# Patient Record
Sex: FEMALE | Race: WHITE | NOT HISPANIC OR LATINO | Employment: OTHER | URBAN - NONMETROPOLITAN AREA
[De-identification: names, ages, dates, MRNs, and addresses within clinical notes are randomized per-mention and may not be internally consistent; named-entity substitution may affect disease eponyms.]

---

## 2017-02-24 ENCOUNTER — TELEPHONE (OUTPATIENT)
Dept: UROLOGY | Facility: CLINIC | Age: 77
End: 2017-02-24

## 2017-02-27 ENCOUNTER — TELEPHONE (OUTPATIENT)
Dept: UROLOGY | Facility: CLINIC | Age: 77
End: 2017-02-27

## 2017-02-27 DIAGNOSIS — N39.0 URINARY TRACT INFECTION, SITE UNSPECIFIED: Primary | ICD-10-CM

## 2017-02-27 RX ORDER — PHENAZOPYRIDINE HYDROCHLORIDE 200 MG/1
200 TABLET, FILM COATED ORAL 3 TIMES DAILY PRN
Qty: 30 TABLET | Refills: 0 | Status: SHIPPED | OUTPATIENT
Start: 2017-02-27 | End: 2017-03-09

## 2017-02-28 ENCOUNTER — TELEPHONE (OUTPATIENT)
Dept: UROLOGY | Facility: CLINIC | Age: 77
End: 2017-02-28

## 2017-03-07 ENCOUNTER — OFFICE VISIT (OUTPATIENT)
Dept: UROLOGY | Facility: CLINIC | Age: 77
End: 2017-03-07

## 2017-03-07 VITALS
BODY MASS INDEX: 36.7 KG/M2 | SYSTOLIC BLOOD PRESSURE: 142 MMHG | HEIGHT: 64 IN | WEIGHT: 215 LBS | DIASTOLIC BLOOD PRESSURE: 70 MMHG | TEMPERATURE: 97.1 F

## 2017-03-07 DIAGNOSIS — N30.20 CHRONIC CYSTITIS: ICD-10-CM

## 2017-03-07 DIAGNOSIS — N39.0 URINARY TRACT INFECTION, SITE UNSPECIFIED: Primary | ICD-10-CM

## 2017-03-07 DIAGNOSIS — N20.0 RENAL STONE: ICD-10-CM

## 2017-03-07 LAB
BILIRUB BLD-MCNC: NEGATIVE MG/DL
CLARITY, POC: CLEAR
COLOR UR: YELLOW
GLUCOSE UR STRIP-MCNC: NEGATIVE MG/DL
KETONES UR QL: NEGATIVE
LEUKOCYTE EST, POC: ABNORMAL
NITRITE UR-MCNC: POSITIVE MG/ML
PH UR: 6 [PH] (ref 5–8)
PROT UR STRIP-MCNC: ABNORMAL MG/DL
RBC # UR STRIP: ABNORMAL /UL
SP GR UR: 1.02 (ref 1–1.03)
UROBILINOGEN UR QL: NORMAL

## 2017-03-07 PROCEDURE — 99214 OFFICE O/P EST MOD 30 MIN: CPT | Performed by: UROLOGY

## 2017-03-07 PROCEDURE — 81003 URINALYSIS AUTO W/O SCOPE: CPT | Performed by: UROLOGY

## 2017-04-18 ENCOUNTER — TELEPHONE (OUTPATIENT)
Dept: UROLOGY | Facility: CLINIC | Age: 77
End: 2017-04-18

## 2017-04-25 ENCOUNTER — OFFICE VISIT (OUTPATIENT)
Dept: UROLOGY | Facility: CLINIC | Age: 77
End: 2017-04-25

## 2017-04-25 ENCOUNTER — HOSPITAL ENCOUNTER (OUTPATIENT)
Dept: GENERAL RADIOLOGY | Facility: HOSPITAL | Age: 77
Discharge: HOME | End: 2017-04-25
Attending: UROLOGY | Admitting: UROLOGY

## 2017-04-25 VITALS — WEIGHT: 215 LBS | HEIGHT: 65 IN | BODY MASS INDEX: 35.82 KG/M2 | TEMPERATURE: 97.7 F

## 2017-04-25 DIAGNOSIS — N20.0 RENAL STONE: ICD-10-CM

## 2017-04-25 DIAGNOSIS — N30.20 CHRONIC CYSTITIS: Primary | ICD-10-CM

## 2017-04-25 LAB
BILIRUB BLD-MCNC: NEGATIVE MG/DL
CLARITY, POC: CLEAR
COLOR UR: YELLOW
GLUCOSE UR STRIP-MCNC: NEGATIVE MG/DL
KETONES UR QL: NEGATIVE
LEUKOCYTE EST, POC: ABNORMAL
NITRITE UR-MCNC: NEGATIVE MG/ML
PH UR: 5.5 [PH] (ref 5–8)
PROT UR STRIP-MCNC: NEGATIVE MG/DL
RBC # UR STRIP: ABNORMAL /UL
SP GR UR: 1.02 (ref 1–1.03)
UROBILINOGEN UR QL: NORMAL

## 2017-04-25 PROCEDURE — 74000 HC ABDOMEN KUB: CPT

## 2017-04-25 PROCEDURE — 81003 URINALYSIS AUTO W/O SCOPE: CPT | Performed by: UROLOGY

## 2017-04-25 PROCEDURE — 87088 URINE BACTERIA CULTURE: CPT | Performed by: UROLOGY

## 2017-04-25 PROCEDURE — 87186 SC STD MICRODIL/AGAR DIL: CPT | Performed by: UROLOGY

## 2017-04-25 PROCEDURE — 87086 URINE CULTURE/COLONY COUNT: CPT | Performed by: UROLOGY

## 2017-04-25 PROCEDURE — 99213 OFFICE O/P EST LOW 20 MIN: CPT | Performed by: UROLOGY

## 2017-04-28 LAB — BACTERIA SPEC AEROBE CULT: ABNORMAL

## 2017-05-01 ENCOUNTER — TELEPHONE (OUTPATIENT)
Dept: UROLOGY | Facility: CLINIC | Age: 77
End: 2017-05-01

## 2017-05-05 ENCOUNTER — TELEPHONE (OUTPATIENT)
Dept: UROLOGY | Facility: CLINIC | Age: 77
End: 2017-05-05

## 2017-06-22 ENCOUNTER — HOSPITAL ENCOUNTER (OUTPATIENT)
Dept: ULTRASOUND IMAGING | Age: 77
Discharge: HOME | End: 2017-06-22
Payer: MEDICARE

## 2017-06-22 DIAGNOSIS — R94.5 ABNORMAL RESULTS OF LIVER FUNCTION STUDIES: ICD-10-CM

## 2017-06-22 PROCEDURE — 76705 ECHO EXAM OF ABDOMEN: CPT

## 2017-10-31 ENCOUNTER — HOSPITAL ENCOUNTER (OUTPATIENT)
Dept: GENERAL RADIOLOGY | Facility: HOSPITAL | Age: 77
Discharge: HOME | End: 2017-10-31
Attending: UROLOGY | Admitting: UROLOGY

## 2017-10-31 ENCOUNTER — OFFICE VISIT (OUTPATIENT)
Dept: UROLOGY | Facility: CLINIC | Age: 77
End: 2017-10-31

## 2017-10-31 VITALS — BODY MASS INDEX: 35.82 KG/M2 | TEMPERATURE: 96 F | WEIGHT: 215 LBS | HEIGHT: 65 IN

## 2017-10-31 DIAGNOSIS — N30.20 CHRONIC CYSTITIS: Primary | ICD-10-CM

## 2017-10-31 DIAGNOSIS — N20.0 RENAL STONE: ICD-10-CM

## 2017-10-31 DIAGNOSIS — N30.20 CHRONIC CYSTITIS WITHOUT HEMATURIA: ICD-10-CM

## 2017-10-31 LAB
BILIRUB BLD-MCNC: NEGATIVE MG/DL
CLARITY, POC: CLEAR
COLOR UR: YELLOW
GLUCOSE UR STRIP-MCNC: NEGATIVE MG/DL
KETONES UR QL: NEGATIVE
LEUKOCYTE EST, POC: ABNORMAL
NITRITE UR-MCNC: POSITIVE MG/ML
PH UR: 6 [PH] (ref 5–8)
PROT UR STRIP-MCNC: NEGATIVE MG/DL
RBC # UR STRIP: ABNORMAL /UL
SP GR UR: 1.02 (ref 1–1.03)
UROBILINOGEN UR QL: NORMAL

## 2017-10-31 PROCEDURE — 81003 URINALYSIS AUTO W/O SCOPE: CPT | Performed by: UROLOGY

## 2017-10-31 PROCEDURE — 99213 OFFICE O/P EST LOW 20 MIN: CPT | Performed by: UROLOGY

## 2017-10-31 PROCEDURE — 74000 HC ABDOMEN KUB: CPT

## 2017-10-31 RX ORDER — AMPICILLIN 500 MG/1
CAPSULE ORAL
Refills: 0 | COMMUNITY
Start: 2017-10-14 | End: 2018-05-18

## 2017-10-31 RX ORDER — CLINDAMYCIN HYDROCHLORIDE 300 MG/1
CAPSULE ORAL
Refills: 0 | COMMUNITY
Start: 2017-10-23 | End: 2018-05-18

## 2018-03-26 ENCOUNTER — TELEPHONE (OUTPATIENT)
Dept: NEUROLOGY | Age: 78
End: 2018-03-26

## 2018-05-18 ENCOUNTER — TELEPHONE (OUTPATIENT)
Dept: UROLOGY | Facility: CLINIC | Age: 78
End: 2018-05-18

## 2018-05-18 PROCEDURE — 87086 URINE CULTURE/COLONY COUNT: CPT | Performed by: NURSE PRACTITIONER

## 2018-05-18 PROCEDURE — 87186 SC STD MICRODIL/AGAR DIL: CPT | Performed by: NURSE PRACTITIONER

## 2018-05-18 PROCEDURE — 87088 URINE BACTERIA CULTURE: CPT | Performed by: NURSE PRACTITIONER

## 2018-05-31 ENCOUNTER — LAB (OUTPATIENT)
Dept: LAB | Facility: HOSPITAL | Age: 78
End: 2018-05-31

## 2018-05-31 ENCOUNTER — TRANSCRIBE ORDERS (OUTPATIENT)
Dept: ADMINISTRATIVE | Facility: HOSPITAL | Age: 78
End: 2018-05-31

## 2018-05-31 DIAGNOSIS — R30.0 DYSURIA: ICD-10-CM

## 2018-05-31 DIAGNOSIS — R30.0 DYSURIA: Primary | ICD-10-CM

## 2018-05-31 LAB
BACTERIA UR QL AUTO: ABNORMAL /HPF
BILIRUB UR QL STRIP: ABNORMAL
CLARITY UR: CLEAR
COLOR UR: YELLOW
GLUCOSE UR STRIP-MCNC: ABNORMAL MG/DL
HGB UR QL STRIP.AUTO: ABNORMAL
HYALINE CASTS UR QL AUTO: ABNORMAL /LPF
KETONES UR QL STRIP: NEGATIVE
LEUKOCYTE ESTERASE UR QL STRIP.AUTO: ABNORMAL
MUCOUS THREADS URNS QL MICRO: ABNORMAL /HPF
NITRITE UR QL STRIP: POSITIVE
PH UR STRIP.AUTO: 6.5 [PH] (ref 5–8)
PROT UR QL STRIP: ABNORMAL
RBC # UR: ABNORMAL /HPF
REF LAB TEST METHOD: ABNORMAL
SP GR UR STRIP: 1.02 (ref 1–1.03)
SQUAMOUS #/AREA URNS HPF: ABNORMAL /HPF
UROBILINOGEN UR QL STRIP: ABNORMAL
WBC UR QL AUTO: ABNORMAL /HPF

## 2018-05-31 PROCEDURE — 87186 SC STD MICRODIL/AGAR DIL: CPT | Performed by: NURSE PRACTITIONER

## 2018-05-31 PROCEDURE — 87088 URINE BACTERIA CULTURE: CPT | Performed by: NURSE PRACTITIONER

## 2018-05-31 PROCEDURE — 81001 URINALYSIS AUTO W/SCOPE: CPT

## 2018-05-31 PROCEDURE — 87086 URINE CULTURE/COLONY COUNT: CPT | Performed by: NURSE PRACTITIONER

## 2018-05-31 PROCEDURE — 87077 CULTURE AEROBIC IDENTIFY: CPT | Performed by: NURSE PRACTITIONER

## 2018-06-05 ENCOUNTER — OFFICE VISIT (OUTPATIENT)
Dept: UROLOGY | Facility: CLINIC | Age: 78
End: 2018-06-05

## 2018-06-05 VITALS
DIASTOLIC BLOOD PRESSURE: 70 MMHG | BODY MASS INDEX: 34.75 KG/M2 | SYSTOLIC BLOOD PRESSURE: 132 MMHG | TEMPERATURE: 97.7 F | HEIGHT: 65 IN | WEIGHT: 208.6 LBS

## 2018-06-05 DIAGNOSIS — R10.9 FLANK PAIN: ICD-10-CM

## 2018-06-05 DIAGNOSIS — N20.0 RENAL STONE: ICD-10-CM

## 2018-06-05 DIAGNOSIS — N30.20 CHRONIC CYSTITIS: Primary | ICD-10-CM

## 2018-06-05 LAB
BACTERIA SPEC AEROBE CULT: ABNORMAL
BILIRUB BLD-MCNC: NEGATIVE MG/DL
CLARITY, POC: CLEAR
COLOR UR: YELLOW
GLUCOSE UR STRIP-MCNC: NEGATIVE MG/DL
KETONES UR QL: NEGATIVE
LEUKOCYTE EST, POC: ABNORMAL
NITRITE UR-MCNC: POSITIVE MG/ML
PH UR: 6 [PH] (ref 5–8)
PROT UR STRIP-MCNC: NEGATIVE MG/DL
RBC # UR STRIP: ABNORMAL /UL
SP GR UR: 1.02 (ref 1–1.03)
UROBILINOGEN UR QL: NORMAL

## 2018-06-05 PROCEDURE — 99214 OFFICE O/P EST MOD 30 MIN: CPT | Performed by: UROLOGY

## 2018-06-05 PROCEDURE — 81001 URINALYSIS AUTO W/SCOPE: CPT | Performed by: UROLOGY

## 2018-06-05 RX ORDER — LEVOTHYROXINE SODIUM 0.1 MG/1
100 TABLET ORAL DAILY
COMMUNITY

## 2018-06-05 RX ORDER — NITROFURANTOIN 25; 75 MG/1; MG/1
100 CAPSULE ORAL 2 TIMES DAILY
COMMUNITY
End: 2018-07-30

## 2018-06-05 RX ORDER — CETIRIZINE HYDROCHLORIDE 10 MG/1
10 TABLET ORAL DAILY
COMMUNITY

## 2018-06-06 ENCOUNTER — TELEPHONE (OUTPATIENT)
Dept: UROLOGY | Facility: CLINIC | Age: 78
End: 2018-06-06

## 2018-06-07 ENCOUNTER — TELEPHONE (OUTPATIENT)
Dept: UROLOGY | Facility: CLINIC | Age: 78
End: 2018-06-07

## 2018-06-08 ENCOUNTER — APPOINTMENT (OUTPATIENT)
Dept: ONCOLOGY | Facility: HOSPITAL | Age: 78
End: 2018-06-08

## 2018-06-09 ENCOUNTER — APPOINTMENT (OUTPATIENT)
Dept: ONCOLOGY | Facility: HOSPITAL | Age: 78
End: 2018-06-09

## 2018-06-10 ENCOUNTER — APPOINTMENT (OUTPATIENT)
Dept: ONCOLOGY | Facility: HOSPITAL | Age: 78
End: 2018-06-10

## 2018-06-11 ENCOUNTER — APPOINTMENT (OUTPATIENT)
Dept: ONCOLOGY | Facility: HOSPITAL | Age: 78
End: 2018-06-11

## 2018-06-12 ENCOUNTER — APPOINTMENT (OUTPATIENT)
Dept: ONCOLOGY | Facility: HOSPITAL | Age: 78
End: 2018-06-12

## 2018-06-18 ENCOUNTER — TELEPHONE (OUTPATIENT)
Dept: UROLOGY | Facility: CLINIC | Age: 78
End: 2018-06-18

## 2018-07-13 ENCOUNTER — PROCEDURE VISIT (OUTPATIENT)
Dept: UROLOGY | Facility: CLINIC | Age: 78
End: 2018-07-13

## 2018-07-13 DIAGNOSIS — N30.20 CHRONIC CYSTITIS: Primary | ICD-10-CM

## 2018-07-13 PROCEDURE — 87077 CULTURE AEROBIC IDENTIFY: CPT | Performed by: UROLOGY

## 2018-07-13 PROCEDURE — 87186 SC STD MICRODIL/AGAR DIL: CPT | Performed by: UROLOGY

## 2018-07-13 PROCEDURE — 87088 URINE BACTERIA CULTURE: CPT | Performed by: UROLOGY

## 2018-07-13 PROCEDURE — 87086 URINE CULTURE/COLONY COUNT: CPT | Performed by: UROLOGY

## 2018-07-13 PROCEDURE — 99024 POSTOP FOLLOW-UP VISIT: CPT | Performed by: UROLOGY

## 2018-07-16 ENCOUNTER — TELEPHONE (OUTPATIENT)
Dept: UROLOGY | Facility: CLINIC | Age: 78
End: 2018-07-16

## 2018-07-17 LAB
BACTERIA SPEC AEROBE CULT: ABNORMAL

## 2018-07-20 ENCOUNTER — PROCEDURE VISIT (OUTPATIENT)
Dept: UROLOGY | Facility: CLINIC | Age: 78
End: 2018-07-20

## 2018-07-20 DIAGNOSIS — N30.20 CHRONIC CYSTITIS: Primary | ICD-10-CM

## 2018-07-20 PROCEDURE — 87088 URINE BACTERIA CULTURE: CPT | Performed by: UROLOGY

## 2018-07-20 PROCEDURE — 87186 SC STD MICRODIL/AGAR DIL: CPT | Performed by: UROLOGY

## 2018-07-20 PROCEDURE — 87077 CULTURE AEROBIC IDENTIFY: CPT | Performed by: UROLOGY

## 2018-07-20 PROCEDURE — 87086 URINE CULTURE/COLONY COUNT: CPT | Performed by: UROLOGY

## 2018-07-23 LAB
BACTERIA SPEC AEROBE CULT: ABNORMAL

## 2018-07-30 PROCEDURE — 87086 URINE CULTURE/COLONY COUNT: CPT | Performed by: NURSE PRACTITIONER

## 2018-07-30 PROCEDURE — 87077 CULTURE AEROBIC IDENTIFY: CPT | Performed by: NURSE PRACTITIONER

## 2018-07-30 PROCEDURE — 87088 URINE BACTERIA CULTURE: CPT | Performed by: NURSE PRACTITIONER

## 2018-07-30 PROCEDURE — 87181 SC STD AGAR DILUTION PER AGT: CPT | Performed by: NURSE PRACTITIONER

## 2018-07-30 PROCEDURE — 87186 SC STD MICRODIL/AGAR DIL: CPT | Performed by: NURSE PRACTITIONER

## 2018-08-02 ENCOUNTER — TELEPHONE (OUTPATIENT)
Dept: URGENT CARE | Facility: CLINIC | Age: 78
End: 2018-08-02

## 2018-08-02 DIAGNOSIS — N39.0 UTI (URINARY TRACT INFECTION), UNCOMPLICATED: Primary | ICD-10-CM

## 2018-08-02 RX ORDER — LEVOFLOXACIN 500 MG/1
500 TABLET, FILM COATED ORAL DAILY
Qty: 7 TABLET | Refills: 0 | Status: SHIPPED | OUTPATIENT
Start: 2018-08-02 | End: 2018-08-09

## 2018-08-14 PROCEDURE — 87088 URINE BACTERIA CULTURE: CPT | Performed by: FAMILY MEDICINE

## 2018-08-14 PROCEDURE — 87186 SC STD MICRODIL/AGAR DIL: CPT | Performed by: FAMILY MEDICINE

## 2018-08-14 PROCEDURE — 87086 URINE CULTURE/COLONY COUNT: CPT | Performed by: FAMILY MEDICINE

## 2018-08-21 ENCOUNTER — TELEPHONE (OUTPATIENT)
Dept: UROLOGY | Facility: CLINIC | Age: 78
End: 2018-08-21

## 2018-08-27 ENCOUNTER — OFFICE VISIT (OUTPATIENT)
Dept: NEUROLOGY | Age: 78
End: 2018-08-27
Payer: MEDICARE

## 2018-08-27 VITALS
SYSTOLIC BLOOD PRESSURE: 117 MMHG | WEIGHT: 208 LBS | BODY MASS INDEX: 34.66 KG/M2 | DIASTOLIC BLOOD PRESSURE: 70 MMHG | HEIGHT: 65 IN | HEART RATE: 78 BPM

## 2018-08-27 DIAGNOSIS — G89.29 CHRONIC BILATERAL LOW BACK PAIN WITH BILATERAL SCIATICA: ICD-10-CM

## 2018-08-27 DIAGNOSIS — M54.41 CHRONIC BILATERAL LOW BACK PAIN WITH BILATERAL SCIATICA: ICD-10-CM

## 2018-08-27 DIAGNOSIS — M54.42 CHRONIC BILATERAL LOW BACK PAIN WITH BILATERAL SCIATICA: ICD-10-CM

## 2018-08-27 DIAGNOSIS — M54.2 CERVICALGIA: ICD-10-CM

## 2018-08-27 DIAGNOSIS — G60.9 IDIOPATHIC PERIPHERAL NEUROPATHY: Primary | ICD-10-CM

## 2018-08-27 PROCEDURE — 4004F PT TOBACCO SCREEN RCVD TLK: CPT | Performed by: PSYCHIATRY & NEUROLOGY

## 2018-08-27 PROCEDURE — 1090F PRES/ABSN URINE INCON ASSESS: CPT | Performed by: PSYCHIATRY & NEUROLOGY

## 2018-08-27 PROCEDURE — G8427 DOCREV CUR MEDS BY ELIG CLIN: HCPCS | Performed by: PSYCHIATRY & NEUROLOGY

## 2018-08-27 PROCEDURE — 99203 OFFICE O/P NEW LOW 30 MIN: CPT | Performed by: PSYCHIATRY & NEUROLOGY

## 2018-08-27 PROCEDURE — 1123F ACP DISCUSS/DSCN MKR DOCD: CPT | Performed by: PSYCHIATRY & NEUROLOGY

## 2018-08-27 PROCEDURE — G8400 PT W/DXA NO RESULTS DOC: HCPCS | Performed by: PSYCHIATRY & NEUROLOGY

## 2018-08-27 PROCEDURE — 1101F PT FALLS ASSESS-DOCD LE1/YR: CPT | Performed by: PSYCHIATRY & NEUROLOGY

## 2018-08-27 PROCEDURE — 4040F PNEUMOC VAC/ADMIN/RCVD: CPT | Performed by: PSYCHIATRY & NEUROLOGY

## 2018-08-27 PROCEDURE — G8417 CALC BMI ABV UP PARAM F/U: HCPCS | Performed by: PSYCHIATRY & NEUROLOGY

## 2018-08-27 RX ORDER — OXYCODONE HYDROCHLORIDE 10 MG/1
10 TABLET ORAL EVERY 6 HOURS
COMMUNITY
End: 2020-01-01

## 2018-08-27 RX ORDER — GABAPENTIN 100 MG/1
300 CAPSULE ORAL 3 TIMES DAILY
Status: ON HOLD | COMMUNITY
End: 2020-01-01 | Stop reason: SDUPTHER

## 2018-08-27 RX ORDER — NAPROXEN 250 MG/1
250 TABLET ORAL 2 TIMES DAILY
COMMUNITY
End: 2020-01-01

## 2018-08-27 RX ORDER — LEVOTHYROXINE SODIUM 137 UG/1
137 TABLET ORAL DAILY
COMMUNITY
End: 2020-01-01

## 2018-08-27 RX ORDER — LISINOPRIL AND HYDROCHLOROTHIAZIDE 12.5; 1 MG/1; MG/1
1 TABLET ORAL DAILY
COMMUNITY
End: 2020-01-01

## 2018-08-27 RX ORDER — LISINOPRIL AND HYDROCHLOROTHIAZIDE 20; 12.5 MG/1; MG/1
20 TABLET ORAL DAILY
COMMUNITY
End: 2020-01-01

## 2018-08-27 RX ORDER — CETIRIZINE HYDROCHLORIDE 10 MG/1
10 TABLET ORAL DAILY
Status: ON HOLD | COMMUNITY
End: 2020-01-01 | Stop reason: HOSPADM

## 2018-08-30 ENCOUNTER — OFFICE VISIT (OUTPATIENT)
Dept: UROLOGY | Facility: CLINIC | Age: 78
End: 2018-08-30

## 2018-08-30 ENCOUNTER — HOSPITAL ENCOUNTER (OUTPATIENT)
Dept: GENERAL RADIOLOGY | Facility: HOSPITAL | Age: 78
Discharge: HOME | End: 2018-08-30
Attending: UROLOGY | Admitting: UROLOGY

## 2018-08-30 VITALS
SYSTOLIC BLOOD PRESSURE: 142 MMHG | DIASTOLIC BLOOD PRESSURE: 78 MMHG | HEIGHT: 65 IN | BODY MASS INDEX: 33.82 KG/M2 | WEIGHT: 203 LBS | TEMPERATURE: 97.7 F

## 2018-08-30 DIAGNOSIS — N20.0 RENAL STONE: ICD-10-CM

## 2018-08-30 DIAGNOSIS — N30.00 ACUTE CYSTITIS WITHOUT HEMATURIA: ICD-10-CM

## 2018-08-30 DIAGNOSIS — N30.20 CHRONIC CYSTITIS WITHOUT HEMATURIA: ICD-10-CM

## 2018-08-30 DIAGNOSIS — R10.9 FLANK PAIN: ICD-10-CM

## 2018-08-30 DIAGNOSIS — N30.20 CHRONIC CYSTITIS: Primary | ICD-10-CM

## 2018-08-30 LAB
BILIRUB BLD-MCNC: NEGATIVE MG/DL
CLARITY, POC: CLEAR
COLOR UR: YELLOW
GLUCOSE UR STRIP-MCNC: NEGATIVE MG/DL
KETONES UR QL: NEGATIVE
LEUKOCYTE EST, POC: ABNORMAL
NITRITE UR-MCNC: NEGATIVE MG/ML
PH UR: 6.5 [PH] (ref 5–8)
PROT UR STRIP-MCNC: NEGATIVE MG/DL
RBC # UR STRIP: ABNORMAL /UL
SP GR UR: 1.02 (ref 1–1.03)
UROBILINOGEN UR QL: NORMAL

## 2018-08-30 PROCEDURE — 99213 OFFICE O/P EST LOW 20 MIN: CPT | Performed by: UROLOGY

## 2018-08-30 PROCEDURE — 81001 URINALYSIS AUTO W/SCOPE: CPT | Performed by: UROLOGY

## 2018-08-30 PROCEDURE — 74018 RADEX ABDOMEN 1 VIEW: CPT

## 2018-08-31 ENCOUNTER — TELEPHONE (OUTPATIENT)
Dept: NEUROLOGY | Age: 78
End: 2018-08-31

## 2018-09-07 ENCOUNTER — HOSPITAL ENCOUNTER (OUTPATIENT)
Dept: NEUROLOGY | Age: 78
Discharge: HOME | End: 2018-09-07
Payer: MEDICARE

## 2018-09-07 DIAGNOSIS — G60.9 IDIOPATHIC PERIPHERAL NEUROPATHY: ICD-10-CM

## 2018-09-13 ENCOUNTER — NURSE TRIAGE (OUTPATIENT)
Dept: CALL CENTER | Facility: HOSPITAL | Age: 78
End: 2018-09-13

## 2018-09-21 ENCOUNTER — TELEPHONE (OUTPATIENT)
Dept: URGENT CARE | Facility: CLINIC | Age: 78
End: 2018-09-21

## 2018-09-21 ENCOUNTER — HOSPITAL ENCOUNTER (OUTPATIENT)
Dept: NEUROLOGY | Age: 78
Discharge: HOME | End: 2018-09-21
Payer: MEDICARE

## 2018-09-21 PROCEDURE — 95886 MUSC TEST DONE W/N TEST COMP: CPT

## 2018-09-21 PROCEDURE — 95886 MUSC TEST DONE W/N TEST COMP: CPT | Performed by: PSYCHIATRY & NEUROLOGY

## 2018-09-21 PROCEDURE — 95911 NRV CNDJ TEST 9-10 STUDIES: CPT

## 2018-09-21 PROCEDURE — 95911 NRV CNDJ TEST 9-10 STUDIES: CPT | Performed by: PSYCHIATRY & NEUROLOGY

## 2018-09-25 ENCOUNTER — TELEPHONE (OUTPATIENT)
Dept: UROLOGY | Facility: CLINIC | Age: 78
End: 2018-09-25

## 2018-09-26 ENCOUNTER — TELEPHONE (OUTPATIENT)
Dept: NEUROLOGY | Age: 78
End: 2018-09-26

## 2018-09-26 DIAGNOSIS — G60.9 IDIOPATHIC PERIPHERAL NEUROPATHY: Primary | ICD-10-CM

## 2018-09-26 ASSESSMENT — ENCOUNTER SYMPTOMS
BLURRED VISION: 1
BACK PAIN: 1
DOUBLE VISION: 0
NAUSEA: 1
CONSTIPATION: 1
SHORTNESS OF BREATH: 0
ABDOMINAL PAIN: 0
VOMITING: 0
DIARRHEA: 0
SPUTUM PRODUCTION: 0
HEMOPTYSIS: 0
COUGH: 0
BLOOD IN STOOL: 0

## 2018-10-02 DIAGNOSIS — N20.0 RENAL STONE: Primary | ICD-10-CM

## 2018-10-05 DIAGNOSIS — G60.9 IDIOPATHIC PERIPHERAL NEUROPATHY: ICD-10-CM

## 2018-10-05 LAB
FOLATE: >20 NG/ML (ref 4.8–37.3)
SEDIMENTATION RATE, ERYTHROCYTE: 28 MM/HR (ref 0–25)
VITAMIN B-12: >2000 PG/ML (ref 211–946)

## 2018-10-08 LAB — ANA IGG, ELISA: NORMAL

## 2018-10-09 LAB
+IMM: NORMAL
ALBUMIN SERPL-MCNC: 4.54 G/DL (ref 3.75–5.01)
ALPHA-1-GLOBULIN: 0.35 G/DL (ref 0.19–0.46)
ALPHA-2-GLOBULIN: 0.82 G/DL (ref 0.48–1.05)
BETA GLOBULIN: 0.94 G/DL (ref 0.48–1.1)
GAMMA GLOBULIN: 0.94 G/DL (ref 0.62–1.51)
IGA: 198 MG/DL (ref 68–408)
IGG: 820 MG/DL (ref 768–1632)
IGM: 40 MG/DL (ref 35–263)
SPE/IFE INTERPRETATION: NORMAL
TOTAL PROTEIN: 7.6 G/DL (ref 6–8.3)

## 2018-10-10 ENCOUNTER — TELEPHONE (OUTPATIENT)
Dept: NEUROLOGY | Age: 78
End: 2018-10-10

## 2018-10-23 ENCOUNTER — OFFICE VISIT (OUTPATIENT)
Dept: OBSTETRICS AND GYNECOLOGY | Facility: CLINIC | Age: 78
End: 2018-10-23

## 2018-10-23 VITALS
DIASTOLIC BLOOD PRESSURE: 60 MMHG | BODY MASS INDEX: 33.82 KG/M2 | HEIGHT: 65 IN | SYSTOLIC BLOOD PRESSURE: 130 MMHG | WEIGHT: 203 LBS

## 2018-10-23 DIAGNOSIS — N90.89 VULVAR IRRITATION: ICD-10-CM

## 2018-10-23 DIAGNOSIS — N81.10 FEMALE CYSTOCELE: Primary | ICD-10-CM

## 2018-10-23 DIAGNOSIS — R35.0 FREQUENCY OF URINATION: ICD-10-CM

## 2018-10-23 DIAGNOSIS — Z87.891 FORMER SMOKER: ICD-10-CM

## 2018-10-23 DIAGNOSIS — Z78.0 MENOPAUSE: ICD-10-CM

## 2018-10-23 DIAGNOSIS — N39.0 RECURRENT UTI: ICD-10-CM

## 2018-10-23 DIAGNOSIS — Z01.411 ENCOUNTER FOR GYNECOLOGICAL EXAMINATION WITH ABNORMAL FINDING: ICD-10-CM

## 2018-10-23 PROCEDURE — G0101 CA SCREEN;PELVIC/BREAST EXAM: HCPCS | Performed by: OBSTETRICS & GYNECOLOGY

## 2018-10-23 PROCEDURE — 99213 OFFICE O/P EST LOW 20 MIN: CPT | Performed by: OBSTETRICS & GYNECOLOGY

## 2018-10-23 RX ORDER — HYDROXYZINE PAMOATE 25 MG/1
CAPSULE ORAL
Refills: 1 | Status: ON HOLD | COMMUNITY
Start: 2018-10-08 | End: 2018-11-26

## 2018-10-26 LAB
BACTERIA UR CULT: ABNORMAL
BACTERIA UR CULT: ABNORMAL
OTHER ANTIBIOTIC SUSC ISLT: ABNORMAL

## 2018-10-30 RX ORDER — CIPROFLOXACIN 250 MG/1
250 TABLET, FILM COATED ORAL 2 TIMES DAILY
Qty: 10 TABLET | Refills: 0 | Status: ON HOLD | OUTPATIENT
Start: 2018-10-30 | End: 2018-11-26

## 2018-11-02 ENCOUNTER — TELEPHONE (OUTPATIENT)
Dept: UROLOGY | Facility: CLINIC | Age: 78
End: 2018-11-02

## 2018-11-12 DIAGNOSIS — R30.0 DYSURIA: Primary | ICD-10-CM

## 2018-11-17 LAB
BACTERIA UR CULT: ABNORMAL
BACTERIA UR CULT: ABNORMAL
OTHER ANTIBIOTIC SUSC ISLT: ABNORMAL

## 2018-11-19 ENCOUNTER — RESULTS ENCOUNTER (OUTPATIENT)
Dept: OBSTETRICS AND GYNECOLOGY | Facility: CLINIC | Age: 78
End: 2018-11-19

## 2018-11-19 DIAGNOSIS — N30.00 ACUTE CYSTITIS WITHOUT HEMATURIA: Primary | ICD-10-CM

## 2018-11-19 DIAGNOSIS — N30.00 ACUTE CYSTITIS WITHOUT HEMATURIA: ICD-10-CM

## 2018-11-19 RX ORDER — NITROFURANTOIN 25; 75 MG/1; MG/1
100 CAPSULE ORAL 2 TIMES DAILY
Qty: 10 CAPSULE | Refills: 0 | Status: ON HOLD | OUTPATIENT
Start: 2018-11-19 | End: 2018-11-26

## 2018-11-25 ENCOUNTER — ANESTHESIA (OUTPATIENT)
Dept: PERIOP | Facility: HOSPITAL | Age: 78
End: 2018-11-25

## 2018-11-25 ENCOUNTER — APPOINTMENT (OUTPATIENT)
Dept: GENERAL RADIOLOGY | Facility: HOSPITAL | Age: 78
End: 2018-11-25

## 2018-11-25 ENCOUNTER — APPOINTMENT (OUTPATIENT)
Dept: CT IMAGING | Facility: HOSPITAL | Age: 78
End: 2018-11-25

## 2018-11-25 ENCOUNTER — ANESTHESIA EVENT (OUTPATIENT)
Dept: PERIOP | Facility: HOSPITAL | Age: 78
End: 2018-11-25

## 2018-11-25 ENCOUNTER — HOSPITAL ENCOUNTER (INPATIENT)
Facility: HOSPITAL | Age: 78
LOS: 6 days | Discharge: NURSING, REHAB OR LONG-TERM CARE | End: 2018-12-01
Attending: EMERGENCY MEDICINE | Admitting: UROLOGY

## 2018-11-25 DIAGNOSIS — R26.9 GAIT ABNORMALITY: ICD-10-CM

## 2018-11-25 DIAGNOSIS — Z78.9 IMPAIRED MOBILITY AND ADLS: ICD-10-CM

## 2018-11-25 DIAGNOSIS — Z74.09 IMPAIRED MOBILITY AND ADLS: ICD-10-CM

## 2018-11-25 DIAGNOSIS — N15.9 KIDNEY INFECTION: ICD-10-CM

## 2018-11-25 DIAGNOSIS — R10.9 FLANK PAIN: ICD-10-CM

## 2018-11-25 DIAGNOSIS — N20.0 RENAL STONE: Primary | ICD-10-CM

## 2018-11-25 PROBLEM — N30.01 ACUTE CYSTITIS WITH HEMATURIA: Status: ACTIVE | Noted: 2018-11-25

## 2018-11-25 LAB
ALBUMIN SERPL-MCNC: 3.7 G/DL (ref 3.5–5)
ALBUMIN/GLOB SERPL: 1.1 G/DL (ref 1.1–2.5)
ALP SERPL-CCNC: 146 U/L (ref 24–120)
ALT SERPL W P-5'-P-CCNC: 37 U/L (ref 0–54)
ANION GAP SERPL CALCULATED.3IONS-SCNC: 11 MMOL/L (ref 4–13)
AST SERPL-CCNC: 66 U/L (ref 7–45)
BACTERIA UR QL AUTO: ABNORMAL /HPF
BASOPHILS # BLD AUTO: 0.07 10*3/MM3 (ref 0–0.2)
BASOPHILS NFR BLD AUTO: 0.4 % (ref 0–2)
BILIRUB SERPL-MCNC: 0.6 MG/DL (ref 0.1–1)
BILIRUB UR QL STRIP: NEGATIVE
BUN BLD-MCNC: 56 MG/DL (ref 5–21)
BUN/CREAT SERPL: 31.8 (ref 7–25)
CALCIUM SPEC-SCNC: 9.8 MG/DL (ref 8.4–10.4)
CHLORIDE SERPL-SCNC: 97 MMOL/L (ref 98–110)
CLARITY UR: ABNORMAL
CO2 SERPL-SCNC: 29 MMOL/L (ref 24–31)
COARSE GRAN CASTS URNS QL MICRO: ABNORMAL /LPF
COLOR UR: YELLOW
CREAT BLD-MCNC: 1.76 MG/DL (ref 0.5–1.4)
D-LACTATE SERPL-SCNC: 1 MMOL/L (ref 0.5–2)
D-LACTATE SERPL-SCNC: 1.2 MMOL/L (ref 0.5–2)
DEPRECATED RDW RBC AUTO: 51.3 FL (ref 40–54)
EOSINOPHIL # BLD AUTO: 0 10*3/MM3 (ref 0–0.7)
EOSINOPHIL NFR BLD AUTO: 0 % (ref 0–4)
ERYTHROCYTE [DISTWIDTH] IN BLOOD BY AUTOMATED COUNT: 15 % (ref 12–15)
GFR SERPL CREATININE-BSD FRML MDRD: 28 ML/MIN/1.73
GLOBULIN UR ELPH-MCNC: 3.4 GM/DL
GLUCOSE BLD-MCNC: 127 MG/DL (ref 70–100)
GLUCOSE UR STRIP-MCNC: NEGATIVE MG/DL
HCT VFR BLD AUTO: 36.6 % (ref 37–47)
HGB BLD-MCNC: 12.1 G/DL (ref 12–16)
HGB UR QL STRIP.AUTO: ABNORMAL
HYALINE CASTS UR QL AUTO: ABNORMAL /LPF
IMM GRANULOCYTES # BLD: 0.14 10*3/MM3 (ref 0–0.03)
IMM GRANULOCYTES NFR BLD: 0.8 % (ref 0–5)
KETONES UR QL STRIP: ABNORMAL
LEUKOCYTE ESTERASE UR QL STRIP.AUTO: ABNORMAL
LYMPHOCYTES # BLD AUTO: 0.86 10*3/MM3 (ref 0.72–4.86)
LYMPHOCYTES NFR BLD AUTO: 5 % (ref 15–45)
MCH RBC QN AUTO: 30.6 PG (ref 28–32)
MCHC RBC AUTO-ENTMCNC: 33.1 G/DL (ref 33–36)
MCV RBC AUTO: 92.7 FL (ref 82–98)
MONOCYTES # BLD AUTO: 1.04 10*3/MM3 (ref 0.19–1.3)
MONOCYTES NFR BLD AUTO: 6 % (ref 4–12)
NEUTROPHILS # BLD AUTO: 15.12 10*3/MM3 (ref 1.87–8.4)
NEUTROPHILS NFR BLD AUTO: 87.8 % (ref 39–78)
NITRITE UR QL STRIP: POSITIVE
NRBC BLD MANUAL-RTO: 0.1 /100 WBC (ref 0–0)
PH UR STRIP.AUTO: 6 [PH] (ref 5–8)
PLATELET # BLD AUTO: 282 10*3/MM3 (ref 130–400)
PMV BLD AUTO: 10.4 FL (ref 6–12)
POTASSIUM BLD-SCNC: 3.6 MMOL/L (ref 3.5–5.3)
PROT SERPL-MCNC: 7.1 G/DL (ref 6.3–8.7)
PROT UR QL STRIP: ABNORMAL
RBC # BLD AUTO: 3.95 10*6/MM3 (ref 4.2–5.4)
RBC # UR: ABNORMAL /HPF
REF LAB TEST METHOD: ABNORMAL
SODIUM BLD-SCNC: 137 MMOL/L (ref 135–145)
SP GR UR STRIP: 1.02 (ref 1–1.03)
SQUAMOUS #/AREA URNS HPF: ABNORMAL /HPF
UROBILINOGEN UR QL STRIP: ABNORMAL
WBC NRBC COR # BLD: 17.23 10*3/MM3 (ref 4.8–10.8)
WBC UR QL AUTO: ABNORMAL /HPF

## 2018-11-25 PROCEDURE — 99223 1ST HOSP IP/OBS HIGH 75: CPT | Performed by: UROLOGY

## 2018-11-25 PROCEDURE — 70450 CT HEAD/BRAIN W/O DYE: CPT

## 2018-11-25 PROCEDURE — 81001 URINALYSIS AUTO W/SCOPE: CPT | Performed by: EMERGENCY MEDICINE

## 2018-11-25 PROCEDURE — 74176 CT ABD & PELVIS W/O CONTRAST: CPT

## 2018-11-25 PROCEDURE — 85025 COMPLETE CBC W/AUTO DIFF WBC: CPT | Performed by: EMERGENCY MEDICINE

## 2018-11-25 PROCEDURE — C1758 CATHETER, URETERAL: HCPCS | Performed by: UROLOGY

## 2018-11-25 PROCEDURE — 25010000002 IOPAMIDOL 61 % SOLUTION: Performed by: UROLOGY

## 2018-11-25 PROCEDURE — 87086 URINE CULTURE/COLONY COUNT: CPT | Performed by: UROLOGY

## 2018-11-25 PROCEDURE — 87086 URINE CULTURE/COLONY COUNT: CPT | Performed by: EMERGENCY MEDICINE

## 2018-11-25 PROCEDURE — 99284 EMERGENCY DEPT VISIT MOD MDM: CPT

## 2018-11-25 PROCEDURE — BT1D1ZZ FLUOROSCOPY OF RIGHT KIDNEY, URETER AND BLADDER USING LOW OSMOLAR CONTRAST: ICD-10-PCS | Performed by: UROLOGY

## 2018-11-25 PROCEDURE — 87088 URINE BACTERIA CULTURE: CPT | Performed by: EMERGENCY MEDICINE

## 2018-11-25 PROCEDURE — 25010000002 FENTANYL CITRATE (PF) 100 MCG/2ML SOLUTION: Performed by: NURSE ANESTHETIST, CERTIFIED REGISTERED

## 2018-11-25 PROCEDURE — 87040 BLOOD CULTURE FOR BACTERIA: CPT | Performed by: INTERNAL MEDICINE

## 2018-11-25 PROCEDURE — 87088 URINE BACTERIA CULTURE: CPT | Performed by: UROLOGY

## 2018-11-25 PROCEDURE — 25010000002 PROPOFOL 10 MG/ML EMULSION: Performed by: NURSE ANESTHETIST, CERTIFIED REGISTERED

## 2018-11-25 PROCEDURE — 87186 SC STD MICRODIL/AGAR DIL: CPT | Performed by: EMERGENCY MEDICINE

## 2018-11-25 PROCEDURE — C1769 GUIDE WIRE: HCPCS | Performed by: UROLOGY

## 2018-11-25 PROCEDURE — 93005 ELECTROCARDIOGRAM TRACING: CPT | Performed by: NURSE ANESTHETIST, CERTIFIED REGISTERED

## 2018-11-25 PROCEDURE — 25010000002 ONDANSETRON PER 1 MG: Performed by: NURSE ANESTHETIST, CERTIFIED REGISTERED

## 2018-11-25 PROCEDURE — 93010 ELECTROCARDIOGRAM REPORT: CPT | Performed by: INTERNAL MEDICINE

## 2018-11-25 PROCEDURE — 74420 UROGRAPHY RTRGR +-KUB: CPT

## 2018-11-25 PROCEDURE — 25010000002 DEXAMETHASONE PER 1 MG: Performed by: NURSE ANESTHETIST, CERTIFIED REGISTERED

## 2018-11-25 PROCEDURE — 80053 COMPREHEN METABOLIC PANEL: CPT | Performed by: EMERGENCY MEDICINE

## 2018-11-25 PROCEDURE — 0T738DZ DILATION OF RIGHT KIDNEY PELVIS WITH INTRALUMINAL DEVICE, VIA NATURAL OR ARTIFICIAL OPENING ENDOSCOPIC: ICD-10-PCS | Performed by: UROLOGY

## 2018-11-25 PROCEDURE — 25010000003 MORPHINE 5 MG/ML SOLUTION: Performed by: EMERGENCY MEDICINE

## 2018-11-25 PROCEDURE — 52332 CYSTOSCOPY AND TREATMENT: CPT | Performed by: UROLOGY

## 2018-11-25 PROCEDURE — 83605 ASSAY OF LACTIC ACID: CPT | Performed by: INTERNAL MEDICINE

## 2018-11-25 PROCEDURE — C2617 STENT, NON-COR, TEM W/O DEL: HCPCS | Performed by: UROLOGY

## 2018-11-25 PROCEDURE — 25010000002 CEFTRIAXONE PER 250 MG: Performed by: EMERGENCY MEDICINE

## 2018-11-25 PROCEDURE — 83605 ASSAY OF LACTIC ACID: CPT | Performed by: EMERGENCY MEDICINE

## 2018-11-25 DEVICE — URETERAL STENT
Type: IMPLANTABLE DEVICE | Status: FUNCTIONAL
Brand: PERCUFLEX™ PLUS

## 2018-11-25 RX ORDER — SODIUM CHLORIDE, SODIUM LACTATE, POTASSIUM CHLORIDE, CALCIUM CHLORIDE 600; 310; 30; 20 MG/100ML; MG/100ML; MG/100ML; MG/100ML
100 INJECTION, SOLUTION INTRAVENOUS CONTINUOUS
Status: DISCONTINUED | OUTPATIENT
Start: 2018-11-25 | End: 2018-11-29

## 2018-11-25 RX ORDER — OXYCODONE HYDROCHLORIDE 15 MG/1
15 TABLET, FILM COATED, EXTENDED RELEASE ORAL EVERY 12 HOURS SCHEDULED
Status: DISCONTINUED | OUTPATIENT
Start: 2018-11-26 | End: 2018-11-26 | Stop reason: SDUPTHER

## 2018-11-25 RX ORDER — ACETAMINOPHEN 500 MG
1000 TABLET ORAL ONCE
Status: CANCELLED | OUTPATIENT
Start: 2018-11-25 | End: 2018-11-25

## 2018-11-25 RX ORDER — LABETALOL HYDROCHLORIDE 5 MG/ML
5 INJECTION, SOLUTION INTRAVENOUS
Status: DISCONTINUED | OUTPATIENT
Start: 2018-11-25 | End: 2018-11-25 | Stop reason: HOSPADM

## 2018-11-25 RX ORDER — MORPHINE SULFATE 5 MG/ML
4 INJECTION, SOLUTION INTRAMUSCULAR; INTRAVENOUS ONCE
Status: COMPLETED | OUTPATIENT
Start: 2018-11-25 | End: 2018-11-25

## 2018-11-25 RX ORDER — OXYCODONE AND ACETAMINOPHEN 10; 325 MG/1; MG/1
1 TABLET ORAL ONCE AS NEEDED
Status: DISCONTINUED | OUTPATIENT
Start: 2018-11-25 | End: 2018-11-25 | Stop reason: HOSPADM

## 2018-11-25 RX ORDER — MEPERIDINE HYDROCHLORIDE 25 MG/ML
12.5 INJECTION INTRAMUSCULAR; INTRAVENOUS; SUBCUTANEOUS
Status: DISCONTINUED | OUTPATIENT
Start: 2018-11-25 | End: 2018-11-25 | Stop reason: HOSPADM

## 2018-11-25 RX ORDER — GABAPENTIN 100 MG/1
100 CAPSULE ORAL 3 TIMES DAILY PRN
Status: DISCONTINUED | OUTPATIENT
Start: 2018-11-25 | End: 2018-12-01 | Stop reason: HOSPADM

## 2018-11-25 RX ORDER — PROPOFOL 10 MG/ML
VIAL (ML) INTRAVENOUS AS NEEDED
Status: DISCONTINUED | OUTPATIENT
Start: 2018-11-25 | End: 2018-11-25 | Stop reason: SURG

## 2018-11-25 RX ORDER — OXYCODONE AND ACETAMINOPHEN 10; 325 MG/1; MG/1
1 TABLET ORAL EVERY 6 HOURS PRN
Status: ON HOLD | COMMUNITY
End: 2018-12-01 | Stop reason: SDUPTHER

## 2018-11-25 RX ORDER — ONDANSETRON 2 MG/ML
INJECTION INTRAMUSCULAR; INTRAVENOUS AS NEEDED
Status: DISCONTINUED | OUTPATIENT
Start: 2018-11-25 | End: 2018-11-25 | Stop reason: SURG

## 2018-11-25 RX ORDER — ONDANSETRON 2 MG/ML
4 INJECTION INTRAMUSCULAR; INTRAVENOUS EVERY 6 HOURS PRN
Status: DISCONTINUED | OUTPATIENT
Start: 2018-11-25 | End: 2018-12-01 | Stop reason: HOSPADM

## 2018-11-25 RX ORDER — CETIRIZINE HYDROCHLORIDE 10 MG/1
10 TABLET ORAL DAILY
Status: DISCONTINUED | OUTPATIENT
Start: 2018-11-26 | End: 2018-12-01 | Stop reason: HOSPADM

## 2018-11-25 RX ORDER — SODIUM CHLORIDE 9 MG/ML
75 INJECTION, SOLUTION INTRAVENOUS CONTINUOUS
Status: DISCONTINUED | OUTPATIENT
Start: 2018-11-25 | End: 2018-11-29

## 2018-11-25 RX ORDER — MAGNESIUM HYDROXIDE 1200 MG/15ML
LIQUID ORAL AS NEEDED
Status: DISCONTINUED | OUTPATIENT
Start: 2018-11-25 | End: 2018-11-25 | Stop reason: HOSPADM

## 2018-11-25 RX ORDER — DIPHENHYDRAMINE HCL 25 MG
25 CAPSULE ORAL EVERY 6 HOURS PRN
COMMUNITY
End: 2018-12-22 | Stop reason: HOSPADM

## 2018-11-25 RX ORDER — DIPHENHYDRAMINE HCL 25 MG
25 CAPSULE ORAL EVERY 6 HOURS PRN
Status: DISCONTINUED | OUTPATIENT
Start: 2018-11-25 | End: 2018-12-01 | Stop reason: HOSPADM

## 2018-11-25 RX ORDER — SODIUM CHLORIDE 0.9 % (FLUSH) 0.9 %
1-10 SYRINGE (ML) INJECTION AS NEEDED
Status: CANCELLED | OUTPATIENT
Start: 2018-11-25

## 2018-11-25 RX ORDER — SODIUM CHLORIDE 0.9 % (FLUSH) 0.9 %
3 SYRINGE (ML) INJECTION EVERY 12 HOURS SCHEDULED
Status: CANCELLED | OUTPATIENT
Start: 2018-11-25

## 2018-11-25 RX ORDER — MORPHINE SULFATE 2 MG/ML
1 INJECTION, SOLUTION INTRAMUSCULAR; INTRAVENOUS EVERY 4 HOURS PRN
Status: DISCONTINUED | OUTPATIENT
Start: 2018-11-25 | End: 2018-12-01 | Stop reason: ALTCHOICE

## 2018-11-25 RX ORDER — METOCLOPRAMIDE HYDROCHLORIDE 5 MG/ML
5 INJECTION INTRAMUSCULAR; INTRAVENOUS
Status: DISCONTINUED | OUTPATIENT
Start: 2018-11-25 | End: 2018-11-25 | Stop reason: HOSPADM

## 2018-11-25 RX ORDER — IPRATROPIUM BROMIDE AND ALBUTEROL SULFATE 2.5; .5 MG/3ML; MG/3ML
3 SOLUTION RESPIRATORY (INHALATION) ONCE AS NEEDED
Status: DISCONTINUED | OUTPATIENT
Start: 2018-11-25 | End: 2018-11-25 | Stop reason: HOSPADM

## 2018-11-25 RX ORDER — NALOXONE HCL 0.4 MG/ML
0.4 VIAL (ML) INJECTION AS NEEDED
Status: DISCONTINUED | OUTPATIENT
Start: 2018-11-25 | End: 2018-11-25 | Stop reason: HOSPADM

## 2018-11-25 RX ORDER — ONDANSETRON 2 MG/ML
4 INJECTION INTRAMUSCULAR; INTRAVENOUS ONCE AS NEEDED
Status: DISCONTINUED | OUTPATIENT
Start: 2018-11-25 | End: 2018-11-25 | Stop reason: HOSPADM

## 2018-11-25 RX ORDER — OXYCODONE AND ACETAMINOPHEN 10; 325 MG/1; MG/1
1 TABLET ORAL EVERY 6 HOURS PRN
Status: DISCONTINUED | OUTPATIENT
Start: 2018-11-25 | End: 2018-12-01 | Stop reason: HOSPADM

## 2018-11-25 RX ORDER — MIDAZOLAM HYDROCHLORIDE 1 MG/ML
1 INJECTION INTRAMUSCULAR; INTRAVENOUS
Status: CANCELLED | OUTPATIENT
Start: 2018-11-25

## 2018-11-25 RX ORDER — IBUPROFEN 600 MG/1
600 TABLET ORAL ONCE AS NEEDED
Status: DISCONTINUED | OUTPATIENT
Start: 2018-11-25 | End: 2018-11-25 | Stop reason: HOSPADM

## 2018-11-25 RX ORDER — FENTANYL CITRATE 50 UG/ML
INJECTION, SOLUTION INTRAMUSCULAR; INTRAVENOUS AS NEEDED
Status: DISCONTINUED | OUTPATIENT
Start: 2018-11-25 | End: 2018-11-25 | Stop reason: SURG

## 2018-11-25 RX ORDER — LEVOTHYROXINE SODIUM 0.1 MG/1
100 TABLET ORAL
Status: DISCONTINUED | OUTPATIENT
Start: 2018-11-26 | End: 2018-12-01 | Stop reason: HOSPADM

## 2018-11-25 RX ORDER — LIDOCAINE HYDROCHLORIDE 20 MG/ML
INJECTION, SOLUTION INFILTRATION; PERINEURAL AS NEEDED
Status: DISCONTINUED | OUTPATIENT
Start: 2018-11-25 | End: 2018-11-25 | Stop reason: SURG

## 2018-11-25 RX ORDER — MIDAZOLAM HYDROCHLORIDE 1 MG/ML
2 INJECTION INTRAMUSCULAR; INTRAVENOUS
Status: CANCELLED | OUTPATIENT
Start: 2018-11-25

## 2018-11-25 RX ORDER — SODIUM CHLORIDE, SODIUM LACTATE, POTASSIUM CHLORIDE, CALCIUM CHLORIDE 600; 310; 30; 20 MG/100ML; MG/100ML; MG/100ML; MG/100ML
INJECTION, SOLUTION INTRAVENOUS CONTINUOUS PRN
Status: DISCONTINUED | OUTPATIENT
Start: 2018-11-25 | End: 2018-11-25 | Stop reason: SURG

## 2018-11-25 RX ORDER — FENTANYL CITRATE 50 UG/ML
25 INJECTION, SOLUTION INTRAMUSCULAR; INTRAVENOUS AS NEEDED
Status: DISCONTINUED | OUTPATIENT
Start: 2018-11-25 | End: 2018-11-25 | Stop reason: HOSPADM

## 2018-11-25 RX ORDER — DEXAMETHASONE SODIUM PHOSPHATE 4 MG/ML
INJECTION, SOLUTION INTRA-ARTICULAR; INTRALESIONAL; INTRAMUSCULAR; INTRAVENOUS; SOFT TISSUE AS NEEDED
Status: DISCONTINUED | OUTPATIENT
Start: 2018-11-25 | End: 2018-11-25 | Stop reason: SURG

## 2018-11-25 RX ORDER — GABAPENTIN 100 MG/1
100 CAPSULE ORAL 3 TIMES DAILY PRN
Status: ON HOLD | COMMUNITY
End: 2018-12-01 | Stop reason: SDUPTHER

## 2018-11-25 RX ORDER — OXYCODONE AND ACETAMINOPHEN 7.5; 325 MG/1; MG/1
2 TABLET ORAL ONCE AS NEEDED
Status: DISCONTINUED | OUTPATIENT
Start: 2018-11-25 | End: 2018-11-25 | Stop reason: HOSPADM

## 2018-11-25 RX ADMIN — ONDANSETRON HYDROCHLORIDE 4 MG: 2 SOLUTION INTRAMUSCULAR; INTRAVENOUS at 20:14

## 2018-11-25 RX ADMIN — MORPHINE SULFATE 4 MG: 5 INJECTION, SOLUTION INTRAMUSCULAR; INTRAVENOUS at 17:31

## 2018-11-25 RX ADMIN — SODIUM CHLORIDE, POTASSIUM CHLORIDE, SODIUM LACTATE AND CALCIUM CHLORIDE 1000 ML: 600; 310; 30; 20 INJECTION, SOLUTION INTRAVENOUS at 21:57

## 2018-11-25 RX ADMIN — MORPHINE SULFATE 4 MG: 5 INJECTION, SOLUTION INTRAMUSCULAR; INTRAVENOUS at 15:49

## 2018-11-25 RX ADMIN — SODIUM CHLORIDE, POTASSIUM CHLORIDE, SODIUM LACTATE AND CALCIUM CHLORIDE 100 ML/HR: 600; 310; 30; 20 INJECTION, SOLUTION INTRAVENOUS at 21:57

## 2018-11-25 RX ADMIN — DEXAMETHASONE SODIUM PHOSPHATE 4 MG: 4 INJECTION, SOLUTION INTRAMUSCULAR; INTRAVENOUS at 20:14

## 2018-11-25 RX ADMIN — CEFTRIAXONE SODIUM 1 G: 1 INJECTION, POWDER, FOR SOLUTION INTRAMUSCULAR; INTRAVENOUS at 17:31

## 2018-11-25 RX ADMIN — PROPOFOL 150 MG: 10 INJECTION, EMULSION INTRAVENOUS at 20:14

## 2018-11-25 RX ADMIN — LIDOCAINE HYDROCHLORIDE 80 MG: 20 INJECTION, SOLUTION INFILTRATION; PERINEURAL at 20:14

## 2018-11-25 RX ADMIN — FENTANYL CITRATE 100 MCG: 50 INJECTION, SOLUTION INTRAMUSCULAR; INTRAVENOUS at 20:14

## 2018-11-25 RX ADMIN — SODIUM CHLORIDE, POTASSIUM CHLORIDE, SODIUM LACTATE AND CALCIUM CHLORIDE 100 ML/HR: 600; 310; 30; 20 INJECTION, SOLUTION INTRAVENOUS at 21:00

## 2018-11-25 RX ADMIN — SODIUM CHLORIDE, POTASSIUM CHLORIDE, SODIUM LACTATE AND CALCIUM CHLORIDE: 600; 310; 30; 20 INJECTION, SOLUTION INTRAVENOUS at 20:07

## 2018-11-25 RX ADMIN — SODIUM CHLORIDE 1000 ML: 9 INJECTION, SOLUTION INTRAVENOUS at 15:23

## 2018-11-26 LAB
ANION GAP SERPL CALCULATED.3IONS-SCNC: 9 MMOL/L (ref 4–13)
BUN BLD-MCNC: 45 MG/DL (ref 5–21)
BUN/CREAT SERPL: 38.8 (ref 7–25)
CALCIUM SPEC-SCNC: 9 MG/DL (ref 8.4–10.4)
CHLORIDE SERPL-SCNC: 104 MMOL/L (ref 98–110)
CO2 SERPL-SCNC: 27 MMOL/L (ref 24–31)
CREAT BLD-MCNC: 1.16 MG/DL (ref 0.5–1.4)
DEPRECATED RDW RBC AUTO: 52.7 FL (ref 40–54)
ERYTHROCYTE [DISTWIDTH] IN BLOOD BY AUTOMATED COUNT: 15.4 % (ref 12–15)
GFR SERPL CREATININE-BSD FRML MDRD: 45 ML/MIN/1.73
GLUCOSE BLD-MCNC: 159 MG/DL (ref 70–100)
HCT VFR BLD AUTO: 32.9 % (ref 37–47)
HGB BLD-MCNC: 10.9 G/DL (ref 12–16)
LYMPHOCYTES # BLD MANUAL: 0.83 10*3/MM3 (ref 0.72–4.86)
LYMPHOCYTES NFR BLD MANUAL: 1 % (ref 4–12)
LYMPHOCYTES NFR BLD MANUAL: 6 % (ref 15–45)
MCH RBC QN AUTO: 31 PG (ref 28–32)
MCHC RBC AUTO-ENTMCNC: 33.1 G/DL (ref 33–36)
MCV RBC AUTO: 93.5 FL (ref 82–98)
MONOCYTES # BLD AUTO: 0.14 10*3/MM3 (ref 0.19–1.3)
NEUTROPHILS # BLD AUTO: 12.63 10*3/MM3 (ref 1.87–8.4)
NEUTROPHILS NFR BLD MANUAL: 73 % (ref 39–78)
NEUTS BAND NFR BLD MANUAL: 18 % (ref 0–10)
NRBC BLD MANUAL-RTO: 0 /100 WBC (ref 0–0)
PLAT MORPH BLD: NORMAL
PLATELET # BLD AUTO: 244 10*3/MM3 (ref 130–400)
PMV BLD AUTO: 10.5 FL (ref 6–12)
POTASSIUM BLD-SCNC: 3.7 MMOL/L (ref 3.5–5.3)
RBC # BLD AUTO: 3.52 10*6/MM3 (ref 4.2–5.4)
RBC MORPH BLD: NORMAL
SODIUM BLD-SCNC: 140 MMOL/L (ref 135–145)
VARIANT LYMPHS NFR BLD MANUAL: 2 % (ref 0–5)
WBC MORPH BLD: NORMAL
WBC NRBC COR # BLD: 13.88 10*3/MM3 (ref 4.8–10.8)

## 2018-11-26 PROCEDURE — 99232 SBSQ HOSP IP/OBS MODERATE 35: CPT | Performed by: UROLOGY

## 2018-11-26 PROCEDURE — 85007 BL SMEAR W/DIFF WBC COUNT: CPT | Performed by: INTERNAL MEDICINE

## 2018-11-26 PROCEDURE — 85025 COMPLETE CBC W/AUTO DIFF WBC: CPT | Performed by: INTERNAL MEDICINE

## 2018-11-26 PROCEDURE — 80048 BASIC METABOLIC PNL TOTAL CA: CPT | Performed by: INTERNAL MEDICINE

## 2018-11-26 PROCEDURE — 36415 COLL VENOUS BLD VENIPUNCTURE: CPT | Performed by: INTERNAL MEDICINE

## 2018-11-26 PROCEDURE — 25010000002 CEFTRIAXONE PER 250 MG: Performed by: INTERNAL MEDICINE

## 2018-11-26 RX ORDER — OXYCODONE HYDROCHLORIDE 15 MG/1
15 TABLET, FILM COATED, EXTENDED RELEASE ORAL EVERY 12 HOURS SCHEDULED
Status: DISCONTINUED | OUTPATIENT
Start: 2018-11-26 | End: 2018-12-01 | Stop reason: HOSPADM

## 2018-11-26 RX ORDER — HYDROXYZINE HYDROCHLORIDE 25 MG/1
25 TABLET, FILM COATED ORAL 3 TIMES DAILY PRN
COMMUNITY
End: 2018-12-01 | Stop reason: HOSPADM

## 2018-11-26 RX ADMIN — SODIUM CHLORIDE, POTASSIUM CHLORIDE, SODIUM LACTATE AND CALCIUM CHLORIDE 100 ML/HR: 600; 310; 30; 20 INJECTION, SOLUTION INTRAVENOUS at 10:23

## 2018-11-26 RX ADMIN — GABAPENTIN 100 MG: 100 CAPSULE ORAL at 13:07

## 2018-11-26 RX ADMIN — OXYCODONE HYDROCHLORIDE AND ACETAMINOPHEN 1 TABLET: 10; 325 TABLET ORAL at 10:02

## 2018-11-26 RX ADMIN — CEFTRIAXONE SODIUM 2 G: 2 INJECTION, POWDER, FOR SOLUTION INTRAMUSCULAR; INTRAVENOUS at 18:12

## 2018-11-26 RX ADMIN — LISINOPRIL: 10 TABLET ORAL at 09:38

## 2018-11-26 RX ADMIN — SODIUM CHLORIDE 75 ML/HR: 9 INJECTION, SOLUTION INTRAVENOUS at 20:21

## 2018-11-26 RX ADMIN — OXYCODONE HYDROCHLORIDE 15 MG: 15 TABLET, FILM COATED, EXTENDED RELEASE ORAL at 20:24

## 2018-11-26 RX ADMIN — OXYCODONE HYDROCHLORIDE AND ACETAMINOPHEN 1 TABLET: 10; 325 TABLET ORAL at 18:12

## 2018-11-26 RX ADMIN — SODIUM CHLORIDE, POTASSIUM CHLORIDE, SODIUM LACTATE AND CALCIUM CHLORIDE 100 ML/HR: 600; 310; 30; 20 INJECTION, SOLUTION INTRAVENOUS at 00:11

## 2018-11-26 RX ADMIN — LEVOTHYROXINE SODIUM 100 MCG: 100 TABLET ORAL at 06:37

## 2018-11-26 RX ADMIN — OXYCODONE HYDROCHLORIDE 15 MG: 15 TABLET, FILM COATED, EXTENDED RELEASE ORAL at 01:49

## 2018-11-26 RX ADMIN — CETIRIZINE HYDROCHLORIDE 10 MG: 10 TABLET ORAL at 09:38

## 2018-11-26 RX ADMIN — OXYCODONE HYDROCHLORIDE 15 MG: 15 TABLET, FILM COATED, EXTENDED RELEASE ORAL at 11:40

## 2018-11-27 ENCOUNTER — APPOINTMENT (OUTPATIENT)
Dept: GENERAL RADIOLOGY | Facility: HOSPITAL | Age: 78
End: 2018-11-27

## 2018-11-27 ENCOUNTER — TELEPHONE (OUTPATIENT)
Dept: UROLOGY | Facility: CLINIC | Age: 78
End: 2018-11-27

## 2018-11-27 LAB
ANION GAP SERPL CALCULATED.3IONS-SCNC: 7 MMOL/L (ref 4–13)
BASOPHILS # BLD AUTO: 0.06 10*3/MM3 (ref 0–0.2)
BASOPHILS NFR BLD AUTO: 0.4 % (ref 0–2)
BUN BLD-MCNC: 30 MG/DL (ref 5–21)
BUN/CREAT SERPL: 38 (ref 7–25)
CALCIUM SPEC-SCNC: 8.8 MG/DL (ref 8.4–10.4)
CHLORIDE SERPL-SCNC: 103 MMOL/L (ref 98–110)
CO2 SERPL-SCNC: 30 MMOL/L (ref 24–31)
CREAT BLD-MCNC: 0.79 MG/DL (ref 0.5–1.4)
DEPRECATED RDW RBC AUTO: 56.1 FL (ref 40–54)
EOSINOPHIL # BLD AUTO: 0.01 10*3/MM3 (ref 0–0.7)
EOSINOPHIL NFR BLD AUTO: 0.1 % (ref 0–4)
ERYTHROCYTE [DISTWIDTH] IN BLOOD BY AUTOMATED COUNT: 15.6 % (ref 12–15)
GFR SERPL CREATININE-BSD FRML MDRD: 71 ML/MIN/1.73
GLUCOSE BLD-MCNC: 117 MG/DL (ref 70–100)
HCT VFR BLD AUTO: 35.8 % (ref 37–47)
HGB BLD-MCNC: 11.6 G/DL (ref 12–16)
IMM GRANULOCYTES # BLD: 0.37 10*3/MM3 (ref 0–0.03)
IMM GRANULOCYTES NFR BLD: 2.3 % (ref 0–5)
LYMPHOCYTES # BLD AUTO: 1.65 10*3/MM3 (ref 0.72–4.86)
LYMPHOCYTES NFR BLD AUTO: 10.1 % (ref 15–45)
MCH RBC QN AUTO: 31.5 PG (ref 28–32)
MCHC RBC AUTO-ENTMCNC: 32.4 G/DL (ref 33–36)
MCV RBC AUTO: 97.3 FL (ref 82–98)
MONOCYTES # BLD AUTO: 1.23 10*3/MM3 (ref 0.19–1.3)
MONOCYTES NFR BLD AUTO: 7.5 % (ref 4–12)
NEUTROPHILS # BLD AUTO: 13 10*3/MM3 (ref 1.87–8.4)
NEUTROPHILS NFR BLD AUTO: 79.6 % (ref 39–78)
NRBC BLD MANUAL-RTO: 0 /100 WBC (ref 0–0)
PLATELET # BLD AUTO: 272 10*3/MM3 (ref 130–400)
PMV BLD AUTO: 10.5 FL (ref 6–12)
POTASSIUM BLD-SCNC: 3.8 MMOL/L (ref 3.5–5.3)
RBC # BLD AUTO: 3.68 10*6/MM3 (ref 4.2–5.4)
SODIUM BLD-SCNC: 140 MMOL/L (ref 135–145)
WBC NRBC COR # BLD: 16.32 10*3/MM3 (ref 4.8–10.8)

## 2018-11-27 PROCEDURE — 97162 PT EVAL MOD COMPLEX 30 MIN: CPT

## 2018-11-27 PROCEDURE — G8979 MOBILITY GOAL STATUS: HCPCS

## 2018-11-27 PROCEDURE — 25010000002 CEFEPIME PER 500 MG: Performed by: FAMILY MEDICINE

## 2018-11-27 PROCEDURE — 99232 SBSQ HOSP IP/OBS MODERATE 35: CPT | Performed by: UROLOGY

## 2018-11-27 PROCEDURE — 80048 BASIC METABOLIC PNL TOTAL CA: CPT | Performed by: FAMILY MEDICINE

## 2018-11-27 PROCEDURE — G8988 SELF CARE GOAL STATUS: HCPCS

## 2018-11-27 PROCEDURE — 85025 COMPLETE CBC W/AUTO DIFF WBC: CPT | Performed by: FAMILY MEDICINE

## 2018-11-27 PROCEDURE — G8978 MOBILITY CURRENT STATUS: HCPCS

## 2018-11-27 PROCEDURE — 25010000002 ENOXAPARIN PER 10 MG: Performed by: FAMILY MEDICINE

## 2018-11-27 PROCEDURE — 97165 OT EVAL LOW COMPLEX 30 MIN: CPT

## 2018-11-27 PROCEDURE — G8987 SELF CARE CURRENT STATUS: HCPCS

## 2018-11-27 PROCEDURE — 73502 X-RAY EXAM HIP UNI 2-3 VIEWS: CPT

## 2018-11-27 RX ADMIN — OXYCODONE HYDROCHLORIDE AND ACETAMINOPHEN 1 TABLET: 10; 325 TABLET ORAL at 10:37

## 2018-11-27 RX ADMIN — CEFEPIME HYDROCHLORIDE 2 G: 2 INJECTION, POWDER, FOR SOLUTION INTRAVENOUS at 20:19

## 2018-11-27 RX ADMIN — OXYCODONE HYDROCHLORIDE AND ACETAMINOPHEN 1 TABLET: 10; 325 TABLET ORAL at 22:47

## 2018-11-27 RX ADMIN — GABAPENTIN 100 MG: 100 CAPSULE ORAL at 20:14

## 2018-11-27 RX ADMIN — LISINOPRIL: 10 TABLET ORAL at 10:38

## 2018-11-27 RX ADMIN — CEFEPIME HYDROCHLORIDE 2 G: 2 INJECTION, POWDER, FOR SOLUTION INTRAVENOUS at 14:45

## 2018-11-27 RX ADMIN — OXYCODONE HYDROCHLORIDE AND ACETAMINOPHEN 1 TABLET: 10; 325 TABLET ORAL at 04:39

## 2018-11-27 RX ADMIN — ENOXAPARIN SODIUM 40 MG: 40 INJECTION SUBCUTANEOUS at 17:37

## 2018-11-27 RX ADMIN — OXYCODONE HYDROCHLORIDE AND ACETAMINOPHEN 1 TABLET: 10; 325 TABLET ORAL at 16:39

## 2018-11-27 RX ADMIN — OXYCODONE HYDROCHLORIDE 15 MG: 15 TABLET, FILM COATED, EXTENDED RELEASE ORAL at 20:19

## 2018-11-27 RX ADMIN — LEVOTHYROXINE SODIUM 100 MCG: 100 TABLET ORAL at 06:11

## 2018-11-27 RX ADMIN — OXYCODONE HYDROCHLORIDE 15 MG: 15 TABLET, FILM COATED, EXTENDED RELEASE ORAL at 08:39

## 2018-11-27 RX ADMIN — CETIRIZINE HYDROCHLORIDE 10 MG: 10 TABLET ORAL at 10:38

## 2018-11-28 ENCOUNTER — TELEPHONE (OUTPATIENT)
Dept: UROLOGY | Facility: CLINIC | Age: 78
End: 2018-11-28

## 2018-11-28 ENCOUNTER — APPOINTMENT (OUTPATIENT)
Dept: CT IMAGING | Facility: HOSPITAL | Age: 78
End: 2018-11-28

## 2018-11-28 LAB
ANION GAP SERPL CALCULATED.3IONS-SCNC: 10 MMOL/L (ref 4–13)
ANISOCYTOSIS BLD QL: ABNORMAL
BACTERIA SPEC AEROBE CULT: ABNORMAL
BUN BLD-MCNC: 20 MG/DL (ref 5–21)
BUN/CREAT SERPL: 27.4 (ref 7–25)
CALCIUM SPEC-SCNC: 8.7 MG/DL (ref 8.4–10.4)
CHLORIDE SERPL-SCNC: 101 MMOL/L (ref 98–110)
CO2 SERPL-SCNC: 30 MMOL/L (ref 24–31)
CREAT BLD-MCNC: 0.73 MG/DL (ref 0.5–1.4)
DEPRECATED RDW RBC AUTO: 53.4 FL (ref 40–54)
ERYTHROCYTE [DISTWIDTH] IN BLOOD BY AUTOMATED COUNT: 15.5 % (ref 12–15)
GFR SERPL CREATININE-BSD FRML MDRD: 77 ML/MIN/1.73
GIANT PLATELETS: ABNORMAL
GLUCOSE BLD-MCNC: 116 MG/DL (ref 70–100)
HCT VFR BLD AUTO: 36 % (ref 37–47)
HGB BLD-MCNC: 11.7 G/DL (ref 12–16)
LYMPHOCYTES # BLD MANUAL: 1.4 10*3/MM3 (ref 0.72–4.86)
LYMPHOCYTES NFR BLD MANUAL: 8.1 % (ref 15–45)
LYMPHOCYTES NFR BLD MANUAL: 9.1 % (ref 4–12)
MCH RBC QN AUTO: 30.5 PG (ref 28–32)
MCHC RBC AUTO-ENTMCNC: 32.5 G/DL (ref 33–36)
MCV RBC AUTO: 93.8 FL (ref 82–98)
MONOCYTES # BLD AUTO: 1.57 10*3/MM3 (ref 0.19–1.3)
NEUTROPHILS # BLD AUTO: 13.08 10*3/MM3 (ref 1.87–8.4)
NEUTROPHILS NFR BLD MANUAL: 74.7 % (ref 39–78)
NEUTS BAND NFR BLD MANUAL: 1 % (ref 0–10)
PLASMA CELL PREC NFR BLD MANUAL: 1 % (ref 0–0)
PLATELET # BLD AUTO: 289 10*3/MM3 (ref 130–400)
PMV BLD AUTO: 10.1 FL (ref 6–12)
POIKILOCYTOSIS BLD QL SMEAR: ABNORMAL
POTASSIUM BLD-SCNC: 3.3 MMOL/L (ref 3.5–5.3)
RBC # BLD AUTO: 3.84 10*6/MM3 (ref 4.2–5.4)
SODIUM BLD-SCNC: 141 MMOL/L (ref 135–145)
VARIANT LYMPHS NFR BLD MANUAL: 6.1 % (ref 0–5)
WBC MORPH BLD: NORMAL
WBC NRBC COR # BLD: 17.26 10*3/MM3 (ref 4.8–10.8)

## 2018-11-28 PROCEDURE — 25010000002 CEFEPIME PER 500 MG: Performed by: FAMILY MEDICINE

## 2018-11-28 PROCEDURE — 94760 N-INVAS EAR/PLS OXIMETRY 1: CPT

## 2018-11-28 PROCEDURE — 94799 UNLISTED PULMONARY SVC/PX: CPT

## 2018-11-28 PROCEDURE — 97530 THERAPEUTIC ACTIVITIES: CPT

## 2018-11-28 PROCEDURE — 85025 COMPLETE CBC W/AUTO DIFF WBC: CPT | Performed by: FAMILY MEDICINE

## 2018-11-28 PROCEDURE — 85007 BL SMEAR W/DIFF WBC COUNT: CPT | Performed by: FAMILY MEDICINE

## 2018-11-28 PROCEDURE — 80048 BASIC METABOLIC PNL TOTAL CA: CPT | Performed by: FAMILY MEDICINE

## 2018-11-28 PROCEDURE — 73700 CT LOWER EXTREMITY W/O DYE: CPT

## 2018-11-28 PROCEDURE — 99231 SBSQ HOSP IP/OBS SF/LOW 25: CPT | Performed by: UROLOGY

## 2018-11-28 PROCEDURE — 25010000002 ENOXAPARIN PER 10 MG: Performed by: FAMILY MEDICINE

## 2018-11-28 PROCEDURE — 97110 THERAPEUTIC EXERCISES: CPT

## 2018-11-28 RX ORDER — POTASSIUM CHLORIDE 750 MG/1
40 CAPSULE, EXTENDED RELEASE ORAL ONCE
Status: COMPLETED | OUTPATIENT
Start: 2018-11-28 | End: 2018-11-28

## 2018-11-28 RX ORDER — ECHINACEA PURPUREA EXTRACT 125 MG
1 TABLET ORAL AS NEEDED
Status: DISCONTINUED | OUTPATIENT
Start: 2018-11-28 | End: 2018-12-01 | Stop reason: HOSPADM

## 2018-11-28 RX ADMIN — OXYCODONE HYDROCHLORIDE AND ACETAMINOPHEN 1 TABLET: 10; 325 TABLET ORAL at 11:16

## 2018-11-28 RX ADMIN — CEFEPIME HYDROCHLORIDE 2 G: 2 INJECTION, POWDER, FOR SOLUTION INTRAVENOUS at 11:19

## 2018-11-28 RX ADMIN — OXYCODONE HYDROCHLORIDE AND ACETAMINOPHEN 1 TABLET: 10; 325 TABLET ORAL at 05:15

## 2018-11-28 RX ADMIN — LEVOTHYROXINE SODIUM 100 MCG: 100 TABLET ORAL at 05:15

## 2018-11-28 RX ADMIN — OXYCODONE HYDROCHLORIDE AND ACETAMINOPHEN 1 TABLET: 10; 325 TABLET ORAL at 23:40

## 2018-11-28 RX ADMIN — GABAPENTIN 100 MG: 100 CAPSULE ORAL at 11:16

## 2018-11-28 RX ADMIN — CEFEPIME HYDROCHLORIDE 2 G: 2 INJECTION, POWDER, FOR SOLUTION INTRAVENOUS at 20:57

## 2018-11-28 RX ADMIN — LISINOPRIL: 10 TABLET ORAL at 09:27

## 2018-11-28 RX ADMIN — POTASSIUM CHLORIDE 40 MEQ: 750 CAPSULE, EXTENDED RELEASE ORAL at 10:18

## 2018-11-28 RX ADMIN — ENOXAPARIN SODIUM 40 MG: 40 INJECTION SUBCUTANEOUS at 17:25

## 2018-11-28 RX ADMIN — SODIUM CHLORIDE 75 ML/HR: 9 INJECTION, SOLUTION INTRAVENOUS at 15:52

## 2018-11-28 RX ADMIN — OXYCODONE HYDROCHLORIDE 15 MG: 15 TABLET, FILM COATED, EXTENDED RELEASE ORAL at 09:27

## 2018-11-28 RX ADMIN — OXYCODONE HYDROCHLORIDE AND ACETAMINOPHEN 1 TABLET: 10; 325 TABLET ORAL at 17:25

## 2018-11-28 RX ADMIN — OXYCODONE HYDROCHLORIDE 15 MG: 15 TABLET, FILM COATED, EXTENDED RELEASE ORAL at 20:57

## 2018-11-28 RX ADMIN — CETIRIZINE HYDROCHLORIDE 10 MG: 10 TABLET ORAL at 09:27

## 2018-11-28 RX ADMIN — SODIUM CHLORIDE 75 ML/HR: 9 INJECTION, SOLUTION INTRAVENOUS at 00:32

## 2018-11-29 LAB
ANION GAP SERPL CALCULATED.3IONS-SCNC: 9 MMOL/L (ref 4–13)
BUN BLD-MCNC: 16 MG/DL (ref 5–21)
BUN/CREAT SERPL: 24.6 (ref 7–25)
CALCIUM SPEC-SCNC: 8.6 MG/DL (ref 8.4–10.4)
CHLORIDE SERPL-SCNC: 96 MMOL/L (ref 98–110)
CO2 SERPL-SCNC: 34 MMOL/L (ref 24–31)
CREAT BLD-MCNC: 0.65 MG/DL (ref 0.5–1.4)
GFR SERPL CREATININE-BSD FRML MDRD: 88 ML/MIN/1.73
GLUCOSE BLD-MCNC: 121 MG/DL (ref 70–100)
POTASSIUM BLD-SCNC: 2.9 MMOL/L (ref 3.5–5.3)
SODIUM BLD-SCNC: 139 MMOL/L (ref 135–145)

## 2018-11-29 PROCEDURE — 99231 SBSQ HOSP IP/OBS SF/LOW 25: CPT | Performed by: UROLOGY

## 2018-11-29 PROCEDURE — 25010000002 ENOXAPARIN PER 10 MG: Performed by: FAMILY MEDICINE

## 2018-11-29 PROCEDURE — 80048 BASIC METABOLIC PNL TOTAL CA: CPT | Performed by: FAMILY MEDICINE

## 2018-11-29 PROCEDURE — 97530 THERAPEUTIC ACTIVITIES: CPT

## 2018-11-29 PROCEDURE — 25010000002 CEFEPIME PER 500 MG: Performed by: FAMILY MEDICINE

## 2018-11-29 PROCEDURE — 97110 THERAPEUTIC EXERCISES: CPT

## 2018-11-29 PROCEDURE — 25010000002 ONDANSETRON PER 1 MG: Performed by: INTERNAL MEDICINE

## 2018-11-29 RX ORDER — HYDROCHLOROTHIAZIDE 25 MG/1
12.5 TABLET ORAL DAILY
Status: DISCONTINUED | OUTPATIENT
Start: 2018-11-30 | End: 2018-12-01 | Stop reason: HOSPADM

## 2018-11-29 RX ORDER — GUAIFENESIN 600 MG/1
1200 TABLET, EXTENDED RELEASE ORAL EVERY 12 HOURS SCHEDULED
Status: DISCONTINUED | OUTPATIENT
Start: 2018-11-29 | End: 2018-12-01 | Stop reason: HOSPADM

## 2018-11-29 RX ORDER — LISINOPRIL 20 MG/1
20 TABLET ORAL ONCE
Status: COMPLETED | OUTPATIENT
Start: 2018-11-29 | End: 2018-11-29

## 2018-11-29 RX ORDER — LISINOPRIL 20 MG/1
40 TABLET ORAL
Status: DISCONTINUED | OUTPATIENT
Start: 2018-11-30 | End: 2018-12-01 | Stop reason: HOSPADM

## 2018-11-29 RX ADMIN — OXYCODONE HYDROCHLORIDE AND ACETAMINOPHEN 1 TABLET: 10; 325 TABLET ORAL at 08:30

## 2018-11-29 RX ADMIN — ONDANSETRON 4 MG: 2 INJECTION INTRAMUSCULAR; INTRAVENOUS at 09:16

## 2018-11-29 RX ADMIN — CEFEPIME HYDROCHLORIDE 2 G: 2 INJECTION, POWDER, FOR SOLUTION INTRAVENOUS at 12:27

## 2018-11-29 RX ADMIN — CEFEPIME HYDROCHLORIDE 2 G: 2 INJECTION, POWDER, FOR SOLUTION INTRAVENOUS at 19:06

## 2018-11-29 RX ADMIN — GUAIFENESIN 1200 MG: 600 TABLET, EXTENDED RELEASE ORAL at 09:30

## 2018-11-29 RX ADMIN — LEVOTHYROXINE SODIUM 100 MCG: 100 TABLET ORAL at 06:18

## 2018-11-29 RX ADMIN — SODIUM CHLORIDE 75 ML/HR: 9 INJECTION, SOLUTION INTRAVENOUS at 04:07

## 2018-11-29 RX ADMIN — GUAIFENESIN 1200 MG: 600 TABLET, EXTENDED RELEASE ORAL at 23:09

## 2018-11-29 RX ADMIN — OXYCODONE HYDROCHLORIDE AND ACETAMINOPHEN 1 TABLET: 10; 325 TABLET ORAL at 15:56

## 2018-11-29 RX ADMIN — LISINOPRIL 20 MG: 20 TABLET ORAL at 09:30

## 2018-11-29 RX ADMIN — CEFEPIME HYDROCHLORIDE 2 G: 2 INJECTION, POWDER, FOR SOLUTION INTRAVENOUS at 04:07

## 2018-11-29 RX ADMIN — ENOXAPARIN SODIUM 40 MG: 40 INJECTION SUBCUTANEOUS at 18:13

## 2018-11-29 RX ADMIN — LISINOPRIL: 10 TABLET ORAL at 08:32

## 2018-11-29 RX ADMIN — GABAPENTIN 100 MG: 100 CAPSULE ORAL at 08:30

## 2018-11-29 RX ADMIN — CETIRIZINE HYDROCHLORIDE 10 MG: 10 TABLET ORAL at 08:32

## 2018-11-29 RX ADMIN — OXYCODONE HYDROCHLORIDE 15 MG: 15 TABLET, FILM COATED, EXTENDED RELEASE ORAL at 23:09

## 2018-11-29 RX ADMIN — OXYCODONE HYDROCHLORIDE 15 MG: 15 TABLET, FILM COATED, EXTENDED RELEASE ORAL at 09:16

## 2018-11-30 LAB
ANION GAP SERPL CALCULATED.3IONS-SCNC: 8 MMOL/L (ref 4–13)
ANION GAP SERPL CALCULATED.3IONS-SCNC: 9 MMOL/L (ref 4–13)
BACTERIA SPEC AEROBE CULT: NORMAL
BUN BLD-MCNC: 25 MG/DL (ref 5–21)
BUN BLD-MCNC: 27 MG/DL (ref 5–21)
BUN/CREAT SERPL: 26.6 (ref 7–25)
BUN/CREAT SERPL: 31.4 (ref 7–25)
CALCIUM SPEC-SCNC: 8.3 MG/DL (ref 8.4–10.4)
CALCIUM SPEC-SCNC: 8.5 MG/DL (ref 8.4–10.4)
CHLORIDE SERPL-SCNC: 97 MMOL/L (ref 98–110)
CHLORIDE SERPL-SCNC: 99 MMOL/L (ref 98–110)
CO2 SERPL-SCNC: 32 MMOL/L (ref 24–31)
CO2 SERPL-SCNC: 33 MMOL/L (ref 24–31)
CREAT BLD-MCNC: 0.86 MG/DL (ref 0.5–1.4)
CREAT BLD-MCNC: 0.94 MG/DL (ref 0.5–1.4)
GFR SERPL CREATININE-BSD FRML MDRD: 58 ML/MIN/1.73
GFR SERPL CREATININE-BSD FRML MDRD: 64 ML/MIN/1.73
GLUCOSE BLD-MCNC: 115 MG/DL (ref 70–100)
GLUCOSE BLD-MCNC: 126 MG/DL (ref 70–100)
POTASSIUM BLD-SCNC: 2.9 MMOL/L (ref 3.5–5.3)
POTASSIUM BLD-SCNC: 3.6 MMOL/L (ref 3.5–5.3)
SODIUM BLD-SCNC: 139 MMOL/L (ref 135–145)
SODIUM BLD-SCNC: 139 MMOL/L (ref 135–145)

## 2018-11-30 PROCEDURE — 97110 THERAPEUTIC EXERCISES: CPT

## 2018-11-30 PROCEDURE — 25010000002 ENOXAPARIN PER 10 MG: Performed by: FAMILY MEDICINE

## 2018-11-30 PROCEDURE — 63710000001 DIPHENHYDRAMINE PER 50 MG: Performed by: INTERNAL MEDICINE

## 2018-11-30 PROCEDURE — 80048 BASIC METABOLIC PNL TOTAL CA: CPT | Performed by: FAMILY MEDICINE

## 2018-11-30 PROCEDURE — 97530 THERAPEUTIC ACTIVITIES: CPT

## 2018-11-30 PROCEDURE — 97535 SELF CARE MNGMENT TRAINING: CPT | Performed by: OCCUPATIONAL THERAPIST

## 2018-11-30 PROCEDURE — 97110 THERAPEUTIC EXERCISES: CPT | Performed by: OCCUPATIONAL THERAPIST

## 2018-11-30 PROCEDURE — 25010000002 CEFEPIME PER 500 MG: Performed by: FAMILY MEDICINE

## 2018-11-30 RX ORDER — POTASSIUM CHLORIDE 750 MG/1
40 CAPSULE, EXTENDED RELEASE ORAL DAILY
Status: DISCONTINUED | OUTPATIENT
Start: 2018-11-30 | End: 2018-12-01 | Stop reason: HOSPADM

## 2018-11-30 RX ORDER — POTASSIUM CHLORIDE 750 MG/1
40 CAPSULE, EXTENDED RELEASE ORAL ONCE
Status: COMPLETED | OUTPATIENT
Start: 2018-11-30 | End: 2018-11-30

## 2018-11-30 RX ADMIN — CETIRIZINE HYDROCHLORIDE 10 MG: 10 TABLET ORAL at 08:17

## 2018-11-30 RX ADMIN — HYDROCHLOROTHIAZIDE 12.5 MG: 25 TABLET ORAL at 08:17

## 2018-11-30 RX ADMIN — POTASSIUM CHLORIDE 40 MEQ: 750 CAPSULE, EXTENDED RELEASE ORAL at 08:17

## 2018-11-30 RX ADMIN — DIPHENHYDRAMINE HYDROCHLORIDE 25 MG: 25 CAPSULE ORAL at 20:57

## 2018-11-30 RX ADMIN — OXYCODONE HYDROCHLORIDE 15 MG: 15 TABLET, FILM COATED, EXTENDED RELEASE ORAL at 20:56

## 2018-11-30 RX ADMIN — OXYCODONE HYDROCHLORIDE AND ACETAMINOPHEN 1 TABLET: 10; 325 TABLET ORAL at 03:17

## 2018-11-30 RX ADMIN — LISINOPRIL 40 MG: 20 TABLET ORAL at 08:17

## 2018-11-30 RX ADMIN — CEFEPIME HYDROCHLORIDE 2 G: 2 INJECTION, POWDER, FOR SOLUTION INTRAVENOUS at 20:57

## 2018-11-30 RX ADMIN — POTASSIUM CHLORIDE 40 MEQ: 750 CAPSULE, EXTENDED RELEASE ORAL at 11:19

## 2018-11-30 RX ADMIN — OXYCODONE HYDROCHLORIDE AND ACETAMINOPHEN 1 TABLET: 10; 325 TABLET ORAL at 12:43

## 2018-11-30 RX ADMIN — GUAIFENESIN 1200 MG: 600 TABLET, EXTENDED RELEASE ORAL at 08:17

## 2018-11-30 RX ADMIN — ENOXAPARIN SODIUM 40 MG: 40 INJECTION SUBCUTANEOUS at 18:47

## 2018-11-30 RX ADMIN — LEVOTHYROXINE SODIUM 100 MCG: 100 TABLET ORAL at 05:42

## 2018-11-30 RX ADMIN — CEFEPIME HYDROCHLORIDE 2 G: 2 INJECTION, POWDER, FOR SOLUTION INTRAVENOUS at 10:56

## 2018-11-30 RX ADMIN — GABAPENTIN 100 MG: 100 CAPSULE ORAL at 18:50

## 2018-11-30 RX ADMIN — CEFEPIME HYDROCHLORIDE 2 G: 2 INJECTION, POWDER, FOR SOLUTION INTRAVENOUS at 03:26

## 2018-11-30 RX ADMIN — OXYCODONE HYDROCHLORIDE AND ACETAMINOPHEN 1 TABLET: 10; 325 TABLET ORAL at 18:50

## 2018-11-30 RX ADMIN — OXYCODONE HYDROCHLORIDE 15 MG: 15 TABLET, FILM COATED, EXTENDED RELEASE ORAL at 08:17

## 2018-12-01 VITALS
DIASTOLIC BLOOD PRESSURE: 51 MMHG | OXYGEN SATURATION: 90 % | TEMPERATURE: 97.8 F | BODY MASS INDEX: 36.15 KG/M2 | RESPIRATION RATE: 16 BRPM | HEART RATE: 88 BPM | HEIGHT: 65 IN | SYSTOLIC BLOOD PRESSURE: 134 MMHG | WEIGHT: 217 LBS

## 2018-12-01 LAB
ANION GAP SERPL CALCULATED.3IONS-SCNC: 9 MMOL/L (ref 4–13)
BUN BLD-MCNC: 19 MG/DL (ref 5–21)
BUN/CREAT SERPL: 23.5 (ref 7–25)
CALCIUM SPEC-SCNC: 8.6 MG/DL (ref 8.4–10.4)
CHLORIDE SERPL-SCNC: 98 MMOL/L (ref 98–110)
CO2 SERPL-SCNC: 34 MMOL/L (ref 24–31)
CREAT BLD-MCNC: 0.81 MG/DL (ref 0.5–1.4)
GFR SERPL CREATININE-BSD FRML MDRD: 69 ML/MIN/1.73
GLUCOSE BLD-MCNC: 150 MG/DL (ref 70–100)
POTASSIUM BLD-SCNC: 3.6 MMOL/L (ref 3.5–5.3)
SODIUM BLD-SCNC: 141 MMOL/L (ref 135–145)

## 2018-12-01 PROCEDURE — 80048 BASIC METABOLIC PNL TOTAL CA: CPT | Performed by: FAMILY MEDICINE

## 2018-12-01 PROCEDURE — 25010000002 CEFEPIME PER 500 MG: Performed by: FAMILY MEDICINE

## 2018-12-01 PROCEDURE — 25010000002 MORPHINE PER 10 MG: Performed by: INTERNAL MEDICINE

## 2018-12-01 RX ORDER — OXYCODONE AND ACETAMINOPHEN 10; 325 MG/1; MG/1
1 TABLET ORAL EVERY 6 HOURS PRN
Qty: 12 TABLET | Refills: 0 | Status: SHIPPED | OUTPATIENT
Start: 2018-12-01 | End: 2018-12-22 | Stop reason: HOSPADM

## 2018-12-01 RX ORDER — GABAPENTIN 100 MG/1
100 CAPSULE ORAL 3 TIMES DAILY PRN
Qty: 30 CAPSULE | Refills: 0 | Status: ON HOLD | OUTPATIENT
Start: 2018-12-01 | End: 2019-01-27

## 2018-12-01 RX ORDER — LEVOFLOXACIN 500 MG/1
500 TABLET, FILM COATED ORAL DAILY
Qty: 14 TABLET | Refills: 0
Start: 2018-12-01 | End: 2018-12-15

## 2018-12-01 RX ORDER — POTASSIUM CHLORIDE 750 MG/1
20 CAPSULE, EXTENDED RELEASE ORAL DAILY
Qty: 30 CAPSULE | Refills: 0 | Status: ON HOLD
Start: 2018-12-02 | End: 2019-01-27

## 2018-12-01 RX ADMIN — OXYCODONE HYDROCHLORIDE AND ACETAMINOPHEN 1 TABLET: 10; 325 TABLET ORAL at 06:52

## 2018-12-01 RX ADMIN — CEFEPIME HYDROCHLORIDE 2 G: 2 INJECTION, POWDER, FOR SOLUTION INTRAVENOUS at 04:30

## 2018-12-01 RX ADMIN — GUAIFENESIN 1200 MG: 600 TABLET, EXTENDED RELEASE ORAL at 08:36

## 2018-12-01 RX ADMIN — OXYCODONE HYDROCHLORIDE 15 MG: 15 TABLET, FILM COATED, EXTENDED RELEASE ORAL at 08:36

## 2018-12-01 RX ADMIN — LISINOPRIL 40 MG: 20 TABLET ORAL at 08:36

## 2018-12-01 RX ADMIN — HYDROCHLOROTHIAZIDE 12.5 MG: 25 TABLET ORAL at 08:36

## 2018-12-01 RX ADMIN — MORPHINE SULFATE 1 MG: 2 INJECTION, SOLUTION INTRAMUSCULAR; INTRAVENOUS at 04:29

## 2018-12-01 RX ADMIN — CETIRIZINE HYDROCHLORIDE 10 MG: 10 TABLET ORAL at 08:36

## 2018-12-01 RX ADMIN — LEVOTHYROXINE SODIUM 100 MCG: 100 TABLET ORAL at 05:30

## 2018-12-01 RX ADMIN — POTASSIUM CHLORIDE 40 MEQ: 750 CAPSULE, EXTENDED RELEASE ORAL at 08:36

## 2018-12-04 ENCOUNTER — LAB REQUISITION (OUTPATIENT)
Dept: LAB | Facility: HOSPITAL | Age: 78
End: 2018-12-04

## 2018-12-04 DIAGNOSIS — Z00.00 ENCOUNTER FOR GENERAL ADULT MEDICAL EXAMINATION WITHOUT ABNORMAL FINDINGS: ICD-10-CM

## 2018-12-04 LAB
25(OH)D3 SERPL-MCNC: 37 NG/ML (ref 30–100)
ALBUMIN SERPL-MCNC: 2.7 G/DL (ref 3.5–5)
ALBUMIN/GLOB SERPL: 0.9 G/DL (ref 1.1–2.5)
ALP SERPL-CCNC: 81 U/L (ref 24–120)
ALT SERPL W P-5'-P-CCNC: 31 U/L (ref 0–54)
ANION GAP SERPL CALCULATED.3IONS-SCNC: 9 MMOL/L (ref 4–13)
ARTICHOKE IGE QN: 75 MG/DL (ref 0–99)
AST SERPL-CCNC: 31 U/L (ref 7–45)
BASOPHILS # BLD AUTO: 0.07 10*3/MM3 (ref 0–0.2)
BASOPHILS NFR BLD AUTO: 0.5 % (ref 0–2)
BILIRUB SERPL-MCNC: 0.4 MG/DL (ref 0.1–1)
BUN BLD-MCNC: 17 MG/DL (ref 5–21)
BUN/CREAT SERPL: 23.9 (ref 7–25)
CALCIUM SPEC-SCNC: 8.4 MG/DL (ref 8.4–10.4)
CHLORIDE SERPL-SCNC: 98 MMOL/L (ref 98–110)
CHOLEST SERPL-MCNC: 134 MG/DL (ref 130–200)
CO2 SERPL-SCNC: 33 MMOL/L (ref 24–31)
CREAT BLD-MCNC: 0.71 MG/DL (ref 0.5–1.4)
DEPRECATED RDW RBC AUTO: 54.6 FL (ref 40–54)
EOSINOPHIL # BLD AUTO: 0.2 10*3/MM3 (ref 0–0.7)
EOSINOPHIL NFR BLD AUTO: 1.5 % (ref 0–4)
ERYTHROCYTE [DISTWIDTH] IN BLOOD BY AUTOMATED COUNT: 15.4 % (ref 12–15)
GFR SERPL CREATININE-BSD FRML MDRD: 80 ML/MIN/1.73
GLOBULIN UR ELPH-MCNC: 2.9 GM/DL
GLUCOSE BLD-MCNC: 97 MG/DL (ref 70–100)
HBA1C MFR BLD: 6.3 %
HCT VFR BLD AUTO: 32 % (ref 37–47)
HDLC SERPL-MCNC: 40 MG/DL
HGB BLD-MCNC: 10 G/DL (ref 12–16)
IMM GRANULOCYTES # BLD: 0.09 10*3/MM3 (ref 0–0.03)
IMM GRANULOCYTES NFR BLD: 0.7 % (ref 0–5)
LDLC/HDLC SERPL: 1.68 {RATIO}
LYMPHOCYTES # BLD AUTO: 1.9 10*3/MM3 (ref 0.72–4.86)
LYMPHOCYTES NFR BLD AUTO: 14.6 % (ref 15–45)
MAGNESIUM SERPL-MCNC: 2.1 MG/DL (ref 1.4–2.2)
MCH RBC QN AUTO: 30.1 PG (ref 28–32)
MCHC RBC AUTO-ENTMCNC: 31.3 G/DL (ref 33–36)
MCV RBC AUTO: 96.4 FL (ref 82–98)
MONOCYTES # BLD AUTO: 1.05 10*3/MM3 (ref 0.19–1.3)
MONOCYTES NFR BLD AUTO: 8.1 % (ref 4–12)
NEUTROPHILS # BLD AUTO: 9.66 10*3/MM3 (ref 1.87–8.4)
NEUTROPHILS NFR BLD AUTO: 74.6 % (ref 39–78)
NRBC BLD MANUAL-RTO: 0 /100 WBC (ref 0–0)
PLATELET # BLD AUTO: 477 10*3/MM3 (ref 130–400)
PMV BLD AUTO: 10.1 FL (ref 6–12)
POTASSIUM BLD-SCNC: 4 MMOL/L (ref 3.5–5.3)
PREALB SERPL-MCNC: 11.2 MG/DL (ref 18–36)
PROT SERPL-MCNC: 5.6 G/DL (ref 6.3–8.7)
RBC # BLD AUTO: 3.32 10*6/MM3 (ref 4.2–5.4)
SODIUM BLD-SCNC: 140 MMOL/L (ref 135–145)
T4 FREE SERPL-MCNC: 0.97 NG/DL (ref 0.78–2.19)
TRIGL SERPL-MCNC: 134 MG/DL (ref 0–149)
TSH SERPL DL<=0.05 MIU/L-ACNC: 6.26 MIU/ML (ref 0.47–4.68)
VIT B12 BLD-MCNC: >1000 PG/ML (ref 239–931)
WBC NRBC COR # BLD: 12.97 10*3/MM3 (ref 4.8–10.8)

## 2018-12-04 PROCEDURE — 36415 COLL VENOUS BLD VENIPUNCTURE: CPT | Performed by: INTERNAL MEDICINE

## 2018-12-04 PROCEDURE — 84443 ASSAY THYROID STIM HORMONE: CPT | Performed by: INTERNAL MEDICINE

## 2018-12-04 PROCEDURE — 84134 ASSAY OF PREALBUMIN: CPT | Performed by: INTERNAL MEDICINE

## 2018-12-04 PROCEDURE — 80061 LIPID PANEL: CPT | Performed by: INTERNAL MEDICINE

## 2018-12-04 PROCEDURE — 84439 ASSAY OF FREE THYROXINE: CPT | Performed by: INTERNAL MEDICINE

## 2018-12-04 PROCEDURE — 85025 COMPLETE CBC W/AUTO DIFF WBC: CPT | Performed by: INTERNAL MEDICINE

## 2018-12-04 PROCEDURE — 83036 HEMOGLOBIN GLYCOSYLATED A1C: CPT | Performed by: INTERNAL MEDICINE

## 2018-12-04 PROCEDURE — 82306 VITAMIN D 25 HYDROXY: CPT | Performed by: INTERNAL MEDICINE

## 2018-12-04 PROCEDURE — 80053 COMPREHEN METABOLIC PANEL: CPT | Performed by: INTERNAL MEDICINE

## 2018-12-04 PROCEDURE — 83735 ASSAY OF MAGNESIUM: CPT | Performed by: INTERNAL MEDICINE

## 2018-12-04 PROCEDURE — 82607 VITAMIN B-12: CPT | Performed by: INTERNAL MEDICINE

## 2018-12-17 ENCOUNTER — APPOINTMENT (OUTPATIENT)
Dept: CARDIOLOGY | Facility: HOSPITAL | Age: 78
End: 2018-12-17
Attending: INTERNAL MEDICINE

## 2018-12-17 ENCOUNTER — APPOINTMENT (OUTPATIENT)
Dept: CT IMAGING | Facility: HOSPITAL | Age: 78
End: 2018-12-17

## 2018-12-17 ENCOUNTER — HOSPITAL ENCOUNTER (INPATIENT)
Facility: HOSPITAL | Age: 78
LOS: 5 days | Discharge: NURSING, REHAB OR LONG-TERM CARE | End: 2018-12-22
Attending: EMERGENCY MEDICINE | Admitting: FAMILY MEDICINE

## 2018-12-17 ENCOUNTER — APPOINTMENT (OUTPATIENT)
Dept: GENERAL RADIOLOGY | Facility: HOSPITAL | Age: 78
End: 2018-12-17

## 2018-12-17 DIAGNOSIS — A41.9 SEPSIS, DUE TO UNSPECIFIED ORGANISM: Primary | ICD-10-CM

## 2018-12-17 DIAGNOSIS — Z74.09 IMPAIRED FUNCTIONAL MOBILITY, BALANCE, GAIT, AND ENDURANCE: ICD-10-CM

## 2018-12-17 DIAGNOSIS — E86.0 DEHYDRATION: ICD-10-CM

## 2018-12-17 DIAGNOSIS — E87.5 HYPERKALEMIA: ICD-10-CM

## 2018-12-17 DIAGNOSIS — N30.90 CYSTITIS: ICD-10-CM

## 2018-12-17 DIAGNOSIS — R13.12 OROPHARYNGEAL DYSPHAGIA: ICD-10-CM

## 2018-12-17 DIAGNOSIS — J69.0 ASPIRATION PNEUMONIA OF RIGHT UPPER LOBE, UNSPECIFIED ASPIRATION PNEUMONIA TYPE (HCC): ICD-10-CM

## 2018-12-17 DIAGNOSIS — N17.9 AKI (ACUTE KIDNEY INJURY) (HCC): ICD-10-CM

## 2018-12-17 LAB
ABO GROUP BLD: NORMAL
ALBUMIN SERPL-MCNC: 3 G/DL (ref 3.5–5)
ALBUMIN/GLOB SERPL: 0.8 G/DL (ref 1.1–2.5)
ALP SERPL-CCNC: 80 U/L (ref 24–120)
ALT SERPL W P-5'-P-CCNC: <15 U/L (ref 0–54)
AMPHET+METHAMPHET UR QL: NEGATIVE
ANION GAP SERPL CALCULATED.3IONS-SCNC: 10 MMOL/L (ref 4–13)
ANION GAP SERPL CALCULATED.3IONS-SCNC: 11 MMOL/L (ref 4–13)
ANION GAP SERPL CALCULATED.3IONS-SCNC: 9 MMOL/L (ref 4–13)
ARTERIAL PATENCY WRIST A: POSITIVE
AST SERPL-CCNC: 47 U/L (ref 7–45)
ATMOSPHERIC PRESS: 756 MMHG
BACTERIA UR QL AUTO: ABNORMAL /HPF
BARBITURATES UR QL SCN: NEGATIVE
BASE EXCESS BLDA CALC-SCNC: -0.6 MMOL/L (ref 0–2)
BASOPHILS # BLD AUTO: 0.05 10*3/MM3 (ref 0–0.2)
BASOPHILS NFR BLD AUTO: 0.3 % (ref 0–2)
BDY SITE: ABNORMAL
BENZODIAZ UR QL SCN: NEGATIVE
BILIRUB SERPL-MCNC: 0.4 MG/DL (ref 0.1–1)
BILIRUB UR QL STRIP: NEGATIVE
BLD GP AB SCN SERPL QL: NEGATIVE
BODY TEMPERATURE: 37 C
BUN BLD-MCNC: 74 MG/DL (ref 5–21)
BUN BLD-MCNC: 75 MG/DL (ref 5–21)
BUN BLD-MCNC: 80 MG/DL (ref 5–21)
BUN/CREAT SERPL: 19.8 (ref 7–25)
BUN/CREAT SERPL: 20.2 (ref 7–25)
BUN/CREAT SERPL: 22.6 (ref 7–25)
CALCIUM SPEC-SCNC: 8.3 MG/DL (ref 8.4–10.4)
CALCIUM SPEC-SCNC: 8.5 MG/DL (ref 8.4–10.4)
CALCIUM SPEC-SCNC: 8.5 MG/DL (ref 8.4–10.4)
CANNABINOIDS SERPL QL: NEGATIVE
CHLORIDE SERPL-SCNC: 102 MMOL/L (ref 98–110)
CHLORIDE SERPL-SCNC: 105 MMOL/L (ref 98–110)
CHLORIDE SERPL-SCNC: 107 MMOL/L (ref 98–110)
CK SERPL-CCNC: <20 U/L (ref 0–203)
CLARITY UR: ABNORMAL
CO2 SERPL-SCNC: 25 MMOL/L (ref 24–31)
CO2 SERPL-SCNC: 26 MMOL/L (ref 24–31)
CO2 SERPL-SCNC: 27 MMOL/L (ref 24–31)
COCAINE UR QL: NEGATIVE
COLOR UR: YELLOW
CREAT BLD-MCNC: 3.32 MG/DL (ref 0.5–1.4)
CREAT BLD-MCNC: 3.67 MG/DL (ref 0.5–1.4)
CREAT BLD-MCNC: 4.04 MG/DL (ref 0.5–1.4)
CREAT UR-MCNC: 104.8 MG/DL
D-LACTATE SERPL-SCNC: 1.4 MMOL/L (ref 0.5–2)
DEPRECATED RDW RBC AUTO: 56.6 FL (ref 40–54)
EOSINOPHIL # BLD AUTO: 0.02 10*3/MM3 (ref 0–0.7)
EOSINOPHIL NFR BLD AUTO: 0.1 % (ref 0–4)
ERYTHROCYTE [DISTWIDTH] IN BLOOD BY AUTOMATED COUNT: 15.7 % (ref 12–15)
FLUAV AG NPH QL: NEGATIVE
FLUBV AG NPH QL IA: NEGATIVE
GAS FLOW AIRWAY: 40 LPM
GFR SERPL CREATININE-BSD FRML MDRD: 11 ML/MIN/1.73
GFR SERPL CREATININE-BSD FRML MDRD: 12 ML/MIN/1.73
GFR SERPL CREATININE-BSD FRML MDRD: 13 ML/MIN/1.73
GFR SERPL CREATININE-BSD FRML MDRD: ABNORMAL ML/MIN/1.73
GLOBULIN UR ELPH-MCNC: 3.8 GM/DL
GLUCOSE BLD-MCNC: 146 MG/DL (ref 70–100)
GLUCOSE BLD-MCNC: 153 MG/DL (ref 70–100)
GLUCOSE BLD-MCNC: 175 MG/DL (ref 70–100)
GLUCOSE BLDC GLUCOMTR-MCNC: 122 MG/DL (ref 70–130)
GLUCOSE UR STRIP-MCNC: NEGATIVE MG/DL
HCO3 BLDA-SCNC: 26 MMOL/L (ref 20–26)
HCT VFR BLD AUTO: 30.5 % (ref 37–47)
HGB BLD-MCNC: 9.3 G/DL (ref 12–16)
HGB UR QL STRIP.AUTO: ABNORMAL
HOROWITZ INDEX BLD+IHG-RTO: 100 %
HYALINE CASTS UR QL AUTO: ABNORMAL /LPF
IMM GRANULOCYTES # BLD: 0.28 10*3/MM3 (ref 0–0.03)
IMM GRANULOCYTES NFR BLD: 1.5 % (ref 0–5)
KETONES UR QL STRIP: NEGATIVE
LEUKOCYTE ESTERASE UR QL STRIP.AUTO: ABNORMAL
LYMPHOCYTES # BLD AUTO: 0.66 10*3/MM3 (ref 0.72–4.86)
LYMPHOCYTES NFR BLD AUTO: 3.6 % (ref 15–45)
Lab: ABNORMAL
MCH RBC QN AUTO: 29.8 PG (ref 28–32)
MCHC RBC AUTO-ENTMCNC: 30.5 G/DL (ref 33–36)
MCV RBC AUTO: 97.8 FL (ref 82–98)
METHADONE UR QL SCN: NEGATIVE
MODALITY: ABNORMAL
MONOCYTES # BLD AUTO: 0.68 10*3/MM3 (ref 0.19–1.3)
MONOCYTES NFR BLD AUTO: 3.7 % (ref 4–12)
NEUTROPHILS # BLD AUTO: 16.72 10*3/MM3 (ref 1.87–8.4)
NEUTROPHILS NFR BLD AUTO: 90.8 % (ref 39–78)
NITRITE UR QL STRIP: NEGATIVE
NRBC BLD MANUAL-RTO: 0 /100 WBC (ref 0–0)
NT-PROBNP SERPL-MCNC: 2310 PG/ML (ref 0–1800)
OPIATES UR QL: POSITIVE
PCO2 BLDA: 51.2 MM HG (ref 35–45)
PCP UR QL SCN: NEGATIVE
PH BLDA: 7.31 PH UNITS (ref 7.35–7.45)
PH UR STRIP.AUTO: <=5 [PH] (ref 5–8)
PLATELET # BLD AUTO: 492 10*3/MM3 (ref 130–400)
PMV BLD AUTO: 9.4 FL (ref 6–12)
PO2 BLDA: 271 MM HG (ref 83–108)
POTASSIUM BLD-SCNC: 5.7 MMOL/L (ref 3.5–5.3)
POTASSIUM BLD-SCNC: 5.9 MMOL/L (ref 3.5–5.3)
POTASSIUM BLD-SCNC: 6.6 MMOL/L (ref 3.5–5.3)
PROCALCITONIN SERPL-MCNC: 0.63 NG/ML
PROT SERPL-MCNC: 6.8 G/DL (ref 6.3–8.7)
PROT UR QL STRIP: ABNORMAL
RBC # BLD AUTO: 3.12 10*6/MM3 (ref 4.2–5.4)
RBC # UR: ABNORMAL /HPF
REF LAB TEST METHOD: ABNORMAL
RH BLD: POSITIVE
SAO2 % BLDCOA: 98.1 % (ref 94–99)
SODIUM BLD-SCNC: 139 MMOL/L (ref 135–145)
SODIUM BLD-SCNC: 141 MMOL/L (ref 135–145)
SODIUM BLD-SCNC: 142 MMOL/L (ref 135–145)
SODIUM UR-SCNC: 47 MMOL/L (ref 30–90)
SP GR UR STRIP: 1.02 (ref 1–1.03)
SQUAMOUS #/AREA URNS HPF: ABNORMAL /HPF
T&S EXPIRATION DATE: NORMAL
TROPONIN I SERPL-MCNC: 0.06 NG/ML (ref 0–0.03)
TROPONIN I SERPL-MCNC: 0.18 NG/ML (ref 0–0.03)
TROPONIN I SERPL-MCNC: 0.24 NG/ML (ref 0–0.03)
TROPONIN I SERPL-MCNC: 0.29 NG/ML (ref 0–0.03)
UROBILINOGEN UR QL STRIP: ABNORMAL
VENTILATOR MODE: ABNORMAL
WBC NRBC COR # BLD: 18.41 10*3/MM3 (ref 4.8–10.8)
WBC UR QL AUTO: ABNORMAL /HPF

## 2018-12-17 PROCEDURE — 84484 ASSAY OF TROPONIN QUANT: CPT | Performed by: FAMILY MEDICINE

## 2018-12-17 PROCEDURE — 36600 WITHDRAWAL OF ARTERIAL BLOOD: CPT

## 2018-12-17 PROCEDURE — 80307 DRUG TEST PRSMV CHEM ANLYZR: CPT | Performed by: EMERGENCY MEDICINE

## 2018-12-17 PROCEDURE — 25010000002 MORPHINE PER 10 MG: Performed by: FAMILY MEDICINE

## 2018-12-17 PROCEDURE — 99222 1ST HOSP IP/OBS MODERATE 55: CPT | Performed by: UROLOGY

## 2018-12-17 PROCEDURE — 84145 PROCALCITONIN (PCT): CPT | Performed by: EMERGENCY MEDICINE

## 2018-12-17 PROCEDURE — 83605 ASSAY OF LACTIC ACID: CPT | Performed by: EMERGENCY MEDICINE

## 2018-12-17 PROCEDURE — 86901 BLOOD TYPING SEROLOGIC RH(D): CPT | Performed by: EMERGENCY MEDICINE

## 2018-12-17 PROCEDURE — 93010 ELECTROCARDIOGRAM REPORT: CPT | Performed by: INTERNAL MEDICINE

## 2018-12-17 PROCEDURE — 36415 COLL VENOUS BLD VENIPUNCTURE: CPT | Performed by: EMERGENCY MEDICINE

## 2018-12-17 PROCEDURE — 25010000002 ONDANSETRON PER 1 MG: Performed by: EMERGENCY MEDICINE

## 2018-12-17 PROCEDURE — 63710000001 INSULIN REGULAR HUMAN PER 5 UNITS: Performed by: EMERGENCY MEDICINE

## 2018-12-17 PROCEDURE — 87804 INFLUENZA ASSAY W/OPTIC: CPT | Performed by: EMERGENCY MEDICINE

## 2018-12-17 PROCEDURE — 71250 CT THORAX DX C-: CPT

## 2018-12-17 PROCEDURE — 93005 ELECTROCARDIOGRAM TRACING: CPT | Performed by: EMERGENCY MEDICINE

## 2018-12-17 PROCEDURE — 82803 BLOOD GASES ANY COMBINATION: CPT

## 2018-12-17 PROCEDURE — 86900 BLOOD TYPING SEROLOGIC ABO: CPT | Performed by: EMERGENCY MEDICINE

## 2018-12-17 PROCEDURE — 93306 TTE W/DOPPLER COMPLETE: CPT | Performed by: INTERNAL MEDICINE

## 2018-12-17 PROCEDURE — 25010000002 PIPERACILLIN SOD-TAZOBACTAM PER 1 G: Performed by: FAMILY MEDICINE

## 2018-12-17 PROCEDURE — 82962 GLUCOSE BLOOD TEST: CPT

## 2018-12-17 PROCEDURE — 84300 ASSAY OF URINE SODIUM: CPT | Performed by: EMERGENCY MEDICINE

## 2018-12-17 PROCEDURE — 25010000002 ENOXAPARIN PER 10 MG: Performed by: FAMILY MEDICINE

## 2018-12-17 PROCEDURE — 70450 CT HEAD/BRAIN W/O DYE: CPT

## 2018-12-17 PROCEDURE — 85025 COMPLETE CBC W/AUTO DIFF WBC: CPT | Performed by: EMERGENCY MEDICINE

## 2018-12-17 PROCEDURE — P9612 CATHETERIZE FOR URINE SPEC: HCPCS

## 2018-12-17 PROCEDURE — 82550 ASSAY OF CK (CPK): CPT | Performed by: EMERGENCY MEDICINE

## 2018-12-17 PROCEDURE — 25010000002 PIPERACILLIN SOD-TAZOBACTAM PER 1 G: Performed by: EMERGENCY MEDICINE

## 2018-12-17 PROCEDURE — 87040 BLOOD CULTURE FOR BACTERIA: CPT | Performed by: EMERGENCY MEDICINE

## 2018-12-17 PROCEDURE — 99285 EMERGENCY DEPT VISIT HI MDM: CPT

## 2018-12-17 PROCEDURE — 99222 1ST HOSP IP/OBS MODERATE 55: CPT | Performed by: INTERNAL MEDICINE

## 2018-12-17 PROCEDURE — 82570 ASSAY OF URINE CREATININE: CPT | Performed by: EMERGENCY MEDICINE

## 2018-12-17 PROCEDURE — 25010000002 VANCOMYCIN 1 G RECONSTITUTED SOLUTION 1 EACH VIAL: Performed by: EMERGENCY MEDICINE

## 2018-12-17 PROCEDURE — 71045 X-RAY EXAM CHEST 1 VIEW: CPT

## 2018-12-17 PROCEDURE — 25010000002 CALCIUM GLUCONATE PER 10 ML: Performed by: EMERGENCY MEDICINE

## 2018-12-17 PROCEDURE — 94799 UNLISTED PULMONARY SVC/PX: CPT

## 2018-12-17 PROCEDURE — 93306 TTE W/DOPPLER COMPLETE: CPT

## 2018-12-17 PROCEDURE — 83880 ASSAY OF NATRIURETIC PEPTIDE: CPT | Performed by: EMERGENCY MEDICINE

## 2018-12-17 PROCEDURE — 84484 ASSAY OF TROPONIN QUANT: CPT | Performed by: EMERGENCY MEDICINE

## 2018-12-17 PROCEDURE — 80053 COMPREHEN METABOLIC PANEL: CPT | Performed by: EMERGENCY MEDICINE

## 2018-12-17 PROCEDURE — 86850 RBC ANTIBODY SCREEN: CPT | Performed by: EMERGENCY MEDICINE

## 2018-12-17 PROCEDURE — 99223 1ST HOSP IP/OBS HIGH 75: CPT | Performed by: INTERNAL MEDICINE

## 2018-12-17 PROCEDURE — 81001 URINALYSIS AUTO W/SCOPE: CPT | Performed by: EMERGENCY MEDICINE

## 2018-12-17 PROCEDURE — 74176 CT ABD & PELVIS W/O CONTRAST: CPT

## 2018-12-17 PROCEDURE — 87086 URINE CULTURE/COLONY COUNT: CPT | Performed by: FAMILY MEDICINE

## 2018-12-17 RX ORDER — SODIUM CHLORIDE 9 MG/ML
30 INJECTION, SOLUTION INTRAVENOUS CONTINUOUS
Status: DISCONTINUED | OUTPATIENT
Start: 2018-12-17 | End: 2018-12-19

## 2018-12-17 RX ORDER — IPRATROPIUM BROMIDE AND ALBUTEROL SULFATE 2.5; .5 MG/3ML; MG/3ML
3 SOLUTION RESPIRATORY (INHALATION)
Status: DISCONTINUED | OUTPATIENT
Start: 2018-12-17 | End: 2018-12-22 | Stop reason: HOSPADM

## 2018-12-17 RX ORDER — CLINDAMYCIN PHOSPHATE 900 MG/50ML
900 INJECTION INTRAVENOUS ONCE
Status: DISCONTINUED | OUTPATIENT
Start: 2018-12-17 | End: 2018-12-17

## 2018-12-17 RX ORDER — MORPHINE SULFATE 2 MG/ML
0.5 INJECTION, SOLUTION INTRAMUSCULAR; INTRAVENOUS EVERY 4 HOURS PRN
Status: DISCONTINUED | OUTPATIENT
Start: 2018-12-17 | End: 2018-12-20

## 2018-12-17 RX ORDER — BUMETANIDE 0.25 MG/ML
1 INJECTION INTRAMUSCULAR; INTRAVENOUS EVERY 12 HOURS SCHEDULED
Status: DISCONTINUED | OUTPATIENT
Start: 2018-12-17 | End: 2018-12-22 | Stop reason: HOSPADM

## 2018-12-17 RX ORDER — ONDANSETRON 2 MG/ML
4 INJECTION INTRAMUSCULAR; INTRAVENOUS ONCE
Status: COMPLETED | OUTPATIENT
Start: 2018-12-17 | End: 2018-12-17

## 2018-12-17 RX ORDER — DEXTROSE MONOHYDRATE 25 G/50ML
50 INJECTION, SOLUTION INTRAVENOUS ONCE
Status: COMPLETED | OUTPATIENT
Start: 2018-12-17 | End: 2018-12-17

## 2018-12-17 RX ORDER — ASPIRIN 81 MG/1
81 TABLET, CHEWABLE ORAL DAILY
Status: DISCONTINUED | OUTPATIENT
Start: 2018-12-17 | End: 2018-12-22 | Stop reason: HOSPADM

## 2018-12-17 RX ORDER — IPRATROPIUM BROMIDE AND ALBUTEROL SULFATE 2.5; .5 MG/3ML; MG/3ML
3 SOLUTION RESPIRATORY (INHALATION)
Status: DISCONTINUED | OUTPATIENT
Start: 2018-12-17 | End: 2018-12-17

## 2018-12-17 RX ORDER — ACETAMINOPHEN 650 MG/1
650 SUPPOSITORY RECTAL EVERY 6 HOURS PRN
Status: DISCONTINUED | OUTPATIENT
Start: 2018-12-17 | End: 2018-12-20

## 2018-12-17 RX ORDER — CLINDAMYCIN PHOSPHATE 900 MG/50ML
900 INJECTION INTRAVENOUS EVERY 8 HOURS
Status: DISCONTINUED | OUTPATIENT
Start: 2018-12-17 | End: 2018-12-20

## 2018-12-17 RX ORDER — FAMOTIDINE 10 MG/ML
20 INJECTION, SOLUTION INTRAVENOUS EVERY 12 HOURS SCHEDULED
Status: DISCONTINUED | OUTPATIENT
Start: 2018-12-17 | End: 2018-12-17

## 2018-12-17 RX ORDER — NALOXONE HCL 0.4 MG/ML
0.4 VIAL (ML) INJECTION ONCE
Status: DISCONTINUED | OUTPATIENT
Start: 2018-12-17 | End: 2018-12-17

## 2018-12-17 RX ORDER — FAMOTIDINE 10 MG/ML
20 INJECTION, SOLUTION INTRAVENOUS DAILY
Status: DISCONTINUED | OUTPATIENT
Start: 2018-12-17 | End: 2018-12-21 | Stop reason: ALTCHOICE

## 2018-12-17 RX ADMIN — IPRATROPIUM BROMIDE AND ALBUTEROL SULFATE 3 ML: 2.5; .5 SOLUTION RESPIRATORY (INHALATION) at 23:50

## 2018-12-17 RX ADMIN — IPRATROPIUM BROMIDE AND ALBUTEROL SULFATE 3 ML: 2.5; .5 SOLUTION RESPIRATORY (INHALATION) at 19:25

## 2018-12-17 RX ADMIN — INSULIN HUMAN 10 UNITS: 100 INJECTION, SOLUTION PARENTERAL at 07:30

## 2018-12-17 RX ADMIN — SODIUM CHLORIDE 100 ML/HR: 9 INJECTION, SOLUTION INTRAVENOUS at 23:18

## 2018-12-17 RX ADMIN — SODIUM CHLORIDE 2898 ML: 9 INJECTION, SOLUTION INTRAVENOUS at 05:12

## 2018-12-17 RX ADMIN — MORPHINE SULFATE 0.5 MG: 2 INJECTION, SOLUTION INTRAMUSCULAR; INTRAVENOUS at 22:19

## 2018-12-17 RX ADMIN — ENOXAPARIN SODIUM 30 MG: 30 INJECTION SUBCUTANEOUS at 10:29

## 2018-12-17 RX ADMIN — IPRATROPIUM BROMIDE AND ALBUTEROL SULFATE 3 ML: 2.5; .5 SOLUTION RESPIRATORY (INHALATION) at 15:33

## 2018-12-17 RX ADMIN — FAMOTIDINE 20 MG: 10 INJECTION, SOLUTION INTRAVENOUS at 10:29

## 2018-12-17 RX ADMIN — PIPERACILLIN SODIUM AND TAZOBACTAM SODIUM 3.38 G: 3; .375 INJECTION, POWDER, LYOPHILIZED, FOR SOLUTION INTRAVENOUS at 05:26

## 2018-12-17 RX ADMIN — SODIUM CHLORIDE 100 ML/HR: 9 INJECTION, SOLUTION INTRAVENOUS at 10:29

## 2018-12-17 RX ADMIN — CLINDAMYCIN IN 5 PERCENT DEXTROSE 900 MG: 18 INJECTION, SOLUTION INTRAVENOUS at 17:59

## 2018-12-17 RX ADMIN — NOREPINEPHRINE BITARTRATE 0.02 MCG/KG/MIN: 1 INJECTION, SOLUTION, CONCENTRATE INTRAVENOUS at 07:35

## 2018-12-17 RX ADMIN — ONDANSETRON 4 MG: 2 INJECTION INTRAMUSCULAR; INTRAVENOUS at 05:26

## 2018-12-17 RX ADMIN — CALCIUM GLUCONATE 1 G: 98 INJECTION, SOLUTION INTRAVENOUS at 07:01

## 2018-12-17 RX ADMIN — TAZOBACTAM SODIUM AND PIPERACILLIN SODIUM 3.38 G: 375; 3 INJECTION, SOLUTION INTRAVENOUS at 17:59

## 2018-12-17 RX ADMIN — CLINDAMYCIN IN 5 PERCENT DEXTROSE 900 MG: 18 INJECTION, SOLUTION INTRAVENOUS at 10:29

## 2018-12-17 RX ADMIN — DEXTROSE MONOHYDRATE 50 ML: 25 INJECTION, SOLUTION INTRAVENOUS at 07:25

## 2018-12-17 RX ADMIN — IPRATROPIUM BROMIDE AND ALBUTEROL SULFATE 3 ML: 2.5; .5 SOLUTION RESPIRATORY (INHALATION) at 11:03

## 2018-12-17 RX ADMIN — BUMETANIDE 1 MG: 0.25 INJECTION INTRAMUSCULAR; INTRAVENOUS at 13:49

## 2018-12-17 RX ADMIN — VANCOMYCIN HYDROCHLORIDE 2000 MG: 1 INJECTION, POWDER, LYOPHILIZED, FOR SOLUTION INTRAVENOUS at 06:10

## 2018-12-18 LAB
ALBUMIN SERPL-MCNC: 2.6 G/DL (ref 3.5–5)
ALBUMIN/GLOB SERPL: 0.8 G/DL (ref 1.1–2.5)
ALP SERPL-CCNC: 155 U/L (ref 24–120)
ALT SERPL W P-5'-P-CCNC: 40 U/L (ref 0–54)
ANION GAP SERPL CALCULATED.3IONS-SCNC: 14 MMOL/L (ref 4–13)
AST SERPL-CCNC: 63 U/L (ref 7–45)
BASOPHILS # BLD AUTO: 0.03 10*3/MM3 (ref 0–0.2)
BASOPHILS NFR BLD AUTO: 0.2 % (ref 0–2)
BH CV ECHO MEAS - AO MAX PG (FULL): 2.7 MMHG
BH CV ECHO MEAS - AO MAX PG: 9.6 MMHG
BH CV ECHO MEAS - AO MEAN PG (FULL): 2 MMHG
BH CV ECHO MEAS - AO MEAN PG: 6 MMHG
BH CV ECHO MEAS - AO ROOT AREA (BSA CORRECTED): 1.4
BH CV ECHO MEAS - AO ROOT AREA: 6.2 CM^2
BH CV ECHO MEAS - AO ROOT DIAM: 2.8 CM
BH CV ECHO MEAS - AO V2 MAX: 155 CM/SEC
BH CV ECHO MEAS - AO V2 MEAN: 108 CM/SEC
BH CV ECHO MEAS - AO V2 VTI: 30.1 CM
BH CV ECHO MEAS - AVA(I,A): 3 CM^2
BH CV ECHO MEAS - AVA(I,D): 3 CM^2
BH CV ECHO MEAS - AVA(V,A): 2.9 CM^2
BH CV ECHO MEAS - AVA(V,D): 2.9 CM^2
BH CV ECHO MEAS - BSA(HAYCOCK): 2.2 M^2
BH CV ECHO MEAS - BSA: 2 M^2
BH CV ECHO MEAS - BZI_BMI: 41.5 KILOGRAMS/M^2
BH CV ECHO MEAS - BZI_METRIC_HEIGHT: 157.5 CM
BH CV ECHO MEAS - BZI_METRIC_WEIGHT: 103 KG
BH CV ECHO MEAS - EDV(CUBED): 137.4 ML
BH CV ECHO MEAS - EDV(MOD-SP4): 88.6 ML
BH CV ECHO MEAS - EDV(TEICH): 127.2 ML
BH CV ECHO MEAS - EF(CUBED): 69.6 %
BH CV ECHO MEAS - EF(MOD-SP4): 53.5 %
BH CV ECHO MEAS - EF(TEICH): 60.8 %
BH CV ECHO MEAS - ESV(CUBED): 41.8 ML
BH CV ECHO MEAS - ESV(MOD-SP4): 41.2 ML
BH CV ECHO MEAS - ESV(TEICH): 49.8 ML
BH CV ECHO MEAS - FS: 32.8 %
BH CV ECHO MEAS - IVS/LVPW: 0.97
BH CV ECHO MEAS - IVSD: 0.93 CM
BH CV ECHO MEAS - LA DIMENSION: 3.5 CM
BH CV ECHO MEAS - LA/AO: 1.3
BH CV ECHO MEAS - LAT PEAK E' VEL: 8.2 CM/SEC
BH CV ECHO MEAS - LV DIASTOLIC VOL/BSA (35-75): 43.9 ML/M^2
BH CV ECHO MEAS - LV MASS(C)D: 178.2 GRAMS
BH CV ECHO MEAS - LV MASS(C)DI: 88.3 GRAMS/M^2
BH CV ECHO MEAS - LV MAX PG: 6.9 MMHG
BH CV ECHO MEAS - LV MEAN PG: 4 MMHG
BH CV ECHO MEAS - LV SYSTOLIC VOL/BSA (12-30): 20.4 ML/M^2
BH CV ECHO MEAS - LV V1 MAX: 131 CM/SEC
BH CV ECHO MEAS - LV V1 MEAN: 87.9 CM/SEC
BH CV ECHO MEAS - LV V1 VTI: 25.9 CM
BH CV ECHO MEAS - LVIDD: 5.2 CM
BH CV ECHO MEAS - LVIDS: 3.5 CM
BH CV ECHO MEAS - LVLD AP4: 8.1 CM
BH CV ECHO MEAS - LVLS AP4: 7 CM
BH CV ECHO MEAS - LVOT AREA (M): 3.5 CM^2
BH CV ECHO MEAS - LVOT AREA: 3.5 CM^2
BH CV ECHO MEAS - LVOT DIAM: 2.1 CM
BH CV ECHO MEAS - LVPWD: 0.96 CM
BH CV ECHO MEAS - MED PEAK E' VEL: 7.62 CM/SEC
BH CV ECHO MEAS - MR MAX PG: 131.3 MMHG
BH CV ECHO MEAS - MR MAX VEL: 573 CM/SEC
BH CV ECHO MEAS - MR MEAN PG: 85 MMHG
BH CV ECHO MEAS - MR MEAN VEL: 431 CM/SEC
BH CV ECHO MEAS - MR VTI: 190 CM
BH CV ECHO MEAS - MV A MAX VEL: 120 CM/SEC
BH CV ECHO MEAS - MV DEC TIME: 0.15 SEC
BH CV ECHO MEAS - MV E MAX VEL: 101 CM/SEC
BH CV ECHO MEAS - MV E/A: 0.84
BH CV ECHO MEAS - RAP SYSTOLE: 5 MMHG
BH CV ECHO MEAS - RVSP: 30.8 MMHG
BH CV ECHO MEAS - SI(AO): 91.9 ML/M^2
BH CV ECHO MEAS - SI(CUBED): 47.4 ML/M^2
BH CV ECHO MEAS - SI(LVOT): 44.5 ML/M^2
BH CV ECHO MEAS - SI(MOD-SP4): 23.5 ML/M^2
BH CV ECHO MEAS - SI(TEICH): 38.4 ML/M^2
BH CV ECHO MEAS - SV(AO): 185.3 ML
BH CV ECHO MEAS - SV(CUBED): 95.6 ML
BH CV ECHO MEAS - SV(LVOT): 89.7 ML
BH CV ECHO MEAS - SV(MOD-SP4): 47.4 ML
BH CV ECHO MEAS - SV(TEICH): 77.4 ML
BH CV ECHO MEAS - TR MAX VEL: 254 CM/SEC
BH CV ECHO MEASUREMENTS AVERAGE E/E' RATIO: 12.77
BILIRUB SERPL-MCNC: 0.3 MG/DL (ref 0.1–1)
BUN BLD-MCNC: 72 MG/DL (ref 5–21)
BUN/CREAT SERPL: 25.5 (ref 7–25)
CALCIUM SPEC-SCNC: 8.3 MG/DL (ref 8.4–10.4)
CHLORIDE SERPL-SCNC: 106 MMOL/L (ref 98–110)
CO2 SERPL-SCNC: 25 MMOL/L (ref 24–31)
CREAT BLD-MCNC: 2.82 MG/DL (ref 0.5–1.4)
DEPRECATED RDW RBC AUTO: 59.9 FL (ref 40–54)
EOSINOPHIL # BLD AUTO: 0.05 10*3/MM3 (ref 0–0.7)
EOSINOPHIL NFR BLD AUTO: 0.3 % (ref 0–4)
ERYTHROCYTE [DISTWIDTH] IN BLOOD BY AUTOMATED COUNT: 15.9 % (ref 12–15)
GFR SERPL CREATININE-BSD FRML MDRD: 16 ML/MIN/1.73
GLOBULIN UR ELPH-MCNC: 3.3 GM/DL
GLUCOSE BLD-MCNC: 80 MG/DL (ref 70–100)
HCT VFR BLD AUTO: 31.1 % (ref 37–47)
HGB BLD-MCNC: 9.2 G/DL (ref 12–16)
IMM GRANULOCYTES # BLD: 0.17 10*3/MM3 (ref 0–0.03)
IMM GRANULOCYTES NFR BLD: 1 % (ref 0–5)
LEFT ATRIUM VOLUME INDEX: 31.2 ML/M2
LEFT ATRIUM VOLUME: 63.1 CM3
LYMPHOCYTES # BLD AUTO: 1.24 10*3/MM3 (ref 0.72–4.86)
LYMPHOCYTES NFR BLD AUTO: 7.1 % (ref 15–45)
MCH RBC QN AUTO: 30.4 PG (ref 28–32)
MCHC RBC AUTO-ENTMCNC: 29.6 G/DL (ref 33–36)
MCV RBC AUTO: 102.6 FL (ref 82–98)
MONOCYTES # BLD AUTO: 0.86 10*3/MM3 (ref 0.19–1.3)
MONOCYTES NFR BLD AUTO: 4.9 % (ref 4–12)
NEUTROPHILS # BLD AUTO: 15.05 10*3/MM3 (ref 1.87–8.4)
NEUTROPHILS NFR BLD AUTO: 86.5 % (ref 39–78)
NRBC BLD MANUAL-RTO: 0 /100 WBC (ref 0–0)
PLATELET # BLD AUTO: 489 10*3/MM3 (ref 130–400)
PMV BLD AUTO: 9.9 FL (ref 6–12)
POTASSIUM BLD-SCNC: 5.9 MMOL/L (ref 3.5–5.3)
PROT SERPL-MCNC: 5.9 G/DL (ref 6.3–8.7)
RBC # BLD AUTO: 3.03 10*6/MM3 (ref 4.2–5.4)
SODIUM BLD-SCNC: 145 MMOL/L (ref 135–145)
WBC NRBC COR # BLD: 17.4 10*3/MM3 (ref 4.8–10.8)

## 2018-12-18 PROCEDURE — 25010000002 PIPERACILLIN SOD-TAZOBACTAM PER 1 G: Performed by: FAMILY MEDICINE

## 2018-12-18 PROCEDURE — 80053 COMPREHEN METABOLIC PANEL: CPT | Performed by: NURSE PRACTITIONER

## 2018-12-18 PROCEDURE — 94760 N-INVAS EAR/PLS OXIMETRY 1: CPT

## 2018-12-18 PROCEDURE — 99232 SBSQ HOSP IP/OBS MODERATE 35: CPT | Performed by: INTERNAL MEDICINE

## 2018-12-18 PROCEDURE — 25010000002 MORPHINE PER 10 MG: Performed by: FAMILY MEDICINE

## 2018-12-18 PROCEDURE — 94799 UNLISTED PULMONARY SVC/PX: CPT

## 2018-12-18 PROCEDURE — G8996 SWALLOW CURRENT STATUS: HCPCS

## 2018-12-18 PROCEDURE — 85025 COMPLETE CBC W/AUTO DIFF WBC: CPT | Performed by: NURSE PRACTITIONER

## 2018-12-18 PROCEDURE — 25010000002 ENOXAPARIN PER 10 MG: Performed by: FAMILY MEDICINE

## 2018-12-18 PROCEDURE — 99232 SBSQ HOSP IP/OBS MODERATE 35: CPT | Performed by: UROLOGY

## 2018-12-18 PROCEDURE — G8997 SWALLOW GOAL STATUS: HCPCS

## 2018-12-18 PROCEDURE — 92610 EVALUATE SWALLOWING FUNCTION: CPT

## 2018-12-18 RX ORDER — LEVOTHYROXINE SODIUM 0.1 MG/1
100 TABLET ORAL
Status: DISCONTINUED | OUTPATIENT
Start: 2018-12-19 | End: 2018-12-22 | Stop reason: HOSPADM

## 2018-12-18 RX ORDER — SODIUM POLYSTYRENE SULFONATE 4.1 MEQ/G
15 POWDER, FOR SUSPENSION ORAL; RECTAL ONCE
Status: COMPLETED | OUTPATIENT
Start: 2018-12-18 | End: 2018-12-18

## 2018-12-18 RX ORDER — METOPROLOL TARTRATE 5 MG/5ML
5 INJECTION INTRAVENOUS ONCE
Status: COMPLETED | OUTPATIENT
Start: 2018-12-18 | End: 2018-12-18

## 2018-12-18 RX ORDER — GABAPENTIN 100 MG/1
100 CAPSULE ORAL NIGHTLY
Status: DISCONTINUED | OUTPATIENT
Start: 2018-12-18 | End: 2018-12-22 | Stop reason: HOSPADM

## 2018-12-18 RX ORDER — SODIUM POLYSTYRENE SULFONATE 15 G/60ML
15 SUSPENSION ORAL; RECTAL ONCE
Status: DISCONTINUED | OUTPATIENT
Start: 2018-12-18 | End: 2018-12-18 | Stop reason: SDUPTHER

## 2018-12-18 RX ADMIN — TAZOBACTAM SODIUM AND PIPERACILLIN SODIUM 3.38 G: 375; 3 INJECTION, SOLUTION INTRAVENOUS at 06:09

## 2018-12-18 RX ADMIN — BUMETANIDE 1 MG: 0.25 INJECTION INTRAMUSCULAR; INTRAVENOUS at 06:09

## 2018-12-18 RX ADMIN — IPRATROPIUM BROMIDE AND ALBUTEROL SULFATE 3 ML: 2.5; .5 SOLUTION RESPIRATORY (INHALATION) at 07:08

## 2018-12-18 RX ADMIN — MORPHINE SULFATE 0.5 MG: 2 INJECTION, SOLUTION INTRAMUSCULAR; INTRAVENOUS at 06:23

## 2018-12-18 RX ADMIN — BUMETANIDE 1 MG: 0.25 INJECTION INTRAMUSCULAR; INTRAVENOUS at 16:47

## 2018-12-18 RX ADMIN — METOPROLOL TARTRATE 5 MG: 5 INJECTION, SOLUTION INTRAVENOUS at 10:33

## 2018-12-18 RX ADMIN — IPRATROPIUM BROMIDE AND ALBUTEROL SULFATE 3 ML: 2.5; .5 SOLUTION RESPIRATORY (INHALATION) at 20:29

## 2018-12-18 RX ADMIN — CLINDAMYCIN IN 5 PERCENT DEXTROSE 900 MG: 18 INJECTION, SOLUTION INTRAVENOUS at 02:24

## 2018-12-18 RX ADMIN — IPRATROPIUM BROMIDE AND ALBUTEROL SULFATE 3 ML: 2.5; .5 SOLUTION RESPIRATORY (INHALATION) at 15:33

## 2018-12-18 RX ADMIN — SODIUM CHLORIDE 30 ML/HR: 9 INJECTION, SOLUTION INTRAVENOUS at 16:46

## 2018-12-18 RX ADMIN — CLINDAMYCIN IN 5 PERCENT DEXTROSE 900 MG: 18 INJECTION, SOLUTION INTRAVENOUS at 09:40

## 2018-12-18 RX ADMIN — ENOXAPARIN SODIUM 30 MG: 30 INJECTION SUBCUTANEOUS at 09:40

## 2018-12-18 RX ADMIN — CLINDAMYCIN IN 5 PERCENT DEXTROSE 900 MG: 18 INJECTION, SOLUTION INTRAVENOUS at 20:03

## 2018-12-18 RX ADMIN — IPRATROPIUM BROMIDE AND ALBUTEROL SULFATE 3 ML: 2.5; .5 SOLUTION RESPIRATORY (INHALATION) at 03:48

## 2018-12-18 RX ADMIN — Medication 81 MG: at 09:39

## 2018-12-18 RX ADMIN — IPRATROPIUM BROMIDE AND ALBUTEROL SULFATE 3 ML: 2.5; .5 SOLUTION RESPIRATORY (INHALATION) at 10:54

## 2018-12-18 RX ADMIN — SODIUM POLYSTYRENE SULFONATE 15 G: 1 POWDER ORAL; RECTAL at 10:33

## 2018-12-18 RX ADMIN — TAZOBACTAM SODIUM AND PIPERACILLIN SODIUM 3.38 G: 375; 3 INJECTION, SOLUTION INTRAVENOUS at 16:42

## 2018-12-18 RX ADMIN — GABAPENTIN 100 MG: 100 CAPSULE ORAL at 22:00

## 2018-12-18 RX ADMIN — MORPHINE SULFATE 0.5 MG: 2 INJECTION, SOLUTION INTRAMUSCULAR; INTRAVENOUS at 02:23

## 2018-12-18 RX ADMIN — FAMOTIDINE 20 MG: 10 INJECTION, SOLUTION INTRAVENOUS at 09:40

## 2018-12-19 LAB
ALBUMIN SERPL-MCNC: 2.6 G/DL (ref 3.5–5)
ALBUMIN/GLOB SERPL: 0.8 G/DL (ref 1.1–2.5)
ALP SERPL-CCNC: 128 U/L (ref 24–120)
ALT SERPL W P-5'-P-CCNC: 27 U/L (ref 0–54)
ANION GAP SERPL CALCULATED.3IONS-SCNC: 13 MMOL/L (ref 4–13)
AST SERPL-CCNC: 21 U/L (ref 7–45)
BASOPHILS # BLD AUTO: 0.04 10*3/MM3 (ref 0–0.2)
BASOPHILS NFR BLD AUTO: 0.2 % (ref 0–2)
BILIRUB SERPL-MCNC: 0.3 MG/DL (ref 0.1–1)
BUN BLD-MCNC: 61 MG/DL (ref 5–21)
BUN/CREAT SERPL: 34.1 (ref 7–25)
CALCIUM SPEC-SCNC: 8.5 MG/DL (ref 8.4–10.4)
CHLORIDE SERPL-SCNC: 110 MMOL/L (ref 98–110)
CO2 SERPL-SCNC: 28 MMOL/L (ref 24–31)
CREAT BLD-MCNC: 1.79 MG/DL (ref 0.5–1.4)
DEPRECATED RDW RBC AUTO: 52 FL (ref 40–54)
EOSINOPHIL # BLD AUTO: 0.03 10*3/MM3 (ref 0–0.7)
EOSINOPHIL NFR BLD AUTO: 0.2 % (ref 0–4)
ERYTHROCYTE [DISTWIDTH] IN BLOOD BY AUTOMATED COUNT: 15.4 % (ref 12–15)
GFR SERPL CREATININE-BSD FRML MDRD: 27 ML/MIN/1.73
GLOBULIN UR ELPH-MCNC: 3.2 GM/DL
GLUCOSE BLD-MCNC: 142 MG/DL (ref 70–100)
HCT VFR BLD AUTO: 28.9 % (ref 37–47)
HGB BLD-MCNC: 9 G/DL (ref 12–16)
IMM GRANULOCYTES # BLD: 0.18 10*3/MM3 (ref 0–0.03)
IMM GRANULOCYTES NFR BLD: 1.1 % (ref 0–5)
LYMPHOCYTES # BLD AUTO: 1.12 10*3/MM3 (ref 0.72–4.86)
LYMPHOCYTES NFR BLD AUTO: 6.7 % (ref 15–45)
MCH RBC QN AUTO: 29 PG (ref 28–32)
MCHC RBC AUTO-ENTMCNC: 31.1 G/DL (ref 33–36)
MCV RBC AUTO: 93.2 FL (ref 82–98)
MONOCYTES # BLD AUTO: 0.85 10*3/MM3 (ref 0.19–1.3)
MONOCYTES NFR BLD AUTO: 5.1 % (ref 4–12)
NEUTROPHILS # BLD AUTO: 14.52 10*3/MM3 (ref 1.87–8.4)
NEUTROPHILS NFR BLD AUTO: 86.7 % (ref 39–78)
NRBC BLD MANUAL-RTO: 0 /100 WBC (ref 0–0)
PLATELET # BLD AUTO: 485 10*3/MM3 (ref 130–400)
PMV BLD AUTO: 9.2 FL (ref 6–12)
POTASSIUM BLD-SCNC: 3.8 MMOL/L (ref 3.5–5.3)
PROT SERPL-MCNC: 5.8 G/DL (ref 6.3–8.7)
RBC # BLD AUTO: 3.1 10*6/MM3 (ref 4.2–5.4)
SODIUM BLD-SCNC: 151 MMOL/L (ref 135–145)
WBC NRBC COR # BLD: 16.74 10*3/MM3 (ref 4.8–10.8)

## 2018-12-19 PROCEDURE — 99232 SBSQ HOSP IP/OBS MODERATE 35: CPT | Performed by: UROLOGY

## 2018-12-19 PROCEDURE — 25010000002 ENOXAPARIN PER 10 MG: Performed by: FAMILY MEDICINE

## 2018-12-19 PROCEDURE — 85025 COMPLETE CBC W/AUTO DIFF WBC: CPT | Performed by: NURSE PRACTITIONER

## 2018-12-19 PROCEDURE — 80053 COMPREHEN METABOLIC PANEL: CPT | Performed by: NURSE PRACTITIONER

## 2018-12-19 PROCEDURE — 94799 UNLISTED PULMONARY SVC/PX: CPT

## 2018-12-19 PROCEDURE — 99231 SBSQ HOSP IP/OBS SF/LOW 25: CPT | Performed by: INTERNAL MEDICINE

## 2018-12-19 PROCEDURE — 25010000002 PIPERACILLIN SOD-TAZOBACTAM PER 1 G: Performed by: FAMILY MEDICINE

## 2018-12-19 PROCEDURE — 25010000002 MORPHINE PER 10 MG: Performed by: FAMILY MEDICINE

## 2018-12-19 PROCEDURE — 94760 N-INVAS EAR/PLS OXIMETRY 1: CPT

## 2018-12-19 PROCEDURE — 25010000002 VANCOMYCIN 10 G RECONSTITUTED SOLUTION: Performed by: FAMILY MEDICINE

## 2018-12-19 PROCEDURE — 92526 ORAL FUNCTION THERAPY: CPT

## 2018-12-19 RX ORDER — DEXTROSE MONOHYDRATE 50 MG/ML
100 INJECTION, SOLUTION INTRAVENOUS CONTINUOUS
Status: DISCONTINUED | OUTPATIENT
Start: 2018-12-19 | End: 2018-12-20 | Stop reason: ALTCHOICE

## 2018-12-19 RX ADMIN — DEXTROSE MONOHYDRATE 75 ML/HR: 50 INJECTION, SOLUTION INTRAVENOUS at 10:00

## 2018-12-19 RX ADMIN — BUMETANIDE 1 MG: 0.25 INJECTION INTRAMUSCULAR; INTRAVENOUS at 05:53

## 2018-12-19 RX ADMIN — IPRATROPIUM BROMIDE AND ALBUTEROL SULFATE 3 ML: 2.5; .5 SOLUTION RESPIRATORY (INHALATION) at 18:24

## 2018-12-19 RX ADMIN — MORPHINE SULFATE 0.5 MG: 2 INJECTION, SOLUTION INTRAMUSCULAR; INTRAVENOUS at 17:23

## 2018-12-19 RX ADMIN — ENOXAPARIN SODIUM 30 MG: 30 INJECTION SUBCUTANEOUS at 09:39

## 2018-12-19 RX ADMIN — MORPHINE SULFATE 0.5 MG: 2 INJECTION, SOLUTION INTRAMUSCULAR; INTRAVENOUS at 11:19

## 2018-12-19 RX ADMIN — BUMETANIDE 1 MG: 0.25 INJECTION INTRAMUSCULAR; INTRAVENOUS at 17:31

## 2018-12-19 RX ADMIN — CLINDAMYCIN IN 5 PERCENT DEXTROSE 900 MG: 18 INJECTION, SOLUTION INTRAVENOUS at 04:16

## 2018-12-19 RX ADMIN — CLINDAMYCIN IN 5 PERCENT DEXTROSE 900 MG: 18 INJECTION, SOLUTION INTRAVENOUS at 09:39

## 2018-12-19 RX ADMIN — TAZOBACTAM SODIUM AND PIPERACILLIN SODIUM 3.38 G: 375; 3 INJECTION, SOLUTION INTRAVENOUS at 17:31

## 2018-12-19 RX ADMIN — IPRATROPIUM BROMIDE AND ALBUTEROL SULFATE 3 ML: 2.5; .5 SOLUTION RESPIRATORY (INHALATION) at 06:50

## 2018-12-19 RX ADMIN — MORPHINE SULFATE 0.5 MG: 2 INJECTION, SOLUTION INTRAMUSCULAR; INTRAVENOUS at 21:28

## 2018-12-19 RX ADMIN — CLINDAMYCIN IN 5 PERCENT DEXTROSE 900 MG: 18 INJECTION, SOLUTION INTRAVENOUS at 21:21

## 2018-12-19 RX ADMIN — Medication 81 MG: at 09:39

## 2018-12-19 RX ADMIN — IPRATROPIUM BROMIDE AND ALBUTEROL SULFATE 3 ML: 2.5; .5 SOLUTION RESPIRATORY (INHALATION) at 00:14

## 2018-12-19 RX ADMIN — IPRATROPIUM BROMIDE AND ALBUTEROL SULFATE 3 ML: 2.5; .5 SOLUTION RESPIRATORY (INHALATION) at 15:16

## 2018-12-19 RX ADMIN — IPRATROPIUM BROMIDE AND ALBUTEROL SULFATE 3 ML: 2.5; .5 SOLUTION RESPIRATORY (INHALATION) at 11:16

## 2018-12-19 RX ADMIN — VANCOMYCIN HYDROCHLORIDE 500 MG: 10 INJECTION, POWDER, LYOPHILIZED, FOR SOLUTION INTRAVENOUS at 23:25

## 2018-12-19 RX ADMIN — TAZOBACTAM SODIUM AND PIPERACILLIN SODIUM 3.38 G: 375; 3 INJECTION, SOLUTION INTRAVENOUS at 05:52

## 2018-12-19 RX ADMIN — GABAPENTIN 100 MG: 100 CAPSULE ORAL at 21:28

## 2018-12-19 RX ADMIN — FAMOTIDINE 20 MG: 10 INJECTION, SOLUTION INTRAVENOUS at 09:39

## 2018-12-19 RX ADMIN — LEVOTHYROXINE SODIUM 100 MCG: 100 TABLET ORAL at 06:54

## 2018-12-19 RX ADMIN — IPRATROPIUM BROMIDE AND ALBUTEROL SULFATE 3 ML: 2.5; .5 SOLUTION RESPIRATORY (INHALATION) at 03:51

## 2018-12-19 RX ADMIN — IPRATROPIUM BROMIDE AND ALBUTEROL SULFATE 3 ML: 2.5; .5 SOLUTION RESPIRATORY (INHALATION) at 22:08

## 2018-12-20 ENCOUNTER — APPOINTMENT (OUTPATIENT)
Dept: GENERAL RADIOLOGY | Facility: HOSPITAL | Age: 78
End: 2018-12-20

## 2018-12-20 LAB
ALBUMIN SERPL-MCNC: 2.7 G/DL (ref 3.5–5)
ALBUMIN/GLOB SERPL: 0.8 G/DL (ref 1.1–2.5)
ALP SERPL-CCNC: 109 U/L (ref 24–120)
ALT SERPL W P-5'-P-CCNC: 20 U/L (ref 0–54)
ANION GAP SERPL CALCULATED.3IONS-SCNC: 12 MMOL/L (ref 4–13)
AST SERPL-CCNC: 17 U/L (ref 7–45)
BACTERIA SPEC AEROBE CULT: ABNORMAL
BASOPHILS # BLD AUTO: 0.04 10*3/MM3 (ref 0–0.2)
BASOPHILS NFR BLD AUTO: 0.2 % (ref 0–2)
BILIRUB SERPL-MCNC: 0.5 MG/DL (ref 0.1–1)
BUN BLD-MCNC: 50 MG/DL (ref 5–21)
BUN/CREAT SERPL: 33.6 (ref 7–25)
CALCIUM SPEC-SCNC: 8.4 MG/DL (ref 8.4–10.4)
CHLORIDE SERPL-SCNC: 103 MMOL/L (ref 98–110)
CO2 SERPL-SCNC: 32 MMOL/L (ref 24–31)
CREAT BLD-MCNC: 1.49 MG/DL (ref 0.5–1.4)
DEPRECATED RDW RBC AUTO: 50.4 FL (ref 40–54)
EOSINOPHIL # BLD AUTO: 0.03 10*3/MM3 (ref 0–0.7)
EOSINOPHIL NFR BLD AUTO: 0.2 % (ref 0–4)
ERYTHROCYTE [DISTWIDTH] IN BLOOD BY AUTOMATED COUNT: 15.1 % (ref 12–15)
GFR SERPL CREATININE-BSD FRML MDRD: 34 ML/MIN/1.73
GLOBULIN UR ELPH-MCNC: 3.3 GM/DL
GLUCOSE BLD-MCNC: 127 MG/DL (ref 70–100)
GLUCOSE BLDC GLUCOMTR-MCNC: 150 MG/DL (ref 70–130)
GLUCOSE BLDC GLUCOMTR-MCNC: 162 MG/DL (ref 70–130)
HCT VFR BLD AUTO: 28.8 % (ref 37–47)
HGB BLD-MCNC: 9.2 G/DL (ref 12–16)
IMM GRANULOCYTES # BLD: 0.32 10*3/MM3 (ref 0–0.03)
IMM GRANULOCYTES NFR BLD: 1.8 % (ref 0–5)
IRON 24H UR-MRATE: 45 MCG/DL (ref 42–180)
IRON SATN MFR SERPL: 23 % (ref 20–45)
LYMPHOCYTES # BLD AUTO: 1.58 10*3/MM3 (ref 0.72–4.86)
LYMPHOCYTES NFR BLD AUTO: 9.1 % (ref 15–45)
MCH RBC QN AUTO: 29.3 PG (ref 28–32)
MCHC RBC AUTO-ENTMCNC: 31.9 G/DL (ref 33–36)
MCV RBC AUTO: 91.7 FL (ref 82–98)
MONOCYTES # BLD AUTO: 0.93 10*3/MM3 (ref 0.19–1.3)
MONOCYTES NFR BLD AUTO: 5.3 % (ref 4–12)
NEUTROPHILS # BLD AUTO: 14.54 10*3/MM3 (ref 1.87–8.4)
NEUTROPHILS NFR BLD AUTO: 83.4 % (ref 39–78)
NRBC BLD MANUAL-RTO: 0 /100 WBC (ref 0–0)
PLATELET # BLD AUTO: 496 10*3/MM3 (ref 130–400)
PMV BLD AUTO: 9.3 FL (ref 6–12)
POTASSIUM BLD-SCNC: 2.7 MMOL/L (ref 3.5–5.3)
PROT SERPL-MCNC: 6 G/DL (ref 6.3–8.7)
RBC # BLD AUTO: 3.14 10*6/MM3 (ref 4.2–5.4)
SODIUM BLD-SCNC: 147 MMOL/L (ref 135–145)
TIBC SERPL-MCNC: 194 MCG/DL (ref 225–420)
WBC NRBC COR # BLD: 17.44 10*3/MM3 (ref 4.8–10.8)

## 2018-12-20 PROCEDURE — 82962 GLUCOSE BLOOD TEST: CPT

## 2018-12-20 PROCEDURE — 25010000002 MORPHINE PER 10 MG: Performed by: FAMILY MEDICINE

## 2018-12-20 PROCEDURE — 71045 X-RAY EXAM CHEST 1 VIEW: CPT

## 2018-12-20 PROCEDURE — 99232 SBSQ HOSP IP/OBS MODERATE 35: CPT | Performed by: UROLOGY

## 2018-12-20 PROCEDURE — 25010000002 ENOXAPARIN PER 10 MG: Performed by: FAMILY MEDICINE

## 2018-12-20 PROCEDURE — 80053 COMPREHEN METABOLIC PANEL: CPT | Performed by: NURSE PRACTITIONER

## 2018-12-20 PROCEDURE — 25010000002 POTASSIUM CHLORIDE PER 2 MEQ OF POTASSIUM: Performed by: INTERNAL MEDICINE

## 2018-12-20 PROCEDURE — 25010000002 PIPERACILLIN SOD-TAZOBACTAM PER 1 G: Performed by: INTERNAL MEDICINE

## 2018-12-20 PROCEDURE — 94799 UNLISTED PULMONARY SVC/PX: CPT

## 2018-12-20 PROCEDURE — 94760 N-INVAS EAR/PLS OXIMETRY 1: CPT

## 2018-12-20 PROCEDURE — 25010000002 PIPERACILLIN SOD-TAZOBACTAM PER 1 G: Performed by: FAMILY MEDICINE

## 2018-12-20 PROCEDURE — 85025 COMPLETE CBC W/AUTO DIFF WBC: CPT | Performed by: NURSE PRACTITIONER

## 2018-12-20 PROCEDURE — 83540 ASSAY OF IRON: CPT | Performed by: INTERNAL MEDICINE

## 2018-12-20 PROCEDURE — 83550 IRON BINDING TEST: CPT | Performed by: INTERNAL MEDICINE

## 2018-12-20 RX ORDER — VANCOMYCIN HYDROCHLORIDE 1 G/200ML
1000 INJECTION, SOLUTION INTRAVENOUS EVERY 24 HOURS
Status: DISCONTINUED | OUTPATIENT
Start: 2018-12-20 | End: 2018-12-20

## 2018-12-20 RX ORDER — POTASSIUM CHLORIDE 750 MG/1
40 CAPSULE, EXTENDED RELEASE ORAL ONCE
Status: COMPLETED | OUTPATIENT
Start: 2018-12-20 | End: 2018-12-20

## 2018-12-20 RX ORDER — FLUCONAZOLE 200 MG/1
200 TABLET ORAL EVERY 24 HOURS
Status: DISCONTINUED | OUTPATIENT
Start: 2018-12-20 | End: 2018-12-22 | Stop reason: HOSPADM

## 2018-12-20 RX ORDER — ECHINACEA PURPUREA EXTRACT 125 MG
1 TABLET ORAL AS NEEDED
Status: DISCONTINUED | OUTPATIENT
Start: 2018-12-20 | End: 2018-12-22 | Stop reason: HOSPADM

## 2018-12-20 RX ORDER — ACETAMINOPHEN 325 MG/1
650 TABLET ORAL EVERY 6 HOURS PRN
Status: DISCONTINUED | OUTPATIENT
Start: 2018-12-20 | End: 2018-12-22 | Stop reason: HOSPADM

## 2018-12-20 RX ORDER — OXYCODONE HYDROCHLORIDE AND ACETAMINOPHEN 5; 325 MG/1; MG/1
1 TABLET ORAL EVERY 4 HOURS PRN
Status: DISCONTINUED | OUTPATIENT
Start: 2018-12-20 | End: 2018-12-22 | Stop reason: HOSPADM

## 2018-12-20 RX ADMIN — Medication 1 SPRAY: at 10:47

## 2018-12-20 RX ADMIN — IPRATROPIUM BROMIDE AND ALBUTEROL SULFATE 3 ML: 2.5; .5 SOLUTION RESPIRATORY (INHALATION) at 06:32

## 2018-12-20 RX ADMIN — IPRATROPIUM BROMIDE AND ALBUTEROL SULFATE 3 ML: 2.5; .5 SOLUTION RESPIRATORY (INHALATION) at 15:19

## 2018-12-20 RX ADMIN — IPRATROPIUM BROMIDE AND ALBUTEROL SULFATE 3 ML: 2.5; .5 SOLUTION RESPIRATORY (INHALATION) at 21:20

## 2018-12-20 RX ADMIN — ENOXAPARIN SODIUM 30 MG: 30 INJECTION SUBCUTANEOUS at 09:34

## 2018-12-20 RX ADMIN — TAZOBACTAM SODIUM AND PIPERACILLIN SODIUM 4.5 G: 500; 4 INJECTION, SOLUTION INTRAVENOUS at 21:31

## 2018-12-20 RX ADMIN — TAZOBACTAM SODIUM AND PIPERACILLIN SODIUM 4.5 G: 500; 4 INJECTION, SOLUTION INTRAVENOUS at 12:48

## 2018-12-20 RX ADMIN — OXYCODONE HYDROCHLORIDE AND ACETAMINOPHEN 1 TABLET: 5; 325 TABLET ORAL at 15:05

## 2018-12-20 RX ADMIN — Medication 81 MG: at 08:00

## 2018-12-20 RX ADMIN — OXYCODONE HYDROCHLORIDE AND ACETAMINOPHEN 1 TABLET: 5; 325 TABLET ORAL at 19:40

## 2018-12-20 RX ADMIN — POTASSIUM CHLORIDE: 149 INJECTION, SOLUTION, CONCENTRATE INTRAVENOUS at 15:05

## 2018-12-20 RX ADMIN — CLINDAMYCIN IN 5 PERCENT DEXTROSE 900 MG: 18 INJECTION, SOLUTION INTRAVENOUS at 09:35

## 2018-12-20 RX ADMIN — FAMOTIDINE 20 MG: 10 INJECTION, SOLUTION INTRAVENOUS at 08:00

## 2018-12-20 RX ADMIN — GABAPENTIN 100 MG: 100 CAPSULE ORAL at 21:31

## 2018-12-20 RX ADMIN — LEVOTHYROXINE SODIUM 100 MCG: 100 TABLET ORAL at 05:24

## 2018-12-20 RX ADMIN — TAZOBACTAM SODIUM AND PIPERACILLIN SODIUM 3.38 G: 375; 3 INJECTION, SOLUTION INTRAVENOUS at 05:25

## 2018-12-20 RX ADMIN — BUMETANIDE 1 MG: 0.25 INJECTION INTRAMUSCULAR; INTRAVENOUS at 17:24

## 2018-12-20 RX ADMIN — OXYCODONE HYDROCHLORIDE AND ACETAMINOPHEN 1 TABLET: 5; 325 TABLET ORAL at 10:47

## 2018-12-20 RX ADMIN — POTASSIUM CHLORIDE 40 MEQ: 750 CAPSULE, EXTENDED RELEASE ORAL at 09:34

## 2018-12-20 RX ADMIN — CLINDAMYCIN IN 5 PERCENT DEXTROSE 900 MG: 18 INJECTION, SOLUTION INTRAVENOUS at 04:15

## 2018-12-20 RX ADMIN — MORPHINE SULFATE 0.5 MG: 2 INJECTION, SOLUTION INTRAMUSCULAR; INTRAVENOUS at 05:07

## 2018-12-20 RX ADMIN — IPRATROPIUM BROMIDE AND ALBUTEROL SULFATE 3 ML: 2.5; .5 SOLUTION RESPIRATORY (INHALATION) at 02:32

## 2018-12-20 RX ADMIN — BUMETANIDE 1 MG: 0.25 INJECTION INTRAMUSCULAR; INTRAVENOUS at 05:25

## 2018-12-20 RX ADMIN — IPRATROPIUM BROMIDE AND ALBUTEROL SULFATE 3 ML: 2.5; .5 SOLUTION RESPIRATORY (INHALATION) at 10:52

## 2018-12-20 RX ADMIN — DEXTROSE MONOHYDRATE 100 ML/HR: 50 INJECTION, SOLUTION INTRAVENOUS at 07:57

## 2018-12-20 RX ADMIN — FLUCONAZOLE 200 MG: 200 TABLET ORAL at 12:46

## 2018-12-21 LAB
ALBUMIN SERPL-MCNC: 2.7 G/DL (ref 3.5–5)
ALBUMIN/GLOB SERPL: 0.8 G/DL (ref 1.1–2.5)
ALP SERPL-CCNC: 89 U/L (ref 24–120)
ALT SERPL W P-5'-P-CCNC: 18 U/L (ref 0–54)
ANION GAP SERPL CALCULATED.3IONS-SCNC: 9 MMOL/L (ref 4–13)
AST SERPL-CCNC: 25 U/L (ref 7–45)
BASOPHILS # BLD AUTO: 0.04 10*3/MM3 (ref 0–0.2)
BASOPHILS NFR BLD AUTO: 0.3 % (ref 0–2)
BILIRUB SERPL-MCNC: 0.3 MG/DL (ref 0.1–1)
BUN BLD-MCNC: 35 MG/DL (ref 5–21)
BUN/CREAT SERPL: 24.6 (ref 7–25)
CALCIUM SPEC-SCNC: 7.7 MG/DL (ref 8.4–10.4)
CHLORIDE SERPL-SCNC: 101 MMOL/L (ref 98–110)
CO2 SERPL-SCNC: 31 MMOL/L (ref 24–31)
CREAT BLD-MCNC: 1.42 MG/DL (ref 0.5–1.4)
DEPRECATED RDW RBC AUTO: 49.6 FL (ref 40–54)
EOSINOPHIL # BLD AUTO: 0.17 10*3/MM3 (ref 0–0.7)
EOSINOPHIL NFR BLD AUTO: 1.4 % (ref 0–4)
ERYTHROCYTE [DISTWIDTH] IN BLOOD BY AUTOMATED COUNT: 14.9 % (ref 12–15)
GFR SERPL CREATININE-BSD FRML MDRD: 36 ML/MIN/1.73
GLOBULIN UR ELPH-MCNC: 3.4 GM/DL
GLUCOSE BLD-MCNC: 108 MG/DL (ref 70–100)
HCT VFR BLD AUTO: 26.8 % (ref 37–47)
HGB BLD-MCNC: 8.7 G/DL (ref 12–16)
IMM GRANULOCYTES # BLD AUTO: 0.25 10*3/MM3 (ref 0–0.03)
IMM GRANULOCYTES NFR BLD AUTO: 2 % (ref 0–5)
LYMPHOCYTES # BLD AUTO: 1.84 10*3/MM3 (ref 0.72–4.86)
LYMPHOCYTES NFR BLD AUTO: 14.7 % (ref 15–45)
MCH RBC QN AUTO: 29.5 PG (ref 28–32)
MCHC RBC AUTO-ENTMCNC: 32.5 G/DL (ref 33–36)
MCV RBC AUTO: 90.8 FL (ref 82–98)
MONOCYTES # BLD AUTO: 0.7 10*3/MM3 (ref 0.19–1.3)
MONOCYTES NFR BLD AUTO: 5.6 % (ref 4–12)
NEUTROPHILS # BLD AUTO: 9.55 10*3/MM3 (ref 1.87–8.4)
NEUTROPHILS NFR BLD AUTO: 76 % (ref 39–78)
NRBC BLD AUTO-RTO: 0 /100 WBC (ref 0–0)
PLATELET # BLD AUTO: 431 10*3/MM3 (ref 130–400)
PMV BLD AUTO: 9.3 FL (ref 6–12)
POTASSIUM BLD-SCNC: 2.9 MMOL/L (ref 3.5–5.3)
PROT SERPL-MCNC: 6.1 G/DL (ref 6.3–8.7)
RBC # BLD AUTO: 2.95 10*6/MM3 (ref 4.2–5.4)
SODIUM BLD-SCNC: 141 MMOL/L (ref 135–145)
WBC NRBC COR # BLD: 12.55 10*3/MM3 (ref 4.8–10.8)

## 2018-12-21 PROCEDURE — 94760 N-INVAS EAR/PLS OXIMETRY 1: CPT

## 2018-12-21 PROCEDURE — 97161 PT EVAL LOW COMPLEX 20 MIN: CPT | Performed by: PHYSICAL THERAPIST

## 2018-12-21 PROCEDURE — 25010000002 POTASSIUM CHLORIDE PER 2 MEQ OF POTASSIUM: Performed by: INTERNAL MEDICINE

## 2018-12-21 PROCEDURE — G8979 MOBILITY GOAL STATUS: HCPCS | Performed by: PHYSICAL THERAPIST

## 2018-12-21 PROCEDURE — 25010000002 ENOXAPARIN PER 10 MG: Performed by: FAMILY MEDICINE

## 2018-12-21 PROCEDURE — 94799 UNLISTED PULMONARY SVC/PX: CPT

## 2018-12-21 PROCEDURE — 99233 SBSQ HOSP IP/OBS HIGH 50: CPT | Performed by: UROLOGY

## 2018-12-21 PROCEDURE — 25010000002 PIPERACILLIN SOD-TAZOBACTAM PER 1 G: Performed by: INTERNAL MEDICINE

## 2018-12-21 PROCEDURE — 85025 COMPLETE CBC W/AUTO DIFF WBC: CPT | Performed by: NURSE PRACTITIONER

## 2018-12-21 PROCEDURE — 80053 COMPREHEN METABOLIC PANEL: CPT | Performed by: NURSE PRACTITIONER

## 2018-12-21 PROCEDURE — G8978 MOBILITY CURRENT STATUS: HCPCS | Performed by: PHYSICAL THERAPIST

## 2018-12-21 PROCEDURE — 92526 ORAL FUNCTION THERAPY: CPT

## 2018-12-21 RX ORDER — POTASSIUM CHLORIDE 750 MG/1
40 CAPSULE, EXTENDED RELEASE ORAL DAILY
Status: DISCONTINUED | OUTPATIENT
Start: 2018-12-21 | End: 2018-12-22 | Stop reason: HOSPADM

## 2018-12-21 RX ORDER — FAMOTIDINE 20 MG/1
20 TABLET, FILM COATED ORAL DAILY
Status: DISCONTINUED | OUTPATIENT
Start: 2018-12-22 | End: 2018-12-22 | Stop reason: HOSPADM

## 2018-12-21 RX ADMIN — OXYCODONE HYDROCHLORIDE AND ACETAMINOPHEN 1 TABLET: 5; 325 TABLET ORAL at 20:38

## 2018-12-21 RX ADMIN — Medication 81 MG: at 11:49

## 2018-12-21 RX ADMIN — BUMETANIDE 1 MG: 0.25 INJECTION INTRAMUSCULAR; INTRAVENOUS at 06:01

## 2018-12-21 RX ADMIN — IPRATROPIUM BROMIDE AND ALBUTEROL SULFATE 3 ML: 2.5; .5 SOLUTION RESPIRATORY (INHALATION) at 06:47

## 2018-12-21 RX ADMIN — FAMOTIDINE 20 MG: 10 INJECTION, SOLUTION INTRAVENOUS at 11:49

## 2018-12-21 RX ADMIN — POTASSIUM CHLORIDE: 149 INJECTION, SOLUTION, CONCENTRATE INTRAVENOUS at 22:53

## 2018-12-21 RX ADMIN — IPRATROPIUM BROMIDE AND ALBUTEROL SULFATE 3 ML: 2.5; .5 SOLUTION RESPIRATORY (INHALATION) at 04:05

## 2018-12-21 RX ADMIN — FLUCONAZOLE 200 MG: 200 TABLET ORAL at 11:49

## 2018-12-21 RX ADMIN — LEVOTHYROXINE SODIUM 100 MCG: 100 TABLET ORAL at 06:03

## 2018-12-21 RX ADMIN — TAZOBACTAM SODIUM AND PIPERACILLIN SODIUM 4.5 G: 500; 4 INJECTION, SOLUTION INTRAVENOUS at 20:38

## 2018-12-21 RX ADMIN — TAZOBACTAM SODIUM AND PIPERACILLIN SODIUM 4.5 G: 500; 4 INJECTION, SOLUTION INTRAVENOUS at 12:42

## 2018-12-21 RX ADMIN — OXYCODONE HYDROCHLORIDE AND ACETAMINOPHEN 1 TABLET: 5; 325 TABLET ORAL at 06:03

## 2018-12-21 RX ADMIN — POTASSIUM CHLORIDE: 149 INJECTION, SOLUTION, CONCENTRATE INTRAVENOUS at 12:41

## 2018-12-21 RX ADMIN — POTASSIUM CHLORIDE 40 MEQ: 750 CAPSULE, EXTENDED RELEASE ORAL at 11:50

## 2018-12-21 RX ADMIN — OXYCODONE HYDROCHLORIDE AND ACETAMINOPHEN 1 TABLET: 5; 325 TABLET ORAL at 10:33

## 2018-12-21 RX ADMIN — IPRATROPIUM BROMIDE AND ALBUTEROL SULFATE 3 ML: 2.5; .5 SOLUTION RESPIRATORY (INHALATION) at 11:12

## 2018-12-21 RX ADMIN — TAZOBACTAM SODIUM AND PIPERACILLIN SODIUM 4.5 G: 500; 4 INJECTION, SOLUTION INTRAVENOUS at 05:05

## 2018-12-21 RX ADMIN — ENOXAPARIN SODIUM 30 MG: 30 INJECTION SUBCUTANEOUS at 11:50

## 2018-12-21 RX ADMIN — OXYCODONE HYDROCHLORIDE AND ACETAMINOPHEN 1 TABLET: 5; 325 TABLET ORAL at 01:19

## 2018-12-21 RX ADMIN — POTASSIUM CHLORIDE: 149 INJECTION, SOLUTION, CONCENTRATE INTRAVENOUS at 01:33

## 2018-12-21 RX ADMIN — GABAPENTIN 100 MG: 100 CAPSULE ORAL at 20:38

## 2018-12-21 RX ADMIN — OXYCODONE HYDROCHLORIDE AND ACETAMINOPHEN 1 TABLET: 5; 325 TABLET ORAL at 15:00

## 2018-12-21 RX ADMIN — BUMETANIDE 1 MG: 0.25 INJECTION INTRAMUSCULAR; INTRAVENOUS at 19:27

## 2018-12-22 VITALS
RESPIRATION RATE: 18 BRPM | BODY MASS INDEX: 33.85 KG/M2 | SYSTOLIC BLOOD PRESSURE: 160 MMHG | HEART RATE: 74 BPM | WEIGHT: 203.2 LBS | DIASTOLIC BLOOD PRESSURE: 78 MMHG | TEMPERATURE: 98.5 F | OXYGEN SATURATION: 92 % | HEIGHT: 65 IN

## 2018-12-22 LAB
ALBUMIN SERPL-MCNC: 2.6 G/DL (ref 3.5–5)
ALBUMIN/GLOB SERPL: 0.8 G/DL (ref 1.1–2.5)
ALP SERPL-CCNC: 84 U/L (ref 24–120)
ALT SERPL W P-5'-P-CCNC: 23 U/L (ref 0–54)
ANION GAP SERPL CALCULATED.3IONS-SCNC: 8 MMOL/L (ref 4–13)
AST SERPL-CCNC: 16 U/L (ref 7–45)
BACTERIA SPEC AEROBE CULT: NORMAL
BACTERIA SPEC AEROBE CULT: NORMAL
BASOPHILS # BLD AUTO: 0.05 10*3/MM3 (ref 0–0.2)
BASOPHILS NFR BLD AUTO: 0.4 % (ref 0–2)
BILIRUB SERPL-MCNC: 0.3 MG/DL (ref 0.1–1)
BUN BLD-MCNC: 23 MG/DL (ref 5–21)
BUN/CREAT SERPL: 19.8 (ref 7–25)
CALCIUM SPEC-SCNC: 7.9 MG/DL (ref 8.4–10.4)
CHLORIDE SERPL-SCNC: 104 MMOL/L (ref 98–110)
CO2 SERPL-SCNC: 29 MMOL/L (ref 24–31)
CREAT BLD-MCNC: 1.16 MG/DL (ref 0.5–1.4)
DEPRECATED RDW RBC AUTO: 51 FL (ref 40–54)
EOSINOPHIL # BLD AUTO: 0.25 10*3/MM3 (ref 0–0.7)
EOSINOPHIL NFR BLD AUTO: 2 % (ref 0–4)
ERYTHROCYTE [DISTWIDTH] IN BLOOD BY AUTOMATED COUNT: 15 % (ref 12–15)
GFR SERPL CREATININE-BSD FRML MDRD: 45 ML/MIN/1.73
GLOBULIN UR ELPH-MCNC: 3.2 GM/DL
GLUCOSE BLD-MCNC: 87 MG/DL (ref 70–100)
HCT VFR BLD AUTO: 31.6 % (ref 37–47)
HGB BLD-MCNC: 10.1 G/DL (ref 12–16)
IMM GRANULOCYTES # BLD AUTO: 0.26 10*3/MM3 (ref 0–0.03)
IMM GRANULOCYTES NFR BLD AUTO: 2 % (ref 0–5)
LYMPHOCYTES # BLD AUTO: 2.36 10*3/MM3 (ref 0.72–4.86)
LYMPHOCYTES NFR BLD AUTO: 18.5 % (ref 15–45)
MAGNESIUM SERPL-MCNC: 1.4 MG/DL (ref 1.4–2.2)
MCH RBC QN AUTO: 29.9 PG (ref 28–32)
MCHC RBC AUTO-ENTMCNC: 32 G/DL (ref 33–36)
MCV RBC AUTO: 93.5 FL (ref 82–98)
MONOCYTES # BLD AUTO: 0.57 10*3/MM3 (ref 0.19–1.3)
MONOCYTES NFR BLD AUTO: 4.5 % (ref 4–12)
NEUTROPHILS # BLD AUTO: 9.28 10*3/MM3 (ref 1.87–8.4)
NEUTROPHILS NFR BLD AUTO: 72.6 % (ref 39–78)
NRBC BLD AUTO-RTO: 0 /100 WBC (ref 0–0)
PLATELET # BLD AUTO: 403 10*3/MM3 (ref 130–400)
PMV BLD AUTO: 9.2 FL (ref 6–12)
POTASSIUM BLD-SCNC: 3.8 MMOL/L (ref 3.5–5.3)
PROT SERPL-MCNC: 5.8 G/DL (ref 6.3–8.7)
RBC # BLD AUTO: 3.38 10*6/MM3 (ref 4.2–5.4)
SODIUM BLD-SCNC: 141 MMOL/L (ref 135–145)
WBC NRBC COR # BLD: 12.77 10*3/MM3 (ref 4.8–10.8)

## 2018-12-22 PROCEDURE — 94760 N-INVAS EAR/PLS OXIMETRY 1: CPT

## 2018-12-22 PROCEDURE — 94799 UNLISTED PULMONARY SVC/PX: CPT

## 2018-12-22 PROCEDURE — 25010000002 PIPERACILLIN SOD-TAZOBACTAM PER 1 G: Performed by: INTERNAL MEDICINE

## 2018-12-22 PROCEDURE — 85025 COMPLETE CBC W/AUTO DIFF WBC: CPT | Performed by: NURSE PRACTITIONER

## 2018-12-22 PROCEDURE — 83735 ASSAY OF MAGNESIUM: CPT | Performed by: FAMILY MEDICINE

## 2018-12-22 PROCEDURE — 99233 SBSQ HOSP IP/OBS HIGH 50: CPT | Performed by: UROLOGY

## 2018-12-22 PROCEDURE — 80053 COMPREHEN METABOLIC PANEL: CPT | Performed by: NURSE PRACTITIONER

## 2018-12-22 RX ORDER — ACETAMINOPHEN 325 MG/1
650 TABLET ORAL EVERY 6 HOURS PRN
Qty: 100 TABLET | Refills: 3 | Status: SHIPPED | OUTPATIENT
Start: 2018-12-22

## 2018-12-22 RX ORDER — IPRATROPIUM BROMIDE AND ALBUTEROL SULFATE 2.5; .5 MG/3ML; MG/3ML
3 SOLUTION RESPIRATORY (INHALATION)
Qty: 360 ML | Refills: 3 | Status: ON HOLD | OUTPATIENT
Start: 2018-12-22 | End: 2019-01-27

## 2018-12-22 RX ORDER — FLUCONAZOLE 200 MG/1
200 TABLET ORAL EVERY 24 HOURS
Qty: 2 TABLET | Refills: 0 | Status: SHIPPED | OUTPATIENT
Start: 2018-12-22 | End: 2018-12-24

## 2018-12-22 RX ORDER — OXYCODONE HYDROCHLORIDE AND ACETAMINOPHEN 5; 325 MG/1; MG/1
1 TABLET ORAL EVERY 4 HOURS PRN
Qty: 30 TABLET | Refills: 0 | Status: SHIPPED | OUTPATIENT
Start: 2018-12-22 | End: 2018-12-30

## 2018-12-22 RX ORDER — ASPIRIN 81 MG/1
81 TABLET, CHEWABLE ORAL DAILY
Qty: 100 TABLET | Refills: 3 | Status: SHIPPED | OUTPATIENT
Start: 2018-12-22

## 2018-12-22 RX ADMIN — POTASSIUM CHLORIDE 40 MEQ: 750 CAPSULE, EXTENDED RELEASE ORAL at 09:37

## 2018-12-22 RX ADMIN — OXYCODONE HYDROCHLORIDE AND ACETAMINOPHEN 1 TABLET: 5; 325 TABLET ORAL at 01:19

## 2018-12-22 RX ADMIN — FAMOTIDINE 20 MG: 20 TABLET, FILM COATED ORAL at 09:37

## 2018-12-22 RX ADMIN — OXYCODONE HYDROCHLORIDE AND ACETAMINOPHEN 1 TABLET: 5; 325 TABLET ORAL at 10:44

## 2018-12-22 RX ADMIN — Medication 81 MG: at 09:37

## 2018-12-22 RX ADMIN — TAZOBACTAM SODIUM AND PIPERACILLIN SODIUM 4.5 G: 500; 4 INJECTION, SOLUTION INTRAVENOUS at 05:28

## 2018-12-22 RX ADMIN — LEVOTHYROXINE SODIUM 100 MCG: 100 TABLET ORAL at 05:28

## 2018-12-22 RX ADMIN — OXYCODONE HYDROCHLORIDE AND ACETAMINOPHEN 1 TABLET: 5; 325 TABLET ORAL at 05:28

## 2018-12-24 ENCOUNTER — LAB REQUISITION (OUTPATIENT)
Dept: LAB | Facility: HOSPITAL | Age: 78
End: 2018-12-24

## 2018-12-24 DIAGNOSIS — Z00.00 ENCOUNTER FOR GENERAL ADULT MEDICAL EXAMINATION WITHOUT ABNORMAL FINDINGS: ICD-10-CM

## 2018-12-24 LAB
25(OH)D3 SERPL-MCNC: 35.6 NG/ML (ref 30–100)
ALBUMIN SERPL-MCNC: 2.7 G/DL (ref 3.5–5)
ALBUMIN/GLOB SERPL: 0.9 G/DL (ref 1.1–2.5)
ALP SERPL-CCNC: 80 U/L (ref 24–120)
ALT SERPL W P-5'-P-CCNC: 21 U/L (ref 0–54)
ANION GAP SERPL CALCULATED.3IONS-SCNC: 9 MMOL/L (ref 4–13)
ARTICHOKE IGE QN: 62 MG/DL (ref 0–99)
AST SERPL-CCNC: 25 U/L (ref 7–45)
BASOPHILS # BLD AUTO: 0.05 10*3/MM3 (ref 0–0.2)
BASOPHILS NFR BLD AUTO: 0.4 % (ref 0–2)
BILIRUB SERPL-MCNC: 0.2 MG/DL (ref 0.1–1)
BUN BLD-MCNC: 12 MG/DL (ref 5–21)
BUN/CREAT SERPL: 14.1 (ref 7–25)
CALCIUM SPEC-SCNC: 8.1 MG/DL (ref 8.4–10.4)
CHLORIDE SERPL-SCNC: 102 MMOL/L (ref 98–110)
CHOLEST SERPL-MCNC: 138 MG/DL (ref 130–200)
CO2 SERPL-SCNC: 28 MMOL/L (ref 24–31)
CREAT BLD-MCNC: 0.85 MG/DL (ref 0.5–1.4)
DEPRECATED RDW RBC AUTO: 51.8 FL (ref 40–54)
EOSINOPHIL # BLD AUTO: 0.23 10*3/MM3 (ref 0–0.7)
EOSINOPHIL NFR BLD AUTO: 1.9 % (ref 0–4)
ERYTHROCYTE [DISTWIDTH] IN BLOOD BY AUTOMATED COUNT: 15.3 % (ref 12–15)
GFR SERPL CREATININE-BSD FRML MDRD: 65 ML/MIN/1.73
GLOBULIN UR ELPH-MCNC: 3.1 GM/DL
GLUCOSE BLD-MCNC: 89 MG/DL (ref 70–100)
HBA1C MFR BLD: 6.7 %
HCT VFR BLD AUTO: 29.9 % (ref 37–47)
HDLC SERPL-MCNC: 43 MG/DL
HGB BLD-MCNC: 9.5 G/DL (ref 12–16)
IMM GRANULOCYTES # BLD AUTO: 0.18 10*3/MM3 (ref 0–0.03)
IMM GRANULOCYTES NFR BLD AUTO: 1.5 % (ref 0–5)
LDLC/HDLC SERPL: 1.02 {RATIO}
LYMPHOCYTES # BLD AUTO: 2.5 10*3/MM3 (ref 0.72–4.86)
LYMPHOCYTES NFR BLD AUTO: 21.1 % (ref 15–45)
MAGNESIUM SERPL-MCNC: 1.6 MG/DL (ref 1.4–2.2)
MCH RBC QN AUTO: 29.7 PG (ref 28–32)
MCHC RBC AUTO-ENTMCNC: 31.8 G/DL (ref 33–36)
MCV RBC AUTO: 93.4 FL (ref 82–98)
MONOCYTES # BLD AUTO: 0.57 10*3/MM3 (ref 0.19–1.3)
MONOCYTES NFR BLD AUTO: 4.8 % (ref 4–12)
NEUTROPHILS # BLD AUTO: 8.32 10*3/MM3 (ref 1.87–8.4)
NEUTROPHILS NFR BLD AUTO: 70.3 % (ref 39–78)
NRBC BLD AUTO-RTO: 0 /100 WBC (ref 0–0)
PLATELET # BLD AUTO: 379 10*3/MM3 (ref 130–400)
PMV BLD AUTO: 9.5 FL (ref 6–12)
POTASSIUM BLD-SCNC: 4 MMOL/L (ref 3.5–5.3)
PREALB SERPL-MCNC: 18.7 MG/DL (ref 18–36)
PROT SERPL-MCNC: 5.8 G/DL (ref 6.3–8.7)
RBC # BLD AUTO: 3.2 10*6/MM3 (ref 4.2–5.4)
SODIUM BLD-SCNC: 139 MMOL/L (ref 135–145)
T4 FREE SERPL-MCNC: 0.99 NG/DL (ref 0.78–2.19)
TRIGL SERPL-MCNC: 256 MG/DL (ref 0–149)
TSH SERPL DL<=0.05 MIU/L-ACNC: 17.2 MIU/ML (ref 0.47–4.68)
VIT B12 BLD-MCNC: >1000 PG/ML (ref 239–931)
WBC NRBC COR # BLD: 11.85 10*3/MM3 (ref 4.8–10.8)

## 2018-12-24 PROCEDURE — 82306 VITAMIN D 25 HYDROXY: CPT | Performed by: NURSE PRACTITIONER

## 2018-12-24 PROCEDURE — 82607 VITAMIN B-12: CPT | Performed by: NURSE PRACTITIONER

## 2018-12-24 PROCEDURE — 84443 ASSAY THYROID STIM HORMONE: CPT | Performed by: NURSE PRACTITIONER

## 2018-12-24 PROCEDURE — 83735 ASSAY OF MAGNESIUM: CPT | Performed by: NURSE PRACTITIONER

## 2018-12-24 PROCEDURE — 36415 COLL VENOUS BLD VENIPUNCTURE: CPT | Performed by: NURSE PRACTITIONER

## 2018-12-24 PROCEDURE — 83036 HEMOGLOBIN GLYCOSYLATED A1C: CPT | Performed by: NURSE PRACTITIONER

## 2018-12-24 PROCEDURE — 84439 ASSAY OF FREE THYROXINE: CPT | Performed by: NURSE PRACTITIONER

## 2018-12-24 PROCEDURE — 80053 COMPREHEN METABOLIC PANEL: CPT | Performed by: NURSE PRACTITIONER

## 2018-12-24 PROCEDURE — 84134 ASSAY OF PREALBUMIN: CPT | Performed by: NURSE PRACTITIONER

## 2018-12-24 PROCEDURE — 80061 LIPID PANEL: CPT | Performed by: NURSE PRACTITIONER

## 2018-12-24 PROCEDURE — 85025 COMPLETE CBC W/AUTO DIFF WBC: CPT | Performed by: NURSE PRACTITIONER

## 2019-01-01 ENCOUNTER — TELEPHONE (OUTPATIENT)
Dept: NEUROLOGY | Age: 79
End: 2019-01-01

## 2019-01-01 ENCOUNTER — HOSPITAL ENCOUNTER (OUTPATIENT)
Dept: MRI IMAGING | Age: 79
Discharge: HOME | End: 2019-12-27
Payer: MEDICARE

## 2019-01-01 ENCOUNTER — OFFICE VISIT (OUTPATIENT)
Dept: NEUROLOGY | Age: 79
End: 2019-01-01
Payer: MEDICARE

## 2019-01-01 VITALS
HEART RATE: 82 BPM | SYSTOLIC BLOOD PRESSURE: 137 MMHG | DIASTOLIC BLOOD PRESSURE: 76 MMHG | BODY MASS INDEX: 41.65 KG/M2 | RESPIRATION RATE: 14 BRPM | HEIGHT: 65 IN | WEIGHT: 250 LBS

## 2019-01-01 DIAGNOSIS — M54.41 CHRONIC BILATERAL LOW BACK PAIN WITH BILATERAL SCIATICA: ICD-10-CM

## 2019-01-01 DIAGNOSIS — M54.41 CHRONIC BILATERAL LOW BACK PAIN WITH BILATERAL SCIATICA: Primary | ICD-10-CM

## 2019-01-01 DIAGNOSIS — G60.9 IDIOPATHIC PERIPHERAL NEUROPATHY: Primary | ICD-10-CM

## 2019-01-01 DIAGNOSIS — M54.42 CHRONIC BILATERAL LOW BACK PAIN WITH BILATERAL SCIATICA: ICD-10-CM

## 2019-01-01 DIAGNOSIS — G89.29 CHRONIC BILATERAL LOW BACK PAIN WITH BILATERAL SCIATICA: Primary | ICD-10-CM

## 2019-01-01 DIAGNOSIS — M54.42 CHRONIC BILATERAL LOW BACK PAIN WITH BILATERAL SCIATICA: Primary | ICD-10-CM

## 2019-01-01 DIAGNOSIS — R27.0 ATAXIA: ICD-10-CM

## 2019-01-01 DIAGNOSIS — G89.29 CHRONIC BILATERAL LOW BACK PAIN WITH BILATERAL SCIATICA: ICD-10-CM

## 2019-01-01 PROCEDURE — G8484 FLU IMMUNIZE NO ADMIN: HCPCS | Performed by: PSYCHIATRY & NEUROLOGY

## 2019-01-01 PROCEDURE — 4040F PNEUMOC VAC/ADMIN/RCVD: CPT | Performed by: PSYCHIATRY & NEUROLOGY

## 2019-01-01 PROCEDURE — 99213 OFFICE O/P EST LOW 20 MIN: CPT | Performed by: PSYCHIATRY & NEUROLOGY

## 2019-01-01 PROCEDURE — 1123F ACP DISCUSS/DSCN MKR DOCD: CPT | Performed by: PSYCHIATRY & NEUROLOGY

## 2019-01-01 PROCEDURE — G8400 PT W/DXA NO RESULTS DOC: HCPCS | Performed by: PSYCHIATRY & NEUROLOGY

## 2019-01-01 PROCEDURE — 1036F TOBACCO NON-USER: CPT | Performed by: PSYCHIATRY & NEUROLOGY

## 2019-01-01 PROCEDURE — G8417 CALC BMI ABV UP PARAM F/U: HCPCS | Performed by: PSYCHIATRY & NEUROLOGY

## 2019-01-01 PROCEDURE — G8427 DOCREV CUR MEDS BY ELIG CLIN: HCPCS | Performed by: PSYCHIATRY & NEUROLOGY

## 2019-01-01 PROCEDURE — 1090F PRES/ABSN URINE INCON ASSESS: CPT | Performed by: PSYCHIATRY & NEUROLOGY

## 2019-01-01 PROCEDURE — 70551 MRI BRAIN STEM W/O DYE: CPT

## 2019-01-01 RX ORDER — FUROSEMIDE 20 MG/1
20 TABLET ORAL 2 TIMES DAILY
COMMUNITY
End: 2020-01-01

## 2019-01-03 ENCOUNTER — LAB REQUISITION (OUTPATIENT)
Dept: LAB | Facility: HOSPITAL | Age: 79
End: 2019-01-03

## 2019-01-03 DIAGNOSIS — Z00.00 ENCOUNTER FOR GENERAL ADULT MEDICAL EXAMINATION WITHOUT ABNORMAL FINDINGS: ICD-10-CM

## 2019-01-03 LAB
ALBUMIN SERPL-MCNC: 3.2 G/DL (ref 3.5–5)
ALBUMIN/GLOB SERPL: 0.9 G/DL (ref 1.1–2.5)
ALP SERPL-CCNC: 82 U/L (ref 24–120)
ALT SERPL W P-5'-P-CCNC: 16 U/L (ref 0–54)
ANION GAP SERPL CALCULATED.3IONS-SCNC: 9 MMOL/L (ref 4–13)
AST SERPL-CCNC: 19 U/L (ref 7–45)
BASOPHILS # BLD AUTO: 0.05 10*3/MM3 (ref 0–0.2)
BASOPHILS NFR BLD AUTO: 0.7 % (ref 0–2)
BILIRUB SERPL-MCNC: 0.3 MG/DL (ref 0.1–1)
BUN BLD-MCNC: 15 MG/DL (ref 5–21)
BUN/CREAT SERPL: 22.4 (ref 7–25)
CALCIUM SPEC-SCNC: 9.1 MG/DL (ref 8.4–10.4)
CHLORIDE SERPL-SCNC: 100 MMOL/L (ref 98–110)
CO2 SERPL-SCNC: 28 MMOL/L (ref 24–31)
CREAT BLD-MCNC: 0.67 MG/DL (ref 0.5–1.4)
DEPRECATED RDW RBC AUTO: 55.5 FL (ref 40–54)
EOSINOPHIL # BLD AUTO: 0.23 10*3/MM3 (ref 0–0.7)
EOSINOPHIL NFR BLD AUTO: 3.2 % (ref 0–4)
ERYTHROCYTE [DISTWIDTH] IN BLOOD BY AUTOMATED COUNT: 16.1 % (ref 12–15)
GFR SERPL CREATININE-BSD FRML MDRD: 85 ML/MIN/1.73
GLOBULIN UR ELPH-MCNC: 3.4 GM/DL
GLUCOSE BLD-MCNC: 139 MG/DL (ref 70–100)
HCT VFR BLD AUTO: 30.3 % (ref 37–47)
HGB BLD-MCNC: 9.2 G/DL (ref 12–16)
IMM GRANULOCYTES # BLD AUTO: 0.03 10*3/MM3 (ref 0–0.03)
IMM GRANULOCYTES NFR BLD AUTO: 0.4 % (ref 0–5)
LYMPHOCYTES # BLD AUTO: 3.08 10*3/MM3 (ref 0.72–4.86)
LYMPHOCYTES NFR BLD AUTO: 42.4 % (ref 15–45)
MAGNESIUM SERPL-MCNC: 2 MG/DL (ref 1.4–2.2)
MCH RBC QN AUTO: 29.3 PG (ref 28–32)
MCHC RBC AUTO-ENTMCNC: 30.4 G/DL (ref 33–36)
MCV RBC AUTO: 96.5 FL (ref 82–98)
MONOCYTES # BLD AUTO: 0.55 10*3/MM3 (ref 0.19–1.3)
MONOCYTES NFR BLD AUTO: 7.6 % (ref 4–12)
NEUTROPHILS # BLD AUTO: 3.32 10*3/MM3 (ref 1.87–8.4)
NEUTROPHILS NFR BLD AUTO: 45.7 % (ref 39–78)
NRBC BLD AUTO-RTO: 0 /100 WBC (ref 0–0)
PLATELET # BLD AUTO: 475 10*3/MM3 (ref 130–400)
PMV BLD AUTO: 9.6 FL (ref 6–12)
POTASSIUM BLD-SCNC: 4.3 MMOL/L (ref 3.5–5.3)
PROT SERPL-MCNC: 6.6 G/DL (ref 6.3–8.7)
RBC # BLD AUTO: 3.14 10*6/MM3 (ref 4.2–5.4)
SODIUM BLD-SCNC: 137 MMOL/L (ref 135–145)
WBC NRBC COR # BLD: 7.26 10*3/MM3 (ref 4.8–10.8)

## 2019-01-03 PROCEDURE — 85025 COMPLETE CBC W/AUTO DIFF WBC: CPT | Performed by: INTERNAL MEDICINE

## 2019-01-03 PROCEDURE — 36415 COLL VENOUS BLD VENIPUNCTURE: CPT | Performed by: INTERNAL MEDICINE

## 2019-01-03 PROCEDURE — 80053 COMPREHEN METABOLIC PANEL: CPT | Performed by: INTERNAL MEDICINE

## 2019-01-03 PROCEDURE — 83735 ASSAY OF MAGNESIUM: CPT | Performed by: INTERNAL MEDICINE

## 2019-01-08 ENCOUNTER — TELEPHONE (OUTPATIENT)
Dept: UROLOGY | Facility: CLINIC | Age: 79
End: 2019-01-08

## 2019-01-09 ENCOUNTER — LAB REQUISITION (OUTPATIENT)
Dept: LAB | Facility: HOSPITAL | Age: 79
End: 2019-01-09

## 2019-01-09 DIAGNOSIS — Z00.00 ENCOUNTER FOR GENERAL ADULT MEDICAL EXAMINATION WITHOUT ABNORMAL FINDINGS: ICD-10-CM

## 2019-01-09 LAB
ALBUMIN SERPL-MCNC: 3.1 G/DL (ref 3.5–5)
ALBUMIN/GLOB SERPL: 0.9 G/DL (ref 1.1–2.5)
ALP SERPL-CCNC: 74 U/L (ref 24–120)
ALT SERPL W P-5'-P-CCNC: 23 U/L (ref 0–54)
ANION GAP SERPL CALCULATED.3IONS-SCNC: 10 MMOL/L (ref 4–13)
AST SERPL-CCNC: 19 U/L (ref 7–45)
BASOPHILS # BLD AUTO: 0.06 10*3/MM3 (ref 0–0.2)
BASOPHILS NFR BLD AUTO: 0.8 % (ref 0–2)
BILIRUB SERPL-MCNC: 0.3 MG/DL (ref 0.1–1)
BUN BLD-MCNC: 19 MG/DL (ref 5–21)
BUN/CREAT SERPL: 27.5 (ref 7–25)
CALCIUM SPEC-SCNC: 9.2 MG/DL (ref 8.4–10.4)
CHLORIDE SERPL-SCNC: 99 MMOL/L (ref 98–110)
CO2 SERPL-SCNC: 30 MMOL/L (ref 24–31)
CREAT BLD-MCNC: 0.69 MG/DL (ref 0.5–1.4)
DEPRECATED RDW RBC AUTO: 59.7 FL (ref 40–54)
EOSINOPHIL # BLD AUTO: 0.3 10*3/MM3 (ref 0–0.7)
EOSINOPHIL NFR BLD AUTO: 3.8 % (ref 0–4)
ERYTHROCYTE [DISTWIDTH] IN BLOOD BY AUTOMATED COUNT: 17.2 % (ref 12–15)
GFR SERPL CREATININE-BSD FRML MDRD: 82 ML/MIN/1.73
GLOBULIN UR ELPH-MCNC: 3.4 GM/DL
GLUCOSE BLD-MCNC: 99 MG/DL (ref 70–100)
HCT VFR BLD AUTO: 29.2 % (ref 37–47)
HGB BLD-MCNC: 9 G/DL (ref 12–16)
IMM GRANULOCYTES # BLD AUTO: 0.06 10*3/MM3 (ref 0–0.03)
IMM GRANULOCYTES NFR BLD AUTO: 0.8 % (ref 0–5)
LYMPHOCYTES # BLD AUTO: 3.91 10*3/MM3 (ref 0.72–4.86)
LYMPHOCYTES NFR BLD AUTO: 49.2 % (ref 15–45)
MAGNESIUM SERPL-MCNC: 2 MG/DL (ref 1.4–2.2)
MCH RBC QN AUTO: 29.8 PG (ref 28–32)
MCHC RBC AUTO-ENTMCNC: 30.8 G/DL (ref 33–36)
MCV RBC AUTO: 96.7 FL (ref 82–98)
MONOCYTES # BLD AUTO: 0.72 10*3/MM3 (ref 0.19–1.3)
MONOCYTES NFR BLD AUTO: 9.1 % (ref 4–12)
NEUTROPHILS # BLD AUTO: 2.9 10*3/MM3 (ref 1.87–8.4)
NEUTROPHILS NFR BLD AUTO: 36.3 % (ref 39–78)
NRBC BLD AUTO-RTO: 0 /100 WBC (ref 0–0)
PLATELET # BLD AUTO: 402 10*3/MM3 (ref 130–400)
PMV BLD AUTO: 9.7 FL (ref 6–12)
POTASSIUM BLD-SCNC: 4.7 MMOL/L (ref 3.5–5.3)
PROT SERPL-MCNC: 6.5 G/DL (ref 6.3–8.7)
RBC # BLD AUTO: 3.02 10*6/MM3 (ref 4.2–5.4)
SODIUM BLD-SCNC: 139 MMOL/L (ref 135–145)
WBC NRBC COR # BLD: 7.95 10*3/MM3 (ref 4.8–10.8)

## 2019-01-09 PROCEDURE — 36415 COLL VENOUS BLD VENIPUNCTURE: CPT | Performed by: INTERNAL MEDICINE

## 2019-01-09 PROCEDURE — 83735 ASSAY OF MAGNESIUM: CPT | Performed by: INTERNAL MEDICINE

## 2019-01-09 PROCEDURE — 85025 COMPLETE CBC W/AUTO DIFF WBC: CPT | Performed by: INTERNAL MEDICINE

## 2019-01-09 PROCEDURE — 80053 COMPREHEN METABOLIC PANEL: CPT | Performed by: INTERNAL MEDICINE

## 2019-01-11 ENCOUNTER — HOSPITAL ENCOUNTER (OUTPATIENT)
Dept: GENERAL RADIOLOGY | Age: 79
Discharge: HOME | End: 2019-01-11
Payer: MEDICARE

## 2019-01-11 DIAGNOSIS — R13.10 DYSPHAGIA, UNSPECIFIED TYPE: ICD-10-CM

## 2019-01-11 PROCEDURE — 74230 X-RAY XM SWLNG FUNCJ C+: CPT

## 2019-01-16 ENCOUNTER — LAB REQUISITION (OUTPATIENT)
Dept: LAB | Facility: HOSPITAL | Age: 79
End: 2019-01-16

## 2019-01-16 DIAGNOSIS — Z00.00 ENCOUNTER FOR GENERAL ADULT MEDICAL EXAMINATION WITHOUT ABNORMAL FINDINGS: ICD-10-CM

## 2019-01-16 LAB
ALBUMIN SERPL-MCNC: 3.2 G/DL (ref 3.5–5)
ALBUMIN/GLOB SERPL: 0.9 G/DL (ref 1.1–2.5)
ALP SERPL-CCNC: 88 U/L (ref 24–120)
ALT SERPL W P-5'-P-CCNC: 18 U/L (ref 0–54)
ANION GAP SERPL CALCULATED.3IONS-SCNC: 11 MMOL/L (ref 4–13)
AST SERPL-CCNC: 17 U/L (ref 7–45)
BASOPHILS # BLD AUTO: 0.06 10*3/MM3 (ref 0–0.2)
BASOPHILS NFR BLD AUTO: 0.5 % (ref 0–2)
BILIRUB SERPL-MCNC: 0.3 MG/DL (ref 0.1–1)
BUN BLD-MCNC: 24 MG/DL (ref 5–21)
BUN/CREAT SERPL: 32.9 (ref 7–25)
CALCIUM SPEC-SCNC: 9 MG/DL (ref 8.4–10.4)
CHLORIDE SERPL-SCNC: 101 MMOL/L (ref 98–110)
CO2 SERPL-SCNC: 28 MMOL/L (ref 24–31)
CREAT BLD-MCNC: 0.73 MG/DL (ref 0.5–1.4)
DEPRECATED RDW RBC AUTO: 62.1 FL (ref 40–54)
EOSINOPHIL # BLD AUTO: 0.05 10*3/MM3 (ref 0–0.7)
EOSINOPHIL NFR BLD AUTO: 0.4 % (ref 0–4)
ERYTHROCYTE [DISTWIDTH] IN BLOOD BY AUTOMATED COUNT: 17.4 % (ref 12–15)
GFR SERPL CREATININE-BSD FRML MDRD: 77 ML/MIN/1.73
GLOBULIN UR ELPH-MCNC: 3.4 GM/DL
GLUCOSE BLD-MCNC: 110 MG/DL (ref 70–100)
HCT VFR BLD AUTO: 30.7 % (ref 37–47)
HGB BLD-MCNC: 9.4 G/DL (ref 12–16)
IMM GRANULOCYTES # BLD AUTO: 0.06 10*3/MM3 (ref 0–0.03)
IMM GRANULOCYTES NFR BLD AUTO: 0.5 % (ref 0–5)
LYMPHOCYTES # BLD AUTO: 3.26 10*3/MM3 (ref 0.72–4.86)
LYMPHOCYTES NFR BLD AUTO: 27 % (ref 15–45)
MAGNESIUM SERPL-MCNC: 2.1 MG/DL (ref 1.4–2.2)
MCH RBC QN AUTO: 29.8 PG (ref 28–32)
MCHC RBC AUTO-ENTMCNC: 30.6 G/DL (ref 33–36)
MCV RBC AUTO: 97.5 FL (ref 82–98)
MONOCYTES # BLD AUTO: 0.72 10*3/MM3 (ref 0.19–1.3)
MONOCYTES NFR BLD AUTO: 6 % (ref 4–12)
NEUTROPHILS # BLD AUTO: 7.93 10*3/MM3 (ref 1.87–8.4)
NEUTROPHILS NFR BLD AUTO: 65.6 % (ref 39–78)
NRBC BLD AUTO-RTO: 0 /100 WBC (ref 0–0)
PLATELET # BLD AUTO: 375 10*3/MM3 (ref 130–400)
PMV BLD AUTO: 9.7 FL (ref 6–12)
POTASSIUM BLD-SCNC: 4.2 MMOL/L (ref 3.5–5.3)
PROT SERPL-MCNC: 6.6 G/DL (ref 6.3–8.7)
RBC # BLD AUTO: 3.15 10*6/MM3 (ref 4.2–5.4)
SODIUM BLD-SCNC: 140 MMOL/L (ref 135–145)
WBC NRBC COR # BLD: 12.08 10*3/MM3 (ref 4.8–10.8)

## 2019-01-16 PROCEDURE — 80053 COMPREHEN METABOLIC PANEL: CPT | Performed by: INTERNAL MEDICINE

## 2019-01-16 PROCEDURE — 85025 COMPLETE CBC W/AUTO DIFF WBC: CPT | Performed by: INTERNAL MEDICINE

## 2019-01-16 PROCEDURE — 36415 COLL VENOUS BLD VENIPUNCTURE: CPT | Performed by: INTERNAL MEDICINE

## 2019-01-16 PROCEDURE — 83735 ASSAY OF MAGNESIUM: CPT | Performed by: INTERNAL MEDICINE

## 2019-01-18 ENCOUNTER — LAB REQUISITION (OUTPATIENT)
Dept: LAB | Facility: HOSPITAL | Age: 79
End: 2019-01-18

## 2019-01-18 DIAGNOSIS — Z00.00 ENCOUNTER FOR GENERAL ADULT MEDICAL EXAMINATION WITHOUT ABNORMAL FINDINGS: ICD-10-CM

## 2019-01-18 LAB
BASOPHILS # BLD AUTO: 0.1 10*3/MM3 (ref 0–0.2)
BASOPHILS NFR BLD AUTO: 0.5 % (ref 0–2)
DEPRECATED RDW RBC AUTO: 59.2 FL (ref 40–54)
EOSINOPHIL # BLD AUTO: 0.01 10*3/MM3 (ref 0–0.7)
EOSINOPHIL NFR BLD AUTO: 0 % (ref 0–4)
ERYTHROCYTE [DISTWIDTH] IN BLOOD BY AUTOMATED COUNT: 17 % (ref 12–15)
HCT VFR BLD AUTO: 31.4 % (ref 37–47)
HGB BLD-MCNC: 9.6 G/DL (ref 12–16)
IMM GRANULOCYTES # BLD AUTO: 0.31 10*3/MM3 (ref 0–0.03)
IMM GRANULOCYTES NFR BLD AUTO: 1.4 % (ref 0–5)
LYMPHOCYTES # BLD AUTO: 2.46 10*3/MM3 (ref 0.72–4.86)
LYMPHOCYTES NFR BLD AUTO: 11.3 % (ref 15–45)
MCH RBC QN AUTO: 29.2 PG (ref 28–32)
MCHC RBC AUTO-ENTMCNC: 30.6 G/DL (ref 33–36)
MCV RBC AUTO: 95.4 FL (ref 82–98)
MONOCYTES # BLD AUTO: 1.42 10*3/MM3 (ref 0.19–1.3)
MONOCYTES NFR BLD AUTO: 6.5 % (ref 4–12)
NEUTROPHILS # BLD AUTO: 17.4 10*3/MM3 (ref 1.87–8.4)
NEUTROPHILS NFR BLD AUTO: 80.3 % (ref 39–78)
NRBC BLD AUTO-RTO: 0.1 /100 WBC (ref 0–0)
PLATELET # BLD AUTO: 394 10*3/MM3 (ref 130–400)
PMV BLD AUTO: 9.7 FL (ref 6–12)
RBC # BLD AUTO: 3.29 10*6/MM3 (ref 4.2–5.4)
WBC NRBC COR # BLD: 21.7 10*3/MM3 (ref 4.8–10.8)

## 2019-01-18 PROCEDURE — 85025 COMPLETE CBC W/AUTO DIFF WBC: CPT | Performed by: NURSE PRACTITIONER

## 2019-01-18 PROCEDURE — 36415 COLL VENOUS BLD VENIPUNCTURE: CPT | Performed by: NURSE PRACTITIONER

## 2019-01-23 ENCOUNTER — LAB REQUISITION (OUTPATIENT)
Dept: LAB | Facility: HOSPITAL | Age: 79
End: 2019-01-23

## 2019-01-23 DIAGNOSIS — Z00.00 ENCOUNTER FOR GENERAL ADULT MEDICAL EXAMINATION WITHOUT ABNORMAL FINDINGS: ICD-10-CM

## 2019-01-23 LAB
BACTERIA UR QL AUTO: ABNORMAL /HPF
BILIRUB UR QL STRIP: NEGATIVE
CLARITY UR: ABNORMAL
COLOR UR: YELLOW
GLUCOSE UR STRIP-MCNC: NEGATIVE MG/DL
HGB UR QL STRIP.AUTO: ABNORMAL
HYALINE CASTS UR QL AUTO: ABNORMAL /LPF
KETONES UR QL STRIP: NEGATIVE
LEUKOCYTE ESTERASE UR QL STRIP.AUTO: ABNORMAL
NITRITE UR QL STRIP: POSITIVE
PH UR STRIP.AUTO: 5.5 [PH] (ref 5–8)
PROT UR QL STRIP: ABNORMAL
RBC # UR: ABNORMAL /HPF
REF LAB TEST METHOD: ABNORMAL
SP GR UR STRIP: 1.02 (ref 1–1.03)
SQUAMOUS #/AREA URNS HPF: ABNORMAL /HPF
UROBILINOGEN UR QL STRIP: ABNORMAL
WBC UR QL AUTO: ABNORMAL /HPF

## 2019-01-23 PROCEDURE — 87077 CULTURE AEROBIC IDENTIFY: CPT | Performed by: INTERNAL MEDICINE

## 2019-01-23 PROCEDURE — 87186 SC STD MICRODIL/AGAR DIL: CPT | Performed by: INTERNAL MEDICINE

## 2019-01-23 PROCEDURE — 87086 URINE CULTURE/COLONY COUNT: CPT | Performed by: INTERNAL MEDICINE

## 2019-01-23 PROCEDURE — 81001 URINALYSIS AUTO W/SCOPE: CPT | Performed by: INTERNAL MEDICINE

## 2019-01-24 ENCOUNTER — LAB REQUISITION (OUTPATIENT)
Dept: LAB | Facility: HOSPITAL | Age: 79
End: 2019-01-24

## 2019-01-24 DIAGNOSIS — Z00.00 ENCOUNTER FOR GENERAL ADULT MEDICAL EXAMINATION WITHOUT ABNORMAL FINDINGS: ICD-10-CM

## 2019-01-24 LAB
BACTERIA UR QL AUTO: ABNORMAL /HPF
BILIRUB UR QL STRIP: NEGATIVE
CLARITY UR: ABNORMAL
COLOR UR: YELLOW
GLUCOSE UR STRIP-MCNC: NEGATIVE MG/DL
HGB UR QL STRIP.AUTO: ABNORMAL
KETONES UR QL STRIP: NEGATIVE
LEUKOCYTE ESTERASE UR QL STRIP.AUTO: ABNORMAL
NITRITE UR QL STRIP: POSITIVE
PH UR STRIP.AUTO: <=5 [PH] (ref 5–8)
PROT UR QL STRIP: ABNORMAL
RBC # UR: ABNORMAL /HPF
REF LAB TEST METHOD: ABNORMAL
SP GR UR STRIP: 1.01 (ref 1–1.03)
SQUAMOUS #/AREA URNS HPF: ABNORMAL /HPF
UROBILINOGEN UR QL STRIP: ABNORMAL
WBC UR QL AUTO: ABNORMAL /HPF
YEAST URNS QL MICRO: ABNORMAL /HPF

## 2019-01-24 PROCEDURE — 87186 SC STD MICRODIL/AGAR DIL: CPT | Performed by: NURSE PRACTITIONER

## 2019-01-24 PROCEDURE — 81001 URINALYSIS AUTO W/SCOPE: CPT | Performed by: NURSE PRACTITIONER

## 2019-01-24 PROCEDURE — 87086 URINE CULTURE/COLONY COUNT: CPT | Performed by: NURSE PRACTITIONER

## 2019-01-24 PROCEDURE — 87077 CULTURE AEROBIC IDENTIFY: CPT | Performed by: NURSE PRACTITIONER

## 2019-01-25 LAB — BACTERIA SPEC AEROBE CULT: ABNORMAL

## 2019-01-26 LAB — BACTERIA SPEC AEROBE CULT: ABNORMAL

## 2019-01-27 ENCOUNTER — HOSPITAL ENCOUNTER (OUTPATIENT)
Facility: HOSPITAL | Age: 79
Setting detail: OBSERVATION
Discharge: HOME HEALTH | End: 2019-01-30
Attending: EMERGENCY MEDICINE | Admitting: FAMILY MEDICINE

## 2019-01-27 DIAGNOSIS — R79.89 ELEVATED TSH: ICD-10-CM

## 2019-01-27 DIAGNOSIS — R29.6 FREQUENT FALLS: Primary | ICD-10-CM

## 2019-01-27 DIAGNOSIS — N39.0 CHRONIC UTI: ICD-10-CM

## 2019-01-27 DIAGNOSIS — M54.31 SCIATICA WITHOUT BACK PAIN, RIGHT: ICD-10-CM

## 2019-01-27 LAB
ANION GAP SERPL CALCULATED.3IONS-SCNC: 10 MMOL/L (ref 4–13)
BACTERIA UR QL AUTO: ABNORMAL /HPF
BASOPHILS # BLD AUTO: 0.06 10*3/MM3 (ref 0–0.2)
BASOPHILS NFR BLD AUTO: 0.7 % (ref 0–2)
BILIRUB UR QL STRIP: NEGATIVE
BUN BLD-MCNC: 19 MG/DL (ref 5–21)
BUN/CREAT SERPL: 28.4 (ref 7–25)
CALCIUM SPEC-SCNC: 9.2 MG/DL (ref 8.4–10.4)
CHLORIDE SERPL-SCNC: 106 MMOL/L (ref 98–110)
CLARITY UR: ABNORMAL
CO2 SERPL-SCNC: 25 MMOL/L (ref 24–31)
COLOR UR: YELLOW
CREAT BLD-MCNC: 0.67 MG/DL (ref 0.5–1.4)
DEPRECATED RDW RBC AUTO: 58 FL (ref 40–54)
EOSINOPHIL # BLD AUTO: 0.24 10*3/MM3 (ref 0–0.7)
EOSINOPHIL NFR BLD AUTO: 2.6 % (ref 0–4)
ERYTHROCYTE [DISTWIDTH] IN BLOOD BY AUTOMATED COUNT: 16.9 % (ref 12–15)
GFR SERPL CREATININE-BSD FRML MDRD: 85 ML/MIN/1.73
GLUCOSE BLD-MCNC: 114 MG/DL (ref 70–100)
GLUCOSE UR STRIP-MCNC: NEGATIVE MG/DL
HCT VFR BLD AUTO: 30.3 % (ref 37–47)
HGB BLD-MCNC: 9.4 G/DL (ref 12–16)
HGB UR QL STRIP.AUTO: ABNORMAL
HYALINE CASTS UR QL AUTO: ABNORMAL /LPF
IMM GRANULOCYTES # BLD AUTO: 0.04 10*3/MM3 (ref 0–0.03)
IMM GRANULOCYTES NFR BLD AUTO: 0.4 % (ref 0–5)
KETONES UR QL STRIP: NEGATIVE
LEUKOCYTE ESTERASE UR QL STRIP.AUTO: ABNORMAL
LYMPHOCYTES # BLD AUTO: 2.29 10*3/MM3 (ref 0.72–4.86)
LYMPHOCYTES NFR BLD AUTO: 25 % (ref 15–45)
MCH RBC QN AUTO: 29.4 PG (ref 28–32)
MCHC RBC AUTO-ENTMCNC: 31 G/DL (ref 33–36)
MCV RBC AUTO: 94.7 FL (ref 82–98)
MONOCYTES # BLD AUTO: 0.53 10*3/MM3 (ref 0.19–1.3)
MONOCYTES NFR BLD AUTO: 5.8 % (ref 4–12)
NEUTROPHILS # BLD AUTO: 6 10*3/MM3 (ref 1.87–8.4)
NEUTROPHILS NFR BLD AUTO: 65.5 % (ref 39–78)
NITRITE UR QL STRIP: POSITIVE
NRBC BLD AUTO-RTO: 0 /100 WBC (ref 0–0)
PH UR STRIP.AUTO: 5.5 [PH] (ref 5–8)
PLATELET # BLD AUTO: 511 10*3/MM3 (ref 130–400)
PMV BLD AUTO: 9 FL (ref 6–12)
POTASSIUM BLD-SCNC: 3.8 MMOL/L (ref 3.5–5.3)
PROT UR QL STRIP: ABNORMAL
RBC # BLD AUTO: 3.2 10*6/MM3 (ref 4.2–5.4)
RBC # UR: ABNORMAL /HPF
REF LAB TEST METHOD: ABNORMAL
SODIUM BLD-SCNC: 141 MMOL/L (ref 135–145)
SP GR UR STRIP: 1.02 (ref 1–1.03)
SQUAMOUS #/AREA URNS HPF: ABNORMAL /HPF
T4 FREE SERPL-MCNC: 1.19 NG/DL (ref 0.78–2.19)
TSH SERPL DL<=0.05 MIU/L-ACNC: 24.2 MIU/ML (ref 0.47–4.68)
UROBILINOGEN UR QL STRIP: ABNORMAL
WBC NRBC COR # BLD: 9.16 10*3/MM3 (ref 4.8–10.8)
WBC UR QL AUTO: ABNORMAL /HPF
YEAST URNS QL MICRO: ABNORMAL /HPF

## 2019-01-27 PROCEDURE — 25010000002 CEFTRIAXONE PER 250 MG: Performed by: EMERGENCY MEDICINE

## 2019-01-27 PROCEDURE — 94760 N-INVAS EAR/PLS OXIMETRY 1: CPT

## 2019-01-27 PROCEDURE — 81001 URINALYSIS AUTO W/SCOPE: CPT | Performed by: EMERGENCY MEDICINE

## 2019-01-27 PROCEDURE — 80048 BASIC METABOLIC PNL TOTAL CA: CPT | Performed by: EMERGENCY MEDICINE

## 2019-01-27 PROCEDURE — 94799 UNLISTED PULMONARY SVC/PX: CPT

## 2019-01-27 PROCEDURE — 85025 COMPLETE CBC W/AUTO DIFF WBC: CPT | Performed by: EMERGENCY MEDICINE

## 2019-01-27 PROCEDURE — 87186 SC STD MICRODIL/AGAR DIL: CPT | Performed by: EMERGENCY MEDICINE

## 2019-01-27 PROCEDURE — 84439 ASSAY OF FREE THYROXINE: CPT | Performed by: EMERGENCY MEDICINE

## 2019-01-27 PROCEDURE — 25010000002 KETOROLAC TROMETHAMINE PER 15 MG: Performed by: EMERGENCY MEDICINE

## 2019-01-27 PROCEDURE — 84443 ASSAY THYROID STIM HORMONE: CPT | Performed by: EMERGENCY MEDICINE

## 2019-01-27 PROCEDURE — 96372 THER/PROPH/DIAG INJ SC/IM: CPT

## 2019-01-27 PROCEDURE — 99284 EMERGENCY DEPT VISIT MOD MDM: CPT

## 2019-01-27 PROCEDURE — 87086 URINE CULTURE/COLONY COUNT: CPT | Performed by: EMERGENCY MEDICINE

## 2019-01-27 PROCEDURE — G0378 HOSPITAL OBSERVATION PER HR: HCPCS

## 2019-01-27 PROCEDURE — 96374 THER/PROPH/DIAG INJ IV PUSH: CPT

## 2019-01-27 RX ORDER — ACETAMINOPHEN 325 MG/1
650 TABLET ORAL EVERY 6 HOURS PRN
Status: DISCONTINUED | OUTPATIENT
Start: 2019-01-27 | End: 2019-01-30 | Stop reason: HOSPADM

## 2019-01-27 RX ORDER — LEVOTHYROXINE SODIUM 0.1 MG/1
100 TABLET ORAL
Status: DISCONTINUED | OUTPATIENT
Start: 2019-01-27 | End: 2019-01-27 | Stop reason: SDUPTHER

## 2019-01-27 RX ORDER — IPRATROPIUM BROMIDE AND ALBUTEROL SULFATE 2.5; .5 MG/3ML; MG/3ML
3 SOLUTION RESPIRATORY (INHALATION) EVERY 6 HOURS PRN
Status: ON HOLD | COMMUNITY
End: 2019-03-31

## 2019-01-27 RX ORDER — POTASSIUM CHLORIDE 750 MG/1
10 CAPSULE, EXTENDED RELEASE ORAL DAILY
Status: DISCONTINUED | OUTPATIENT
Start: 2019-01-27 | End: 2019-01-30 | Stop reason: HOSPADM

## 2019-01-27 RX ORDER — IPRATROPIUM BROMIDE AND ALBUTEROL SULFATE 2.5; .5 MG/3ML; MG/3ML
3 SOLUTION RESPIRATORY (INHALATION) EVERY 6 HOURS PRN
Status: DISCONTINUED | OUTPATIENT
Start: 2019-01-27 | End: 2019-01-27 | Stop reason: SDUPTHER

## 2019-01-27 RX ORDER — POTASSIUM CHLORIDE 750 MG/1
10 CAPSULE, EXTENDED RELEASE ORAL DAILY
COMMUNITY

## 2019-01-27 RX ORDER — POTASSIUM CHLORIDE 750 MG/1
10 CAPSULE, EXTENDED RELEASE ORAL DAILY
Status: DISCONTINUED | OUTPATIENT
Start: 2019-01-27 | End: 2019-01-27 | Stop reason: SDUPTHER

## 2019-01-27 RX ORDER — IPRATROPIUM BROMIDE AND ALBUTEROL SULFATE 2.5; .5 MG/3ML; MG/3ML
3 SOLUTION RESPIRATORY (INHALATION) EVERY 6 HOURS PRN
Status: DISCONTINUED | OUTPATIENT
Start: 2019-01-27 | End: 2019-01-30 | Stop reason: HOSPADM

## 2019-01-27 RX ORDER — CEFDINIR 300 MG/1
300 CAPSULE ORAL 2 TIMES DAILY
Qty: 20 CAPSULE | Refills: 0 | Status: SHIPPED | OUTPATIENT
Start: 2019-01-27 | End: 2019-01-27

## 2019-01-27 RX ORDER — CETIRIZINE HYDROCHLORIDE 10 MG/1
10 TABLET ORAL DAILY
Status: DISCONTINUED | OUTPATIENT
Start: 2019-01-27 | End: 2019-01-30 | Stop reason: HOSPADM

## 2019-01-27 RX ORDER — FLUCONAZOLE 200 MG/1
200 TABLET ORAL EVERY 24 HOURS
Status: DISCONTINUED | OUTPATIENT
Start: 2019-01-27 | End: 2019-01-30 | Stop reason: HOSPADM

## 2019-01-27 RX ORDER — ASPIRIN 81 MG/1
81 TABLET, CHEWABLE ORAL DAILY
Status: DISCONTINUED | OUTPATIENT
Start: 2019-01-27 | End: 2019-01-30 | Stop reason: HOSPADM

## 2019-01-27 RX ORDER — LEVOTHYROXINE SODIUM 0.1 MG/1
100 TABLET ORAL
Status: DISCONTINUED | OUTPATIENT
Start: 2019-01-28 | End: 2019-01-30 | Stop reason: HOSPADM

## 2019-01-27 RX ORDER — LEVOTHYROXINE SODIUM 0.1 MG/1
100 TABLET ORAL
Status: DISCONTINUED | OUTPATIENT
Start: 2019-01-27 | End: 2019-01-27

## 2019-01-27 RX ORDER — KETOROLAC TROMETHAMINE 30 MG/ML
30 INJECTION, SOLUTION INTRAMUSCULAR; INTRAVENOUS ONCE
Status: COMPLETED | OUTPATIENT
Start: 2019-01-27 | End: 2019-01-27

## 2019-01-27 RX ORDER — GABAPENTIN 100 MG/1
100 CAPSULE ORAL 3 TIMES DAILY
Status: DISCONTINUED | OUTPATIENT
Start: 2019-01-27 | End: 2019-01-30 | Stop reason: HOSPADM

## 2019-01-27 RX ORDER — LEVOTHYROXINE SODIUM 112 UG/1
112 TABLET ORAL
Status: DISCONTINUED | OUTPATIENT
Start: 2019-01-27 | End: 2019-01-27 | Stop reason: SDUPTHER

## 2019-01-27 RX ORDER — OXYCODONE HYDROCHLORIDE AND ACETAMINOPHEN 5; 325 MG/1; MG/1
1 TABLET ORAL EVERY 4 HOURS PRN
Status: ON HOLD | COMMUNITY
End: 2019-03-31

## 2019-01-27 RX ORDER — ASPIRIN 81 MG/1
81 TABLET ORAL DAILY
Status: DISCONTINUED | OUTPATIENT
Start: 2019-01-27 | End: 2019-01-27

## 2019-01-27 RX ORDER — ACETAMINOPHEN 325 MG/1
650 TABLET ORAL EVERY 6 HOURS PRN
Status: DISCONTINUED | OUTPATIENT
Start: 2019-01-27 | End: 2019-01-27 | Stop reason: SDUPTHER

## 2019-01-27 RX ORDER — OXYCODONE HYDROCHLORIDE AND ACETAMINOPHEN 5; 325 MG/1; MG/1
1 TABLET ORAL EVERY 4 HOURS PRN
Status: DISCONTINUED | OUTPATIENT
Start: 2019-01-27 | End: 2019-01-27 | Stop reason: SDUPTHER

## 2019-01-27 RX ORDER — OXYCODONE HYDROCHLORIDE AND ACETAMINOPHEN 5; 325 MG/1; MG/1
1 TABLET ORAL EVERY 4 HOURS PRN
Status: DISCONTINUED | OUTPATIENT
Start: 2019-01-27 | End: 2019-01-30 | Stop reason: HOSPADM

## 2019-01-27 RX ORDER — GABAPENTIN 300 MG/1
300 CAPSULE ORAL EVERY 8 HOURS PRN
COMMUNITY

## 2019-01-27 RX ADMIN — KETOROLAC TROMETHAMINE 30 MG: 30 INJECTION, SOLUTION INTRAMUSCULAR at 06:49

## 2019-01-27 RX ADMIN — OXYCODONE HYDROCHLORIDE AND ACETAMINOPHEN 1 TABLET: 5; 325 TABLET ORAL at 11:32

## 2019-01-27 RX ADMIN — OXYCODONE HYDROCHLORIDE AND ACETAMINOPHEN 1 TABLET: 5; 325 TABLET ORAL at 23:09

## 2019-01-27 RX ADMIN — GABAPENTIN 100 MG: 100 CAPSULE ORAL at 20:05

## 2019-01-27 RX ADMIN — FLUCONAZOLE 200 MG: 200 TABLET ORAL at 14:30

## 2019-01-27 RX ADMIN — CEFTRIAXONE SODIUM 1 G: 1 INJECTION, POWDER, FOR SOLUTION INTRAMUSCULAR; INTRAVENOUS at 08:18

## 2019-01-27 RX ADMIN — Medication 81 MG: at 13:29

## 2019-01-27 RX ADMIN — GABAPENTIN 100 MG: 100 CAPSULE ORAL at 11:33

## 2019-01-27 RX ADMIN — CETIRIZINE HYDROCHLORIDE 10 MG: 10 TABLET, FILM COATED ORAL at 13:29

## 2019-01-27 RX ADMIN — GABAPENTIN 100 MG: 100 CAPSULE ORAL at 15:37

## 2019-01-27 RX ADMIN — POTASSIUM CHLORIDE 10 MEQ: 750 CAPSULE, EXTENDED RELEASE ORAL at 11:33

## 2019-01-27 RX ADMIN — LEVOTHYROXINE SODIUM 112 MCG: 112 TABLET ORAL at 08:39

## 2019-01-27 RX ADMIN — OXYCODONE HYDROCHLORIDE AND ACETAMINOPHEN 1 TABLET: 5; 325 TABLET ORAL at 18:39

## 2019-01-28 LAB — C DIFF TOX GENS STL QL NAA+PROBE: POSITIVE

## 2019-01-28 PROCEDURE — 25010000002 ENOXAPARIN PER 10 MG: Performed by: FAMILY MEDICINE

## 2019-01-28 PROCEDURE — G0378 HOSPITAL OBSERVATION PER HR: HCPCS

## 2019-01-28 PROCEDURE — 96372 THER/PROPH/DIAG INJ SC/IM: CPT

## 2019-01-28 PROCEDURE — 94799 UNLISTED PULMONARY SVC/PX: CPT

## 2019-01-28 PROCEDURE — 94760 N-INVAS EAR/PLS OXIMETRY 1: CPT

## 2019-01-28 PROCEDURE — 87493 C DIFF AMPLIFIED PROBE: CPT | Performed by: FAMILY MEDICINE

## 2019-01-28 RX ADMIN — OXYCODONE HYDROCHLORIDE AND ACETAMINOPHEN 1 TABLET: 5; 325 TABLET ORAL at 03:30

## 2019-01-28 RX ADMIN — FLUCONAZOLE 200 MG: 200 TABLET ORAL at 13:52

## 2019-01-28 RX ADMIN — OXYCODONE HYDROCHLORIDE AND ACETAMINOPHEN 1 TABLET: 5; 325 TABLET ORAL at 09:12

## 2019-01-28 RX ADMIN — POTASSIUM CHLORIDE 10 MEQ: 750 CAPSULE, EXTENDED RELEASE ORAL at 09:09

## 2019-01-28 RX ADMIN — CETIRIZINE HYDROCHLORIDE 10 MG: 10 TABLET, FILM COATED ORAL at 09:09

## 2019-01-28 RX ADMIN — OXYCODONE HYDROCHLORIDE AND ACETAMINOPHEN 1 TABLET: 5; 325 TABLET ORAL at 19:36

## 2019-01-28 RX ADMIN — ENOXAPARIN SODIUM 40 MG: 40 INJECTION SUBCUTANEOUS at 15:19

## 2019-01-28 RX ADMIN — OXYCODONE HYDROCHLORIDE AND ACETAMINOPHEN 1 TABLET: 5; 325 TABLET ORAL at 15:12

## 2019-01-28 RX ADMIN — GABAPENTIN 100 MG: 100 CAPSULE ORAL at 20:53

## 2019-01-28 RX ADMIN — GABAPENTIN 100 MG: 100 CAPSULE ORAL at 09:08

## 2019-01-28 RX ADMIN — GABAPENTIN 100 MG: 100 CAPSULE ORAL at 15:19

## 2019-01-28 RX ADMIN — LEVOTHYROXINE SODIUM 100 MCG: 100 TABLET ORAL at 04:29

## 2019-01-28 RX ADMIN — Medication 81 MG: at 09:09

## 2019-01-29 VITALS
SYSTOLIC BLOOD PRESSURE: 144 MMHG | RESPIRATION RATE: 16 BRPM | TEMPERATURE: 98.3 F | HEIGHT: 65 IN | OXYGEN SATURATION: 95 % | BODY MASS INDEX: 34.49 KG/M2 | WEIGHT: 207 LBS | DIASTOLIC BLOOD PRESSURE: 74 MMHG | HEART RATE: 79 BPM

## 2019-01-29 LAB
ANION GAP SERPL CALCULATED.3IONS-SCNC: 6 MMOL/L (ref 4–13)
BACTERIA SPEC AEROBE CULT: ABNORMAL
BACTERIA SPEC AEROBE CULT: ABNORMAL
BASOPHILS # BLD AUTO: 0.08 10*3/MM3 (ref 0–0.2)
BASOPHILS NFR BLD AUTO: 0.7 % (ref 0–2)
BUN BLD-MCNC: 18 MG/DL (ref 5–21)
BUN/CREAT SERPL: 28.1 (ref 7–25)
CALCIUM SPEC-SCNC: 9.1 MG/DL (ref 8.4–10.4)
CHLORIDE SERPL-SCNC: 104 MMOL/L (ref 98–110)
CO2 SERPL-SCNC: 29 MMOL/L (ref 24–31)
CREAT BLD-MCNC: 0.64 MG/DL (ref 0.5–1.4)
DEPRECATED RDW RBC AUTO: 58.3 FL (ref 40–54)
EOSINOPHIL # BLD AUTO: 0.38 10*3/MM3 (ref 0–0.7)
EOSINOPHIL NFR BLD AUTO: 3.3 % (ref 0–4)
ERYTHROCYTE [DISTWIDTH] IN BLOOD BY AUTOMATED COUNT: 16.8 % (ref 12–15)
GFR SERPL CREATININE-BSD FRML MDRD: 90 ML/MIN/1.73
GLUCOSE BLD-MCNC: 99 MG/DL (ref 70–100)
HCT VFR BLD AUTO: 29.9 % (ref 37–47)
HGB BLD-MCNC: 9.1 G/DL (ref 12–16)
IMM GRANULOCYTES # BLD AUTO: 0.05 10*3/MM3 (ref 0–0.03)
IMM GRANULOCYTES NFR BLD AUTO: 0.4 % (ref 0–5)
LYMPHOCYTES # BLD AUTO: 3.11 10*3/MM3 (ref 0.72–4.86)
LYMPHOCYTES NFR BLD AUTO: 26.9 % (ref 15–45)
MCH RBC QN AUTO: 29 PG (ref 28–32)
MCHC RBC AUTO-ENTMCNC: 30.4 G/DL (ref 33–36)
MCV RBC AUTO: 95.2 FL (ref 82–98)
MONOCYTES # BLD AUTO: 0.76 10*3/MM3 (ref 0.19–1.3)
MONOCYTES NFR BLD AUTO: 6.6 % (ref 4–12)
NEUTROPHILS # BLD AUTO: 7.18 10*3/MM3 (ref 1.87–8.4)
NEUTROPHILS NFR BLD AUTO: 62.1 % (ref 39–78)
NRBC BLD AUTO-RTO: 0 /100 WBC (ref 0–0)
PLATELET # BLD AUTO: 507 10*3/MM3 (ref 130–400)
PMV BLD AUTO: 9.2 FL (ref 6–12)
POTASSIUM BLD-SCNC: 4.3 MMOL/L (ref 3.5–5.3)
RBC # BLD AUTO: 3.14 10*6/MM3 (ref 4.2–5.4)
SODIUM BLD-SCNC: 139 MMOL/L (ref 135–145)
WBC NRBC COR # BLD: 11.56 10*3/MM3 (ref 4.8–10.8)

## 2019-01-29 PROCEDURE — 80048 BASIC METABOLIC PNL TOTAL CA: CPT | Performed by: FAMILY MEDICINE

## 2019-01-29 PROCEDURE — 85025 COMPLETE CBC W/AUTO DIFF WBC: CPT | Performed by: FAMILY MEDICINE

## 2019-01-29 PROCEDURE — G0378 HOSPITAL OBSERVATION PER HR: HCPCS

## 2019-01-29 RX ORDER — LISINOPRIL 10 MG/1
10 TABLET ORAL
Status: DISCONTINUED | OUTPATIENT
Start: 2019-01-29 | End: 2019-01-30 | Stop reason: HOSPADM

## 2019-01-29 RX ORDER — NITROFURANTOIN MACROCRYSTALS 50 MG/1
50 CAPSULE ORAL EVERY 6 HOURS SCHEDULED
Status: DISCONTINUED | OUTPATIENT
Start: 2019-01-29 | End: 2019-01-30 | Stop reason: HOSPADM

## 2019-01-29 RX ORDER — NAPROXEN 250 MG/1
250 TABLET ORAL 2 TIMES DAILY PRN
Status: DISCONTINUED | OUTPATIENT
Start: 2019-01-29 | End: 2019-01-30 | Stop reason: HOSPADM

## 2019-01-29 RX ADMIN — OXYCODONE HYDROCHLORIDE AND ACETAMINOPHEN 1 TABLET: 5; 325 TABLET ORAL at 07:11

## 2019-01-29 RX ADMIN — GABAPENTIN 100 MG: 100 CAPSULE ORAL at 16:44

## 2019-01-29 RX ADMIN — NAPROXEN 250 MG: 250 TABLET ORAL at 13:46

## 2019-01-29 RX ADMIN — NITROFURANTOIN MACROCRYSTALS 50 MG: 50 CAPSULE ORAL at 13:04

## 2019-01-29 RX ADMIN — LEVOTHYROXINE SODIUM 100 MCG: 100 TABLET ORAL at 05:28

## 2019-01-29 RX ADMIN — OXYCODONE HYDROCHLORIDE AND ACETAMINOPHEN 1 TABLET: 5; 325 TABLET ORAL at 21:39

## 2019-01-29 RX ADMIN — CETIRIZINE HYDROCHLORIDE 10 MG: 10 TABLET, FILM COATED ORAL at 08:34

## 2019-01-29 RX ADMIN — GABAPENTIN 100 MG: 100 CAPSULE ORAL at 08:34

## 2019-01-29 RX ADMIN — VANCOMYCIN HYDROCHLORIDE 125 MG: KIT at 13:47

## 2019-01-29 RX ADMIN — GABAPENTIN 100 MG: 100 CAPSULE ORAL at 20:28

## 2019-01-29 RX ADMIN — VANCOMYCIN HYDROCHLORIDE 125 MG: KIT at 17:19

## 2019-01-29 RX ADMIN — OXYCODONE HYDROCHLORIDE AND ACETAMINOPHEN 1 TABLET: 5; 325 TABLET ORAL at 17:19

## 2019-01-29 RX ADMIN — NITROFURANTOIN MACROCRYSTALS 50 MG: 50 CAPSULE ORAL at 17:19

## 2019-01-29 RX ADMIN — HYDROCORTISONE 2.5%: 25 CREAM TOPICAL at 13:46

## 2019-01-29 RX ADMIN — OXYCODONE HYDROCHLORIDE AND ACETAMINOPHEN 1 TABLET: 5; 325 TABLET ORAL at 13:03

## 2019-01-29 RX ADMIN — HYDROCORTISONE 2.5%: 25 CREAM TOPICAL at 20:28

## 2019-01-29 RX ADMIN — Medication 81 MG: at 08:34

## 2019-01-29 RX ADMIN — LISINOPRIL 10 MG: 10 TABLET ORAL at 13:46

## 2019-01-29 RX ADMIN — OXYCODONE HYDROCHLORIDE AND ACETAMINOPHEN 1 TABLET: 5; 325 TABLET ORAL at 02:35

## 2019-01-29 RX ADMIN — POTASSIUM CHLORIDE 10 MEQ: 750 CAPSULE, EXTENDED RELEASE ORAL at 08:34

## 2019-01-29 RX ADMIN — FLUCONAZOLE 200 MG: 200 TABLET ORAL at 13:05

## 2019-01-30 LAB
ANION GAP SERPL CALCULATED.3IONS-SCNC: 8 MMOL/L (ref 4–13)
BASOPHILS # BLD AUTO: 0.08 10*3/MM3 (ref 0–0.2)
BASOPHILS NFR BLD AUTO: 0.8 % (ref 0–2)
BUN BLD-MCNC: 15 MG/DL (ref 5–21)
BUN/CREAT SERPL: 24.2 (ref 7–25)
CALCIUM SPEC-SCNC: 9.6 MG/DL (ref 8.4–10.4)
CHLORIDE SERPL-SCNC: 103 MMOL/L (ref 98–110)
CO2 SERPL-SCNC: 27 MMOL/L (ref 24–31)
CREAT BLD-MCNC: 0.62 MG/DL (ref 0.5–1.4)
DEPRECATED RDW RBC AUTO: 62.3 FL (ref 40–54)
EOSINOPHIL # BLD AUTO: 0.41 10*3/MM3 (ref 0–0.7)
EOSINOPHIL NFR BLD AUTO: 4.3 % (ref 0–4)
ERYTHROCYTE [DISTWIDTH] IN BLOOD BY AUTOMATED COUNT: 16.7 % (ref 12–15)
GFR SERPL CREATININE-BSD FRML MDRD: 93 ML/MIN/1.73
GLUCOSE BLD-MCNC: 98 MG/DL (ref 70–100)
HCT VFR BLD AUTO: 35.7 % (ref 37–47)
HGB BLD-MCNC: 10.6 G/DL (ref 12–16)
IMM GRANULOCYTES # BLD AUTO: 0.05 10*3/MM3 (ref 0–0.03)
IMM GRANULOCYTES NFR BLD AUTO: 0.5 % (ref 0–5)
LYMPHOCYTES # BLD AUTO: 3.87 10*3/MM3 (ref 0.72–4.86)
LYMPHOCYTES NFR BLD AUTO: 40.7 % (ref 15–45)
MCH RBC QN AUTO: 29.9 PG (ref 28–32)
MCHC RBC AUTO-ENTMCNC: 29.7 G/DL (ref 33–36)
MCV RBC AUTO: 100.8 FL (ref 82–98)
MONOCYTES # BLD AUTO: 0.6 10*3/MM3 (ref 0.19–1.3)
MONOCYTES NFR BLD AUTO: 6.3 % (ref 4–12)
NEUTROPHILS # BLD AUTO: 4.49 10*3/MM3 (ref 1.87–8.4)
NEUTROPHILS NFR BLD AUTO: 47.4 % (ref 39–78)
NRBC BLD AUTO-RTO: 0 /100 WBC (ref 0–0)
PLATELET # BLD AUTO: 485 10*3/MM3 (ref 130–400)
PMV BLD AUTO: 9.5 FL (ref 6–12)
POTASSIUM BLD-SCNC: 4.6 MMOL/L (ref 3.5–5.3)
RBC # BLD AUTO: 3.54 10*6/MM3 (ref 4.2–5.4)
SODIUM BLD-SCNC: 138 MMOL/L (ref 135–145)
WBC NRBC COR # BLD: 9.5 10*3/MM3 (ref 4.8–10.8)

## 2019-01-30 PROCEDURE — 80048 BASIC METABOLIC PNL TOTAL CA: CPT | Performed by: FAMILY MEDICINE

## 2019-01-30 PROCEDURE — G0378 HOSPITAL OBSERVATION PER HR: HCPCS

## 2019-01-30 PROCEDURE — 96372 THER/PROPH/DIAG INJ SC/IM: CPT

## 2019-01-30 PROCEDURE — 85025 COMPLETE CBC W/AUTO DIFF WBC: CPT | Performed by: FAMILY MEDICINE

## 2019-01-30 PROCEDURE — 25010000002 ENOXAPARIN PER 10 MG: Performed by: FAMILY MEDICINE

## 2019-01-30 RX ORDER — FLUCONAZOLE 200 MG/1
200 TABLET ORAL EVERY 24 HOURS
Qty: 4 TABLET | Refills: 0 | Status: SHIPPED | OUTPATIENT
Start: 2019-01-30 | End: 2019-02-03

## 2019-01-30 RX ORDER — NITROFURANTOIN MACROCRYSTALS 50 MG/1
50 CAPSULE ORAL EVERY 6 HOURS SCHEDULED
Qty: 16 CAPSULE | Refills: 0 | Status: SHIPPED | OUTPATIENT
Start: 2019-01-30 | End: 2019-02-03

## 2019-01-30 RX ORDER — LISINOPRIL 10 MG/1
10 TABLET ORAL
Qty: 30 TABLET | Refills: 0 | Status: SHIPPED | OUTPATIENT
Start: 2019-01-31

## 2019-01-30 RX ADMIN — GABAPENTIN 100 MG: 100 CAPSULE ORAL at 09:10

## 2019-01-30 RX ADMIN — OXYCODONE HYDROCHLORIDE AND ACETAMINOPHEN 1 TABLET: 5; 325 TABLET ORAL at 15:24

## 2019-01-30 RX ADMIN — LISINOPRIL 10 MG: 10 TABLET ORAL at 09:10

## 2019-01-30 RX ADMIN — VANCOMYCIN HYDROCHLORIDE 125 MG: KIT at 12:08

## 2019-01-30 RX ADMIN — CETIRIZINE HYDROCHLORIDE 10 MG: 10 TABLET, FILM COATED ORAL at 09:10

## 2019-01-30 RX ADMIN — VANCOMYCIN HYDROCHLORIDE 125 MG: KIT at 01:13

## 2019-01-30 RX ADMIN — POTASSIUM CHLORIDE 10 MEQ: 750 CAPSULE, EXTENDED RELEASE ORAL at 09:10

## 2019-01-30 RX ADMIN — NITROFURANTOIN MACROCRYSTALS 50 MG: 50 CAPSULE ORAL at 12:08

## 2019-01-30 RX ADMIN — HYDROCORTISONE 2.5%: 25 CREAM TOPICAL at 09:09

## 2019-01-30 RX ADMIN — VANCOMYCIN HYDROCHLORIDE 125 MG: KIT at 06:28

## 2019-01-30 RX ADMIN — NITROFURANTOIN MACROCRYSTALS 50 MG: 50 CAPSULE ORAL at 01:13

## 2019-01-30 RX ADMIN — NITROFURANTOIN MACROCRYSTALS 50 MG: 50 CAPSULE ORAL at 07:22

## 2019-01-30 RX ADMIN — OXYCODONE HYDROCHLORIDE AND ACETAMINOPHEN 1 TABLET: 5; 325 TABLET ORAL at 10:38

## 2019-01-30 RX ADMIN — ENOXAPARIN SODIUM 40 MG: 40 INJECTION SUBCUTANEOUS at 12:09

## 2019-01-30 RX ADMIN — Medication 81 MG: at 09:10

## 2019-01-30 RX ADMIN — LEVOTHYROXINE SODIUM 100 MCG: 100 TABLET ORAL at 06:28

## 2019-01-30 RX ADMIN — FLUCONAZOLE 200 MG: 200 TABLET ORAL at 14:08

## 2019-01-31 ENCOUNTER — READMISSION MANAGEMENT (OUTPATIENT)
Dept: CALL CENTER | Facility: HOSPITAL | Age: 79
End: 2019-01-31

## 2019-02-01 ENCOUNTER — READMISSION MANAGEMENT (OUTPATIENT)
Dept: CALL CENTER | Facility: HOSPITAL | Age: 79
End: 2019-02-01

## 2019-02-05 ENCOUNTER — READMISSION MANAGEMENT (OUTPATIENT)
Dept: CALL CENTER | Facility: HOSPITAL | Age: 79
End: 2019-02-05

## 2019-02-09 ENCOUNTER — LAB REQUISITION (OUTPATIENT)
Dept: LAB | Facility: HOSPITAL | Age: 79
End: 2019-02-09

## 2019-02-09 DIAGNOSIS — Z00.00 ENCOUNTER FOR GENERAL ADULT MEDICAL EXAMINATION WITHOUT ABNORMAL FINDINGS: ICD-10-CM

## 2019-02-09 LAB
ANION GAP SERPL CALCULATED.3IONS-SCNC: 9 MMOL/L (ref 4–13)
BACTERIA UR QL AUTO: ABNORMAL /HPF
BASOPHILS # BLD AUTO: 0.1 10*3/MM3 (ref 0–0.2)
BASOPHILS NFR BLD AUTO: 1.4 % (ref 0–2)
BILIRUB UR QL STRIP: NEGATIVE
BUN BLD-MCNC: 21 MG/DL (ref 5–21)
BUN/CREAT SERPL: 25.3 (ref 7–25)
CALCIUM SPEC-SCNC: 10.2 MG/DL (ref 8.4–10.4)
CHLORIDE SERPL-SCNC: 102 MMOL/L (ref 98–110)
CLARITY UR: ABNORMAL
CO2 SERPL-SCNC: 29 MMOL/L (ref 24–31)
COLOR UR: YELLOW
CREAT BLD-MCNC: 0.83 MG/DL (ref 0.5–1.4)
DEPRECATED RDW RBC AUTO: 62 FL (ref 40–54)
EOSINOPHIL # BLD AUTO: 0.31 10*3/MM3 (ref 0–0.7)
EOSINOPHIL NFR BLD AUTO: 4.4 % (ref 0–4)
ERYTHROCYTE [DISTWIDTH] IN BLOOD BY AUTOMATED COUNT: 17.2 % (ref 12–15)
GFR SERPL CREATININE-BSD FRML MDRD: 67 ML/MIN/1.73
GLUCOSE BLD-MCNC: 126 MG/DL (ref 70–100)
GLUCOSE UR STRIP-MCNC: NEGATIVE MG/DL
HCT VFR BLD AUTO: 36.3 % (ref 37–47)
HGB BLD-MCNC: 11.1 G/DL (ref 12–16)
HGB UR QL STRIP.AUTO: ABNORMAL
KETONES UR QL STRIP: NEGATIVE
LEUKOCYTE ESTERASE UR QL STRIP.AUTO: ABNORMAL
LYMPHOCYTES # BLD AUTO: 2.78 10*3/MM3 (ref 0.72–4.86)
LYMPHOCYTES NFR BLD AUTO: 39.3 % (ref 15–45)
MCH RBC QN AUTO: 29.9 PG (ref 28–32)
MCHC RBC AUTO-ENTMCNC: 30.6 G/DL (ref 33–36)
MCV RBC AUTO: 97.8 FL (ref 82–98)
MONOCYTES # BLD AUTO: 0.42 10*3/MM3 (ref 0.19–1.3)
MONOCYTES NFR BLD AUTO: 5.9 % (ref 4–12)
NEUTROPHILS # BLD AUTO: 3.45 10*3/MM3 (ref 1.87–8.4)
NEUTROPHILS NFR BLD AUTO: 48.7 % (ref 39–78)
NITRITE UR QL STRIP: NEGATIVE
PH UR STRIP.AUTO: 6.5 [PH] (ref 5–8)
PLATELET # BLD AUTO: 385 10*3/MM3 (ref 130–400)
PMV BLD AUTO: 10.4 FL (ref 6–12)
POTASSIUM BLD-SCNC: 4.5 MMOL/L (ref 3.5–5.3)
PROT UR QL STRIP: ABNORMAL
RBC # BLD AUTO: 3.71 10*6/MM3 (ref 4.2–5.4)
RBC # UR: ABNORMAL /HPF
REF LAB TEST METHOD: ABNORMAL
SODIUM BLD-SCNC: 140 MMOL/L (ref 135–145)
SP GR UR STRIP: 1.01 (ref 1–1.03)
SQUAMOUS #/AREA URNS HPF: ABNORMAL /HPF
UROBILINOGEN UR QL STRIP: ABNORMAL
WBC NRBC COR # BLD: 7.08 10*3/MM3 (ref 4.8–10.8)
WBC UR QL AUTO: ABNORMAL /HPF

## 2019-02-09 PROCEDURE — 87086 URINE CULTURE/COLONY COUNT: CPT | Performed by: FAMILY MEDICINE

## 2019-02-09 PROCEDURE — 85025 COMPLETE CBC W/AUTO DIFF WBC: CPT | Performed by: FAMILY MEDICINE

## 2019-02-09 PROCEDURE — 87088 URINE BACTERIA CULTURE: CPT | Performed by: FAMILY MEDICINE

## 2019-02-09 PROCEDURE — 80048 BASIC METABOLIC PNL TOTAL CA: CPT | Performed by: FAMILY MEDICINE

## 2019-02-09 PROCEDURE — 81001 URINALYSIS AUTO W/SCOPE: CPT | Performed by: FAMILY MEDICINE

## 2019-02-09 PROCEDURE — 87186 SC STD MICRODIL/AGAR DIL: CPT | Performed by: FAMILY MEDICINE

## 2019-02-11 ENCOUNTER — READMISSION MANAGEMENT (OUTPATIENT)
Dept: CALL CENTER | Facility: HOSPITAL | Age: 79
End: 2019-02-11

## 2019-02-11 LAB — BACTERIA SPEC AEROBE CULT: ABNORMAL

## 2019-02-14 ENCOUNTER — READMISSION MANAGEMENT (OUTPATIENT)
Dept: CALL CENTER | Facility: HOSPITAL | Age: 79
End: 2019-02-14

## 2019-02-15 ENCOUNTER — READMISSION MANAGEMENT (OUTPATIENT)
Dept: CALL CENTER | Facility: HOSPITAL | Age: 79
End: 2019-02-15

## 2019-02-19 ENCOUNTER — TELEPHONE (OUTPATIENT)
Dept: INTERNAL MEDICINE | Age: 79
End: 2019-02-19

## 2019-02-27 ENCOUNTER — LAB REQUISITION (OUTPATIENT)
Dept: LAB | Facility: HOSPITAL | Age: 79
End: 2019-02-27

## 2019-02-27 DIAGNOSIS — Z00.00 ENCOUNTER FOR GENERAL ADULT MEDICAL EXAMINATION WITHOUT ABNORMAL FINDINGS: ICD-10-CM

## 2019-02-27 LAB
BACTERIA UR QL AUTO: ABNORMAL /HPF
BASOPHILS # BLD AUTO: 0.05 10*3/MM3 (ref 0–0.2)
BASOPHILS NFR BLD AUTO: 0.8 % (ref 0–2)
BILIRUB UR QL STRIP: NEGATIVE
CLARITY UR: ABNORMAL
COLOR UR: YELLOW
DEPRECATED RDW RBC AUTO: 58.3 FL (ref 40–54)
EOSINOPHIL # BLD AUTO: 0.22 10*3/MM3 (ref 0–0.7)
EOSINOPHIL NFR BLD AUTO: 3.4 % (ref 0–4)
ERYTHROCYTE [DISTWIDTH] IN BLOOD BY AUTOMATED COUNT: 16.1 % (ref 12–15)
FERRITIN SERPL-MCNC: 46.1 NG/ML (ref 11.1–264)
GLUCOSE UR STRIP-MCNC: NEGATIVE MG/DL
HCT VFR BLD AUTO: 34.2 % (ref 37–47)
HGB BLD-MCNC: 10.2 G/DL (ref 12–16)
HGB UR QL STRIP.AUTO: ABNORMAL
HYALINE CASTS UR QL AUTO: ABNORMAL /LPF
IMM GRANULOCYTES # BLD AUTO: 0.01 10*3/MM3 (ref 0–0.05)
IMM GRANULOCYTES NFR BLD AUTO: 0.2 % (ref 0–5)
IRON 24H UR-MRATE: <10 MCG/DL (ref 42–180)
IRON SATN MFR SERPL: ABNORMAL % (ref 20–45)
KETONES UR QL STRIP: NEGATIVE
LEUKOCYTE ESTERASE UR QL STRIP.AUTO: ABNORMAL
LYMPHOCYTES # BLD AUTO: 2.98 10*3/MM3 (ref 0.72–4.86)
LYMPHOCYTES NFR BLD AUTO: 45.9 % (ref 15–45)
MCH RBC QN AUTO: 29.4 PG (ref 28–32)
MCHC RBC AUTO-ENTMCNC: 29.8 G/DL (ref 33–36)
MCV RBC AUTO: 98.6 FL (ref 82–98)
MONOCYTES # BLD AUTO: 0.57 10*3/MM3 (ref 0.19–1.3)
MONOCYTES NFR BLD AUTO: 8.8 % (ref 4–12)
NEUTROPHILS # BLD AUTO: 2.66 10*3/MM3 (ref 1.87–8.4)
NEUTROPHILS NFR BLD AUTO: 40.9 % (ref 39–78)
NITRITE UR QL STRIP: NEGATIVE
NRBC BLD AUTO-RTO: 0 /100 WBC (ref 0–0)
PH UR STRIP.AUTO: 6 [PH] (ref 5–8)
PLATELET # BLD AUTO: 450 10*3/MM3 (ref 130–400)
PMV BLD AUTO: 9.9 FL (ref 6–12)
PROT UR QL STRIP: ABNORMAL
RBC # BLD AUTO: 3.47 10*6/MM3 (ref 4.2–5.4)
RBC # UR: ABNORMAL /HPF
REF LAB TEST METHOD: ABNORMAL
RETICS # AUTO: 0.05 10*6/MM3 (ref 0.02–0.13)
RETICS/RBC NFR AUTO: 1.57 % (ref 0.6–1.8)
SP GR UR STRIP: 1.02 (ref 1–1.03)
SQUAMOUS #/AREA URNS HPF: ABNORMAL /HPF
TIBC SERPL-MCNC: 253 MCG/DL (ref 225–420)
UROBILINOGEN UR QL STRIP: ABNORMAL
VIT B12 BLD-MCNC: 333 PG/ML (ref 239–931)
WBC NRBC COR # BLD: 6.49 10*3/MM3 (ref 4.8–10.8)
WBC UR QL AUTO: ABNORMAL /HPF

## 2019-02-27 PROCEDURE — 82607 VITAMIN B-12: CPT | Performed by: FAMILY MEDICINE

## 2019-02-27 PROCEDURE — 85045 AUTOMATED RETICULOCYTE COUNT: CPT | Performed by: FAMILY MEDICINE

## 2019-02-27 PROCEDURE — 85025 COMPLETE CBC W/AUTO DIFF WBC: CPT | Performed by: FAMILY MEDICINE

## 2019-02-27 PROCEDURE — 87086 URINE CULTURE/COLONY COUNT: CPT | Performed by: FAMILY MEDICINE

## 2019-02-27 PROCEDURE — 83550 IRON BINDING TEST: CPT | Performed by: FAMILY MEDICINE

## 2019-02-27 PROCEDURE — 87088 URINE BACTERIA CULTURE: CPT | Performed by: FAMILY MEDICINE

## 2019-02-27 PROCEDURE — 87186 SC STD MICRODIL/AGAR DIL: CPT | Performed by: FAMILY MEDICINE

## 2019-02-27 PROCEDURE — 82728 ASSAY OF FERRITIN: CPT | Performed by: FAMILY MEDICINE

## 2019-02-27 PROCEDURE — 81001 URINALYSIS AUTO W/SCOPE: CPT | Performed by: FAMILY MEDICINE

## 2019-02-27 PROCEDURE — 83540 ASSAY OF IRON: CPT | Performed by: FAMILY MEDICINE

## 2019-03-01 LAB — BACTERIA SPEC AEROBE CULT: ABNORMAL

## 2019-03-14 ENCOUNTER — HOSPITAL ENCOUNTER (EMERGENCY)
Facility: HOSPITAL | Age: 79
Discharge: HOME | End: 2019-03-15
Admitting: EMERGENCY MEDICINE

## 2019-03-14 ENCOUNTER — APPOINTMENT (OUTPATIENT)
Dept: CT IMAGING | Facility: HOSPITAL | Age: 79
End: 2019-03-14

## 2019-03-14 DIAGNOSIS — N30.90 CYSTITIS: ICD-10-CM

## 2019-03-14 DIAGNOSIS — R10.9 ABDOMINAL PAIN, UNSPECIFIED ABDOMINAL LOCATION: Primary | ICD-10-CM

## 2019-03-14 LAB
ALBUMIN SERPL-MCNC: 3.6 G/DL (ref 3.5–5)
ALBUMIN/GLOB SERPL: 1 G/DL (ref 1.1–2.5)
ALP SERPL-CCNC: 74 U/L (ref 24–120)
ALT SERPL W P-5'-P-CCNC: <15 U/L (ref 0–54)
ANION GAP SERPL CALCULATED.3IONS-SCNC: 5 MMOL/L (ref 4–13)
APTT PPP: 33.7 SECONDS (ref 24.1–34.8)
AST SERPL-CCNC: 25 U/L (ref 7–45)
BACTERIA UR QL AUTO: ABNORMAL /HPF
BASOPHILS # BLD AUTO: 0.04 10*3/MM3 (ref 0–0.2)
BASOPHILS NFR BLD AUTO: 0.5 % (ref 0–2)
BILIRUB SERPL-MCNC: 0.2 MG/DL (ref 0.1–1)
BILIRUB UR QL STRIP: NEGATIVE
BUN BLD-MCNC: 27 MG/DL (ref 5–21)
BUN/CREAT SERPL: 30.3 (ref 7–25)
CALCIUM SPEC-SCNC: 8.9 MG/DL (ref 8.4–10.4)
CHLORIDE SERPL-SCNC: 100 MMOL/L (ref 98–110)
CLARITY UR: ABNORMAL
CO2 SERPL-SCNC: 33 MMOL/L (ref 24–31)
COLOR UR: YELLOW
CREAT BLD-MCNC: 0.89 MG/DL (ref 0.5–1.4)
D-LACTATE SERPL-SCNC: 0.8 MMOL/L (ref 0.5–2)
DEPRECATED RDW RBC AUTO: 56.8 FL (ref 40–54)
EOSINOPHIL # BLD AUTO: 0.23 10*3/MM3 (ref 0–0.7)
EOSINOPHIL NFR BLD AUTO: 2.6 % (ref 0–4)
ERYTHROCYTE [DISTWIDTH] IN BLOOD BY AUTOMATED COUNT: 15.3 % (ref 12–15)
GFR SERPL CREATININE-BSD FRML MDRD: 62 ML/MIN/1.73
GLOBULIN UR ELPH-MCNC: 3.5 GM/DL
GLUCOSE BLD-MCNC: 110 MG/DL (ref 70–100)
GLUCOSE UR STRIP-MCNC: NEGATIVE MG/DL
HCT VFR BLD AUTO: 33.1 % (ref 37–47)
HGB BLD-MCNC: 10 G/DL (ref 12–16)
HGB UR QL STRIP.AUTO: ABNORMAL
HOLD SPECIMEN: NORMAL
HOLD SPECIMEN: NORMAL
HYALINE CASTS UR QL AUTO: ABNORMAL /LPF
IMM GRANULOCYTES # BLD AUTO: 0.02 10*3/MM3 (ref 0–0.05)
IMM GRANULOCYTES NFR BLD AUTO: 0.2 % (ref 0–5)
INR PPP: 0.94 (ref 0.91–1.09)
KETONES UR QL STRIP: NEGATIVE
LEUKOCYTE ESTERASE UR QL STRIP.AUTO: ABNORMAL
LIPASE SERPL-CCNC: 56 U/L (ref 23–203)
LYMPHOCYTES # BLD AUTO: 3.22 10*3/MM3 (ref 0.72–4.86)
LYMPHOCYTES NFR BLD AUTO: 36.9 % (ref 15–45)
MCH RBC QN AUTO: 30.4 PG (ref 28–32)
MCHC RBC AUTO-ENTMCNC: 30.2 G/DL (ref 33–36)
MCV RBC AUTO: 100.6 FL (ref 82–98)
MONOCYTES # BLD AUTO: 0.69 10*3/MM3 (ref 0.19–1.3)
MONOCYTES NFR BLD AUTO: 7.9 % (ref 4–12)
NEUTROPHILS # BLD AUTO: 4.53 10*3/MM3 (ref 1.87–8.4)
NEUTROPHILS NFR BLD AUTO: 51.9 % (ref 39–78)
NITRITE UR QL STRIP: POSITIVE
NRBC BLD AUTO-RTO: 0 /100 WBC (ref 0–0)
PH UR STRIP.AUTO: 6.5 [PH] (ref 5–8)
PLATELET # BLD AUTO: 364 10*3/MM3 (ref 130–400)
PMV BLD AUTO: 9.8 FL (ref 6–12)
POTASSIUM BLD-SCNC: 4 MMOL/L (ref 3.5–5.3)
PROT SERPL-MCNC: 7.1 G/DL (ref 6.3–8.7)
PROT UR QL STRIP: ABNORMAL
PROTHROMBIN TIME: 12.9 SECONDS (ref 11.9–14.6)
RBC # BLD AUTO: 3.29 10*6/MM3 (ref 4.2–5.4)
RBC # UR: ABNORMAL /HPF
REF LAB TEST METHOD: ABNORMAL
SODIUM BLD-SCNC: 138 MMOL/L (ref 135–145)
SP GR UR STRIP: 1.02 (ref 1–1.03)
SQUAMOUS #/AREA URNS HPF: ABNORMAL /HPF
UROBILINOGEN UR QL STRIP: ABNORMAL
WBC NRBC COR # BLD: 8.73 10*3/MM3 (ref 4.8–10.8)
WBC UR QL AUTO: ABNORMAL /HPF
WHOLE BLOOD HOLD SPECIMEN: NORMAL
WHOLE BLOOD HOLD SPECIMEN: NORMAL

## 2019-03-14 PROCEDURE — 83605 ASSAY OF LACTIC ACID: CPT | Performed by: EMERGENCY MEDICINE

## 2019-03-14 PROCEDURE — 85025 COMPLETE CBC W/AUTO DIFF WBC: CPT | Performed by: EMERGENCY MEDICINE

## 2019-03-14 PROCEDURE — 25010000002 MORPHINE PER 10 MG: Performed by: EMERGENCY MEDICINE

## 2019-03-14 PROCEDURE — 0 IOPAMIDOL PER 1 ML: Performed by: EMERGENCY MEDICINE

## 2019-03-14 PROCEDURE — 96365 THER/PROPH/DIAG IV INF INIT: CPT

## 2019-03-14 PROCEDURE — 80053 COMPREHEN METABOLIC PANEL: CPT | Performed by: EMERGENCY MEDICINE

## 2019-03-14 PROCEDURE — 87186 SC STD MICRODIL/AGAR DIL: CPT | Performed by: EMERGENCY MEDICINE

## 2019-03-14 PROCEDURE — 25010000002 ONDANSETRON PER 1 MG: Performed by: EMERGENCY MEDICINE

## 2019-03-14 PROCEDURE — 25010000002 CEFTRIAXONE PER 250 MG: Performed by: PHYSICIAN ASSISTANT

## 2019-03-14 PROCEDURE — 99284 EMERGENCY DEPT VISIT MOD MDM: CPT

## 2019-03-14 PROCEDURE — 87088 URINE BACTERIA CULTURE: CPT | Performed by: EMERGENCY MEDICINE

## 2019-03-14 PROCEDURE — 74177 CT ABD & PELVIS W/CONTRAST: CPT

## 2019-03-14 PROCEDURE — 81001 URINALYSIS AUTO W/SCOPE: CPT | Performed by: EMERGENCY MEDICINE

## 2019-03-14 PROCEDURE — 85730 THROMBOPLASTIN TIME PARTIAL: CPT | Performed by: EMERGENCY MEDICINE

## 2019-03-14 PROCEDURE — 83690 ASSAY OF LIPASE: CPT | Performed by: EMERGENCY MEDICINE

## 2019-03-14 PROCEDURE — 87086 URINE CULTURE/COLONY COUNT: CPT | Performed by: EMERGENCY MEDICINE

## 2019-03-14 PROCEDURE — 96375 TX/PRO/DX INJ NEW DRUG ADDON: CPT

## 2019-03-14 PROCEDURE — 85610 PROTHROMBIN TIME: CPT | Performed by: EMERGENCY MEDICINE

## 2019-03-14 RX ORDER — ONDANSETRON 2 MG/ML
4 INJECTION INTRAMUSCULAR; INTRAVENOUS ONCE
Status: COMPLETED | OUTPATIENT
Start: 2019-03-14 | End: 2019-03-14

## 2019-03-14 RX ORDER — LEVOFLOXACIN 750 MG/1
750 TABLET ORAL DAILY
Qty: 7 TABLET | Refills: 0 | Status: ON HOLD | OUTPATIENT
Start: 2019-03-14 | End: 2019-03-31

## 2019-03-14 RX ORDER — ACETAMINOPHEN 500 MG
1000 TABLET ORAL ONCE
Status: COMPLETED | OUTPATIENT
Start: 2019-03-14 | End: 2019-03-14

## 2019-03-14 RX ADMIN — IOPAMIDOL 100 ML: 755 INJECTION, SOLUTION INTRAVENOUS at 20:34

## 2019-03-14 RX ADMIN — ACETAMINOPHEN 1000 MG: 500 TABLET ORAL at 22:46

## 2019-03-14 RX ADMIN — ONDANSETRON 4 MG: 2 INJECTION INTRAMUSCULAR; INTRAVENOUS at 19:18

## 2019-03-14 RX ADMIN — SODIUM CHLORIDE 1000 ML: 9 INJECTION, SOLUTION INTRAVENOUS at 19:17

## 2019-03-14 RX ADMIN — MORPHINE SULFATE 4 MG: 4 INJECTION INTRAVENOUS at 19:18

## 2019-03-14 RX ADMIN — CEFTRIAXONE SODIUM 1 G: 1 INJECTION, POWDER, FOR SOLUTION INTRAMUSCULAR; INTRAVENOUS at 22:11

## 2019-03-15 VITALS
WEIGHT: 206 LBS | HEIGHT: 65 IN | SYSTOLIC BLOOD PRESSURE: 138 MMHG | RESPIRATION RATE: 18 BRPM | OXYGEN SATURATION: 96 % | TEMPERATURE: 97.9 F | BODY MASS INDEX: 34.32 KG/M2 | DIASTOLIC BLOOD PRESSURE: 67 MMHG | HEART RATE: 82 BPM

## 2019-03-16 ENCOUNTER — TELEPHONE (OUTPATIENT)
Dept: EMERGENCY DEPT | Facility: HOSPITAL | Age: 79
End: 2019-03-16

## 2019-03-16 LAB — BACTERIA SPEC AEROBE CULT: ABNORMAL

## 2019-03-19 ENCOUNTER — OFFICE VISIT (OUTPATIENT)
Dept: UROLOGY | Facility: CLINIC | Age: 79
End: 2019-03-19

## 2019-03-19 ENCOUNTER — PREP FOR SURGERY (OUTPATIENT)
Dept: OTHER | Facility: HOSPITAL | Age: 79
End: 2019-03-19

## 2019-03-19 VITALS — WEIGHT: 206 LBS | BODY MASS INDEX: 34.32 KG/M2 | HEIGHT: 65 IN | TEMPERATURE: 99.3 F

## 2019-03-19 DIAGNOSIS — N20.0 RENAL STONE: Primary | ICD-10-CM

## 2019-03-19 DIAGNOSIS — R26.9 GAIT ABNORMALITY: ICD-10-CM

## 2019-03-19 DIAGNOSIS — N20.1 URETERAL STONE: Primary | ICD-10-CM

## 2019-03-19 DIAGNOSIS — N20.1 URETERAL STONE: ICD-10-CM

## 2019-03-19 DIAGNOSIS — N15.9 KIDNEY INFECTION: ICD-10-CM

## 2019-03-19 PROCEDURE — 99214 OFFICE O/P EST MOD 30 MIN: CPT | Performed by: UROLOGY

## 2019-03-19 RX ORDER — BUPIVACAINE HCL/0.9 % NACL/PF 0.1 %
2 PLASTIC BAG, INJECTION (ML) EPIDURAL ONCE
Status: CANCELLED | OUTPATIENT
Start: 2019-03-19 | End: 2019-03-19

## 2019-03-19 RX ORDER — NITROFURANTOIN 25; 75 MG/1; MG/1
100 CAPSULE ORAL 2 TIMES DAILY
Qty: 30 CAPSULE | Refills: 0 | Status: SHIPPED | OUTPATIENT
Start: 2019-03-19 | End: 2019-03-27 | Stop reason: HOSPADM

## 2019-03-27 ENCOUNTER — HOSPITAL ENCOUNTER (OUTPATIENT)
Facility: HOSPITAL | Age: 79
Discharge: HOME | End: 2019-03-27
Attending: UROLOGY | Admitting: UROLOGY

## 2019-03-27 ENCOUNTER — ANESTHESIA EVENT (OUTPATIENT)
Dept: PERIOP | Facility: HOSPITAL | Age: 79
End: 2019-03-27

## 2019-03-27 ENCOUNTER — ANESTHESIA (OUTPATIENT)
Dept: PERIOP | Facility: HOSPITAL | Age: 79
End: 2019-03-27

## 2019-03-27 ENCOUNTER — APPOINTMENT (OUTPATIENT)
Dept: GENERAL RADIOLOGY | Facility: HOSPITAL | Age: 79
End: 2019-03-27

## 2019-03-27 VITALS
BODY MASS INDEX: 35.61 KG/M2 | TEMPERATURE: 99 F | SYSTOLIC BLOOD PRESSURE: 147 MMHG | RESPIRATION RATE: 16 BRPM | WEIGHT: 221.56 LBS | DIASTOLIC BLOOD PRESSURE: 88 MMHG | HEIGHT: 66 IN | HEART RATE: 90 BPM | OXYGEN SATURATION: 94 %

## 2019-03-27 DIAGNOSIS — N20.1 URETERAL STONE: ICD-10-CM

## 2019-03-27 LAB
BILIRUB UR QL STRIP: NEGATIVE
CLARITY UR: ABNORMAL
COLOR UR: YELLOW
GLUCOSE UR STRIP-MCNC: NEGATIVE MG/DL
HGB UR QL STRIP.AUTO: ABNORMAL
KETONES UR QL STRIP: NEGATIVE
LEUKOCYTE ESTERASE UR QL STRIP.AUTO: ABNORMAL
NITRITE UR QL STRIP: POSITIVE
PH UR STRIP.AUTO: 7.5 [PH] (ref 5–8)
PROT UR QL STRIP: ABNORMAL
SP GR UR STRIP: 1.01 (ref 1–1.03)
UROBILINOGEN UR QL STRIP: ABNORMAL

## 2019-03-27 PROCEDURE — 63710000001 ACETAMINOPHEN 500 MG TABLET: Performed by: ANESTHESIOLOGY

## 2019-03-27 PROCEDURE — 25010000002 FENTANYL CITRATE (PF) 100 MCG/2ML SOLUTION: Performed by: NURSE ANESTHETIST, CERTIFIED REGISTERED

## 2019-03-27 PROCEDURE — 52356 CYSTO/URETERO W/LITHOTRIPSY: CPT | Performed by: UROLOGY

## 2019-03-27 PROCEDURE — 25010000002 FENTANYL CITRATE (PF) 100 MCG/2ML SOLUTION: Performed by: ANESTHESIOLOGY

## 2019-03-27 PROCEDURE — A9270 NON-COVERED ITEM OR SERVICE: HCPCS | Performed by: ANESTHESIOLOGY

## 2019-03-27 PROCEDURE — 25010000002 ONDANSETRON PER 1 MG: Performed by: NURSE ANESTHETIST, CERTIFIED REGISTERED

## 2019-03-27 PROCEDURE — 25010000002 PROPOFOL 10 MG/ML EMULSION: Performed by: NURSE ANESTHETIST, CERTIFIED REGISTERED

## 2019-03-27 PROCEDURE — C1894 INTRO/SHEATH, NON-LASER: HCPCS | Performed by: UROLOGY

## 2019-03-27 PROCEDURE — 0T778DZ DILATION OF LEFT URETER WITH INTRALUMINAL DEVICE, VIA NATURAL OR ARTIFICIAL OPENING ENDOSCOPIC: ICD-10-PCS | Performed by: UROLOGY

## 2019-03-27 PROCEDURE — C1758 CATHETER, URETERAL: HCPCS | Performed by: UROLOGY

## 2019-03-27 PROCEDURE — 0TF48ZZ FRAGMENTATION IN LEFT KIDNEY PELVIS, VIA NATURAL OR ARTIFICIAL OPENING ENDOSCOPIC: ICD-10-PCS | Performed by: UROLOGY

## 2019-03-27 PROCEDURE — 82360 CALCULUS ASSAY QUANT: CPT | Performed by: UROLOGY

## 2019-03-27 PROCEDURE — 25010000002 DEXAMETHASONE PER 1 MG: Performed by: NURSE ANESTHETIST, CERTIFIED REGISTERED

## 2019-03-27 PROCEDURE — C2617 STENT, NON-COR, TEM W/O DEL: HCPCS | Performed by: UROLOGY

## 2019-03-27 PROCEDURE — 88300 SURGICAL PATH GROSS: CPT | Performed by: UROLOGY

## 2019-03-27 PROCEDURE — C1769 GUIDE WIRE: HCPCS | Performed by: UROLOGY

## 2019-03-27 PROCEDURE — 25010000002 IOPAMIDOL 61 % SOLUTION: Performed by: UROLOGY

## 2019-03-27 PROCEDURE — 25010000002 MIDAZOLAM PER 1 MG: Performed by: ANESTHESIOLOGY

## 2019-03-27 PROCEDURE — 25010000002 FENTANYL CITRATE (PF) 100 MCG/2ML SOLUTION

## 2019-03-27 PROCEDURE — 81003 URINALYSIS AUTO W/O SCOPE: CPT | Performed by: UROLOGY

## 2019-03-27 PROCEDURE — 0TP98DZ REMOVAL OF INTRALUMINAL DEVICE FROM URETER, VIA NATURAL OR ARTIFICIAL OPENING ENDOSCOPIC: ICD-10-PCS | Performed by: UROLOGY

## 2019-03-27 PROCEDURE — 63710000001 OXYCODONE-ACETAMINOPHEN 10-325 MG TABLET: Performed by: ANESTHESIOLOGY

## 2019-03-27 PROCEDURE — 25010000002 ONDANSETRON PER 1 MG: Performed by: ANESTHESIOLOGY

## 2019-03-27 PROCEDURE — 74018 RADEX ABDOMEN 1 VIEW: CPT

## 2019-03-27 DEVICE — URETERAL STENT
Type: IMPLANTABLE DEVICE | Site: URETER | Status: FUNCTIONAL
Brand: PERCUFLEX™ PLUS

## 2019-03-27 RX ORDER — FENTANYL CITRATE 50 UG/ML
25 INJECTION, SOLUTION INTRAMUSCULAR; INTRAVENOUS
Status: DISCONTINUED | OUTPATIENT
Start: 2019-03-27 | End: 2019-03-27 | Stop reason: HOSPADM

## 2019-03-27 RX ORDER — LIDOCAINE HYDROCHLORIDE 40 MG/ML
SOLUTION TOPICAL AS NEEDED
Status: DISCONTINUED | OUTPATIENT
Start: 2019-03-27 | End: 2019-03-27 | Stop reason: SURG

## 2019-03-27 RX ORDER — MIDAZOLAM HYDROCHLORIDE 1 MG/ML
1 INJECTION INTRAMUSCULAR; INTRAVENOUS
Status: DISCONTINUED | OUTPATIENT
Start: 2019-03-27 | End: 2019-03-27 | Stop reason: HOSPADM

## 2019-03-27 RX ORDER — OXYCODONE AND ACETAMINOPHEN 7.5; 325 MG/1; MG/1
2 TABLET ORAL EVERY 4 HOURS PRN
Status: DISCONTINUED | OUTPATIENT
Start: 2019-03-27 | End: 2019-03-27 | Stop reason: HOSPADM

## 2019-03-27 RX ORDER — ROCURONIUM BROMIDE 10 MG/ML
INJECTION, SOLUTION INTRAVENOUS AS NEEDED
Status: DISCONTINUED | OUTPATIENT
Start: 2019-03-27 | End: 2019-03-27 | Stop reason: SURG

## 2019-03-27 RX ORDER — FENTANYL CITRATE 50 UG/ML
INJECTION, SOLUTION INTRAMUSCULAR; INTRAVENOUS AS NEEDED
Status: DISCONTINUED | OUTPATIENT
Start: 2019-03-27 | End: 2019-03-27 | Stop reason: SURG

## 2019-03-27 RX ORDER — LIDOCAINE HYDROCHLORIDE 20 MG/ML
INJECTION, SOLUTION INFILTRATION; PERINEURAL AS NEEDED
Status: DISCONTINUED | OUTPATIENT
Start: 2019-03-27 | End: 2019-03-27 | Stop reason: SURG

## 2019-03-27 RX ORDER — NALOXONE HCL 0.4 MG/ML
0.4 VIAL (ML) INJECTION AS NEEDED
Status: DISCONTINUED | OUTPATIENT
Start: 2019-03-27 | End: 2019-03-27 | Stop reason: HOSPADM

## 2019-03-27 RX ORDER — FENTANYL CITRATE 50 UG/ML
25 INJECTION, SOLUTION INTRAMUSCULAR; INTRAVENOUS AS NEEDED
Status: DISCONTINUED | OUTPATIENT
Start: 2019-03-27 | End: 2019-03-27 | Stop reason: HOSPADM

## 2019-03-27 RX ORDER — MIDAZOLAM HYDROCHLORIDE 1 MG/ML
2 INJECTION INTRAMUSCULAR; INTRAVENOUS
Status: DISCONTINUED | OUTPATIENT
Start: 2019-03-27 | End: 2019-03-27 | Stop reason: HOSPADM

## 2019-03-27 RX ORDER — ONDANSETRON 2 MG/ML
4 INJECTION INTRAMUSCULAR; INTRAVENOUS ONCE AS NEEDED
Status: COMPLETED | OUTPATIENT
Start: 2019-03-27 | End: 2019-03-27

## 2019-03-27 RX ORDER — ONDANSETRON 2 MG/ML
INJECTION INTRAMUSCULAR; INTRAVENOUS AS NEEDED
Status: DISCONTINUED | OUTPATIENT
Start: 2019-03-27 | End: 2019-03-27 | Stop reason: SURG

## 2019-03-27 RX ORDER — SORBITOL 30 G/1000ML
IRRIGANT IRRIGATION AS NEEDED
Status: DISCONTINUED | OUTPATIENT
Start: 2019-03-27 | End: 2019-03-27 | Stop reason: HOSPADM

## 2019-03-27 RX ORDER — FENTANYL CITRATE 50 UG/ML
INJECTION, SOLUTION INTRAMUSCULAR; INTRAVENOUS
Status: COMPLETED
Start: 2019-03-27 | End: 2019-03-27

## 2019-03-27 RX ORDER — IBUPROFEN 600 MG/1
600 TABLET ORAL ONCE AS NEEDED
Status: DISCONTINUED | OUTPATIENT
Start: 2019-03-27 | End: 2019-03-27 | Stop reason: HOSPADM

## 2019-03-27 RX ORDER — LABETALOL HYDROCHLORIDE 5 MG/ML
5 INJECTION, SOLUTION INTRAVENOUS
Status: DISCONTINUED | OUTPATIENT
Start: 2019-03-27 | End: 2019-03-27 | Stop reason: HOSPADM

## 2019-03-27 RX ORDER — ACETAMINOPHEN 500 MG
1000 TABLET ORAL ONCE
Status: COMPLETED | OUTPATIENT
Start: 2019-03-27 | End: 2019-03-27

## 2019-03-27 RX ORDER — CEPHALEXIN 500 MG/1
500 CAPSULE ORAL 3 TIMES DAILY
Qty: 40 CAPSULE | Refills: 0 | Status: SHIPPED | OUTPATIENT
Start: 2019-03-27 | End: 2019-04-04 | Stop reason: HOSPADM

## 2019-03-27 RX ORDER — DEXAMETHASONE SODIUM PHOSPHATE 4 MG/ML
INJECTION, SOLUTION INTRA-ARTICULAR; INTRALESIONAL; INTRAMUSCULAR; INTRAVENOUS; SOFT TISSUE AS NEEDED
Status: DISCONTINUED | OUTPATIENT
Start: 2019-03-27 | End: 2019-03-27 | Stop reason: SURG

## 2019-03-27 RX ORDER — MEPERIDINE HYDROCHLORIDE 25 MG/ML
12.5 INJECTION INTRAMUSCULAR; INTRAVENOUS; SUBCUTANEOUS
Status: COMPLETED | OUTPATIENT
Start: 2019-03-27 | End: 2019-03-27

## 2019-03-27 RX ORDER — PROPOFOL 10 MG/ML
VIAL (ML) INTRAVENOUS AS NEEDED
Status: DISCONTINUED | OUTPATIENT
Start: 2019-03-27 | End: 2019-03-27 | Stop reason: SURG

## 2019-03-27 RX ORDER — SODIUM CHLORIDE 0.9 % (FLUSH) 0.9 %
3 SYRINGE (ML) INJECTION AS NEEDED
Status: DISCONTINUED | OUTPATIENT
Start: 2019-03-27 | End: 2019-03-27 | Stop reason: HOSPADM

## 2019-03-27 RX ORDER — SODIUM CHLORIDE 0.9 % (FLUSH) 0.9 %
3 SYRINGE (ML) INJECTION EVERY 12 HOURS SCHEDULED
Status: DISCONTINUED | OUTPATIENT
Start: 2019-03-27 | End: 2019-03-27 | Stop reason: HOSPADM

## 2019-03-27 RX ORDER — OXYCODONE AND ACETAMINOPHEN 10; 325 MG/1; MG/1
1 TABLET ORAL ONCE AS NEEDED
Status: COMPLETED | OUTPATIENT
Start: 2019-03-27 | End: 2019-03-27

## 2019-03-27 RX ORDER — METOCLOPRAMIDE HYDROCHLORIDE 5 MG/ML
5 INJECTION INTRAMUSCULAR; INTRAVENOUS
Status: DISCONTINUED | OUTPATIENT
Start: 2019-03-27 | End: 2019-03-27 | Stop reason: HOSPADM

## 2019-03-27 RX ORDER — IPRATROPIUM BROMIDE AND ALBUTEROL SULFATE 2.5; .5 MG/3ML; MG/3ML
3 SOLUTION RESPIRATORY (INHALATION) ONCE AS NEEDED
Status: DISCONTINUED | OUTPATIENT
Start: 2019-03-27 | End: 2019-03-27 | Stop reason: HOSPADM

## 2019-03-27 RX ORDER — SODIUM CHLORIDE, SODIUM LACTATE, POTASSIUM CHLORIDE, CALCIUM CHLORIDE 600; 310; 30; 20 MG/100ML; MG/100ML; MG/100ML; MG/100ML
1000 INJECTION, SOLUTION INTRAVENOUS CONTINUOUS
Status: DISCONTINUED | OUTPATIENT
Start: 2019-03-27 | End: 2019-03-27 | Stop reason: HOSPADM

## 2019-03-27 RX ORDER — SODIUM CHLORIDE 0.9 % (FLUSH) 0.9 %
1-10 SYRINGE (ML) INJECTION AS NEEDED
Status: DISCONTINUED | OUTPATIENT
Start: 2019-03-27 | End: 2019-03-27 | Stop reason: HOSPADM

## 2019-03-27 RX ORDER — MAGNESIUM HYDROXIDE 1200 MG/15ML
LIQUID ORAL AS NEEDED
Status: DISCONTINUED | OUTPATIENT
Start: 2019-03-27 | End: 2019-03-27 | Stop reason: HOSPADM

## 2019-03-27 RX ORDER — SODIUM CHLORIDE, SODIUM LACTATE, POTASSIUM CHLORIDE, CALCIUM CHLORIDE 600; 310; 30; 20 MG/100ML; MG/100ML; MG/100ML; MG/100ML
100 INJECTION, SOLUTION INTRAVENOUS CONTINUOUS
Status: DISCONTINUED | OUTPATIENT
Start: 2019-03-27 | End: 2019-03-27 | Stop reason: HOSPADM

## 2019-03-27 RX ORDER — BUPIVACAINE HCL/0.9 % NACL/PF 0.1 %
2 PLASTIC BAG, INJECTION (ML) EPIDURAL ONCE
Status: COMPLETED | OUTPATIENT
Start: 2019-03-27 | End: 2019-03-27

## 2019-03-27 RX ADMIN — MIDAZOLAM 2 MG: 1 INJECTION INTRAMUSCULAR; INTRAVENOUS at 12:30

## 2019-03-27 RX ADMIN — FENTANYL CITRATE 25 MCG: 50 INJECTION INTRAMUSCULAR; INTRAVENOUS at 12:26

## 2019-03-27 RX ADMIN — Medication 2 G: at 13:28

## 2019-03-27 RX ADMIN — PROPOFOL 150 MG: 10 INJECTION, EMULSION INTRAVENOUS at 13:19

## 2019-03-27 RX ADMIN — ONDANSETRON HYDROCHLORIDE 4 MG: 2 SOLUTION INTRAMUSCULAR; INTRAVENOUS at 13:34

## 2019-03-27 RX ADMIN — FENTANYL CITRATE 25 MCG: 50 INJECTION INTRAMUSCULAR; INTRAVENOUS at 12:53

## 2019-03-27 RX ADMIN — LIDOCAINE HYDROCHLORIDE 1 EACH: 40 SOLUTION TOPICAL at 13:19

## 2019-03-27 RX ADMIN — DEXAMETHASONE SODIUM PHOSPHATE 4 MG: 4 INJECTION, SOLUTION INTRAMUSCULAR; INTRAVENOUS at 13:34

## 2019-03-27 RX ADMIN — MEPERIDINE HYDROCHLORIDE 12.5 MG: 25 INJECTION INTRAMUSCULAR; INTRAVENOUS; SUBCUTANEOUS at 15:02

## 2019-03-27 RX ADMIN — FENTANYL CITRATE 100 MCG: 50 INJECTION, SOLUTION INTRAMUSCULAR; INTRAVENOUS at 13:19

## 2019-03-27 RX ADMIN — SODIUM CHLORIDE, POTASSIUM CHLORIDE, SODIUM LACTATE AND CALCIUM CHLORIDE: 600; 310; 30; 20 INJECTION, SOLUTION INTRAVENOUS at 14:00

## 2019-03-27 RX ADMIN — LABETALOL HYDROCHLORIDE 5 MG: 5 INJECTION INTRAVENOUS at 15:10

## 2019-03-27 RX ADMIN — LIDOCAINE HYDROCHLORIDE 100 MG: 20 INJECTION, SOLUTION INFILTRATION; PERINEURAL at 13:19

## 2019-03-27 RX ADMIN — ACETAMINOPHEN 1000 MG: 500 TABLET, FILM COATED ORAL at 12:30

## 2019-03-27 RX ADMIN — MEPERIDINE HYDROCHLORIDE 12.5 MG: 25 INJECTION INTRAMUSCULAR; INTRAVENOUS; SUBCUTANEOUS at 14:57

## 2019-03-27 RX ADMIN — ONDANSETRON HYDROCHLORIDE 4 MG: 2 SOLUTION INTRAMUSCULAR; INTRAVENOUS at 14:57

## 2019-03-27 RX ADMIN — OXYCODONE HYDROCHLORIDE AND ACETAMINOPHEN 1 TABLET: 10; 325 TABLET ORAL at 14:55

## 2019-03-27 RX ADMIN — FENTANYL CITRATE 100 MCG: 50 INJECTION, SOLUTION INTRAMUSCULAR; INTRAVENOUS at 13:43

## 2019-03-27 RX ADMIN — ROCURONIUM BROMIDE 25 MG: 10 INJECTION INTRAVENOUS at 13:19

## 2019-03-27 RX ADMIN — SODIUM CHLORIDE, POTASSIUM CHLORIDE, SODIUM LACTATE AND CALCIUM CHLORIDE 1000 ML: 600; 310; 30; 20 INJECTION, SOLUTION INTRAVENOUS at 11:43

## 2019-03-28 ENCOUNTER — NURSE TRIAGE (OUTPATIENT)
Dept: CALL CENTER | Facility: HOSPITAL | Age: 79
End: 2019-03-28

## 2019-03-28 ENCOUNTER — TELEPHONE (OUTPATIENT)
Dept: UROLOGY | Facility: CLINIC | Age: 79
End: 2019-03-28

## 2019-03-28 ENCOUNTER — HOSPITAL ENCOUNTER (EMERGENCY)
Facility: HOSPITAL | Age: 79
Discharge: NURSING, REHAB OR LONG-TERM CARE | End: 2019-03-28
Attending: EMERGENCY MEDICINE | Admitting: EMERGENCY MEDICINE

## 2019-03-28 VITALS
SYSTOLIC BLOOD PRESSURE: 163 MMHG | WEIGHT: 221 LBS | BODY MASS INDEX: 35.52 KG/M2 | OXYGEN SATURATION: 93 % | RESPIRATION RATE: 22 BRPM | DIASTOLIC BLOOD PRESSURE: 97 MMHG | HEIGHT: 66 IN | HEART RATE: 93 BPM | TEMPERATURE: 99.9 F

## 2019-03-28 DIAGNOSIS — R11.11 VOMITING WITHOUT NAUSEA, INTRACTABILITY OF VOMITING NOT SPECIFIED, UNSPECIFIED VOMITING TYPE: Primary | ICD-10-CM

## 2019-03-28 LAB
ALBUMIN SERPL-MCNC: 4 G/DL (ref 3.5–5)
ALBUMIN/GLOB SERPL: 1.1 G/DL (ref 1.1–2.5)
ALP SERPL-CCNC: 105 U/L (ref 24–120)
ALT SERPL W P-5'-P-CCNC: 25 U/L (ref 0–54)
ANION GAP SERPL CALCULATED.3IONS-SCNC: 8 MMOL/L (ref 4–13)
AST SERPL-CCNC: 62 U/L (ref 7–45)
BACTERIA UR QL AUTO: ABNORMAL /HPF
BASOPHILS # BLD AUTO: 0.06 10*3/MM3 (ref 0–0.2)
BASOPHILS NFR BLD AUTO: 0.4 % (ref 0–2)
BILIRUB SERPL-MCNC: 0.5 MG/DL (ref 0.1–1)
BILIRUB UR QL STRIP: NEGATIVE
BUN BLD-MCNC: 24 MG/DL (ref 5–21)
BUN/CREAT SERPL: 28.9 (ref 7–25)
CALCIUM SPEC-SCNC: 9.1 MG/DL (ref 8.4–10.4)
CHLORIDE SERPL-SCNC: 100 MMOL/L (ref 98–110)
CLARITY UR: ABNORMAL
CO2 SERPL-SCNC: 29 MMOL/L (ref 24–31)
COLOR UR: YELLOW
CREAT BLD-MCNC: 0.83 MG/DL (ref 0.5–1.4)
DEPRECATED RDW RBC AUTO: 51.9 FL (ref 40–54)
EOSINOPHIL # BLD AUTO: 0 10*3/MM3 (ref 0–0.7)
EOSINOPHIL NFR BLD AUTO: 0 % (ref 0–4)
ERYTHROCYTE [DISTWIDTH] IN BLOOD BY AUTOMATED COUNT: 15 % (ref 12–15)
GFR SERPL CREATININE-BSD FRML MDRD: 67 ML/MIN/1.73
GLOBULIN UR ELPH-MCNC: 3.5 GM/DL
GLUCOSE BLD-MCNC: 127 MG/DL (ref 70–100)
GLUCOSE UR STRIP-MCNC: NEGATIVE MG/DL
HCT VFR BLD AUTO: 32.4 % (ref 37–47)
HGB BLD-MCNC: 10 G/DL (ref 12–16)
HGB UR QL STRIP.AUTO: ABNORMAL
HYALINE CASTS UR QL AUTO: ABNORMAL /LPF
IMM GRANULOCYTES # BLD AUTO: 0.1 10*3/MM3 (ref 0–0.05)
IMM GRANULOCYTES NFR BLD AUTO: 0.7 % (ref 0–5)
KETONES UR QL STRIP: NEGATIVE
LEUKOCYTE ESTERASE UR QL STRIP.AUTO: ABNORMAL
LYMPHOCYTES # BLD AUTO: 1.12 10*3/MM3 (ref 0.72–4.86)
LYMPHOCYTES NFR BLD AUTO: 7.7 % (ref 15–45)
MCH RBC QN AUTO: 29.1 PG (ref 28–32)
MCHC RBC AUTO-ENTMCNC: 30.9 G/DL (ref 33–36)
MCV RBC AUTO: 94.2 FL (ref 82–98)
MONOCYTES # BLD AUTO: 0.75 10*3/MM3 (ref 0.19–1.3)
MONOCYTES NFR BLD AUTO: 5.1 % (ref 4–12)
NEUTROPHILS # BLD AUTO: 12.56 10*3/MM3 (ref 1.87–8.4)
NEUTROPHILS NFR BLD AUTO: 86.1 % (ref 39–78)
NITRITE UR QL STRIP: POSITIVE
NRBC BLD AUTO-RTO: 0 /100 WBC (ref 0–0)
PH UR STRIP.AUTO: 7 [PH] (ref 5–8)
PLATELET # BLD AUTO: 380 10*3/MM3 (ref 130–400)
PMV BLD AUTO: 9.3 FL (ref 6–12)
POTASSIUM BLD-SCNC: 4.5 MMOL/L (ref 3.5–5.3)
PROT SERPL-MCNC: 7.5 G/DL (ref 6.3–8.7)
PROT UR QL STRIP: ABNORMAL
RBC # BLD AUTO: 3.44 10*6/MM3 (ref 4.2–5.4)
RBC # UR: ABNORMAL /HPF
REF LAB TEST METHOD: ABNORMAL
SODIUM BLD-SCNC: 137 MMOL/L (ref 135–145)
SP GR UR STRIP: 1.01 (ref 1–1.03)
SQUAMOUS #/AREA URNS HPF: ABNORMAL /HPF
UROBILINOGEN UR QL STRIP: ABNORMAL
WBC CLUMPS # UR AUTO: ABNORMAL /HPF
WBC NRBC COR # BLD: 14.59 10*3/MM3 (ref 4.8–10.8)
WBC UR QL AUTO: ABNORMAL /HPF

## 2019-03-28 PROCEDURE — 87086 URINE CULTURE/COLONY COUNT: CPT | Performed by: EMERGENCY MEDICINE

## 2019-03-28 PROCEDURE — 99283 EMERGENCY DEPT VISIT LOW MDM: CPT

## 2019-03-28 PROCEDURE — 36415 COLL VENOUS BLD VENIPUNCTURE: CPT | Performed by: EMERGENCY MEDICINE

## 2019-03-28 PROCEDURE — 87077 CULTURE AEROBIC IDENTIFY: CPT | Performed by: EMERGENCY MEDICINE

## 2019-03-28 PROCEDURE — 87186 SC STD MICRODIL/AGAR DIL: CPT | Performed by: EMERGENCY MEDICINE

## 2019-03-28 PROCEDURE — 81001 URINALYSIS AUTO W/SCOPE: CPT | Performed by: EMERGENCY MEDICINE

## 2019-03-28 PROCEDURE — 85025 COMPLETE CBC W/AUTO DIFF WBC: CPT | Performed by: EMERGENCY MEDICINE

## 2019-03-28 PROCEDURE — 87181 SC STD AGAR DILUTION PER AGT: CPT | Performed by: EMERGENCY MEDICINE

## 2019-03-28 PROCEDURE — 96372 THER/PROPH/DIAG INJ SC/IM: CPT

## 2019-03-28 PROCEDURE — 25010000002 CEFTRIAXONE PER 250 MG: Performed by: EMERGENCY MEDICINE

## 2019-03-28 PROCEDURE — 80053 COMPREHEN METABOLIC PANEL: CPT | Performed by: EMERGENCY MEDICINE

## 2019-03-28 RX ORDER — OXYCODONE HYDROCHLORIDE AND ACETAMINOPHEN 5; 325 MG/1; MG/1
1 TABLET ORAL ONCE
Status: COMPLETED | OUTPATIENT
Start: 2019-03-28 | End: 2019-03-28

## 2019-03-28 RX ADMIN — LIDOCAINE HYDROCHLORIDE 250 MG: 10 INJECTION, SOLUTION INFILTRATION; PERINEURAL at 17:50

## 2019-03-28 RX ADMIN — OXYCODONE AND ACETAMINOPHEN 1 TABLET: 5; 325 TABLET ORAL at 17:18

## 2019-03-29 ENCOUNTER — HOSPITAL ENCOUNTER (INPATIENT)
Facility: HOSPITAL | Age: 79
LOS: 6 days | Discharge: HOME HEALTH | End: 2019-04-04
Attending: EMERGENCY MEDICINE | Admitting: UROLOGY

## 2019-03-29 ENCOUNTER — APPOINTMENT (OUTPATIENT)
Dept: GENERAL RADIOLOGY | Facility: HOSPITAL | Age: 79
End: 2019-03-29

## 2019-03-29 DIAGNOSIS — Z74.09 IMPAIRED MOBILITY: ICD-10-CM

## 2019-03-29 DIAGNOSIS — A41.9 SEPSIS, DUE TO UNSPECIFIED ORGANISM: ICD-10-CM

## 2019-03-29 DIAGNOSIS — N12 PYELONEPHRITIS: Primary | ICD-10-CM

## 2019-03-29 LAB
ALBUMIN SERPL-MCNC: 3.5 G/DL (ref 3.5–5)
ALBUMIN/GLOB SERPL: 1.1 G/DL (ref 1.1–2.5)
ALP SERPL-CCNC: 115 U/L (ref 24–120)
ALT SERPL W P-5'-P-CCNC: 19 U/L (ref 0–54)
ANION GAP SERPL CALCULATED.3IONS-SCNC: 7 MMOL/L (ref 4–13)
AST SERPL-CCNC: 44 U/L (ref 7–45)
BACTERIA UR QL AUTO: ABNORMAL /HPF
BASOPHILS # BLD AUTO: 0.03 10*3/MM3 (ref 0–0.2)
BASOPHILS NFR BLD AUTO: 0.2 % (ref 0–2)
BILIRUB SERPL-MCNC: 0.4 MG/DL (ref 0.1–1)
BILIRUB UR QL STRIP: NEGATIVE
BUN BLD-MCNC: 27 MG/DL (ref 5–21)
BUN/CREAT SERPL: 27.3 (ref 7–25)
CALCIUM SPEC-SCNC: 8.8 MG/DL (ref 8.4–10.4)
CHLORIDE SERPL-SCNC: 100 MMOL/L (ref 98–110)
CLARITY UR: ABNORMAL
CO2 SERPL-SCNC: 29 MMOL/L (ref 24–31)
COLOR UR: YELLOW
CREAT BLD-MCNC: 0.99 MG/DL (ref 0.5–1.4)
D-LACTATE SERPL-SCNC: 0.8 MMOL/L (ref 0.5–2)
DEPRECATED RDW RBC AUTO: 50.8 FL (ref 40–54)
EOSINOPHIL # BLD AUTO: 0 10*3/MM3 (ref 0–0.7)
EOSINOPHIL NFR BLD AUTO: 0 % (ref 0–4)
ERYTHROCYTE [DISTWIDTH] IN BLOOD BY AUTOMATED COUNT: 14.9 % (ref 12–15)
GFR SERPL CREATININE-BSD FRML MDRD: 54 ML/MIN/1.73
GLOBULIN UR ELPH-MCNC: 3.3 GM/DL
GLUCOSE BLD-MCNC: 134 MG/DL (ref 70–100)
GLUCOSE UR STRIP-MCNC: NEGATIVE MG/DL
HCT VFR BLD AUTO: 30.2 % (ref 37–47)
HGB BLD-MCNC: 9.5 G/DL (ref 12–16)
HGB UR QL STRIP.AUTO: ABNORMAL
HOLD SPECIMEN: NORMAL
HOLD SPECIMEN: NORMAL
HYALINE CASTS UR QL AUTO: ABNORMAL /LPF
IMM GRANULOCYTES # BLD AUTO: 0.1 10*3/MM3 (ref 0–0.05)
IMM GRANULOCYTES NFR BLD AUTO: 0.6 % (ref 0–5)
KETONES UR QL STRIP: NEGATIVE
LEUKOCYTE ESTERASE UR QL STRIP.AUTO: ABNORMAL
LYMPHOCYTES # BLD AUTO: 1.11 10*3/MM3 (ref 0.72–4.86)
LYMPHOCYTES NFR BLD AUTO: 7.1 % (ref 15–45)
MCH RBC QN AUTO: 29.5 PG (ref 28–32)
MCHC RBC AUTO-ENTMCNC: 31.5 G/DL (ref 33–36)
MCV RBC AUTO: 93.8 FL (ref 82–98)
MONOCYTES # BLD AUTO: 0.98 10*3/MM3 (ref 0.19–1.3)
MONOCYTES NFR BLD AUTO: 6.3 % (ref 4–12)
NEUTROPHILS # BLD AUTO: 13.38 10*3/MM3 (ref 1.87–8.4)
NEUTROPHILS NFR BLD AUTO: 85.8 % (ref 39–78)
NITRITE UR QL STRIP: POSITIVE
NRBC BLD AUTO-RTO: 0 /100 WBC (ref 0–0)
PH UR STRIP.AUTO: 5.5 [PH] (ref 5–8)
PLATELET # BLD AUTO: 331 10*3/MM3 (ref 130–400)
PMV BLD AUTO: 9.4 FL (ref 6–12)
POTASSIUM BLD-SCNC: 4 MMOL/L (ref 3.5–5.3)
PROT SERPL-MCNC: 6.8 G/DL (ref 6.3–8.7)
PROT UR QL STRIP: ABNORMAL
RBC # BLD AUTO: 3.22 10*6/MM3 (ref 4.2–5.4)
RBC # UR: ABNORMAL /HPF
REF LAB TEST METHOD: ABNORMAL
SODIUM BLD-SCNC: 136 MMOL/L (ref 135–145)
SP GR UR STRIP: 1.01 (ref 1–1.03)
SQUAMOUS #/AREA URNS HPF: ABNORMAL /HPF
UROBILINOGEN UR QL STRIP: ABNORMAL
WBC NRBC COR # BLD: 15.6 10*3/MM3 (ref 4.8–10.8)
WBC UR QL AUTO: ABNORMAL /HPF
WHOLE BLOOD HOLD SPECIMEN: NORMAL
WHOLE BLOOD HOLD SPECIMEN: NORMAL
YEAST URNS QL MICRO: ABNORMAL /HPF

## 2019-03-29 PROCEDURE — 87186 SC STD MICRODIL/AGAR DIL: CPT | Performed by: EMERGENCY MEDICINE

## 2019-03-29 PROCEDURE — 83605 ASSAY OF LACTIC ACID: CPT | Performed by: EMERGENCY MEDICINE

## 2019-03-29 PROCEDURE — 87086 URINE CULTURE/COLONY COUNT: CPT | Performed by: EMERGENCY MEDICINE

## 2019-03-29 PROCEDURE — 99285 EMERGENCY DEPT VISIT HI MDM: CPT

## 2019-03-29 PROCEDURE — 87040 BLOOD CULTURE FOR BACTERIA: CPT | Performed by: EMERGENCY MEDICINE

## 2019-03-29 PROCEDURE — 85025 COMPLETE CBC W/AUTO DIFF WBC: CPT | Performed by: EMERGENCY MEDICINE

## 2019-03-29 PROCEDURE — 74018 RADEX ABDOMEN 1 VIEW: CPT

## 2019-03-29 PROCEDURE — 81001 URINALYSIS AUTO W/SCOPE: CPT | Performed by: EMERGENCY MEDICINE

## 2019-03-29 PROCEDURE — 25010000002 CEFTRIAXONE PER 250 MG: Performed by: EMERGENCY MEDICINE

## 2019-03-29 PROCEDURE — 80053 COMPREHEN METABOLIC PANEL: CPT | Performed by: EMERGENCY MEDICINE

## 2019-03-29 PROCEDURE — 87077 CULTURE AEROBIC IDENTIFY: CPT | Performed by: EMERGENCY MEDICINE

## 2019-03-29 RX ORDER — SODIUM CHLORIDE 0.9 % (FLUSH) 0.9 %
10 SYRINGE (ML) INJECTION AS NEEDED
Status: DISCONTINUED | OUTPATIENT
Start: 2019-03-29 | End: 2019-04-04 | Stop reason: HOSPADM

## 2019-03-29 RX ADMIN — CEFTRIAXONE SODIUM 1 G: 1 INJECTION, POWDER, FOR SOLUTION INTRAMUSCULAR; INTRAVENOUS at 22:06

## 2019-03-30 PROBLEM — M48.07 SPINAL STENOSIS OF LUMBOSACRAL REGION: Chronic | Status: ACTIVE | Noted: 2019-03-30

## 2019-03-30 PROCEDURE — 99222 1ST HOSP IP/OBS MODERATE 55: CPT | Performed by: UROLOGY

## 2019-03-30 PROCEDURE — 25010000002 CEFTRIAXONE PER 250 MG: Performed by: FAMILY MEDICINE

## 2019-03-30 PROCEDURE — 94760 N-INVAS EAR/PLS OXIMETRY 1: CPT

## 2019-03-30 PROCEDURE — 94799 UNLISTED PULMONARY SVC/PX: CPT

## 2019-03-30 RX ORDER — LEVOTHYROXINE SODIUM 0.1 MG/1
100 TABLET ORAL
Status: DISCONTINUED | OUTPATIENT
Start: 2019-03-30 | End: 2019-04-04 | Stop reason: HOSPADM

## 2019-03-30 RX ORDER — ACETAMINOPHEN 325 MG/1
650 TABLET ORAL EVERY 6 HOURS PRN
Status: DISCONTINUED | OUTPATIENT
Start: 2019-03-30 | End: 2019-04-04 | Stop reason: HOSPADM

## 2019-03-30 RX ORDER — SODIUM CHLORIDE 0.9 % (FLUSH) 0.9 %
3 SYRINGE (ML) INJECTION EVERY 12 HOURS SCHEDULED
Status: DISCONTINUED | OUTPATIENT
Start: 2019-03-30 | End: 2019-04-04 | Stop reason: HOSPADM

## 2019-03-30 RX ORDER — GABAPENTIN 100 MG/1
100 CAPSULE ORAL 3 TIMES DAILY
Status: DISCONTINUED | OUTPATIENT
Start: 2019-03-30 | End: 2019-04-04 | Stop reason: HOSPADM

## 2019-03-30 RX ORDER — OXYCODONE HYDROCHLORIDE AND ACETAMINOPHEN 5; 325 MG/1; MG/1
1 TABLET ORAL EVERY 4 HOURS PRN
Status: DISCONTINUED | OUTPATIENT
Start: 2019-03-30 | End: 2019-04-04 | Stop reason: HOSPADM

## 2019-03-30 RX ORDER — SODIUM CHLORIDE 0.9 % (FLUSH) 0.9 %
3-10 SYRINGE (ML) INJECTION AS NEEDED
Status: DISCONTINUED | OUTPATIENT
Start: 2019-03-30 | End: 2019-04-04 | Stop reason: HOSPADM

## 2019-03-30 RX ORDER — POTASSIUM CHLORIDE 750 MG/1
10 CAPSULE, EXTENDED RELEASE ORAL DAILY
Status: DISCONTINUED | OUTPATIENT
Start: 2019-03-30 | End: 2019-04-04 | Stop reason: HOSPADM

## 2019-03-30 RX ORDER — SODIUM CHLORIDE 9 MG/ML
100 INJECTION, SOLUTION INTRAVENOUS CONTINUOUS
Status: DISCONTINUED | OUTPATIENT
Start: 2019-03-30 | End: 2019-04-04 | Stop reason: HOSPADM

## 2019-03-30 RX ORDER — CETIRIZINE HYDROCHLORIDE 10 MG/1
10 TABLET ORAL DAILY
Status: DISCONTINUED | OUTPATIENT
Start: 2019-03-30 | End: 2019-04-04 | Stop reason: HOSPADM

## 2019-03-30 RX ORDER — OXYCODONE HCL 20 MG/ML
5 CONCENTRATE, ORAL ORAL EVERY 6 HOURS
Status: DISCONTINUED | OUTPATIENT
Start: 2019-03-30 | End: 2019-03-30 | Stop reason: CLARIF

## 2019-03-30 RX ORDER — OXYCODONE HCL 10 MG/1
10 TABLET, FILM COATED, EXTENDED RELEASE ORAL EVERY 12 HOURS SCHEDULED
Status: DISCONTINUED | OUTPATIENT
Start: 2019-03-30 | End: 2019-04-04 | Stop reason: HOSPADM

## 2019-03-30 RX ORDER — TRAMADOL HYDROCHLORIDE 50 MG/1
50 TABLET ORAL EVERY 6 HOURS PRN
Status: DISCONTINUED | OUTPATIENT
Start: 2019-03-30 | End: 2019-04-01

## 2019-03-30 RX ORDER — FLUCONAZOLE 200 MG/1
200 TABLET ORAL
Status: COMPLETED | OUTPATIENT
Start: 2019-03-30 | End: 2019-04-01

## 2019-03-30 RX ORDER — IPRATROPIUM BROMIDE AND ALBUTEROL SULFATE 2.5; .5 MG/3ML; MG/3ML
3 SOLUTION RESPIRATORY (INHALATION) EVERY 6 HOURS PRN
Status: DISCONTINUED | OUTPATIENT
Start: 2019-03-30 | End: 2019-04-04 | Stop reason: HOSPADM

## 2019-03-30 RX ORDER — OXYCODONE HCL 20 MG/ML
5 CONCENTRATE, ORAL ORAL EVERY 6 HOURS PRN
Status: DISCONTINUED | OUTPATIENT
Start: 2019-03-30 | End: 2019-03-30

## 2019-03-30 RX ORDER — LISINOPRIL 10 MG/1
10 TABLET ORAL
Status: DISCONTINUED | OUTPATIENT
Start: 2019-03-30 | End: 2019-04-02

## 2019-03-30 RX ORDER — ASPIRIN 81 MG/1
81 TABLET, CHEWABLE ORAL DAILY
Status: DISCONTINUED | OUTPATIENT
Start: 2019-03-30 | End: 2019-04-04 | Stop reason: HOSPADM

## 2019-03-30 RX ORDER — HYDROCODONE BITARTRATE AND ACETAMINOPHEN 5; 325 MG/1; MG/1
1 TABLET ORAL EVERY 4 HOURS PRN
Status: DISCONTINUED | OUTPATIENT
Start: 2019-03-30 | End: 2019-04-04 | Stop reason: HOSPADM

## 2019-03-30 RX ORDER — CLONIDINE HYDROCHLORIDE 0.1 MG/1
0.1 TABLET ORAL 4 TIMES DAILY PRN
Status: DISCONTINUED | OUTPATIENT
Start: 2019-03-30 | End: 2019-04-04 | Stop reason: HOSPADM

## 2019-03-30 RX ORDER — SODIUM CHLORIDE 9 MG/ML
125 INJECTION, SOLUTION INTRAVENOUS CONTINUOUS
Status: DISCONTINUED | OUTPATIENT
Start: 2019-03-30 | End: 2019-03-30

## 2019-03-30 RX ADMIN — FLUCONAZOLE 200 MG: 200 TABLET ORAL at 11:18

## 2019-03-30 RX ADMIN — LISINOPRIL 10 MG: 10 TABLET ORAL at 09:17

## 2019-03-30 RX ADMIN — OXYCODONE AND ACETAMINOPHEN 1 TABLET: 5; 325 TABLET ORAL at 15:17

## 2019-03-30 RX ADMIN — CETIRIZINE HYDROCHLORIDE 10 MG: 10 TABLET, FILM COATED ORAL at 09:17

## 2019-03-30 RX ADMIN — GABAPENTIN 100 MG: 100 CAPSULE ORAL at 15:17

## 2019-03-30 RX ADMIN — TRAMADOL HYDROCHLORIDE 50 MG: 50 TABLET, COATED ORAL at 02:21

## 2019-03-30 RX ADMIN — GABAPENTIN 100 MG: 100 CAPSULE ORAL at 09:17

## 2019-03-30 RX ADMIN — SODIUM CHLORIDE, PRESERVATIVE FREE 10 ML: 5 INJECTION INTRAVENOUS at 02:06

## 2019-03-30 RX ADMIN — SODIUM CHLORIDE 100 ML/HR: 9 INJECTION, SOLUTION INTRAVENOUS at 10:28

## 2019-03-30 RX ADMIN — OXYCODONE HYDROCHLORIDE 10 MG: 10 TABLET, FILM COATED, EXTENDED RELEASE ORAL at 09:17

## 2019-03-30 RX ADMIN — POTASSIUM CHLORIDE 10 MEQ: 750 CAPSULE, EXTENDED RELEASE ORAL at 09:17

## 2019-03-30 RX ADMIN — ASPIRIN 81 MG 81 MG: 81 TABLET ORAL at 09:17

## 2019-03-30 RX ADMIN — SODIUM CHLORIDE 125 ML/HR: 9 INJECTION, SOLUTION INTRAVENOUS at 02:05

## 2019-03-30 RX ADMIN — LEVOTHYROXINE SODIUM 100 MCG: 100 TABLET ORAL at 09:17

## 2019-03-30 RX ADMIN — CEFTRIAXONE SODIUM 1 G: 1 INJECTION, POWDER, FOR SOLUTION INTRAMUSCULAR; INTRAVENOUS at 23:04

## 2019-03-30 RX ADMIN — SODIUM CHLORIDE, PRESERVATIVE FREE 3 ML: 5 INJECTION INTRAVENOUS at 09:18

## 2019-03-30 RX ADMIN — OXYCODONE AND ACETAMINOPHEN 1 TABLET: 5; 325 TABLET ORAL at 11:18

## 2019-03-30 RX ADMIN — SODIUM CHLORIDE 100 ML/HR: 9 INJECTION, SOLUTION INTRAVENOUS at 20:09

## 2019-03-30 RX ADMIN — GABAPENTIN 100 MG: 100 CAPSULE ORAL at 20:28

## 2019-03-30 RX ADMIN — OXYCODONE HYDROCHLORIDE 10 MG: 10 TABLET, FILM COATED, EXTENDED RELEASE ORAL at 20:28

## 2019-03-31 LAB
ALBUMIN SERPL-MCNC: 3.1 G/DL (ref 3.5–5)
ALBUMIN/GLOB SERPL: 1 G/DL (ref 1.1–2.5)
ALP SERPL-CCNC: 108 U/L (ref 24–120)
ALT SERPL W P-5'-P-CCNC: <15 U/L (ref 0–54)
ANION GAP SERPL CALCULATED.3IONS-SCNC: 7 MMOL/L (ref 4–13)
AST SERPL-CCNC: 22 U/L (ref 7–45)
BASOPHILS # BLD AUTO: 0.02 10*3/MM3 (ref 0–0.2)
BASOPHILS NFR BLD AUTO: 0.2 % (ref 0–2)
BILIRUB SERPL-MCNC: 0.2 MG/DL (ref 0.1–1)
BUN BLD-MCNC: 17 MG/DL (ref 5–21)
BUN/CREAT SERPL: 22.1 (ref 7–25)
CALCIUM SPEC-SCNC: 8.6 MG/DL (ref 8.4–10.4)
CHLORIDE SERPL-SCNC: 104 MMOL/L (ref 98–110)
CO2 SERPL-SCNC: 27 MMOL/L (ref 24–31)
CREAT BLD-MCNC: 0.77 MG/DL (ref 0.5–1.4)
DEPRECATED RDW RBC AUTO: 52.3 FL (ref 40–54)
EOSINOPHIL # BLD AUTO: 0.06 10*3/MM3 (ref 0–0.7)
EOSINOPHIL NFR BLD AUTO: 0.5 % (ref 0–4)
ERYTHROCYTE [DISTWIDTH] IN BLOOD BY AUTOMATED COUNT: 14.8 % (ref 12–15)
GFR SERPL CREATININE-BSD FRML MDRD: 73 ML/MIN/1.73
GLOBULIN UR ELPH-MCNC: 3.1 GM/DL
GLUCOSE BLD-MCNC: 103 MG/DL (ref 70–100)
HCT VFR BLD AUTO: 29 % (ref 37–47)
HGB BLD-MCNC: 9 G/DL (ref 12–16)
IMM GRANULOCYTES # BLD AUTO: 0.05 10*3/MM3 (ref 0–0.05)
IMM GRANULOCYTES NFR BLD AUTO: 0.4 % (ref 0–5)
IRON 24H UR-MRATE: <10 MCG/DL (ref 42–180)
LYMPHOCYTES # BLD AUTO: 2.17 10*3/MM3 (ref 0.72–4.86)
LYMPHOCYTES NFR BLD AUTO: 19.3 % (ref 15–45)
MCH RBC QN AUTO: 29.5 PG (ref 28–32)
MCHC RBC AUTO-ENTMCNC: 31 G/DL (ref 33–36)
MCV RBC AUTO: 95.1 FL (ref 82–98)
MONOCYTES # BLD AUTO: 0.84 10*3/MM3 (ref 0.19–1.3)
MONOCYTES NFR BLD AUTO: 7.5 % (ref 4–12)
NEUTROPHILS # BLD AUTO: 8.13 10*3/MM3 (ref 1.87–8.4)
NEUTROPHILS NFR BLD AUTO: 72.1 % (ref 39–78)
NRBC BLD AUTO-RTO: 0 /100 WBC (ref 0–0)
PLATELET # BLD AUTO: 321 10*3/MM3 (ref 130–400)
PMV BLD AUTO: 9.7 FL (ref 6–12)
POTASSIUM BLD-SCNC: 3.8 MMOL/L (ref 3.5–5.3)
PROT SERPL-MCNC: 6.2 G/DL (ref 6.3–8.7)
RBC # BLD AUTO: 3.05 10*6/MM3 (ref 4.2–5.4)
SODIUM BLD-SCNC: 138 MMOL/L (ref 135–145)
VIT B12 BLD-MCNC: 389 PG/ML (ref 239–931)
WBC NRBC COR # BLD: 11.27 10*3/MM3 (ref 4.8–10.8)

## 2019-03-31 PROCEDURE — 83540 ASSAY OF IRON: CPT | Performed by: FAMILY MEDICINE

## 2019-03-31 PROCEDURE — 85025 COMPLETE CBC W/AUTO DIFF WBC: CPT | Performed by: FAMILY MEDICINE

## 2019-03-31 PROCEDURE — 99232 SBSQ HOSP IP/OBS MODERATE 35: CPT | Performed by: UROLOGY

## 2019-03-31 PROCEDURE — 80053 COMPREHEN METABOLIC PANEL: CPT | Performed by: FAMILY MEDICINE

## 2019-03-31 PROCEDURE — 82607 VITAMIN B-12: CPT | Performed by: FAMILY MEDICINE

## 2019-03-31 PROCEDURE — 25010000002 CYANOCOBALAMIN PER 1000 MCG: Performed by: FAMILY MEDICINE

## 2019-03-31 PROCEDURE — 25010000002 IRON SUCROSE PER 1 MG: Performed by: FAMILY MEDICINE

## 2019-03-31 PROCEDURE — 25010000002 CEFTRIAXONE PER 250 MG: Performed by: FAMILY MEDICINE

## 2019-03-31 RX ORDER — OXYCODONE AND ACETAMINOPHEN 7.5; 325 MG/1; MG/1
1 TABLET ORAL EVERY 8 HOURS PRN
COMMUNITY

## 2019-03-31 RX ORDER — CYANOCOBALAMIN 1000 UG/ML
1000 INJECTION, SOLUTION INTRAMUSCULAR; SUBCUTANEOUS
Status: DISCONTINUED | OUTPATIENT
Start: 2019-03-31 | End: 2019-04-04 | Stop reason: HOSPADM

## 2019-03-31 RX ADMIN — SODIUM CHLORIDE 100 ML/HR: 9 INJECTION, SOLUTION INTRAVENOUS at 07:42

## 2019-03-31 RX ADMIN — GABAPENTIN 100 MG: 100 CAPSULE ORAL at 08:58

## 2019-03-31 RX ADMIN — OXYCODONE AND ACETAMINOPHEN 1 TABLET: 5; 325 TABLET ORAL at 13:42

## 2019-03-31 RX ADMIN — OXYCODONE HYDROCHLORIDE 10 MG: 10 TABLET, FILM COATED, EXTENDED RELEASE ORAL at 08:58

## 2019-03-31 RX ADMIN — TRAMADOL HYDROCHLORIDE 50 MG: 50 TABLET, COATED ORAL at 16:11

## 2019-03-31 RX ADMIN — OXYCODONE HYDROCHLORIDE 10 MG: 10 TABLET, FILM COATED, EXTENDED RELEASE ORAL at 21:00

## 2019-03-31 RX ADMIN — OXYCODONE AND ACETAMINOPHEN 1 TABLET: 5; 325 TABLET ORAL at 03:02

## 2019-03-31 RX ADMIN — CEFTRIAXONE SODIUM 1 G: 1 INJECTION, POWDER, FOR SOLUTION INTRAMUSCULAR; INTRAVENOUS at 22:45

## 2019-03-31 RX ADMIN — SODIUM CHLORIDE, PRESERVATIVE FREE 3 ML: 5 INJECTION INTRAVENOUS at 10:53

## 2019-03-31 RX ADMIN — OXYCODONE AND ACETAMINOPHEN 1 TABLET: 5; 325 TABLET ORAL at 18:22

## 2019-03-31 RX ADMIN — GABAPENTIN 100 MG: 100 CAPSULE ORAL at 21:00

## 2019-03-31 RX ADMIN — ACETAMINOPHEN 650 MG: 325 TABLET, FILM COATED ORAL at 06:00

## 2019-03-31 RX ADMIN — OXYCODONE AND ACETAMINOPHEN 1 TABLET: 5; 325 TABLET ORAL at 07:42

## 2019-03-31 RX ADMIN — GABAPENTIN 100 MG: 100 CAPSULE ORAL at 16:11

## 2019-03-31 RX ADMIN — SODIUM CHLORIDE 100 ML/HR: 9 INJECTION, SOLUTION INTRAVENOUS at 18:21

## 2019-03-31 RX ADMIN — POTASSIUM CHLORIDE 10 MEQ: 750 CAPSULE, EXTENDED RELEASE ORAL at 08:57

## 2019-03-31 RX ADMIN — SODIUM CHLORIDE, PRESERVATIVE FREE 3 ML: 5 INJECTION INTRAVENOUS at 21:00

## 2019-03-31 RX ADMIN — ASPIRIN 81 MG 81 MG: 81 TABLET ORAL at 08:57

## 2019-03-31 RX ADMIN — FLUCONAZOLE 200 MG: 200 TABLET ORAL at 09:00

## 2019-03-31 RX ADMIN — CYANOCOBALAMIN 1000 MCG: 1000 INJECTION, SOLUTION INTRAMUSCULAR at 08:58

## 2019-03-31 RX ADMIN — LEVOTHYROXINE SODIUM 100 MCG: 100 TABLET ORAL at 06:00

## 2019-03-31 RX ADMIN — TRAMADOL HYDROCHLORIDE 50 MG: 50 TABLET, COATED ORAL at 00:47

## 2019-03-31 RX ADMIN — LISINOPRIL 10 MG: 10 TABLET ORAL at 08:58

## 2019-03-31 RX ADMIN — CETIRIZINE HYDROCHLORIDE 10 MG: 10 TABLET, FILM COATED ORAL at 08:58

## 2019-03-31 RX ADMIN — IRON SUCROSE 200 MG: 20 INJECTION, SOLUTION INTRAVENOUS at 10:47

## 2019-04-01 PROCEDURE — 25010000002 CEFTRIAXONE PER 250 MG: Performed by: FAMILY MEDICINE

## 2019-04-01 PROCEDURE — 25010000002 IRON SUCROSE PER 1 MG: Performed by: FAMILY MEDICINE

## 2019-04-01 PROCEDURE — 99231 SBSQ HOSP IP/OBS SF/LOW 25: CPT | Performed by: UROLOGY

## 2019-04-01 PROCEDURE — 25010000002 DAPTOMYCIN PER 1 MG: Performed by: INTERNAL MEDICINE

## 2019-04-01 RX ADMIN — OXYCODONE AND ACETAMINOPHEN 1 TABLET: 5; 325 TABLET ORAL at 03:11

## 2019-04-01 RX ADMIN — GABAPENTIN 100 MG: 100 CAPSULE ORAL at 08:08

## 2019-04-01 RX ADMIN — LEVOTHYROXINE SODIUM 100 MCG: 100 TABLET ORAL at 05:13

## 2019-04-01 RX ADMIN — OXYCODONE AND ACETAMINOPHEN 1 TABLET: 5; 325 TABLET ORAL at 10:00

## 2019-04-01 RX ADMIN — IRON SUCROSE 200 MG: 20 INJECTION, SOLUTION INTRAVENOUS at 08:08

## 2019-04-01 RX ADMIN — OXYCODONE AND ACETAMINOPHEN 1 TABLET: 5; 325 TABLET ORAL at 23:02

## 2019-04-01 RX ADMIN — CEFTRIAXONE SODIUM 1 G: 1 INJECTION, POWDER, FOR SOLUTION INTRAMUSCULAR; INTRAVENOUS at 22:23

## 2019-04-01 RX ADMIN — TRAMADOL HYDROCHLORIDE 50 MG: 50 TABLET, COATED ORAL at 05:13

## 2019-04-01 RX ADMIN — OXYCODONE HYDROCHLORIDE 10 MG: 10 TABLET, FILM COATED, EXTENDED RELEASE ORAL at 08:08

## 2019-04-01 RX ADMIN — LISINOPRIL 10 MG: 10 TABLET ORAL at 08:08

## 2019-04-01 RX ADMIN — GABAPENTIN 100 MG: 100 CAPSULE ORAL at 15:45

## 2019-04-01 RX ADMIN — SODIUM CHLORIDE 100 ML/HR: 9 INJECTION, SOLUTION INTRAVENOUS at 14:42

## 2019-04-01 RX ADMIN — SODIUM CHLORIDE 100 ML/HR: 9 INJECTION, SOLUTION INTRAVENOUS at 05:13

## 2019-04-01 RX ADMIN — POTASSIUM CHLORIDE 10 MEQ: 750 CAPSULE, EXTENDED RELEASE ORAL at 08:08

## 2019-04-01 RX ADMIN — CETIRIZINE HYDROCHLORIDE 10 MG: 10 TABLET, FILM COATED ORAL at 08:08

## 2019-04-01 RX ADMIN — OXYCODONE HYDROCHLORIDE 10 MG: 10 TABLET, FILM COATED, EXTENDED RELEASE ORAL at 20:55

## 2019-04-01 RX ADMIN — CLONIDINE HYDROCHLORIDE 0.1 MG: 0.1 TABLET ORAL at 15:45

## 2019-04-01 RX ADMIN — OXYCODONE AND ACETAMINOPHEN 1 TABLET: 5; 325 TABLET ORAL at 14:42

## 2019-04-01 RX ADMIN — DAPTOMYCIN 300 MG: 500 INJECTION, POWDER, LYOPHILIZED, FOR SOLUTION INTRAVENOUS at 18:40

## 2019-04-01 RX ADMIN — ASPIRIN 81 MG 81 MG: 81 TABLET ORAL at 08:08

## 2019-04-01 RX ADMIN — GABAPENTIN 100 MG: 100 CAPSULE ORAL at 20:55

## 2019-04-01 RX ADMIN — FLUCONAZOLE 200 MG: 200 TABLET ORAL at 08:08

## 2019-04-02 LAB
ANION GAP SERPL CALCULATED.3IONS-SCNC: 10 MMOL/L (ref 4–13)
BACTERIA SPEC AEROBE CULT: ABNORMAL
BACTERIA SPEC AEROBE CULT: ABNORMAL
BASOPHILS # BLD MANUAL: 0.11 10*3/MM3 (ref 0–0.2)
BASOPHILS NFR BLD AUTO: 1 % (ref 0–2)
BUN BLD-MCNC: 9 MG/DL (ref 5–21)
BUN/CREAT SERPL: 12.3 (ref 7–25)
CALCIUM SPEC-SCNC: 9.2 MG/DL (ref 8.4–10.4)
CHLORIDE SERPL-SCNC: 103 MMOL/L (ref 98–110)
CK SERPL-CCNC: 22 U/L (ref 0–203)
CO2 SERPL-SCNC: 30 MMOL/L (ref 24–31)
CREAT BLD-MCNC: 0.73 MG/DL (ref 0.5–1.4)
DEPRECATED RDW RBC AUTO: 52.6 FL (ref 40–54)
EOSINOPHIL # BLD MANUAL: 0.42 10*3/MM3 (ref 0–0.7)
EOSINOPHIL NFR BLD MANUAL: 4 % (ref 0–4)
ERYTHROCYTE [DISTWIDTH] IN BLOOD BY AUTOMATED COUNT: 14.9 % (ref 12–15)
GFR SERPL CREATININE-BSD FRML MDRD: 77 ML/MIN/1.73
GIANT PLATELETS: ABNORMAL
GLUCOSE BLD-MCNC: 130 MG/DL (ref 70–100)
HCT VFR BLD AUTO: 33.2 % (ref 37–47)
HGB BLD-MCNC: 10 G/DL (ref 12–16)
LYMPHOCYTES # BLD MANUAL: 1.82 10*3/MM3 (ref 0.72–4.86)
LYMPHOCYTES NFR BLD MANUAL: 17.2 % (ref 15–45)
LYMPHOCYTES NFR BLD MANUAL: 7.1 % (ref 4–12)
MCH RBC QN AUTO: 29.1 PG (ref 28–32)
MCHC RBC AUTO-ENTMCNC: 30.1 G/DL (ref 33–36)
MCV RBC AUTO: 96.5 FL (ref 82–98)
METAMYELOCYTES NFR BLD MANUAL: 1 % (ref 0–0)
MONOCYTES # BLD AUTO: 0.75 10*3/MM3 (ref 0.19–1.3)
MYELOCYTES NFR BLD MANUAL: 1 % (ref 0–0)
NEUTROPHILS # BLD AUTO: 5.66 10*3/MM3 (ref 1.4–7)
NEUTROPHILS NFR BLD MANUAL: 50.5 % (ref 39–78)
NEUTS BAND NFR BLD MANUAL: 3 % (ref 0–10)
PLASMA CELL PREC NFR BLD MANUAL: 2 % (ref 0–0)
PLATELET # BLD AUTO: 349 10*3/MM3 (ref 130–400)
PMV BLD AUTO: 10 FL (ref 6–12)
POLYCHROMASIA BLD QL SMEAR: ABNORMAL
POTASSIUM BLD-SCNC: 3.8 MMOL/L (ref 3.5–5.3)
RBC # BLD AUTO: 3.44 10*6/MM3 (ref 4.2–5.4)
SODIUM BLD-SCNC: 143 MMOL/L (ref 135–145)
TOXIC GRANULATION: ABNORMAL
VARIANT LYMPHS NFR BLD MANUAL: 13.1 % (ref 0–5)
WBC NRBC COR # BLD: 10.58 10*3/MM3 (ref 4.8–10.8)

## 2019-04-02 PROCEDURE — 85025 COMPLETE CBC W/AUTO DIFF WBC: CPT | Performed by: FAMILY MEDICINE

## 2019-04-02 PROCEDURE — 99232 SBSQ HOSP IP/OBS MODERATE 35: CPT | Performed by: UROLOGY

## 2019-04-02 PROCEDURE — 25010000002 DAPTOMYCIN PER 1 MG: Performed by: INTERNAL MEDICINE

## 2019-04-02 PROCEDURE — 80048 BASIC METABOLIC PNL TOTAL CA: CPT | Performed by: FAMILY MEDICINE

## 2019-04-02 PROCEDURE — 25010000002 IRON SUCROSE PER 1 MG: Performed by: FAMILY MEDICINE

## 2019-04-02 PROCEDURE — 82550 ASSAY OF CK (CPK): CPT | Performed by: FAMILY MEDICINE

## 2019-04-02 PROCEDURE — 85007 BL SMEAR W/DIFF WBC COUNT: CPT | Performed by: FAMILY MEDICINE

## 2019-04-02 RX ORDER — LISINOPRIL 20 MG/1
20 TABLET ORAL
Status: DISCONTINUED | OUTPATIENT
Start: 2019-04-03 | End: 2019-04-04 | Stop reason: HOSPADM

## 2019-04-02 RX ADMIN — ASPIRIN 81 MG 81 MG: 81 TABLET ORAL at 08:07

## 2019-04-02 RX ADMIN — CETIRIZINE HYDROCHLORIDE 10 MG: 10 TABLET, FILM COATED ORAL at 08:07

## 2019-04-02 RX ADMIN — OXYCODONE AND ACETAMINOPHEN 1 TABLET: 5; 325 TABLET ORAL at 06:38

## 2019-04-02 RX ADMIN — OXYCODONE AND ACETAMINOPHEN 1 TABLET: 5; 325 TABLET ORAL at 18:17

## 2019-04-02 RX ADMIN — SODIUM CHLORIDE, PRESERVATIVE FREE 3 ML: 5 INJECTION INTRAVENOUS at 08:07

## 2019-04-02 RX ADMIN — GABAPENTIN 100 MG: 100 CAPSULE ORAL at 08:07

## 2019-04-02 RX ADMIN — OXYCODONE HYDROCHLORIDE 10 MG: 10 TABLET, FILM COATED, EXTENDED RELEASE ORAL at 08:07

## 2019-04-02 RX ADMIN — OXYCODONE HYDROCHLORIDE 10 MG: 10 TABLET, FILM COATED, EXTENDED RELEASE ORAL at 21:19

## 2019-04-02 RX ADMIN — HYDROCODONE BITARTRATE AND ACETAMINOPHEN 1 TABLET: 5; 325 TABLET ORAL at 14:51

## 2019-04-02 RX ADMIN — DAPTOMYCIN 300 MG: 500 INJECTION, POWDER, LYOPHILIZED, FOR SOLUTION INTRAVENOUS at 18:17

## 2019-04-02 RX ADMIN — LISINOPRIL 10 MG: 10 TABLET ORAL at 08:07

## 2019-04-02 RX ADMIN — IRON SUCROSE 200 MG: 20 INJECTION, SOLUTION INTRAVENOUS at 08:07

## 2019-04-02 RX ADMIN — GABAPENTIN 100 MG: 100 CAPSULE ORAL at 15:08

## 2019-04-02 RX ADMIN — LEVOTHYROXINE SODIUM 100 MCG: 100 TABLET ORAL at 05:57

## 2019-04-02 RX ADMIN — OXYCODONE AND ACETAMINOPHEN 1 TABLET: 5; 325 TABLET ORAL at 23:31

## 2019-04-02 RX ADMIN — SODIUM CHLORIDE 100 ML/HR: 9 INJECTION, SOLUTION INTRAVENOUS at 04:06

## 2019-04-02 RX ADMIN — GABAPENTIN 100 MG: 100 CAPSULE ORAL at 21:19

## 2019-04-02 RX ADMIN — OXYCODONE AND ACETAMINOPHEN 1 TABLET: 5; 325 TABLET ORAL at 11:46

## 2019-04-02 RX ADMIN — POTASSIUM CHLORIDE 10 MEQ: 750 CAPSULE, EXTENDED RELEASE ORAL at 08:07

## 2019-04-02 RX ADMIN — HYDROCODONE BITARTRATE AND ACETAMINOPHEN 1 TABLET: 5; 325 TABLET ORAL at 02:25

## 2019-04-02 RX ADMIN — SODIUM CHLORIDE 100 ML/HR: 9 INJECTION, SOLUTION INTRAVENOUS at 14:51

## 2019-04-03 LAB
BACTERIA SPEC AEROBE CULT: ABNORMAL
BACTERIA SPEC AEROBE CULT: ABNORMAL
BACTERIA SPEC AEROBE CULT: NORMAL
BACTERIA SPEC AEROBE CULT: NORMAL

## 2019-04-03 PROCEDURE — 99232 SBSQ HOSP IP/OBS MODERATE 35: CPT | Performed by: UROLOGY

## 2019-04-03 RX ORDER — LINEZOLID 600 MG/1
600 TABLET, FILM COATED ORAL EVERY 12 HOURS SCHEDULED
Status: DISCONTINUED | OUTPATIENT
Start: 2019-04-03 | End: 2019-04-04 | Stop reason: HOSPADM

## 2019-04-03 RX ADMIN — SODIUM CHLORIDE 100 ML/HR: 9 INJECTION, SOLUTION INTRAVENOUS at 22:48

## 2019-04-03 RX ADMIN — CETIRIZINE HYDROCHLORIDE 10 MG: 10 TABLET, FILM COATED ORAL at 08:36

## 2019-04-03 RX ADMIN — OXYCODONE AND ACETAMINOPHEN 1 TABLET: 5; 325 TABLET ORAL at 15:18

## 2019-04-03 RX ADMIN — LEVOTHYROXINE SODIUM 100 MCG: 100 TABLET ORAL at 05:19

## 2019-04-03 RX ADMIN — OXYCODONE HYDROCHLORIDE 10 MG: 10 TABLET, FILM COATED, EXTENDED RELEASE ORAL at 20:17

## 2019-04-03 RX ADMIN — OXYCODONE AND ACETAMINOPHEN 1 TABLET: 5; 325 TABLET ORAL at 10:39

## 2019-04-03 RX ADMIN — GABAPENTIN 100 MG: 100 CAPSULE ORAL at 20:17

## 2019-04-03 RX ADMIN — OXYCODONE AND ACETAMINOPHEN 1 TABLET: 5; 325 TABLET ORAL at 05:19

## 2019-04-03 RX ADMIN — LINEZOLID 600 MG: 600 TABLET, FILM COATED ORAL at 20:17

## 2019-04-03 RX ADMIN — OXYCODONE HYDROCHLORIDE 10 MG: 10 TABLET, FILM COATED, EXTENDED RELEASE ORAL at 08:36

## 2019-04-03 RX ADMIN — SODIUM CHLORIDE 100 ML/HR: 9 INJECTION, SOLUTION INTRAVENOUS at 01:53

## 2019-04-03 RX ADMIN — SODIUM CHLORIDE 100 ML/HR: 9 INJECTION, SOLUTION INTRAVENOUS at 13:05

## 2019-04-03 RX ADMIN — HYDROCODONE BITARTRATE AND ACETAMINOPHEN 1 TABLET: 5; 325 TABLET ORAL at 23:46

## 2019-04-03 RX ADMIN — CLONIDINE HYDROCHLORIDE 0.1 MG: 0.1 TABLET ORAL at 00:42

## 2019-04-03 RX ADMIN — GABAPENTIN 100 MG: 100 CAPSULE ORAL at 08:36

## 2019-04-03 RX ADMIN — ASPIRIN 81 MG 81 MG: 81 TABLET ORAL at 08:36

## 2019-04-03 RX ADMIN — POTASSIUM CHLORIDE 10 MEQ: 750 CAPSULE, EXTENDED RELEASE ORAL at 08:36

## 2019-04-03 RX ADMIN — GABAPENTIN 100 MG: 100 CAPSULE ORAL at 15:18

## 2019-04-03 RX ADMIN — LISINOPRIL 20 MG: 20 TABLET ORAL at 08:36

## 2019-04-04 VITALS
DIASTOLIC BLOOD PRESSURE: 72 MMHG | TEMPERATURE: 97.6 F | WEIGHT: 193.6 LBS | OXYGEN SATURATION: 92 % | HEART RATE: 70 BPM | BODY MASS INDEX: 31.12 KG/M2 | SYSTOLIC BLOOD PRESSURE: 140 MMHG | RESPIRATION RATE: 16 BRPM | HEIGHT: 66 IN

## 2019-04-04 PROCEDURE — 97162 PT EVAL MOD COMPLEX 30 MIN: CPT

## 2019-04-04 PROCEDURE — 99231 SBSQ HOSP IP/OBS SF/LOW 25: CPT | Performed by: UROLOGY

## 2019-04-04 RX ORDER — LINEZOLID 600 MG/1
600 TABLET, FILM COATED ORAL EVERY 12 HOURS SCHEDULED
Qty: 13 TABLET | Refills: 0 | Status: SHIPPED | OUTPATIENT
Start: 2019-04-04 | End: 2019-04-11

## 2019-04-04 RX ADMIN — GABAPENTIN 100 MG: 100 CAPSULE ORAL at 08:07

## 2019-04-04 RX ADMIN — LISINOPRIL 20 MG: 20 TABLET ORAL at 08:06

## 2019-04-04 RX ADMIN — OXYCODONE HYDROCHLORIDE 10 MG: 10 TABLET, FILM COATED, EXTENDED RELEASE ORAL at 08:07

## 2019-04-04 RX ADMIN — OXYCODONE AND ACETAMINOPHEN 1 TABLET: 5; 325 TABLET ORAL at 05:47

## 2019-04-04 RX ADMIN — GABAPENTIN 100 MG: 100 CAPSULE ORAL at 15:11

## 2019-04-04 RX ADMIN — LINEZOLID 600 MG: 600 TABLET, FILM COATED ORAL at 09:05

## 2019-04-04 RX ADMIN — POTASSIUM CHLORIDE 10 MEQ: 750 CAPSULE, EXTENDED RELEASE ORAL at 08:07

## 2019-04-04 RX ADMIN — SODIUM CHLORIDE, PRESERVATIVE FREE 3 ML: 5 INJECTION INTRAVENOUS at 08:06

## 2019-04-04 RX ADMIN — LEVOTHYROXINE SODIUM 100 MCG: 100 TABLET ORAL at 05:47

## 2019-04-04 RX ADMIN — ASPIRIN 81 MG 81 MG: 81 TABLET ORAL at 08:07

## 2019-04-04 RX ADMIN — OXYCODONE AND ACETAMINOPHEN 1 TABLET: 5; 325 TABLET ORAL at 10:58

## 2019-04-04 RX ADMIN — SODIUM CHLORIDE 100 ML/HR: 9 INJECTION, SOLUTION INTRAVENOUS at 09:06

## 2019-04-04 RX ADMIN — CETIRIZINE HYDROCHLORIDE 10 MG: 10 TABLET, FILM COATED ORAL at 08:07

## 2019-04-05 ENCOUNTER — READMISSION MANAGEMENT (OUTPATIENT)
Dept: CALL CENTER | Facility: HOSPITAL | Age: 79
End: 2019-04-05

## 2019-04-08 ENCOUNTER — READMISSION MANAGEMENT (OUTPATIENT)
Dept: CALL CENTER | Facility: HOSPITAL | Age: 79
End: 2019-04-08

## 2019-04-09 ENCOUNTER — PROCEDURE VISIT (OUTPATIENT)
Dept: UROLOGY | Facility: CLINIC | Age: 79
End: 2019-04-09

## 2019-04-09 DIAGNOSIS — N12 PYELONEPHRITIS: Primary | ICD-10-CM

## 2019-04-09 DIAGNOSIS — N20.0 RENAL STONE: ICD-10-CM

## 2019-04-09 DIAGNOSIS — N20.1 URETERAL STONE: ICD-10-CM

## 2019-04-09 PROCEDURE — 52310 CYSTOSCOPY AND TREATMENT: CPT | Performed by: UROLOGY

## 2019-04-15 LAB
LAB AP CASE REPORT: NORMAL
PATH REPORT.FINAL DX SPEC: NORMAL
PATH REPORT.GROSS SPEC: NORMAL

## 2019-04-19 ENCOUNTER — HOSPITAL ENCOUNTER (OUTPATIENT)
Dept: ULTRASOUND IMAGING | Facility: HOSPITAL | Age: 79
Discharge: HOME | End: 2019-04-19
Admitting: UROLOGY

## 2019-04-19 ENCOUNTER — HOSPITAL ENCOUNTER (OUTPATIENT)
Dept: GENERAL RADIOLOGY | Facility: HOSPITAL | Age: 79
Discharge: HOME | End: 2019-04-19

## 2019-04-19 DIAGNOSIS — N20.0 RENAL STONE: ICD-10-CM

## 2019-04-19 PROCEDURE — 74018 RADEX ABDOMEN 1 VIEW: CPT

## 2019-04-19 PROCEDURE — 76775 US EXAM ABDO BACK WALL LIM: CPT

## 2019-04-29 ENCOUNTER — TELEPHONE (OUTPATIENT)
Dept: UROLOGY | Facility: CLINIC | Age: 79
End: 2019-04-29

## 2019-05-14 ENCOUNTER — TELEPHONE (OUTPATIENT)
Dept: UROLOGY | Facility: CLINIC | Age: 79
End: 2019-05-14

## 2019-05-20 LAB
REASON FOR REJECTION: NORMAL
REJECTED TEST: NORMAL

## 2019-05-31 ENCOUNTER — TELEPHONE (OUTPATIENT)
Dept: NEUROLOGY | Age: 79
End: 2019-05-31

## 2019-05-31 ENCOUNTER — LAB REQUISITION (OUTPATIENT)
Dept: LAB | Facility: HOSPITAL | Age: 79
End: 2019-05-31

## 2019-05-31 DIAGNOSIS — Z00.00 ENCOUNTER FOR GENERAL ADULT MEDICAL EXAMINATION WITHOUT ABNORMAL FINDINGS: ICD-10-CM

## 2019-05-31 LAB
ANION GAP SERPL CALCULATED.3IONS-SCNC: 7 MMOL/L (ref 4–13)
BUN BLD-MCNC: 25 MG/DL (ref 5–21)
BUN/CREAT SERPL: 29.1 (ref 7–25)
CALCIUM SPEC-SCNC: 9 MG/DL (ref 8.4–10.4)
CHLORIDE SERPL-SCNC: 103 MMOL/L (ref 98–110)
CO2 SERPL-SCNC: 29 MMOL/L (ref 24–31)
CREAT BLD-MCNC: 0.86 MG/DL (ref 0.5–1.4)
GFR SERPL CREATININE-BSD FRML MDRD: 64 ML/MIN/1.73
GLUCOSE BLD-MCNC: 121 MG/DL (ref 70–100)
NT-PROBNP SERPL-MCNC: 331 PG/ML (ref 0–1800)
POTASSIUM BLD-SCNC: 4.3 MMOL/L (ref 3.5–5.3)
SODIUM BLD-SCNC: 139 MMOL/L (ref 135–145)

## 2019-05-31 PROCEDURE — 83880 ASSAY OF NATRIURETIC PEPTIDE: CPT | Performed by: FAMILY MEDICINE

## 2019-05-31 PROCEDURE — 80048 BASIC METABOLIC PNL TOTAL CA: CPT | Performed by: FAMILY MEDICINE

## 2019-06-03 ENCOUNTER — OFFICE VISIT (OUTPATIENT)
Dept: NEUROLOGY | Age: 79
End: 2019-06-03
Payer: MEDICARE

## 2019-06-03 VITALS
DIASTOLIC BLOOD PRESSURE: 77 MMHG | RESPIRATION RATE: 14 BRPM | SYSTOLIC BLOOD PRESSURE: 135 MMHG | HEART RATE: 72 BPM | BODY MASS INDEX: 31.75 KG/M2 | WEIGHT: 190.6 LBS | HEIGHT: 65 IN

## 2019-06-03 DIAGNOSIS — M54.41 CHRONIC BILATERAL LOW BACK PAIN WITH BILATERAL SCIATICA: ICD-10-CM

## 2019-06-03 DIAGNOSIS — G60.9 IDIOPATHIC PERIPHERAL NEUROPATHY: Primary | ICD-10-CM

## 2019-06-03 DIAGNOSIS — G89.29 CHRONIC BILATERAL LOW BACK PAIN WITH BILATERAL SCIATICA: ICD-10-CM

## 2019-06-03 DIAGNOSIS — R53.1 WEAKNESS: ICD-10-CM

## 2019-06-03 DIAGNOSIS — R26.89 IMPAIRMENT OF BALANCE: ICD-10-CM

## 2019-06-03 DIAGNOSIS — M54.42 CHRONIC BILATERAL LOW BACK PAIN WITH BILATERAL SCIATICA: ICD-10-CM

## 2019-06-03 PROCEDURE — 4040F PNEUMOC VAC/ADMIN/RCVD: CPT | Performed by: PSYCHIATRY & NEUROLOGY

## 2019-06-03 PROCEDURE — 99214 OFFICE O/P EST MOD 30 MIN: CPT | Performed by: PSYCHIATRY & NEUROLOGY

## 2019-06-03 PROCEDURE — G8427 DOCREV CUR MEDS BY ELIG CLIN: HCPCS | Performed by: PSYCHIATRY & NEUROLOGY

## 2019-06-03 PROCEDURE — 1036F TOBACCO NON-USER: CPT | Performed by: PSYCHIATRY & NEUROLOGY

## 2019-06-03 PROCEDURE — 1123F ACP DISCUSS/DSCN MKR DOCD: CPT | Performed by: PSYCHIATRY & NEUROLOGY

## 2019-06-03 PROCEDURE — G8400 PT W/DXA NO RESULTS DOC: HCPCS | Performed by: PSYCHIATRY & NEUROLOGY

## 2019-06-03 PROCEDURE — 1090F PRES/ABSN URINE INCON ASSESS: CPT | Performed by: PSYCHIATRY & NEUROLOGY

## 2019-06-03 PROCEDURE — G8417 CALC BMI ABV UP PARAM F/U: HCPCS | Performed by: PSYCHIATRY & NEUROLOGY

## 2019-06-03 RX ORDER — POTASSIUM CHLORIDE 750 MG/1
10 TABLET, EXTENDED RELEASE ORAL DAILY
Status: ON HOLD | COMMUNITY
End: 2020-01-01 | Stop reason: HOSPADM

## 2019-06-03 RX ORDER — OXYCODONE AND ACETAMINOPHEN 7.5; 325 MG/1; MG/1
1 TABLET ORAL EVERY 8 HOURS PRN
Status: ON HOLD | COMMUNITY
End: 2020-01-01 | Stop reason: SDUPTHER

## 2019-06-03 NOTE — PROGRESS NOTES
[facility] Neurology  [address] [place] [address] Drive, [address]  [facility] [address]  Phone [phone]  Fax [phone]     [facility] Neurology Follow Up Encounter  6/3/2019 1:17 PM    Information:   Patient Name: Paul Lee  :   1940  Age:   67 y.o. MRN:   [id number]  Account #:  [de-identified]  Today:  6/3/19    Provider: Maye Sylvester M.D. Chief Complaint:   Chief Complaint   Patient presents with    Follow-up     idiopathic peripheral neuropathy       Subjective:   Paul Lee is a 67 y.o. woman with a history of peripheral neuropathy who is following up. She has numbness and tingling in her feet and hands. She fell in December and was admitted to [facility]. She was admitted to [facility] for rehab and now resides in [facility]. She complains of swelling in her legs. She cannot stand or walk by herself even with a walker. She has had two kidney stone surgeries since then. She has no vertigo. Objective:     Past Medical History:  Past Medical History:   Diagnosis Date    Chronic cystitis     Fibromyalgia     Hypertension     Kidney stones     Osteoarthritis     Peripheral neuropathy     Polymyalgia rheumatica     Stroke        Past Surgical History:   Procedure Laterality Date    HYSTERECTOMY      KIDNEY SURGERY      TONSILLECTOMY         Recent Hospitalizations  · None    Significant Injuries  · None    Habits  Paul Lee reports that she quit smoking about 40 years ago. She has never used smokeless tobacco. She reports that she does not drink alcohol or use drugs. Family History   Problem Relation Age of Onset    Diabetes Mother     No Known Problems Father     Stroke Sister        Social History  Paul Lee is single, lives in [address], and is retired.     Medications:  Current Outpatient Medications   Medication Sig Dispense Refill    aspirin 81 MG tablet Take 81 mg by mouth daily      potassium chloride (KLOR-CON M) 10 MEQ extended release tablet Take 10 mEq by mouth daily      oxyCODONE ER (XTAMPZA ER) 18 MG C12A Take by mouth 2 times daily.  oxyCODONE-acetaminophen (PERCOCET) 7.5-325 MG per tablet Take 1 tablet by mouth every 8 hours as needed for Pain.  cetirizine (ZYRTEC) 10 MG tablet Take 10 mg by mouth daily      gabapentin (NEURONTIN) 100 MG capsule Take 300 mg by mouth 3 times daily.  levothyroxine (SYNTHROID) 137 MCG tablet Take 137 mcg by mouth daily      lisinopril-hydrochlorothiazide (PRINZIDE;ZESTORETIC) 10-12.5 MG per tablet Take 1 tablet by mouth daily      lisinopril-hydrochlorothiazide (PRINZIDE;ZESTORETIC) 20-12.5 MG per tablet Take 20 tablets by mouth daily      naproxen (NAPROSYN) 250 MG tablet Take 250 mg by mouth 2 times daily      oxyCODONE HCl (OXY-IR) 10 MG immediate release tablet Take 10 mg by mouth every 6 hours. Porter Bryce OxyCODONE ER 13.5 MG C12A Take 13.5 mg by mouth every 12 hours. .       No current facility-administered medications for this visit. Allergies:   Allergies as of 06/03/2019 - Review Complete 06/03/2019   Allergen Reaction Noted    Sulfa antibiotics  08/27/2018       Review of Systems:  General ROS: negative for - chills or fever  Psychological ROS: negative for - anxiety or depression  ENT ROS: negative for - headaches or sinus pain  Hematological and Lymphatic ROS: negative for - bleeding problems, bruising or swollen lymph nodes  Respiratory ROS: negative for - cough, hemoptysis or shortness of breath  Cardiovascular ROS: negative for - chest pain or palpitations  Gastrointestinal ROS: negative for - blood in stools, diarrhea, vomiting  Positive for - constipation, nausea  Genito-Urinary ROS: negative for - hematuria  Positive for - urinary frequency/urgency  Musculoskeletal ROS: positive for - joint pain, joint stiffness or joint swelling  Neurological ROS: positive for - memory loss, numbness/tingling or weakness     Examination:  Vitals:  /77   Pulse 72   Resp 14   Ht 5' 5\" (1.651 m) Wt 190 lb 9.6 oz (86.5 kg)   BMI 31.72 kg/m²   General appearance: alert, appears stated age and cooperative  Skin: Skin color, texture, turgor normal. No rashes or lesions  HEENT: Head: Normal, normocephalic, atraumatic. Neck: no adenopathy, no carotid bruit, no JVD, supple, symmetrical, trachea midline and thyroid not enlarged, symmetric, no tenderness/mass/nodules  Lungs: clear to auscultation bilaterally  Heart: regular rate and rhythm, S1, S2 normal, no murmur, click, rub or gallop  Abdomen: soft, non-tender; bowel sounds normal; no masses,  no organomegaly  Extremities: moderate ankle edema  Neurologic:  Extraocular movements are intact without nystagmus. Visual fields are full to confrontation. Facial movements are symmetrical and normal.  Speech is precise. Extremity strength is normal in both uppers and lowers. Deep tendon reflexes are absent. Rapid alternating movements are unimpaired. Finger-to-nose testing is performed well, without dysmetria. Pertinent Diagnostic Studies:  NCS/EMG showed a PN and BCTS    Assessment:       ICD-10-CM    1. Idiopathic peripheral neuropathy G60.9    2. Chronic bilateral low back pain with bilateral sciatica M54.42     M54.41     G89.29    3. Weakness R53.1    4. Impairment of balance R26.89    She has an idiopathic PN that is fairly severe, chronic back pain with radiculopathy, and progressive weakness, gait and balance impairment, and falls. Consider stroke or NPH. Consider spinal stenosis. Plan:   1. MRI head  2. Consider MRI L spine if head unrevealing.   3. FU in 6 months    Electronically signed by Delorse Hashimoto, MD on 6/3/2019

## 2019-06-14 ENCOUNTER — LAB REQUISITION (OUTPATIENT)
Dept: LAB | Facility: HOSPITAL | Age: 79
End: 2019-06-14

## 2019-06-14 DIAGNOSIS — Z00.00 ENCOUNTER FOR GENERAL ADULT MEDICAL EXAMINATION WITHOUT ABNORMAL FINDINGS: ICD-10-CM

## 2019-06-14 LAB
BACTERIA UR QL AUTO: ABNORMAL /HPF
BILIRUB UR QL STRIP: NEGATIVE
C DIFF TOX GENS STL QL NAA+PROBE: POSITIVE
CLARITY UR: ABNORMAL
COLOR UR: YELLOW
GLUCOSE UR STRIP-MCNC: NEGATIVE MG/DL
HGB UR QL STRIP.AUTO: NEGATIVE
HYALINE CASTS UR QL AUTO: ABNORMAL /LPF
KETONES UR QL STRIP: NEGATIVE
LEUKOCYTE ESTERASE UR QL STRIP.AUTO: ABNORMAL
NITRITE UR QL STRIP: POSITIVE
PH UR STRIP.AUTO: <=5 [PH] (ref 5–8)
PROT UR QL STRIP: NEGATIVE
RBC # UR: ABNORMAL /HPF
REF LAB TEST METHOD: ABNORMAL
SP GR UR STRIP: 1.01 (ref 1–1.03)
SQUAMOUS #/AREA URNS HPF: ABNORMAL /HPF
UROBILINOGEN UR QL STRIP: ABNORMAL
WBC UR QL AUTO: ABNORMAL /HPF

## 2019-06-14 PROCEDURE — 87186 SC STD MICRODIL/AGAR DIL: CPT

## 2019-06-14 PROCEDURE — 87088 URINE BACTERIA CULTURE: CPT

## 2019-06-14 PROCEDURE — 87086 URINE CULTURE/COLONY COUNT: CPT

## 2019-06-14 PROCEDURE — 81001 URINALYSIS AUTO W/SCOPE: CPT

## 2019-06-14 PROCEDURE — 87077 CULTURE AEROBIC IDENTIFY: CPT

## 2019-06-14 PROCEDURE — 87493 C DIFF AMPLIFIED PROBE: CPT

## 2019-06-17 LAB
BACTERIA SPEC AEROBE CULT: ABNORMAL

## 2019-07-29 ENCOUNTER — TELEPHONE (OUTPATIENT)
Dept: NEUROLOGY | Age: 79
End: 2019-07-29

## 2019-08-02 ENCOUNTER — LAB REQUISITION (OUTPATIENT)
Dept: LAB | Facility: HOSPITAL | Age: 79
End: 2019-08-02

## 2019-08-02 DIAGNOSIS — Z00.00 ENCOUNTER FOR GENERAL ADULT MEDICAL EXAMINATION WITHOUT ABNORMAL FINDINGS: ICD-10-CM

## 2019-08-02 LAB
BACTERIA UR QL AUTO: ABNORMAL /HPF
BILIRUB UR QL STRIP: NEGATIVE
CLARITY UR: ABNORMAL
COARSE GRAN CASTS URNS QL MICRO: ABNORMAL /LPF
COLOR UR: YELLOW
GLUCOSE UR STRIP-MCNC: NEGATIVE MG/DL
HGB UR QL STRIP.AUTO: NEGATIVE
HYALINE CASTS UR QL AUTO: ABNORMAL /LPF
KETONES UR QL STRIP: NEGATIVE
LEUKOCYTE ESTERASE UR QL STRIP.AUTO: ABNORMAL
NITRITE UR QL STRIP: POSITIVE
PH UR STRIP.AUTO: 5.5 [PH] (ref 5–8)
PROT UR QL STRIP: ABNORMAL
RBC # UR: ABNORMAL /HPF
REF LAB TEST METHOD: ABNORMAL
SP GR UR STRIP: 1.02 (ref 1–1.03)
SQUAMOUS #/AREA URNS HPF: ABNORMAL /HPF
UROBILINOGEN UR QL STRIP: ABNORMAL
WBC UR QL AUTO: ABNORMAL /HPF

## 2019-08-02 PROCEDURE — 87186 SC STD MICRODIL/AGAR DIL: CPT | Performed by: FAMILY MEDICINE

## 2019-08-02 PROCEDURE — 87088 URINE BACTERIA CULTURE: CPT | Performed by: FAMILY MEDICINE

## 2019-08-02 PROCEDURE — 81001 URINALYSIS AUTO W/SCOPE: CPT | Performed by: FAMILY MEDICINE

## 2019-08-02 PROCEDURE — 87086 URINE CULTURE/COLONY COUNT: CPT | Performed by: FAMILY MEDICINE

## 2019-08-04 LAB
BACTERIA SPEC AEROBE CULT: ABNORMAL
BACTERIA SPEC AEROBE CULT: ABNORMAL

## 2019-08-23 ENCOUNTER — LAB REQUISITION (OUTPATIENT)
Dept: LAB | Facility: HOSPITAL | Age: 79
End: 2019-08-23

## 2019-08-23 DIAGNOSIS — Z00.00 ENCOUNTER FOR GENERAL ADULT MEDICAL EXAMINATION WITHOUT ABNORMAL FINDINGS: ICD-10-CM

## 2019-08-23 PROCEDURE — 87086 URINE CULTURE/COLONY COUNT: CPT

## 2019-08-23 PROCEDURE — 87088 URINE BACTERIA CULTURE: CPT

## 2019-08-23 PROCEDURE — 87186 SC STD MICRODIL/AGAR DIL: CPT

## 2019-08-25 LAB — BACTERIA SPEC AEROBE CULT: ABNORMAL

## 2019-09-11 ENCOUNTER — HOSPITAL ENCOUNTER (INPATIENT)
Facility: HOSPITAL | Age: 79
LOS: 6 days | Discharge: HOME HEALTH | End: 2019-09-17
Attending: FAMILY MEDICINE | Admitting: FAMILY MEDICINE

## 2019-09-11 DIAGNOSIS — A04.72 C. DIFFICILE DIARRHEA: Primary | ICD-10-CM

## 2019-09-11 DIAGNOSIS — Z78.9 DECREASED ACTIVITIES OF DAILY LIVING (ADL): ICD-10-CM

## 2019-09-11 DIAGNOSIS — Z74.09 IMPAIRED MOBILITY: ICD-10-CM

## 2019-09-11 LAB
ADV 40+41 DNA STL QL NAA+NON-PROBE: NOT DETECTED
ALBUMIN SERPL-MCNC: 3.4 G/DL (ref 3.5–5.2)
ALBUMIN/GLOB SERPL: 1 G/DL
ALP SERPL-CCNC: 63 U/L (ref 39–117)
ALT SERPL W P-5'-P-CCNC: 18 U/L (ref 1–33)
ANION GAP SERPL CALCULATED.3IONS-SCNC: 7 MMOL/L (ref 5–15)
AST SERPL-CCNC: 42 U/L (ref 1–32)
ASTRO TYP 1-8 RNA STL QL NAA+NON-PROBE: NOT DETECTED
BACTERIA UR QL AUTO: ABNORMAL /HPF
BASOPHILS # BLD AUTO: 0.06 10*3/MM3 (ref 0–0.2)
BASOPHILS NFR BLD AUTO: 0.5 % (ref 0–1.5)
BILIRUB SERPL-MCNC: 0.3 MG/DL (ref 0.2–1.2)
BILIRUB UR QL STRIP: NEGATIVE
BUN BLD-MCNC: 21 MG/DL (ref 8–23)
BUN/CREAT SERPL: 25.6 (ref 7–25)
C CAYETANENSIS DNA STL QL NAA+NON-PROBE: NOT DETECTED
C DIFF TOX GENS STL QL NAA+PROBE: DETECTED
CALCIUM SPEC-SCNC: 8.5 MG/DL (ref 8.6–10.5)
CAMPY SP DNA.DIARRHEA STL QL NAA+PROBE: NOT DETECTED
CHLORIDE SERPL-SCNC: 105 MMOL/L (ref 98–107)
CLARITY UR: ABNORMAL
CO2 SERPL-SCNC: 28 MMOL/L (ref 22–29)
COLOR UR: YELLOW
CREAT BLD-MCNC: 0.82 MG/DL (ref 0.57–1)
CRYPTOSP STL CULT: NOT DETECTED
D-LACTATE SERPL-SCNC: 1 MMOL/L (ref 0.5–2)
DEPRECATED RDW RBC AUTO: 60 FL (ref 37–54)
E COLI DNA SPEC QL NAA+PROBE: NOT DETECTED
E HISTOLYT AG STL-ACNC: NOT DETECTED
EAEC PAA PLAS AGGR+AATA ST NAA+NON-PRB: NOT DETECTED
EC STX1 + STX2 GENES STL NAA+PROBE: NOT DETECTED
EOSINOPHIL # BLD AUTO: 0.07 10*3/MM3 (ref 0–0.4)
EOSINOPHIL NFR BLD AUTO: 0.6 % (ref 0.3–6.2)
EPEC EAE GENE STL QL NAA+NON-PROBE: NOT DETECTED
ERYTHROCYTE [DISTWIDTH] IN BLOOD BY AUTOMATED COUNT: 16.7 % (ref 12.3–15.4)
ETEC LTA+ST1A+ST1B TOX ST NAA+NON-PROBE: NOT DETECTED
G LAMBLIA DNA SPEC QL NAA+PROBE: NOT DETECTED
GFR SERPL CREATININE-BSD FRML MDRD: 67 ML/MIN/1.73
GLOBULIN UR ELPH-MCNC: 3.5 GM/DL
GLUCOSE BLD-MCNC: 130 MG/DL (ref 65–99)
GLUCOSE UR STRIP-MCNC: NEGATIVE MG/DL
HCT VFR BLD AUTO: 34.1 % (ref 34–46.6)
HGB BLD-MCNC: 10.4 G/DL (ref 12–15.9)
HGB UR QL STRIP.AUTO: ABNORMAL
HOLD SPECIMEN: NORMAL
HOLD SPECIMEN: NORMAL
HYALINE CASTS UR QL AUTO: ABNORMAL /LPF
IMM GRANULOCYTES # BLD AUTO: 0.03 10*3/MM3 (ref 0–0.05)
IMM GRANULOCYTES NFR BLD AUTO: 0.2 % (ref 0–0.5)
KETONES UR QL STRIP: NEGATIVE
LEUKOCYTE ESTERASE UR QL STRIP.AUTO: ABNORMAL
LYMPHOCYTES # BLD AUTO: 0.99 10*3/MM3 (ref 0.7–3.1)
LYMPHOCYTES NFR BLD AUTO: 7.9 % (ref 19.6–45.3)
MCH RBC QN AUTO: 30.1 PG (ref 26.6–33)
MCHC RBC AUTO-ENTMCNC: 30.5 G/DL (ref 31.5–35.7)
MCV RBC AUTO: 98.6 FL (ref 79–97)
MONOCYTES # BLD AUTO: 1.27 10*3/MM3 (ref 0.1–0.9)
MONOCYTES NFR BLD AUTO: 10.2 % (ref 5–12)
NEUTROPHILS # BLD AUTO: 10.05 10*3/MM3 (ref 1.7–7)
NEUTROPHILS NFR BLD AUTO: 80.6 % (ref 42.7–76)
NITRITE UR QL STRIP: POSITIVE
NOROVIRUS GI+II RNA STL QL NAA+NON-PROBE: NOT DETECTED
NRBC BLD AUTO-RTO: 0 /100 WBC (ref 0–0.2)
P SHIGELLOIDES DNA STL QL NAA+PROBE: NOT DETECTED
PH UR STRIP.AUTO: 6 [PH] (ref 5–8)
PLATELET # BLD AUTO: 293 10*3/MM3 (ref 140–450)
PMV BLD AUTO: 9.8 FL (ref 6–12)
POTASSIUM BLD-SCNC: 4.1 MMOL/L (ref 3.5–5.2)
PROT SERPL-MCNC: 6.9 G/DL (ref 6–8.5)
PROT UR QL STRIP: ABNORMAL
RBC # BLD AUTO: 3.46 10*6/MM3 (ref 3.77–5.28)
RBC # UR: ABNORMAL /HPF
REF LAB TEST METHOD: ABNORMAL
RV RNA STL NAA+PROBE: NOT DETECTED
SALMONELLA DNA SPEC QL NAA+PROBE: NOT DETECTED
SAPO I+II+IV+V RNA STL QL NAA+NON-PROBE: NOT DETECTED
SHIGELLA SP+EIEC IPAH STL QL NAA+PROBE: NOT DETECTED
SODIUM BLD-SCNC: 140 MMOL/L (ref 136–145)
SP GR UR STRIP: 1.02 (ref 1–1.03)
SQUAMOUS #/AREA URNS HPF: ABNORMAL /HPF
UROBILINOGEN UR QL STRIP: ABNORMAL
V CHOLERAE DNA SPEC QL NAA+PROBE: NOT DETECTED
VIBRIO DNA SPEC NAA+PROBE: NOT DETECTED
WBC NRBC COR # BLD: 12.47 10*3/MM3 (ref 3.4–10.8)
WBC UR QL AUTO: ABNORMAL /HPF
WHOLE BLOOD HOLD SPECIMEN: NORMAL
YERSINIA STL CULT: NOT DETECTED

## 2019-09-11 PROCEDURE — P9612 CATHETERIZE FOR URINE SPEC: HCPCS

## 2019-09-11 PROCEDURE — 85025 COMPLETE CBC W/AUTO DIFF WBC: CPT | Performed by: FAMILY MEDICINE

## 2019-09-11 PROCEDURE — 87186 SC STD MICRODIL/AGAR DIL: CPT | Performed by: FAMILY MEDICINE

## 2019-09-11 PROCEDURE — 87077 CULTURE AEROBIC IDENTIFY: CPT | Performed by: FAMILY MEDICINE

## 2019-09-11 PROCEDURE — 99285 EMERGENCY DEPT VISIT HI MDM: CPT

## 2019-09-11 PROCEDURE — 83605 ASSAY OF LACTIC ACID: CPT | Performed by: FAMILY MEDICINE

## 2019-09-11 PROCEDURE — 87086 URINE CULTURE/COLONY COUNT: CPT | Performed by: FAMILY MEDICINE

## 2019-09-11 PROCEDURE — 80053 COMPREHEN METABOLIC PANEL: CPT | Performed by: FAMILY MEDICINE

## 2019-09-11 PROCEDURE — 87040 BLOOD CULTURE FOR BACTERIA: CPT | Performed by: FAMILY MEDICINE

## 2019-09-11 PROCEDURE — 0097U HC BIOFIRE FILMARRAY GI PANEL: CPT | Performed by: FAMILY MEDICINE

## 2019-09-11 PROCEDURE — 25010000002 ONDANSETRON PER 1 MG: Performed by: FAMILY MEDICINE

## 2019-09-11 PROCEDURE — 81001 URINALYSIS AUTO W/SCOPE: CPT | Performed by: FAMILY MEDICINE

## 2019-09-11 PROCEDURE — 36415 COLL VENOUS BLD VENIPUNCTURE: CPT

## 2019-09-11 RX ORDER — GABAPENTIN 100 MG/1
100 CAPSULE ORAL EVERY 8 HOURS PRN
Status: DISCONTINUED | OUTPATIENT
Start: 2019-09-11 | End: 2019-09-17 | Stop reason: HOSPADM

## 2019-09-11 RX ORDER — ONDANSETRON 2 MG/ML
4 INJECTION INTRAMUSCULAR; INTRAVENOUS ONCE
Status: COMPLETED | OUTPATIENT
Start: 2019-09-11 | End: 2019-09-11

## 2019-09-11 RX ORDER — SODIUM CHLORIDE 9 MG/ML
75 INJECTION, SOLUTION INTRAVENOUS CONTINUOUS
Status: DISCONTINUED | OUTPATIENT
Start: 2019-09-12 | End: 2019-09-17 | Stop reason: HOSPADM

## 2019-09-11 RX ORDER — OXYCODONE AND ACETAMINOPHEN 7.5; 325 MG/1; MG/1
1 TABLET ORAL EVERY 8 HOURS PRN
Status: DISCONTINUED | OUTPATIENT
Start: 2019-09-11 | End: 2019-09-17 | Stop reason: HOSPADM

## 2019-09-11 RX ORDER — ACETAMINOPHEN 325 MG/1
650 TABLET ORAL EVERY 6 HOURS PRN
Status: DISCONTINUED | OUTPATIENT
Start: 2019-09-11 | End: 2019-09-17 | Stop reason: HOSPADM

## 2019-09-11 RX ORDER — OXYCODONE AND ACETAMINOPHEN 7.5; 325 MG/1; MG/1
1 TABLET ORAL ONCE
Status: COMPLETED | OUTPATIENT
Start: 2019-09-11 | End: 2019-09-11

## 2019-09-11 RX ORDER — ASPIRIN 81 MG/1
81 TABLET, CHEWABLE ORAL DAILY
Status: DISCONTINUED | OUTPATIENT
Start: 2019-09-12 | End: 2019-09-17 | Stop reason: HOSPADM

## 2019-09-11 RX ORDER — POTASSIUM CHLORIDE 750 MG/1
10 CAPSULE, EXTENDED RELEASE ORAL DAILY
Status: DISCONTINUED | OUTPATIENT
Start: 2019-09-12 | End: 2019-09-17 | Stop reason: HOSPADM

## 2019-09-11 RX ORDER — LEVOTHYROXINE SODIUM 0.1 MG/1
100 TABLET ORAL
Status: DISCONTINUED | OUTPATIENT
Start: 2019-09-12 | End: 2019-09-15

## 2019-09-11 RX ADMIN — ONDANSETRON HYDROCHLORIDE 4 MG: 2 SOLUTION INTRAMUSCULAR; INTRAVENOUS at 13:32

## 2019-09-11 RX ADMIN — OXYCODONE HYDROCHLORIDE AND ACETAMINOPHEN 1 TABLET: 7.5; 325 TABLET ORAL at 18:42

## 2019-09-11 RX ADMIN — SODIUM CHLORIDE 75 ML/HR: 9 INJECTION, SOLUTION INTRAVENOUS at 23:37

## 2019-09-11 RX ADMIN — VANCOMYCIN HYDROCHLORIDE 125 MG: KIT at 23:37

## 2019-09-11 RX ADMIN — SODIUM CHLORIDE 500 ML: 9 INJECTION, SOLUTION INTRAVENOUS at 13:16

## 2019-09-12 LAB
ANION GAP SERPL CALCULATED.3IONS-SCNC: 7 MMOL/L (ref 5–15)
BUN BLD-MCNC: 18 MG/DL (ref 8–23)
BUN/CREAT SERPL: 23.4 (ref 7–25)
CALCIUM SPEC-SCNC: 8.4 MG/DL (ref 8.6–10.5)
CHLORIDE SERPL-SCNC: 105 MMOL/L (ref 98–107)
CO2 SERPL-SCNC: 27 MMOL/L (ref 22–29)
CREAT BLD-MCNC: 0.77 MG/DL (ref 0.57–1)
DEPRECATED RDW RBC AUTO: 61 FL (ref 37–54)
ERYTHROCYTE [DISTWIDTH] IN BLOOD BY AUTOMATED COUNT: 16.6 % (ref 12.3–15.4)
GFR SERPL CREATININE-BSD FRML MDRD: 72 ML/MIN/1.73
GIANT PLATELETS: ABNORMAL
GLUCOSE BLD-MCNC: 137 MG/DL (ref 65–99)
GLUCOSE BLDC GLUCOMTR-MCNC: 111 MG/DL (ref 70–130)
HCT VFR BLD AUTO: 32.1 % (ref 34–46.6)
HGB BLD-MCNC: 9.7 G/DL (ref 12–15.9)
LYMPHOCYTES # BLD MANUAL: 2.56 10*3/MM3 (ref 0.7–3.1)
LYMPHOCYTES NFR BLD MANUAL: 18.2 % (ref 19.6–45.3)
LYMPHOCYTES NFR BLD MANUAL: 4 % (ref 5–12)
MCH RBC QN AUTO: 29.8 PG (ref 26.6–33)
MCHC RBC AUTO-ENTMCNC: 30.2 G/DL (ref 31.5–35.7)
MCV RBC AUTO: 98.8 FL (ref 79–97)
MONOCYTES # BLD AUTO: 0.56 10*3/MM3 (ref 0.1–0.9)
NEUTROPHILS # BLD AUTO: 10.92 10*3/MM3 (ref 1.7–7)
NEUTROPHILS NFR BLD MANUAL: 64.6 % (ref 42.7–76)
NEUTS BAND NFR BLD MANUAL: 13.1 % (ref 0–5)
PLATELET # BLD AUTO: 270 10*3/MM3 (ref 140–450)
PMV BLD AUTO: 9.5 FL (ref 6–12)
POLYCHROMASIA BLD QL SMEAR: ABNORMAL
POTASSIUM BLD-SCNC: 4.1 MMOL/L (ref 3.5–5.2)
RBC # BLD AUTO: 3.25 10*6/MM3 (ref 3.77–5.28)
SODIUM BLD-SCNC: 139 MMOL/L (ref 136–145)
WBC MORPH BLD: NORMAL
WBC NRBC COR # BLD: 14.04 10*3/MM3 (ref 3.4–10.8)

## 2019-09-12 PROCEDURE — 80048 BASIC METABOLIC PNL TOTAL CA: CPT | Performed by: FAMILY MEDICINE

## 2019-09-12 PROCEDURE — 82962 GLUCOSE BLOOD TEST: CPT

## 2019-09-12 PROCEDURE — 85007 BL SMEAR W/DIFF WBC COUNT: CPT | Performed by: FAMILY MEDICINE

## 2019-09-12 PROCEDURE — 85025 COMPLETE CBC W/AUTO DIFF WBC: CPT | Performed by: FAMILY MEDICINE

## 2019-09-12 PROCEDURE — 25010000002 ENOXAPARIN PER 10 MG: Performed by: FAMILY MEDICINE

## 2019-09-12 RX ORDER — LOPERAMIDE HYDROCHLORIDE 2 MG/1
4 CAPSULE ORAL 2 TIMES DAILY PRN
COMMUNITY
End: 2019-09-17 | Stop reason: HOSPADM

## 2019-09-12 RX ORDER — MELOXICAM 7.5 MG/1
7.5 TABLET ORAL DAILY PRN
COMMUNITY

## 2019-09-12 RX ADMIN — OXYCODONE HYDROCHLORIDE AND ACETAMINOPHEN 1 TABLET: 7.5; 325 TABLET ORAL at 22:02

## 2019-09-12 RX ADMIN — GABAPENTIN 100 MG: 100 CAPSULE ORAL at 11:38

## 2019-09-12 RX ADMIN — SODIUM CHLORIDE 75 ML/HR: 9 INJECTION, SOLUTION INTRAVENOUS at 11:53

## 2019-09-12 RX ADMIN — ASPIRIN 81 MG: 81 TABLET, CHEWABLE ORAL at 08:13

## 2019-09-12 RX ADMIN — VANCOMYCIN HYDROCHLORIDE 125 MG: KIT at 11:38

## 2019-09-12 RX ADMIN — LEVOTHYROXINE SODIUM 100 MCG: 100 TABLET ORAL at 05:59

## 2019-09-12 RX ADMIN — OXYCODONE HYDROCHLORIDE AND ACETAMINOPHEN 1 TABLET: 7.5; 325 TABLET ORAL at 05:59

## 2019-09-12 RX ADMIN — VANCOMYCIN HYDROCHLORIDE 125 MG: KIT at 05:59

## 2019-09-12 RX ADMIN — ENOXAPARIN SODIUM 40 MG: 40 INJECTION SUBCUTANEOUS at 08:14

## 2019-09-12 RX ADMIN — POTASSIUM CHLORIDE 10 MEQ: 750 CAPSULE, EXTENDED RELEASE ORAL at 08:13

## 2019-09-13 LAB — BACTERIA SPEC AEROBE CULT: ABNORMAL

## 2019-09-13 PROCEDURE — 25010000002 ENOXAPARIN PER 10 MG: Performed by: FAMILY MEDICINE

## 2019-09-13 RX ADMIN — ENOXAPARIN SODIUM 40 MG: 40 INJECTION SUBCUTANEOUS at 09:45

## 2019-09-13 RX ADMIN — OXYCODONE 18 MG: 18 CAPSULE, EXTENDED RELEASE ORAL at 20:50

## 2019-09-13 RX ADMIN — POTASSIUM CHLORIDE 10 MEQ: 750 CAPSULE, EXTENDED RELEASE ORAL at 09:45

## 2019-09-13 RX ADMIN — SODIUM CHLORIDE 75 ML/HR: 9 INJECTION, SOLUTION INTRAVENOUS at 14:17

## 2019-09-13 RX ADMIN — VANCOMYCIN HYDROCHLORIDE 125 MG: KIT at 00:11

## 2019-09-13 RX ADMIN — VANCOMYCIN HYDROCHLORIDE 125 MG: KIT at 18:06

## 2019-09-13 RX ADMIN — VANCOMYCIN HYDROCHLORIDE 125 MG: KIT at 12:00

## 2019-09-13 RX ADMIN — VANCOMYCIN HYDROCHLORIDE 125 MG: KIT at 23:04

## 2019-09-13 RX ADMIN — ASPIRIN 81 MG: 81 TABLET, CHEWABLE ORAL at 09:45

## 2019-09-13 RX ADMIN — VANCOMYCIN HYDROCHLORIDE 125 MG: KIT at 06:12

## 2019-09-13 RX ADMIN — GABAPENTIN 100 MG: 100 CAPSULE ORAL at 03:02

## 2019-09-13 RX ADMIN — OXYCODONE HYDROCHLORIDE AND ACETAMINOPHEN 1 TABLET: 7.5; 325 TABLET ORAL at 06:12

## 2019-09-13 RX ADMIN — OXYCODONE HYDROCHLORIDE AND ACETAMINOPHEN 1 TABLET: 7.5; 325 TABLET ORAL at 16:02

## 2019-09-13 RX ADMIN — LEVOTHYROXINE SODIUM 100 MCG: 100 TABLET ORAL at 06:12

## 2019-09-13 RX ADMIN — SODIUM CHLORIDE 75 ML/HR: 9 INJECTION, SOLUTION INTRAVENOUS at 00:12

## 2019-09-13 RX ADMIN — GABAPENTIN 100 MG: 100 CAPSULE ORAL at 11:54

## 2019-09-14 PROBLEM — R29.6 FREQUENT FALLS: Chronic | Status: ACTIVE | Noted: 2019-01-27

## 2019-09-14 LAB
ANION GAP SERPL CALCULATED.3IONS-SCNC: 5 MMOL/L (ref 5–15)
BASOPHILS # BLD AUTO: 0.06 10*3/MM3 (ref 0–0.2)
BASOPHILS NFR BLD AUTO: 0.5 % (ref 0–1.5)
BUN BLD-MCNC: 11 MG/DL (ref 8–23)
BUN/CREAT SERPL: 20.8 (ref 7–25)
CALCIUM SPEC-SCNC: 7.8 MG/DL (ref 8.6–10.5)
CHLORIDE SERPL-SCNC: 105 MMOL/L (ref 98–107)
CO2 SERPL-SCNC: 28 MMOL/L (ref 22–29)
CREAT BLD-MCNC: 0.53 MG/DL (ref 0.57–1)
DEPRECATED RDW RBC AUTO: 57 FL (ref 37–54)
EOSINOPHIL # BLD AUTO: 0.17 10*3/MM3 (ref 0–0.4)
EOSINOPHIL NFR BLD AUTO: 1.3 % (ref 0.3–6.2)
ERYTHROCYTE [DISTWIDTH] IN BLOOD BY AUTOMATED COUNT: 15.7 % (ref 12.3–15.4)
GFR SERPL CREATININE-BSD FRML MDRD: 112 ML/MIN/1.73
GLUCOSE BLD-MCNC: 175 MG/DL (ref 65–99)
HCT VFR BLD AUTO: 31.7 % (ref 34–46.6)
HGB BLD-MCNC: 9.8 G/DL (ref 12–15.9)
IMM GRANULOCYTES # BLD AUTO: 0.06 10*3/MM3 (ref 0–0.05)
IMM GRANULOCYTES NFR BLD AUTO: 0.5 % (ref 0–0.5)
LYMPHOCYTES # BLD AUTO: 2.19 10*3/MM3 (ref 0.7–3.1)
LYMPHOCYTES NFR BLD AUTO: 16.7 % (ref 19.6–45.3)
MCH RBC QN AUTO: 30.2 PG (ref 26.6–33)
MCHC RBC AUTO-ENTMCNC: 30.9 G/DL (ref 31.5–35.7)
MCV RBC AUTO: 97.8 FL (ref 79–97)
MONOCYTES # BLD AUTO: 1.15 10*3/MM3 (ref 0.1–0.9)
MONOCYTES NFR BLD AUTO: 8.8 % (ref 5–12)
NEUTROPHILS # BLD AUTO: 9.46 10*3/MM3 (ref 1.7–7)
NEUTROPHILS NFR BLD AUTO: 72.2 % (ref 42.7–76)
NRBC BLD AUTO-RTO: 0 /100 WBC (ref 0–0.2)
PLATELET # BLD AUTO: 261 10*3/MM3 (ref 140–450)
PMV BLD AUTO: 9.7 FL (ref 6–12)
POTASSIUM BLD-SCNC: 3.5 MMOL/L (ref 3.5–5.2)
RBC # BLD AUTO: 3.24 10*6/MM3 (ref 3.77–5.28)
SODIUM BLD-SCNC: 138 MMOL/L (ref 136–145)
WBC NRBC COR # BLD: 13.09 10*3/MM3 (ref 3.4–10.8)

## 2019-09-14 PROCEDURE — 25010000002 ENOXAPARIN PER 10 MG: Performed by: FAMILY MEDICINE

## 2019-09-14 PROCEDURE — 85025 COMPLETE CBC W/AUTO DIFF WBC: CPT | Performed by: FAMILY MEDICINE

## 2019-09-14 PROCEDURE — 80048 BASIC METABOLIC PNL TOTAL CA: CPT | Performed by: FAMILY MEDICINE

## 2019-09-14 RX ORDER — ECHINACEA PURPUREA EXTRACT 125 MG
1 TABLET ORAL AS NEEDED
Status: DISCONTINUED | OUTPATIENT
Start: 2019-09-14 | End: 2019-09-17 | Stop reason: HOSPADM

## 2019-09-14 RX ADMIN — ACETAMINOPHEN 650 MG: 325 TABLET, FILM COATED ORAL at 22:37

## 2019-09-14 RX ADMIN — OXYCODONE HYDROCHLORIDE AND ACETAMINOPHEN 1 TABLET: 7.5; 325 TABLET ORAL at 03:11

## 2019-09-14 RX ADMIN — OXYCODONE 18 MG: 18 CAPSULE, EXTENDED RELEASE ORAL at 08:38

## 2019-09-14 RX ADMIN — VANCOMYCIN HYDROCHLORIDE 125 MG: KIT at 12:27

## 2019-09-14 RX ADMIN — ENOXAPARIN SODIUM 40 MG: 40 INJECTION SUBCUTANEOUS at 08:37

## 2019-09-14 RX ADMIN — VANCOMYCIN HYDROCHLORIDE 125 MG: KIT at 18:06

## 2019-09-14 RX ADMIN — SODIUM CHLORIDE 75 ML/HR: 9 INJECTION, SOLUTION INTRAVENOUS at 05:14

## 2019-09-14 RX ADMIN — SODIUM CHLORIDE 75 ML/HR: 9 INJECTION, SOLUTION INTRAVENOUS at 19:29

## 2019-09-14 RX ADMIN — POTASSIUM CHLORIDE 10 MEQ: 750 CAPSULE, EXTENDED RELEASE ORAL at 08:37

## 2019-09-14 RX ADMIN — OXYCODONE HYDROCHLORIDE AND ACETAMINOPHEN 1 TABLET: 7.5; 325 TABLET ORAL at 16:03

## 2019-09-14 RX ADMIN — OXYCODONE 18 MG: 18 CAPSULE, EXTENDED RELEASE ORAL at 20:14

## 2019-09-14 RX ADMIN — LEVOTHYROXINE SODIUM 100 MCG: 100 TABLET ORAL at 05:13

## 2019-09-14 RX ADMIN — ASPIRIN 81 MG: 81 TABLET, CHEWABLE ORAL at 08:38

## 2019-09-14 RX ADMIN — VANCOMYCIN HYDROCHLORIDE 125 MG: KIT at 05:13

## 2019-09-15 LAB
ALBUMIN SERPL-MCNC: 2.9 G/DL (ref 3.5–5.2)
ALBUMIN/GLOB SERPL: 0.8 G/DL
ALP SERPL-CCNC: 65 U/L (ref 39–117)
ALT SERPL W P-5'-P-CCNC: 15 U/L (ref 1–33)
ANION GAP SERPL CALCULATED.3IONS-SCNC: 6 MMOL/L (ref 5–15)
AST SERPL-CCNC: 17 U/L (ref 1–32)
BASOPHILS # BLD AUTO: 0.07 10*3/MM3 (ref 0–0.2)
BASOPHILS NFR BLD AUTO: 0.7 % (ref 0–1.5)
BILIRUB SERPL-MCNC: <0.2 MG/DL (ref 0.2–1.2)
BUN BLD-MCNC: 11 MG/DL (ref 8–23)
BUN/CREAT SERPL: 19 (ref 7–25)
CALCIUM SPEC-SCNC: 8.2 MG/DL (ref 8.6–10.5)
CHLORIDE SERPL-SCNC: 105 MMOL/L (ref 98–107)
CO2 SERPL-SCNC: 30 MMOL/L (ref 22–29)
CREAT BLD-MCNC: 0.58 MG/DL (ref 0.57–1)
DEPRECATED RDW RBC AUTO: 55.9 FL (ref 37–54)
EOSINOPHIL # BLD AUTO: 0.38 10*3/MM3 (ref 0–0.4)
EOSINOPHIL NFR BLD AUTO: 4 % (ref 0.3–6.2)
ERYTHROCYTE [DISTWIDTH] IN BLOOD BY AUTOMATED COUNT: 15.7 % (ref 12.3–15.4)
GFR SERPL CREATININE-BSD FRML MDRD: 101 ML/MIN/1.73
GLOBULIN UR ELPH-MCNC: 3.5 GM/DL
GLUCOSE BLD-MCNC: 123 MG/DL (ref 65–99)
HCT VFR BLD AUTO: 32.2 % (ref 34–46.6)
HGB BLD-MCNC: 9.8 G/DL (ref 12–15.9)
IMM GRANULOCYTES # BLD AUTO: 0.09 10*3/MM3 (ref 0–0.05)
IMM GRANULOCYTES NFR BLD AUTO: 0.9 % (ref 0–0.5)
LYMPHOCYTES # BLD AUTO: 2.01 10*3/MM3 (ref 0.7–3.1)
LYMPHOCYTES NFR BLD AUTO: 21.1 % (ref 19.6–45.3)
MCH RBC QN AUTO: 29.8 PG (ref 26.6–33)
MCHC RBC AUTO-ENTMCNC: 30.4 G/DL (ref 31.5–35.7)
MCV RBC AUTO: 97.9 FL (ref 79–97)
MONOCYTES # BLD AUTO: 0.88 10*3/MM3 (ref 0.1–0.9)
MONOCYTES NFR BLD AUTO: 9.2 % (ref 5–12)
NEUTROPHILS # BLD AUTO: 6.11 10*3/MM3 (ref 1.7–7)
NEUTROPHILS NFR BLD AUTO: 64.1 % (ref 42.7–76)
NRBC BLD AUTO-RTO: 0 /100 WBC (ref 0–0.2)
PLATELET # BLD AUTO: 267 10*3/MM3 (ref 140–450)
PMV BLD AUTO: 9.9 FL (ref 6–12)
POTASSIUM BLD-SCNC: 3.6 MMOL/L (ref 3.5–5.2)
PROT SERPL-MCNC: 6.4 G/DL (ref 6–8.5)
RBC # BLD AUTO: 3.29 10*6/MM3 (ref 3.77–5.28)
SODIUM BLD-SCNC: 141 MMOL/L (ref 136–145)
WBC NRBC COR # BLD: 9.54 10*3/MM3 (ref 3.4–10.8)

## 2019-09-15 PROCEDURE — 25010000002 ENOXAPARIN PER 10 MG: Performed by: FAMILY MEDICINE

## 2019-09-15 PROCEDURE — 85025 COMPLETE CBC W/AUTO DIFF WBC: CPT | Performed by: FAMILY MEDICINE

## 2019-09-15 PROCEDURE — 80053 COMPREHEN METABOLIC PANEL: CPT | Performed by: FAMILY MEDICINE

## 2019-09-15 RX ORDER — LEVOTHYROXINE SODIUM 0.1 MG/1
100 TABLET ORAL
Status: DISCONTINUED | OUTPATIENT
Start: 2019-09-16 | End: 2019-09-17 | Stop reason: HOSPADM

## 2019-09-15 RX ORDER — GUAIFENESIN 600 MG/1
1200 TABLET, EXTENDED RELEASE ORAL EVERY 12 HOURS SCHEDULED
Status: DISCONTINUED | OUTPATIENT
Start: 2019-09-15 | End: 2019-09-17 | Stop reason: HOSPADM

## 2019-09-15 RX ORDER — CETIRIZINE HYDROCHLORIDE 10 MG/1
10 TABLET ORAL DAILY
Status: DISCONTINUED | OUTPATIENT
Start: 2019-09-15 | End: 2019-09-17 | Stop reason: HOSPADM

## 2019-09-15 RX ORDER — LISINOPRIL 10 MG/1
10 TABLET ORAL
Status: DISCONTINUED | OUTPATIENT
Start: 2019-09-15 | End: 2019-09-17 | Stop reason: HOSPADM

## 2019-09-15 RX ADMIN — LISINOPRIL 10 MG: 10 TABLET ORAL at 11:03

## 2019-09-15 RX ADMIN — Medication 1 SPRAY: at 08:15

## 2019-09-15 RX ADMIN — VANCOMYCIN HYDROCHLORIDE 125 MG: KIT at 20:44

## 2019-09-15 RX ADMIN — ENOXAPARIN SODIUM 40 MG: 40 INJECTION SUBCUTANEOUS at 08:14

## 2019-09-15 RX ADMIN — POTASSIUM CHLORIDE 10 MEQ: 750 CAPSULE, EXTENDED RELEASE ORAL at 08:14

## 2019-09-15 RX ADMIN — VANCOMYCIN HYDROCHLORIDE 125 MG: KIT at 16:25

## 2019-09-15 RX ADMIN — VANCOMYCIN HYDROCHLORIDE 125 MG: KIT at 06:37

## 2019-09-15 RX ADMIN — SODIUM CHLORIDE 75 ML/HR: 9 INJECTION, SOLUTION INTRAVENOUS at 23:15

## 2019-09-15 RX ADMIN — VANCOMYCIN HYDROCHLORIDE 125 MG: KIT at 11:03

## 2019-09-15 RX ADMIN — OXYCODONE HYDROCHLORIDE AND ACETAMINOPHEN 1 TABLET: 7.5; 325 TABLET ORAL at 00:53

## 2019-09-15 RX ADMIN — GUAIFENESIN 1200 MG: 600 TABLET, EXTENDED RELEASE ORAL at 16:25

## 2019-09-15 RX ADMIN — LEVOTHYROXINE SODIUM 100 MCG: 100 TABLET ORAL at 06:36

## 2019-09-15 RX ADMIN — VANCOMYCIN HYDROCHLORIDE 125 MG: KIT at 00:54

## 2019-09-15 RX ADMIN — ACETAMINOPHEN 650 MG: 325 TABLET, FILM COATED ORAL at 16:25

## 2019-09-15 RX ADMIN — OXYCODONE HYDROCHLORIDE AND ACETAMINOPHEN 1 TABLET: 7.5; 325 TABLET ORAL at 23:15

## 2019-09-15 RX ADMIN — GUAIFENESIN 1200 MG: 600 TABLET, EXTENDED RELEASE ORAL at 20:44

## 2019-09-15 RX ADMIN — ASPIRIN 81 MG: 81 TABLET, CHEWABLE ORAL at 08:14

## 2019-09-15 RX ADMIN — OXYCODONE 18 MG: 18 CAPSULE, EXTENDED RELEASE ORAL at 20:44

## 2019-09-15 RX ADMIN — GABAPENTIN 100 MG: 100 CAPSULE ORAL at 14:17

## 2019-09-15 RX ADMIN — SODIUM CHLORIDE 75 ML/HR: 9 INJECTION, SOLUTION INTRAVENOUS at 08:20

## 2019-09-15 RX ADMIN — OXYCODONE 18 MG: 18 CAPSULE, EXTENDED RELEASE ORAL at 08:13

## 2019-09-15 RX ADMIN — CETIRIZINE HYDROCHLORIDE 10 MG: 10 TABLET, FILM COATED ORAL at 11:03

## 2019-09-15 RX ADMIN — OXYCODONE HYDROCHLORIDE AND ACETAMINOPHEN 1 TABLET: 7.5; 325 TABLET ORAL at 11:03

## 2019-09-15 RX ADMIN — GABAPENTIN 100 MG: 100 CAPSULE ORAL at 23:15

## 2019-09-16 LAB
ALBUMIN SERPL-MCNC: 3.1 G/DL (ref 3.5–5.2)
ALBUMIN/GLOB SERPL: 0.9 G/DL
ALP SERPL-CCNC: 66 U/L (ref 39–117)
ALT SERPL W P-5'-P-CCNC: 12 U/L (ref 1–33)
ANION GAP SERPL CALCULATED.3IONS-SCNC: 7 MMOL/L (ref 5–15)
AST SERPL-CCNC: 18 U/L (ref 1–32)
BACTERIA SPEC AEROBE CULT: NORMAL
BACTERIA SPEC AEROBE CULT: NORMAL
BASOPHILS # BLD AUTO: 0.09 10*3/MM3 (ref 0–0.2)
BASOPHILS NFR BLD AUTO: 1 % (ref 0–1.5)
BILIRUB SERPL-MCNC: <0.2 MG/DL (ref 0.2–1.2)
BUN BLD-MCNC: 11 MG/DL (ref 8–23)
BUN/CREAT SERPL: 19.6 (ref 7–25)
CALCIUM SPEC-SCNC: 8.6 MG/DL (ref 8.6–10.5)
CHLORIDE SERPL-SCNC: 108 MMOL/L (ref 98–107)
CO2 SERPL-SCNC: 29 MMOL/L (ref 22–29)
CREAT BLD-MCNC: 0.56 MG/DL (ref 0.57–1)
DEPRECATED RDW RBC AUTO: 57.6 FL (ref 37–54)
EOSINOPHIL # BLD AUTO: 0.39 10*3/MM3 (ref 0–0.4)
EOSINOPHIL NFR BLD AUTO: 4.5 % (ref 0.3–6.2)
ERYTHROCYTE [DISTWIDTH] IN BLOOD BY AUTOMATED COUNT: 15.8 % (ref 12.3–15.4)
GFR SERPL CREATININE-BSD FRML MDRD: 105 ML/MIN/1.73
GLOBULIN UR ELPH-MCNC: 3.4 GM/DL
GLUCOSE BLD-MCNC: 112 MG/DL (ref 65–99)
HCT VFR BLD AUTO: 33.9 % (ref 34–46.6)
HGB BLD-MCNC: 10.1 G/DL (ref 12–15.9)
IMM GRANULOCYTES # BLD AUTO: 0.16 10*3/MM3 (ref 0–0.05)
IMM GRANULOCYTES NFR BLD AUTO: 1.8 % (ref 0–0.5)
LYMPHOCYTES # BLD AUTO: 2.25 10*3/MM3 (ref 0.7–3.1)
LYMPHOCYTES NFR BLD AUTO: 25.9 % (ref 19.6–45.3)
MCH RBC QN AUTO: 29.6 PG (ref 26.6–33)
MCHC RBC AUTO-ENTMCNC: 29.8 G/DL (ref 31.5–35.7)
MCV RBC AUTO: 99.4 FL (ref 79–97)
MONOCYTES # BLD AUTO: 0.88 10*3/MM3 (ref 0.1–0.9)
MONOCYTES NFR BLD AUTO: 10.1 % (ref 5–12)
NEUTROPHILS # BLD AUTO: 4.91 10*3/MM3 (ref 1.7–7)
NEUTROPHILS NFR BLD AUTO: 56.7 % (ref 42.7–76)
NRBC BLD AUTO-RTO: 0 /100 WBC (ref 0–0.2)
PLATELET # BLD AUTO: 297 10*3/MM3 (ref 140–450)
PMV BLD AUTO: 9.9 FL (ref 6–12)
POTASSIUM BLD-SCNC: 3.6 MMOL/L (ref 3.5–5.2)
PROT SERPL-MCNC: 6.5 G/DL (ref 6–8.5)
RBC # BLD AUTO: 3.41 10*6/MM3 (ref 3.77–5.28)
SODIUM BLD-SCNC: 144 MMOL/L (ref 136–145)
WBC NRBC COR # BLD: 8.68 10*3/MM3 (ref 3.4–10.8)

## 2019-09-16 PROCEDURE — 97162 PT EVAL MOD COMPLEX 30 MIN: CPT

## 2019-09-16 PROCEDURE — 80053 COMPREHEN METABOLIC PANEL: CPT | Performed by: FAMILY MEDICINE

## 2019-09-16 PROCEDURE — 97166 OT EVAL MOD COMPLEX 45 MIN: CPT

## 2019-09-16 PROCEDURE — 85025 COMPLETE CBC W/AUTO DIFF WBC: CPT | Performed by: FAMILY MEDICINE

## 2019-09-16 RX ADMIN — GABAPENTIN 100 MG: 100 CAPSULE ORAL at 13:56

## 2019-09-16 RX ADMIN — ACETAMINOPHEN 650 MG: 325 TABLET, FILM COATED ORAL at 06:01

## 2019-09-16 RX ADMIN — GUAIFENESIN 1200 MG: 600 TABLET, EXTENDED RELEASE ORAL at 09:11

## 2019-09-16 RX ADMIN — LEVOTHYROXINE SODIUM 100 MCG: 100 TABLET ORAL at 06:01

## 2019-09-16 RX ADMIN — OXYCODONE HYDROCHLORIDE AND ACETAMINOPHEN 1 TABLET: 7.5; 325 TABLET ORAL at 15:59

## 2019-09-16 RX ADMIN — GUAIFENESIN 1200 MG: 600 TABLET, EXTENDED RELEASE ORAL at 20:30

## 2019-09-16 RX ADMIN — OXYCODONE HYDROCHLORIDE AND ACETAMINOPHEN 1 TABLET: 7.5; 325 TABLET ORAL at 07:34

## 2019-09-16 RX ADMIN — CETIRIZINE HYDROCHLORIDE 10 MG: 10 TABLET, FILM COATED ORAL at 09:11

## 2019-09-16 RX ADMIN — VANCOMYCIN HYDROCHLORIDE 125 MG: KIT at 12:46

## 2019-09-16 RX ADMIN — OXYCODONE 18 MG: 18 CAPSULE, EXTENDED RELEASE ORAL at 09:12

## 2019-09-16 RX ADMIN — OXYCODONE 18 MG: 18 CAPSULE, EXTENDED RELEASE ORAL at 20:29

## 2019-09-16 RX ADMIN — VANCOMYCIN HYDROCHLORIDE 125 MG: KIT at 17:25

## 2019-09-16 RX ADMIN — LISINOPRIL 10 MG: 10 TABLET ORAL at 09:11

## 2019-09-16 RX ADMIN — ASPIRIN 81 MG: 81 TABLET, CHEWABLE ORAL at 09:11

## 2019-09-16 RX ADMIN — POTASSIUM CHLORIDE 10 MEQ: 750 CAPSULE, EXTENDED RELEASE ORAL at 09:11

## 2019-09-16 RX ADMIN — VANCOMYCIN HYDROCHLORIDE 125 MG: KIT at 06:01

## 2019-09-16 RX ADMIN — SODIUM CHLORIDE 75 ML/HR: 9 INJECTION, SOLUTION INTRAVENOUS at 13:56

## 2019-09-17 VITALS
HEART RATE: 75 BPM | WEIGHT: 254.9 LBS | RESPIRATION RATE: 16 BRPM | TEMPERATURE: 98.4 F | DIASTOLIC BLOOD PRESSURE: 85 MMHG | OXYGEN SATURATION: 95 % | HEIGHT: 66 IN | BODY MASS INDEX: 40.97 KG/M2 | SYSTOLIC BLOOD PRESSURE: 157 MMHG

## 2019-09-17 PROCEDURE — 25010000002 ENOXAPARIN PER 10 MG: Performed by: FAMILY MEDICINE

## 2019-09-17 RX ORDER — GUAIFENESIN 600 MG/1
1200 TABLET, EXTENDED RELEASE ORAL EVERY 12 HOURS SCHEDULED
Qty: 60 TABLET | Refills: 0 | Status: SHIPPED | OUTPATIENT
Start: 2019-09-17

## 2019-09-17 RX ADMIN — OXYCODONE HYDROCHLORIDE AND ACETAMINOPHEN 1 TABLET: 7.5; 325 TABLET ORAL at 10:01

## 2019-09-17 RX ADMIN — GUAIFENESIN 1200 MG: 600 TABLET, EXTENDED RELEASE ORAL at 08:29

## 2019-09-17 RX ADMIN — VANCOMYCIN HYDROCHLORIDE 125 MG: KIT at 06:34

## 2019-09-17 RX ADMIN — VANCOMYCIN HYDROCHLORIDE 125 MG: KIT at 00:46

## 2019-09-17 RX ADMIN — CETIRIZINE HYDROCHLORIDE 10 MG: 10 TABLET, FILM COATED ORAL at 08:29

## 2019-09-17 RX ADMIN — VANCOMYCIN HYDROCHLORIDE 125 MG: KIT at 15:11

## 2019-09-17 RX ADMIN — ENOXAPARIN SODIUM 40 MG: 40 INJECTION SUBCUTANEOUS at 08:29

## 2019-09-17 RX ADMIN — LEVOTHYROXINE SODIUM 100 MCG: 100 TABLET ORAL at 06:34

## 2019-09-17 RX ADMIN — OXYCODONE HYDROCHLORIDE AND ACETAMINOPHEN 1 TABLET: 7.5; 325 TABLET ORAL at 17:11

## 2019-09-17 RX ADMIN — LISINOPRIL 10 MG: 10 TABLET ORAL at 08:29

## 2019-09-17 RX ADMIN — OXYCODONE HYDROCHLORIDE AND ACETAMINOPHEN 1 TABLET: 7.5; 325 TABLET ORAL at 00:48

## 2019-09-17 RX ADMIN — GABAPENTIN 100 MG: 100 CAPSULE ORAL at 15:10

## 2019-09-17 RX ADMIN — SODIUM CHLORIDE 75 ML/HR: 9 INJECTION, SOLUTION INTRAVENOUS at 04:21

## 2019-09-17 RX ADMIN — POTASSIUM CHLORIDE 10 MEQ: 750 CAPSULE, EXTENDED RELEASE ORAL at 08:29

## 2019-09-17 RX ADMIN — OXYCODONE 18 MG: 18 CAPSULE, EXTENDED RELEASE ORAL at 08:29

## 2019-09-17 RX ADMIN — ASPIRIN 81 MG: 81 TABLET, CHEWABLE ORAL at 08:29

## 2019-09-24 ENCOUNTER — LAB REQUISITION (OUTPATIENT)
Dept: LAB | Facility: HOSPITAL | Age: 79
End: 2019-09-24

## 2019-09-24 DIAGNOSIS — Z00.00 ENCOUNTER FOR GENERAL ADULT MEDICAL EXAMINATION WITHOUT ABNORMAL FINDINGS: ICD-10-CM

## 2019-09-24 PROCEDURE — 87086 URINE CULTURE/COLONY COUNT: CPT

## 2019-09-24 PROCEDURE — 87077 CULTURE AEROBIC IDENTIFY: CPT

## 2019-09-24 PROCEDURE — 87088 URINE BACTERIA CULTURE: CPT

## 2019-09-24 PROCEDURE — 81001 URINALYSIS AUTO W/SCOPE: CPT

## 2019-09-24 PROCEDURE — 87186 SC STD MICRODIL/AGAR DIL: CPT

## 2019-09-25 LAB
BACTERIA UR QL AUTO: ABNORMAL /HPF
BILIRUB UR QL STRIP: NEGATIVE
CLARITY UR: CLEAR
COLOR UR: YELLOW
GLUCOSE UR STRIP-MCNC: NEGATIVE MG/DL
HGB UR QL STRIP.AUTO: NEGATIVE
HYALINE CASTS UR QL AUTO: ABNORMAL /LPF
KETONES UR QL STRIP: NEGATIVE
LEUKOCYTE ESTERASE UR QL STRIP.AUTO: ABNORMAL
NITRITE UR QL STRIP: POSITIVE
PH UR STRIP.AUTO: 7.5 [PH] (ref 5–8)
PROT UR QL STRIP: ABNORMAL
RBC # UR: ABNORMAL /HPF
REF LAB TEST METHOD: ABNORMAL
SP GR UR STRIP: 1.01 (ref 1–1.03)
SQUAMOUS #/AREA URNS HPF: ABNORMAL /HPF
UROBILINOGEN UR QL STRIP: ABNORMAL
WBC UR QL AUTO: ABNORMAL /HPF

## 2019-09-27 LAB
BACTERIA SPEC AEROBE CULT: ABNORMAL
BACTERIA SPEC AEROBE CULT: ABNORMAL

## 2019-09-30 ENCOUNTER — TRANSCRIBE ORDERS (OUTPATIENT)
Dept: ADMINISTRATIVE | Facility: HOSPITAL | Age: 79
End: 2019-09-30

## 2019-09-30 DIAGNOSIS — R01.1 HEART MURMUR: Primary | ICD-10-CM

## 2019-10-08 ENCOUNTER — APPOINTMENT (OUTPATIENT)
Dept: CARDIOLOGY | Facility: HOSPITAL | Age: 79
End: 2019-10-08

## 2019-10-08 ENCOUNTER — HOSPITAL ENCOUNTER (OUTPATIENT)
Dept: CARDIOLOGY | Facility: HOSPITAL | Age: 79
Discharge: HOME | End: 2019-10-08
Admitting: INTERNAL MEDICINE

## 2019-10-08 VITALS
DIASTOLIC BLOOD PRESSURE: 84 MMHG | WEIGHT: 254 LBS | HEIGHT: 66 IN | BODY MASS INDEX: 40.82 KG/M2 | SYSTOLIC BLOOD PRESSURE: 146 MMHG

## 2019-10-08 DIAGNOSIS — R01.1 HEART MURMUR: ICD-10-CM

## 2019-10-08 PROCEDURE — 93306 TTE W/DOPPLER COMPLETE: CPT

## 2019-10-08 PROCEDURE — 93306 TTE W/DOPPLER COMPLETE: CPT | Performed by: INTERNAL MEDICINE

## 2019-10-10 LAB
BH CV ECHO MEAS - AO MAX PG (FULL): 3.2 MMHG
BH CV ECHO MEAS - AO MAX PG: 13.8 MMHG
BH CV ECHO MEAS - AO MEAN PG (FULL): 2 MMHG
BH CV ECHO MEAS - AO MEAN PG: 7 MMHG
BH CV ECHO MEAS - AO ROOT AREA (BSA CORRECTED): 1.5
BH CV ECHO MEAS - AO ROOT AREA: 8.3 CM^2
BH CV ECHO MEAS - AO ROOT DIAM: 3.3 CM
BH CV ECHO MEAS - AO V2 MAX: 186 CM/SEC
BH CV ECHO MEAS - AO V2 MEAN: 120 CM/SEC
BH CV ECHO MEAS - AO V2 VTI: 48.3 CM
BH CV ECHO MEAS - AVA(I,A): 2.6 CM^2
BH CV ECHO MEAS - AVA(I,D): 2.6 CM^2
BH CV ECHO MEAS - AVA(V,A): 2.8 CM^2
BH CV ECHO MEAS - AVA(V,D): 2.8 CM^2
BH CV ECHO MEAS - BSA(HAYCOCK): 2.4 M^2
BH CV ECHO MEAS - BSA: 2.2 M^2
BH CV ECHO MEAS - BZI_BMI: 41 KILOGRAMS/M^2
BH CV ECHO MEAS - BZI_METRIC_HEIGHT: 167.6 CM
BH CV ECHO MEAS - BZI_METRIC_WEIGHT: 115.2 KG
BH CV ECHO MEAS - EDV(CUBED): 141.4 ML
BH CV ECHO MEAS - EDV(MOD-SP4): 133 ML
BH CV ECHO MEAS - EDV(TEICH): 130.1 ML
BH CV ECHO MEAS - EF(CUBED): 74.8 %
BH CV ECHO MEAS - EF(MOD-SP4): 65.6 %
BH CV ECHO MEAS - EF(TEICH): 66.3 %
BH CV ECHO MEAS - ESV(CUBED): 35.6 ML
BH CV ECHO MEAS - ESV(MOD-SP4): 45.8 ML
BH CV ECHO MEAS - ESV(TEICH): 43.8 ML
BH CV ECHO MEAS - FS: 36.9 %
BH CV ECHO MEAS - IVS/LVPW: 1.8
BH CV ECHO MEAS - IVSD: 1.4 CM
BH CV ECHO MEAS - LA DIMENSION: 5.3 CM
BH CV ECHO MEAS - LA/AO: 1.6
BH CV ECHO MEAS - LV DIASTOLIC VOL/BSA (35-75): 60.1 ML/M^2
BH CV ECHO MEAS - LV MASS(C)D: 403 GRAMS
BH CV ECHO MEAS - LV MASS(C)DI: 182 GRAMS/M^2
BH CV ECHO MEAS - LV MAX PG: 10.6 MMHG
BH CV ECHO MEAS - LV MEAN PG: 5 MMHG
BH CV ECHO MEAS - LV SYSTOLIC VOL/BSA (12-30): 20.7 ML/M^2
BH CV ECHO MEAS - LV V1 MAX: 163 CM/SEC
BH CV ECHO MEAS - LV V1 MEAN: 102 CM/SEC
BH CV ECHO MEAS - LV V1 VTI: 40.7 CM
BH CV ECHO MEAS - LVIDD: 5.2 CM
BH CV ECHO MEAS - LVIDS: 3.3 CM
BH CV ECHO MEAS - LVLD AP4: 9.2 CM
BH CV ECHO MEAS - LVLS AP4: 6.8 CM
BH CV ECHO MEAS - LVOT AREA (M): 3.1 CM^2
BH CV ECHO MEAS - LVOT AREA: 3.1 CM^2
BH CV ECHO MEAS - LVOT DIAM: 2 CM
BH CV ECHO MEAS - LVPWD: 1.2 CM
BH CV ECHO MEAS - MR MAX PG: 114.9 MMHG
BH CV ECHO MEAS - MR MAX VEL: 536 CM/SEC
BH CV ECHO MEAS - MR MEAN PG: 79 MMHG
BH CV ECHO MEAS - MR MEAN VEL: 414 CM/SEC
BH CV ECHO MEAS - MR VTI: 206 CM
BH CV ECHO MEAS - MV A MAX VEL: 116 CM/SEC
BH CV ECHO MEAS - MV DEC TIME: 0.16 SEC
BH CV ECHO MEAS - MV E MAX VEL: 120 CM/SEC
BH CV ECHO MEAS - MV E/A: 1
BH CV ECHO MEAS - RAP SYSTOLE: 5 MMHG
BH CV ECHO MEAS - RVDD: 3.7 CM
BH CV ECHO MEAS - RVSP: 41 MMHG
BH CV ECHO MEAS - SI(AO): 181 ML/M^2
BH CV ECHO MEAS - SI(CUBED): 47.8 ML/M^2
BH CV ECHO MEAS - SI(LVOT): 57.8 ML/M^2
BH CV ECHO MEAS - SI(MOD-SP4): 39.4 ML/M^2
BH CV ECHO MEAS - SI(TEICH): 39 ML/M^2
BH CV ECHO MEAS - SV(AO): 400.7 ML
BH CV ECHO MEAS - SV(CUBED): 105.8 ML
BH CV ECHO MEAS - SV(LVOT): 127.9 ML
BH CV ECHO MEAS - SV(MOD-SP4): 87.2 ML
BH CV ECHO MEAS - SV(TEICH): 86.3 ML
BH CV ECHO MEAS - TR MAX VEL: 300 CM/SEC
LEFT ATRIUM VOLUME INDEX: 46.6 ML/M2
LEFT ATRIUM VOLUME: 103 CM3
LV EF 2D ECHO EST: 65 %

## 2019-10-23 ENCOUNTER — LAB REQUISITION (OUTPATIENT)
Dept: LAB | Facility: HOSPITAL | Age: 79
End: 2019-10-23

## 2019-10-23 DIAGNOSIS — Z00.00 ENCOUNTER FOR GENERAL ADULT MEDICAL EXAMINATION WITHOUT ABNORMAL FINDINGS: ICD-10-CM

## 2019-10-23 LAB
BILIRUB UR QL STRIP: NEGATIVE
CLARITY UR: ABNORMAL
COLOR UR: YELLOW
GLUCOSE UR STRIP-MCNC: NEGATIVE MG/DL
HGB UR QL STRIP.AUTO: NEGATIVE
KETONES UR QL STRIP: NEGATIVE
LEUKOCYTE ESTERASE UR QL STRIP.AUTO: NEGATIVE
NITRITE UR QL STRIP: NEGATIVE
PH UR STRIP.AUTO: 8 [PH] (ref 5–8)
PROT UR QL STRIP: ABNORMAL
SP GR UR STRIP: 1.02 (ref 1–1.03)
UROBILINOGEN UR QL STRIP: ABNORMAL

## 2019-10-23 PROCEDURE — 81003 URINALYSIS AUTO W/O SCOPE: CPT | Performed by: FAMILY MEDICINE

## 2019-10-23 PROCEDURE — 87086 URINE CULTURE/COLONY COUNT: CPT | Performed by: FAMILY MEDICINE

## 2019-10-25 LAB — BACTERIA SPEC AEROBE CULT: ABNORMAL

## 2019-12-31 ENCOUNTER — LAB REQUISITION (OUTPATIENT)
Dept: LAB | Facility: HOSPITAL | Age: 79
End: 2019-12-31

## 2019-12-31 DIAGNOSIS — Z00.00 ENCOUNTER FOR GENERAL ADULT MEDICAL EXAMINATION WITHOUT ABNORMAL FINDINGS: ICD-10-CM

## 2019-12-31 LAB
BACTERIA UR QL AUTO: ABNORMAL /HPF
BILIRUB UR QL STRIP: NEGATIVE
CLARITY UR: CLEAR
COLOR UR: YELLOW
GLUCOSE UR STRIP-MCNC: NEGATIVE MG/DL
HGB UR QL STRIP.AUTO: NEGATIVE
HYALINE CASTS UR QL AUTO: ABNORMAL /LPF
KETONES UR QL STRIP: NEGATIVE
LEUKOCYTE ESTERASE UR QL STRIP.AUTO: ABNORMAL
NITRITE UR QL STRIP: POSITIVE
PH UR STRIP.AUTO: 6 [PH] (ref 5–8)
PROT UR QL STRIP: NEGATIVE
RBC # UR: ABNORMAL /HPF
REF LAB TEST METHOD: ABNORMAL
SP GR UR STRIP: 1.01 (ref 1–1.03)
SQUAMOUS #/AREA URNS HPF: ABNORMAL /HPF
UROBILINOGEN UR QL STRIP: ABNORMAL
WBC UR QL AUTO: ABNORMAL /HPF

## 2019-12-31 PROCEDURE — 87086 URINE CULTURE/COLONY COUNT: CPT

## 2019-12-31 PROCEDURE — 81001 URINALYSIS AUTO W/SCOPE: CPT

## 2019-12-31 PROCEDURE — 87186 SC STD MICRODIL/AGAR DIL: CPT

## 2020-01-01 ENCOUNTER — HOSPITAL ENCOUNTER (INPATIENT)
Age: 80
LOS: 3 days | Discharge: NURSING, REHAB OR LONG-TERM CARE | DRG: 641 | End: 2020-10-22
Attending: EMERGENCY MEDICINE
Payer: MEDICARE

## 2020-01-01 ENCOUNTER — APPOINTMENT (OUTPATIENT)
Dept: GENERAL RADIOLOGY | Age: 80
DRG: 641 | End: 2020-01-01
Payer: MEDICARE

## 2020-01-01 ENCOUNTER — APPOINTMENT (OUTPATIENT)
Dept: GENERAL RADIOLOGY | Age: 80
DRG: 917 | End: 2020-01-01
Payer: MEDICARE

## 2020-01-01 ENCOUNTER — TELEPHONE (OUTPATIENT)
Dept: CARDIOLOGY | Age: 80
End: 2020-01-01

## 2020-01-01 ENCOUNTER — OFFICE VISIT (OUTPATIENT)
Dept: NEUROLOGY | Age: 80
End: 2020-01-01
Payer: MEDICARE

## 2020-01-01 ENCOUNTER — APPOINTMENT (OUTPATIENT)
Dept: CT IMAGING | Age: 80
DRG: 917 | End: 2020-01-01
Payer: MEDICARE

## 2020-01-01 ENCOUNTER — TELEPHONE (OUTPATIENT)
Dept: NEUROLOGY | Age: 80
End: 2020-01-01

## 2020-01-01 ENCOUNTER — APPOINTMENT (OUTPATIENT)
Dept: CT IMAGING | Age: 80
DRG: 641 | End: 2020-01-01
Payer: MEDICARE

## 2020-01-01 ENCOUNTER — HOSPITAL ENCOUNTER (INPATIENT)
Age: 80
LOS: 25 days | Discharge: HOME | DRG: 917 | End: 2020-11-19
Attending: EMERGENCY MEDICINE | Admitting: INTERNAL MEDICINE
Payer: MEDICARE

## 2020-01-01 VITALS
BODY MASS INDEX: 48.82 KG/M2 | HEART RATE: 94 BPM | WEIGHT: 293 LBS | HEIGHT: 65 IN | DIASTOLIC BLOOD PRESSURE: 74 MMHG | SYSTOLIC BLOOD PRESSURE: 133 MMHG | OXYGEN SATURATION: 92 % | TEMPERATURE: 97.2 F | RESPIRATION RATE: 18 BRPM

## 2020-01-01 VITALS
DIASTOLIC BLOOD PRESSURE: 73 MMHG | BODY MASS INDEX: 40.18 KG/M2 | HEIGHT: 66 IN | SYSTOLIC BLOOD PRESSURE: 135 MMHG | HEART RATE: 76 BPM | WEIGHT: 250 LBS | RESPIRATION RATE: 16 BRPM

## 2020-01-01 VITALS
OXYGEN SATURATION: 90 % | HEART RATE: 77 BPM | HEIGHT: 66 IN | DIASTOLIC BLOOD PRESSURE: 62 MMHG | WEIGHT: 293 LBS | BODY MASS INDEX: 47.09 KG/M2 | RESPIRATION RATE: 18 BRPM | TEMPERATURE: 97.1 F | SYSTOLIC BLOOD PRESSURE: 116 MMHG

## 2020-01-01 LAB
ADENOVIRUS BY PCR: NOT DETECTED
ALBUMIN SERPL-MCNC: 3.4 G/DL (ref 3.5–5.2)
ALBUMIN SERPL-MCNC: 3.5 G/DL (ref 3.5–5.2)
ALBUMIN SERPL-MCNC: 3.5 G/DL (ref 3.5–5.2)
ALBUMIN SERPL-MCNC: 3.6 G/DL (ref 3.5–5.2)
ALBUMIN SERPL-MCNC: 3.9 G/DL (ref 3.5–5.2)
ALBUMIN SERPL-MCNC: 4 G/DL (ref 3.5–5.2)
ALBUMIN SERPL-MCNC: 4.2 G/DL (ref 3.5–5.2)
ALP BLD-CCNC: 61 U/L (ref 35–104)
ALP BLD-CCNC: 62 U/L (ref 35–104)
ALP BLD-CCNC: 66 U/L (ref 35–104)
ALP BLD-CCNC: 68 U/L (ref 35–104)
ALP BLD-CCNC: 71 U/L (ref 35–104)
ALP BLD-CCNC: 72 U/L (ref 35–104)
ALP BLD-CCNC: 75 U/L (ref 35–104)
ALT SERPL-CCNC: 33 U/L (ref 5–33)
ALT SERPL-CCNC: 40 U/L (ref 5–33)
ALT SERPL-CCNC: 44 U/L (ref 5–33)
ALT SERPL-CCNC: 45 U/L (ref 5–33)
ALT SERPL-CCNC: 48 U/L (ref 5–33)
ALT SERPL-CCNC: 61 U/L (ref 5–33)
ALT SERPL-CCNC: 67 U/L (ref 5–33)
AMORPHOUS: ABNORMAL /HPF
ANION GAP SERPL CALCULATED.3IONS-SCNC: 10 MMOL/L (ref 7–19)
ANION GAP SERPL CALCULATED.3IONS-SCNC: 11 MMOL/L (ref 7–19)
ANION GAP SERPL CALCULATED.3IONS-SCNC: 12 MMOL/L (ref 7–19)
ANION GAP SERPL CALCULATED.3IONS-SCNC: 13 MMOL/L (ref 7–19)
ANION GAP SERPL CALCULATED.3IONS-SCNC: 7 MMOL/L (ref 7–19)
ANION GAP SERPL CALCULATED.3IONS-SCNC: 8 MMOL/L (ref 7–19)
ANION GAP SERPL CALCULATED.3IONS-SCNC: 8 MMOL/L (ref 7–19)
ANION GAP SERPL CALCULATED.3IONS-SCNC: 9 MMOL/L (ref 7–19)
ANION GAP SERPL CALCULATED.3IONS-SCNC: 9 MMOL/L (ref 7–19)
ANISOCYTOSIS: ABNORMAL
APTT: 30.9 SEC (ref 26–36.2)
AST SERPL-CCNC: 35 U/L (ref 5–32)
AST SERPL-CCNC: 42 U/L (ref 5–32)
AST SERPL-CCNC: 47 U/L (ref 5–32)
AST SERPL-CCNC: 60 U/L (ref 5–32)
AST SERPL-CCNC: 63 U/L (ref 5–32)
AST SERPL-CCNC: 74 U/L (ref 5–32)
AST SERPL-CCNC: 90 U/L (ref 5–32)
BACTERIA: ABNORMAL /HPF
BACTERIA: ABNORMAL /HPF
BASE EXCESS ARTERIAL: 1 MMOL/L (ref -2–2)
BASE EXCESS ARTERIAL: 3.1 MMOL/L (ref -2–2)
BASE EXCESS ARTERIAL: 6.1 MMOL/L (ref -2–2)
BASE EXCESS ARTERIAL: 6.4 MMOL/L (ref -2–2)
BASE EXCESS ARTERIAL: 7 MMOL/L (ref -2–2)
BASOPHILS ABSOLUTE: 0 K/UL (ref 0–0.2)
BASOPHILS ABSOLUTE: 0.1 K/UL (ref 0–0.2)
BASOPHILS RELATIVE PERCENT: 0.3 % (ref 0–1)
BASOPHILS RELATIVE PERCENT: 0.5 % (ref 0–1)
BASOPHILS RELATIVE PERCENT: 0.6 % (ref 0–1)
BASOPHILS RELATIVE PERCENT: 0.6 % (ref 0–1)
BASOPHILS RELATIVE PERCENT: 0.7 % (ref 0–1)
BASOPHILS RELATIVE PERCENT: 0.8 % (ref 0–1)
BASOPHILS RELATIVE PERCENT: 0.8 % (ref 0–1)
BASOPHILS RELATIVE PERCENT: 1 % (ref 0–1)
BILIRUB SERPL-MCNC: 0.3 MG/DL (ref 0.2–1.2)
BILIRUB SERPL-MCNC: 0.3 MG/DL (ref 0.2–1.2)
BILIRUB SERPL-MCNC: 0.4 MG/DL (ref 0.2–1.2)
BILIRUB SERPL-MCNC: 0.4 MG/DL (ref 0.2–1.2)
BILIRUB SERPL-MCNC: <0.2 MG/DL (ref 0.2–1.2)
BILIRUBIN DIRECT: 0.1 MG/DL (ref 0–0.3)
BILIRUBIN DIRECT: 0.2 MG/DL (ref 0–0.3)
BILIRUBIN URINE: NEGATIVE
BILIRUBIN URINE: NEGATIVE
BILIRUBIN, INDIRECT: 0.1 MG/DL (ref 0.1–1)
BILIRUBIN, INDIRECT: 0.1 MG/DL (ref 0.1–1)
BILIRUBIN, INDIRECT: 0.2 MG/DL (ref 0.1–1)
BILIRUBIN, INDIRECT: 0.2 MG/DL (ref 0.1–1)
BILIRUBIN, INDIRECT: 0.3 MG/DL (ref 0.1–1)
BLOOD CULTURE, ROUTINE: NORMAL
BLOOD, URINE: ABNORMAL
BLOOD, URINE: ABNORMAL
BORDETELLA PARAPERTUSSIS BY PCR: NOT DETECTED
BORDETELLA PERTUSSIS BY PCR: NOT DETECTED
BUN BLDV-MCNC: 20 MG/DL (ref 8–23)
BUN BLDV-MCNC: 20 MG/DL (ref 8–23)
BUN BLDV-MCNC: 21 MG/DL (ref 8–23)
BUN BLDV-MCNC: 21 MG/DL (ref 8–23)
BUN BLDV-MCNC: 22 MG/DL (ref 8–23)
BUN BLDV-MCNC: 23 MG/DL (ref 8–23)
BUN BLDV-MCNC: 23 MG/DL (ref 8–23)
BUN BLDV-MCNC: 24 MG/DL (ref 8–23)
BUN BLDV-MCNC: 25 MG/DL (ref 8–23)
BUN BLDV-MCNC: 26 MG/DL (ref 8–23)
BUN BLDV-MCNC: 29 MG/DL (ref 8–23)
BUN BLDV-MCNC: 29 MG/DL (ref 8–23)
BUN BLDV-MCNC: 30 MG/DL (ref 8–23)
BUN BLDV-MCNC: 31 MG/DL (ref 8–23)
BUN BLDV-MCNC: 31 MG/DL (ref 8–23)
BUN BLDV-MCNC: 32 MG/DL (ref 8–23)
BUN BLDV-MCNC: 32 MG/DL (ref 8–23)
BUN BLDV-MCNC: 40 MG/DL (ref 8–23)
BUN BLDV-MCNC: 45 MG/DL (ref 8–23)
BUN BLDV-MCNC: 54 MG/DL (ref 8–23)
C-REACTIVE PROTEIN: 2.9 MG/DL (ref 0–0.5)
CALCIUM SERPL-MCNC: 8.4 MG/DL (ref 8.8–10.2)
CALCIUM SERPL-MCNC: 8.6 MG/DL (ref 8.8–10.2)
CALCIUM SERPL-MCNC: 8.9 MG/DL (ref 8.8–10.2)
CALCIUM SERPL-MCNC: 9 MG/DL (ref 8.8–10.2)
CALCIUM SERPL-MCNC: 9 MG/DL (ref 8.8–10.2)
CALCIUM SERPL-MCNC: 9.1 MG/DL (ref 8.8–10.2)
CALCIUM SERPL-MCNC: 9.1 MG/DL (ref 8.8–10.2)
CALCIUM SERPL-MCNC: 9.2 MG/DL (ref 8.8–10.2)
CALCIUM SERPL-MCNC: 9.3 MG/DL (ref 8.8–10.2)
CALCIUM SERPL-MCNC: 9.4 MG/DL (ref 8.8–10.2)
CALCIUM SERPL-MCNC: 9.5 MG/DL (ref 8.8–10.2)
CALCIUM SERPL-MCNC: 9.6 MG/DL (ref 8.8–10.2)
CARBOXYHEMOGLOBIN ARTERIAL: 2.1 % (ref 0–5)
CARBOXYHEMOGLOBIN ARTERIAL: 2.3 % (ref 0–5)
CARBOXYHEMOGLOBIN ARTERIAL: 2.3 % (ref 0–5)
CARBOXYHEMOGLOBIN ARTERIAL: 2.4 % (ref 0–5)
CARBOXYHEMOGLOBIN ARTERIAL: 2.5 % (ref 0–5)
CHLAMYDOPHILIA PNEUMONIAE BY PCR: NOT DETECTED
CHLORIDE BLD-SCNC: 101 MMOL/L (ref 98–111)
CHLORIDE BLD-SCNC: 102 MMOL/L (ref 98–111)
CHLORIDE BLD-SCNC: 102 MMOL/L (ref 98–111)
CHLORIDE BLD-SCNC: 103 MMOL/L (ref 98–111)
CHLORIDE BLD-SCNC: 104 MMOL/L (ref 98–111)
CHLORIDE BLD-SCNC: 105 MMOL/L (ref 98–111)
CHLORIDE BLD-SCNC: 106 MMOL/L (ref 98–111)
CHLORIDE BLD-SCNC: 107 MMOL/L (ref 98–111)
CHLORIDE BLD-SCNC: 109 MMOL/L (ref 98–111)
CHLORIDE BLD-SCNC: 97 MMOL/L (ref 98–111)
CHLORIDE BLD-SCNC: 99 MMOL/L (ref 98–111)
CHOLESTEROL, TOTAL: 173 MG/DL (ref 160–199)
CLARITY: ABNORMAL
CLARITY: ABNORMAL
CO2: 25 MMOL/L (ref 22–29)
CO2: 25 MMOL/L (ref 22–29)
CO2: 26 MMOL/L (ref 22–29)
CO2: 27 MMOL/L (ref 22–29)
CO2: 28 MMOL/L (ref 22–29)
CO2: 29 MMOL/L (ref 22–29)
CO2: 30 MMOL/L (ref 22–29)
CO2: 31 MMOL/L (ref 22–29)
CO2: 31 MMOL/L (ref 22–29)
CO2: 32 MMOL/L (ref 22–29)
CO2: 35 MMOL/L (ref 22–29)
COLOR: ABNORMAL
COLOR: YELLOW
CORONAVIRUS 229E BY PCR: NOT DETECTED
CORONAVIRUS HKU1 BY PCR: NOT DETECTED
CORONAVIRUS NL63 BY PCR: NOT DETECTED
CORONAVIRUS OC43 BY PCR: NOT DETECTED
CREAT SERPL-MCNC: 0.7 MG/DL (ref 0.5–0.9)
CREAT SERPL-MCNC: 0.8 MG/DL (ref 0.5–0.9)
CREAT SERPL-MCNC: 0.9 MG/DL (ref 0.5–0.9)
CREAT SERPL-MCNC: 1 MG/DL (ref 0.5–0.9)
CREAT SERPL-MCNC: 1 MG/DL (ref 0.5–0.9)
CREAT SERPL-MCNC: 1.1 MG/DL (ref 0.5–0.9)
CREAT SERPL-MCNC: 2.1 MG/DL (ref 0.5–0.9)
CREAT SERPL-MCNC: 2.1 MG/DL (ref 0.5–0.9)
CULTURE, BLOOD 2: NORMAL
D DIMER: 2.11 UG/ML FEU (ref 0–0.48)
D DIMER: 3.74 UG/ML FEU (ref 0–0.48)
EKG P AXIS: 38 DEGREES
EKG P AXIS: 39 DEGREES
EKG P AXIS: NORMAL DEGREES
EKG P-R INTERVAL: 168 MS
EKG P-R INTERVAL: 192 MS
EKG P-R INTERVAL: NORMAL MS
EKG Q-T INTERVAL: 322 MS
EKG Q-T INTERVAL: 342 MS
EKG Q-T INTERVAL: 364 MS
EKG QRS DURATION: 82 MS
EKG QRS DURATION: 84 MS
EKG QRS DURATION: 92 MS
EKG QTC CALCULATION (BAZETT): 386 MS
EKG QTC CALCULATION (BAZETT): 405 MS
EKG QTC CALCULATION (BAZETT): 434 MS
EKG T AXIS: 144 DEGREES
EKG T AXIS: 171 DEGREES
EKG T AXIS: 86 DEGREES
EOSINOPHILS ABSOLUTE: 0 K/UL (ref 0–0.6)
EOSINOPHILS ABSOLUTE: 0 K/UL (ref 0–0.6)
EOSINOPHILS ABSOLUTE: 0.1 K/UL (ref 0–0.6)
EOSINOPHILS ABSOLUTE: 0.3 K/UL (ref 0–0.6)
EOSINOPHILS ABSOLUTE: 0.5 K/UL (ref 0–0.6)
EOSINOPHILS ABSOLUTE: 0.5 K/UL (ref 0–0.6)
EOSINOPHILS ABSOLUTE: 0.6 K/UL (ref 0–0.6)
EOSINOPHILS ABSOLUTE: 0.6 K/UL (ref 0–0.6)
EOSINOPHILS RELATIVE PERCENT: 0.1 % (ref 0–5)
EOSINOPHILS RELATIVE PERCENT: 0.2 % (ref 0–5)
EOSINOPHILS RELATIVE PERCENT: 0.5 % (ref 0–5)
EOSINOPHILS RELATIVE PERCENT: 0.7 % (ref 0–5)
EOSINOPHILS RELATIVE PERCENT: 1.5 % (ref 0–5)
EOSINOPHILS RELATIVE PERCENT: 3.1 % (ref 0–5)
EOSINOPHILS RELATIVE PERCENT: 5.2 % (ref 0–5)
EOSINOPHILS RELATIVE PERCENT: 6.3 % (ref 0–5)
EOSINOPHILS RELATIVE PERCENT: 6.5 % (ref 0–5)
EOSINOPHILS RELATIVE PERCENT: 6.9 % (ref 0–5)
EPITHELIAL CELLS, UA: ABNORMAL /HPF
EPITHELIAL CELLS, UA: ABNORMAL /HPF
FERRITIN: 40.7 NG/ML (ref 13–150)
GFR AFRICAN AMERICAN: 27
GFR AFRICAN AMERICAN: 27
GFR AFRICAN AMERICAN: 58
GFR AFRICAN AMERICAN: >59
GFR NON-AFRICAN AMERICAN: 23
GFR NON-AFRICAN AMERICAN: 23
GFR NON-AFRICAN AMERICAN: 48
GFR NON-AFRICAN AMERICAN: 53
GFR NON-AFRICAN AMERICAN: 53
GFR NON-AFRICAN AMERICAN: >60
GLUCOSE BLD-MCNC: 100 MG/DL (ref 70–99)
GLUCOSE BLD-MCNC: 100 MG/DL (ref 70–99)
GLUCOSE BLD-MCNC: 102 MG/DL (ref 70–99)
GLUCOSE BLD-MCNC: 104 MG/DL (ref 70–99)
GLUCOSE BLD-MCNC: 104 MG/DL (ref 70–99)
GLUCOSE BLD-MCNC: 106 MG/DL (ref 70–99)
GLUCOSE BLD-MCNC: 107 MG/DL (ref 70–99)
GLUCOSE BLD-MCNC: 108 MG/DL (ref 70–99)
GLUCOSE BLD-MCNC: 109 MG/DL (ref 70–99)
GLUCOSE BLD-MCNC: 109 MG/DL (ref 74–109)
GLUCOSE BLD-MCNC: 110 MG/DL (ref 70–99)
GLUCOSE BLD-MCNC: 110 MG/DL (ref 70–99)
GLUCOSE BLD-MCNC: 110 MG/DL (ref 74–109)
GLUCOSE BLD-MCNC: 112 MG/DL (ref 70–99)
GLUCOSE BLD-MCNC: 113 MG/DL (ref 74–109)
GLUCOSE BLD-MCNC: 114 MG/DL (ref 70–99)
GLUCOSE BLD-MCNC: 115 MG/DL (ref 74–109)
GLUCOSE BLD-MCNC: 117 MG/DL (ref 70–99)
GLUCOSE BLD-MCNC: 118 MG/DL (ref 70–99)
GLUCOSE BLD-MCNC: 118 MG/DL (ref 70–99)
GLUCOSE BLD-MCNC: 118 MG/DL (ref 74–109)
GLUCOSE BLD-MCNC: 119 MG/DL (ref 70–99)
GLUCOSE BLD-MCNC: 120 MG/DL (ref 70–99)
GLUCOSE BLD-MCNC: 120 MG/DL (ref 70–99)
GLUCOSE BLD-MCNC: 120 MG/DL (ref 74–109)
GLUCOSE BLD-MCNC: 121 MG/DL (ref 70–99)
GLUCOSE BLD-MCNC: 121 MG/DL (ref 70–99)
GLUCOSE BLD-MCNC: 122 MG/DL (ref 70–99)
GLUCOSE BLD-MCNC: 123 MG/DL (ref 70–99)
GLUCOSE BLD-MCNC: 123 MG/DL (ref 70–99)
GLUCOSE BLD-MCNC: 123 MG/DL (ref 74–109)
GLUCOSE BLD-MCNC: 124 MG/DL (ref 70–99)
GLUCOSE BLD-MCNC: 125 MG/DL (ref 70–99)
GLUCOSE BLD-MCNC: 126 MG/DL (ref 70–99)
GLUCOSE BLD-MCNC: 126 MG/DL (ref 74–109)
GLUCOSE BLD-MCNC: 127 MG/DL (ref 70–99)
GLUCOSE BLD-MCNC: 127 MG/DL (ref 70–99)
GLUCOSE BLD-MCNC: 127 MG/DL (ref 74–109)
GLUCOSE BLD-MCNC: 128 MG/DL (ref 74–109)
GLUCOSE BLD-MCNC: 129 MG/DL (ref 70–99)
GLUCOSE BLD-MCNC: 129 MG/DL (ref 70–99)
GLUCOSE BLD-MCNC: 130 MG/DL (ref 70–99)
GLUCOSE BLD-MCNC: 131 MG/DL (ref 70–99)
GLUCOSE BLD-MCNC: 133 MG/DL (ref 70–99)
GLUCOSE BLD-MCNC: 134 MG/DL (ref 70–99)
GLUCOSE BLD-MCNC: 134 MG/DL (ref 74–109)
GLUCOSE BLD-MCNC: 136 MG/DL (ref 70–99)
GLUCOSE BLD-MCNC: 137 MG/DL (ref 70–99)
GLUCOSE BLD-MCNC: 137 MG/DL (ref 74–109)
GLUCOSE BLD-MCNC: 137 MG/DL (ref 74–109)
GLUCOSE BLD-MCNC: 138 MG/DL (ref 70–99)
GLUCOSE BLD-MCNC: 138 MG/DL (ref 74–109)
GLUCOSE BLD-MCNC: 139 MG/DL (ref 74–109)
GLUCOSE BLD-MCNC: 140 MG/DL (ref 70–99)
GLUCOSE BLD-MCNC: 141 MG/DL (ref 70–99)
GLUCOSE BLD-MCNC: 143 MG/DL (ref 70–99)
GLUCOSE BLD-MCNC: 143 MG/DL (ref 74–109)
GLUCOSE BLD-MCNC: 144 MG/DL (ref 70–99)
GLUCOSE BLD-MCNC: 145 MG/DL (ref 74–109)
GLUCOSE BLD-MCNC: 146 MG/DL (ref 70–99)
GLUCOSE BLD-MCNC: 147 MG/DL (ref 70–99)
GLUCOSE BLD-MCNC: 147 MG/DL (ref 74–109)
GLUCOSE BLD-MCNC: 148 MG/DL (ref 70–99)
GLUCOSE BLD-MCNC: 148 MG/DL (ref 74–109)
GLUCOSE BLD-MCNC: 148 MG/DL (ref 74–109)
GLUCOSE BLD-MCNC: 149 MG/DL (ref 70–99)
GLUCOSE BLD-MCNC: 151 MG/DL (ref 70–99)
GLUCOSE BLD-MCNC: 152 MG/DL (ref 70–99)
GLUCOSE BLD-MCNC: 153 MG/DL (ref 70–99)
GLUCOSE BLD-MCNC: 154 MG/DL (ref 74–109)
GLUCOSE BLD-MCNC: 155 MG/DL (ref 74–109)
GLUCOSE BLD-MCNC: 157 MG/DL (ref 70–99)
GLUCOSE BLD-MCNC: 160 MG/DL (ref 70–99)
GLUCOSE BLD-MCNC: 160 MG/DL (ref 70–99)
GLUCOSE BLD-MCNC: 161 MG/DL (ref 70–99)
GLUCOSE BLD-MCNC: 161 MG/DL (ref 70–99)
GLUCOSE BLD-MCNC: 163 MG/DL (ref 70–99)
GLUCOSE BLD-MCNC: 163 MG/DL (ref 70–99)
GLUCOSE BLD-MCNC: 167 MG/DL (ref 70–99)
GLUCOSE BLD-MCNC: 168 MG/DL (ref 70–99)
GLUCOSE BLD-MCNC: 169 MG/DL (ref 70–99)
GLUCOSE BLD-MCNC: 171 MG/DL (ref 70–99)
GLUCOSE BLD-MCNC: 174 MG/DL (ref 70–99)
GLUCOSE BLD-MCNC: 174 MG/DL (ref 70–99)
GLUCOSE BLD-MCNC: 176 MG/DL (ref 74–109)
GLUCOSE BLD-MCNC: 177 MG/DL (ref 70–99)
GLUCOSE BLD-MCNC: 186 MG/DL (ref 70–99)
GLUCOSE BLD-MCNC: 191 MG/DL (ref 70–99)
GLUCOSE BLD-MCNC: 197 MG/DL (ref 70–99)
GLUCOSE BLD-MCNC: 263 MG/DL (ref 74–109)
GLUCOSE BLD-MCNC: 279 MG/DL (ref 70–99)
GLUCOSE BLD-MCNC: 94 MG/DL (ref 70–99)
GLUCOSE URINE: NEGATIVE MG/DL
GLUCOSE URINE: NEGATIVE MG/DL
HBA1C MFR BLD: 7.4 % (ref 4–6)
HCO3 ARTERIAL: 29.9 MMOL/L (ref 22–26)
HCO3 ARTERIAL: 30.4 MMOL/L (ref 22–26)
HCO3 ARTERIAL: 33.1 MMOL/L (ref 22–26)
HCO3 ARTERIAL: 33.4 MMOL/L (ref 22–26)
HCO3 ARTERIAL: 33.5 MMOL/L (ref 22–26)
HCT VFR BLD CALC: 41.9 % (ref 37–47)
HCT VFR BLD CALC: 42.4 % (ref 37–47)
HCT VFR BLD CALC: 42.5 % (ref 37–47)
HCT VFR BLD CALC: 43.8 % (ref 37–47)
HCT VFR BLD CALC: 44.5 % (ref 37–47)
HCT VFR BLD CALC: 44.6 % (ref 37–47)
HCT VFR BLD CALC: 44.7 % (ref 37–47)
HCT VFR BLD CALC: 45.6 % (ref 37–47)
HCT VFR BLD CALC: 45.7 % (ref 37–47)
HCT VFR BLD CALC: 46.4 % (ref 37–47)
HCT VFR BLD CALC: 46.8 % (ref 37–47)
HCT VFR BLD CALC: 47.5 % (ref 37–47)
HDLC SERPL-MCNC: 55 MG/DL (ref 65–121)
HEMOGLOBIN, ART, EXTENDED: 13.1 G/DL (ref 12–16)
HEMOGLOBIN, ART, EXTENDED: 13.2 G/DL (ref 12–16)
HEMOGLOBIN, ART, EXTENDED: 13.7 G/DL (ref 12–16)
HEMOGLOBIN, ART, EXTENDED: 14.6 G/DL (ref 12–16)
HEMOGLOBIN, ART, EXTENDED: 14.6 G/DL (ref 12–16)
HEMOGLOBIN: 12.3 G/DL (ref 12–16)
HEMOGLOBIN: 12.5 G/DL (ref 12–16)
HEMOGLOBIN: 12.5 G/DL (ref 12–16)
HEMOGLOBIN: 12.6 G/DL (ref 12–16)
HEMOGLOBIN: 12.8 G/DL (ref 12–16)
HEMOGLOBIN: 12.8 G/DL (ref 12–16)
HEMOGLOBIN: 13 G/DL (ref 12–16)
HEMOGLOBIN: 13.2 G/DL (ref 12–16)
HEMOGLOBIN: 13.5 G/DL (ref 12–16)
HEMOGLOBIN: 13.5 G/DL (ref 12–16)
HEMOGLOBIN: 13.8 G/DL (ref 12–16)
HEMOGLOBIN: 14 G/DL (ref 12–16)
HUMAN METAPNEUMOVIRUS BY PCR: NOT DETECTED
HUMAN RHINOVIRUS/ENTEROVIRUS BY PCR: NOT DETECTED
HYPOCHROMIA: ABNORMAL
HYPOCHROMIA: ABNORMAL
IMMATURE GRANULOCYTES #: 0 K/UL
IMMATURE GRANULOCYTES #: 0.1 K/UL
IMMATURE GRANULOCYTES #: 0.1 K/UL
INFLUENZA A BY PCR: NOT DETECTED
INFLUENZA B BY PCR: NOT DETECTED
INR BLD: 1.02 (ref 0.88–1.18)
IRON SATURATION: 15 % (ref 14–50)
IRON: 53 UG/DL (ref 37–145)
KETONES, URINE: NEGATIVE MG/DL
KETONES, URINE: NEGATIVE MG/DL
LACTIC ACID: 1.6 MMOL/L (ref 0.5–1.9)
LACTIC ACID: 4 MMOL/L (ref 0.5–1.9)
LDL CHOLESTEROL CALCULATED: 80 MG/DL
LEUKOCYTE ESTERASE, URINE: ABNORMAL
LEUKOCYTE ESTERASE, URINE: ABNORMAL
LV EF: 58 %
LVEF MODALITY: NORMAL
LYMPHOCYTES ABSOLUTE: 0.5 K/UL (ref 1.1–4.5)
LYMPHOCYTES ABSOLUTE: 1.2 K/UL (ref 1.1–4.5)
LYMPHOCYTES ABSOLUTE: 1.3 K/UL (ref 1.1–4.5)
LYMPHOCYTES ABSOLUTE: 1.4 K/UL (ref 1.1–4.5)
LYMPHOCYTES ABSOLUTE: 1.6 K/UL (ref 1.1–4.5)
LYMPHOCYTES ABSOLUTE: 1.9 K/UL (ref 1.1–4.5)
LYMPHOCYTES ABSOLUTE: 2.1 K/UL (ref 1.1–4.5)
LYMPHOCYTES RELATIVE PERCENT: 14.2 % (ref 20–40)
LYMPHOCYTES RELATIVE PERCENT: 14.3 % (ref 20–40)
LYMPHOCYTES RELATIVE PERCENT: 14.6 % (ref 20–40)
LYMPHOCYTES RELATIVE PERCENT: 15.1 % (ref 20–40)
LYMPHOCYTES RELATIVE PERCENT: 15.7 % (ref 20–40)
LYMPHOCYTES RELATIVE PERCENT: 16.3 % (ref 20–40)
LYMPHOCYTES RELATIVE PERCENT: 16.6 % (ref 20–40)
LYMPHOCYTES RELATIVE PERCENT: 22.3 % (ref 20–40)
LYMPHOCYTES RELATIVE PERCENT: 23.4 % (ref 20–40)
LYMPHOCYTES RELATIVE PERCENT: 4.7 % (ref 20–40)
MACROCYTES: ABNORMAL
MAGNESIUM: 2 MG/DL (ref 1.6–2.4)
MAGNESIUM: 2 MG/DL (ref 1.6–2.4)
MAGNESIUM: 2.1 MG/DL (ref 1.6–2.4)
MAGNESIUM: 2.2 MG/DL (ref 1.6–2.4)
MAGNESIUM: 2.2 MG/DL (ref 1.6–2.4)
MCH RBC QN AUTO: 30.7 PG (ref 27–31)
MCH RBC QN AUTO: 30.7 PG (ref 27–31)
MCH RBC QN AUTO: 30.9 PG (ref 27–31)
MCH RBC QN AUTO: 31 PG (ref 27–31)
MCH RBC QN AUTO: 31.1 PG (ref 27–31)
MCH RBC QN AUTO: 31.1 PG (ref 27–31)
MCH RBC QN AUTO: 31.2 PG (ref 27–31)
MCH RBC QN AUTO: 31.2 PG (ref 27–31)
MCH RBC QN AUTO: 31.3 PG (ref 27–31)
MCH RBC QN AUTO: 31.6 PG (ref 27–31)
MCH RBC QN AUTO: 31.7 PG (ref 27–31)
MCH RBC QN AUTO: 32.1 PG (ref 27–31)
MCHC RBC AUTO-ENTMCNC: 27.6 G/DL (ref 33–37)
MCHC RBC AUTO-ENTMCNC: 27.6 G/DL (ref 33–37)
MCHC RBC AUTO-ENTMCNC: 29.1 G/DL (ref 33–37)
MCHC RBC AUTO-ENTMCNC: 29.2 G/DL (ref 33–37)
MCHC RBC AUTO-ENTMCNC: 29.4 G/DL (ref 33–37)
MCHC RBC AUTO-ENTMCNC: 29.5 G/DL (ref 33–37)
MCHC RBC AUTO-ENTMCNC: 29.6 G/DL (ref 33–37)
MCHC RBC AUTO-ENTMCNC: 29.7 G/DL (ref 33–37)
MCHC RBC AUTO-ENTMCNC: 29.8 G/DL (ref 33–37)
MCV RBC AUTO: 103.6 FL (ref 81–99)
MCV RBC AUTO: 104.4 FL (ref 81–99)
MCV RBC AUTO: 104.9 FL (ref 81–99)
MCV RBC AUTO: 105.3 FL (ref 81–99)
MCV RBC AUTO: 105.8 FL (ref 81–99)
MCV RBC AUTO: 105.9 FL (ref 81–99)
MCV RBC AUTO: 105.9 FL (ref 81–99)
MCV RBC AUTO: 107.1 FL (ref 81–99)
MCV RBC AUTO: 107.5 FL (ref 81–99)
MCV RBC AUTO: 108.2 FL (ref 81–99)
MCV RBC AUTO: 112.9 FL (ref 81–99)
MCV RBC AUTO: 112.9 FL (ref 81–99)
METHEMOGLOBIN ARTERIAL: 0.9 %
METHEMOGLOBIN ARTERIAL: 0.9 %
METHEMOGLOBIN ARTERIAL: 1 %
METHEMOGLOBIN ARTERIAL: 1 %
METHEMOGLOBIN ARTERIAL: 1.1 %
MONOCYTES ABSOLUTE: 0.2 K/UL (ref 0–0.9)
MONOCYTES ABSOLUTE: 0.7 K/UL (ref 0–0.9)
MONOCYTES ABSOLUTE: 0.8 K/UL (ref 0–0.9)
MONOCYTES ABSOLUTE: 0.9 K/UL (ref 0–0.9)
MONOCYTES ABSOLUTE: 0.9 K/UL (ref 0–0.9)
MONOCYTES RELATIVE PERCENT: 2.4 % (ref 0–10)
MONOCYTES RELATIVE PERCENT: 7 % (ref 0–10)
MONOCYTES RELATIVE PERCENT: 7.9 % (ref 0–10)
MONOCYTES RELATIVE PERCENT: 8 % (ref 0–10)
MONOCYTES RELATIVE PERCENT: 8.4 % (ref 0–10)
MONOCYTES RELATIVE PERCENT: 8.8 % (ref 0–10)
MONOCYTES RELATIVE PERCENT: 8.8 % (ref 0–10)
MONOCYTES RELATIVE PERCENT: 9.2 % (ref 0–10)
MONOCYTES RELATIVE PERCENT: 9.4 % (ref 0–10)
MONOCYTES RELATIVE PERCENT: 9.5 % (ref 0–10)
MYCOPLASMA PNEUMONIAE BY PCR: NOT DETECTED
NEUTROPHILS ABSOLUTE: 4.8 K/UL (ref 1.5–7.5)
NEUTROPHILS ABSOLUTE: 5.6 K/UL (ref 1.5–7.5)
NEUTROPHILS ABSOLUTE: 5.9 K/UL (ref 1.5–7.5)
NEUTROPHILS ABSOLUTE: 6 K/UL (ref 1.5–7.5)
NEUTROPHILS ABSOLUTE: 6.5 K/UL (ref 1.5–7.5)
NEUTROPHILS ABSOLUTE: 7.2 K/UL (ref 1.5–7.5)
NEUTROPHILS ABSOLUTE: 7.6 K/UL (ref 1.5–7.5)
NEUTROPHILS ABSOLUTE: 9.2 K/UL (ref 1.5–7.5)
NEUTROPHILS RELATIVE PERCENT: 59.5 % (ref 50–65)
NEUTROPHILS RELATIVE PERCENT: 64.6 % (ref 50–65)
NEUTROPHILS RELATIVE PERCENT: 68.1 % (ref 50–65)
NEUTROPHILS RELATIVE PERCENT: 68.5 % (ref 50–65)
NEUTROPHILS RELATIVE PERCENT: 69.7 % (ref 50–65)
NEUTROPHILS RELATIVE PERCENT: 71.8 % (ref 50–65)
NEUTROPHILS RELATIVE PERCENT: 75.4 % (ref 50–65)
NEUTROPHILS RELATIVE PERCENT: 75.7 % (ref 50–65)
NEUTROPHILS RELATIVE PERCENT: 76.9 % (ref 50–65)
NEUTROPHILS RELATIVE PERCENT: 91.6 % (ref 50–65)
NITRITE, URINE: NEGATIVE
NITRITE, URINE: POSITIVE
O2 CONTENT ARTERIAL: 15.3 ML/DL
O2 CONTENT ARTERIAL: 16.7 ML/DL
O2 CONTENT ARTERIAL: 17.8 ML/DL
O2 CONTENT ARTERIAL: 18.5 ML/DL
O2 CONTENT ARTERIAL: 19.4 ML/DL
O2 SAT, ARTERIAL: 83 %
O2 SAT, ARTERIAL: 90 %
O2 SAT, ARTERIAL: 90.5 %
O2 SAT, ARTERIAL: 92.2 %
O2 SAT, ARTERIAL: 94.3 %
O2 THERAPY: ABNORMAL
ORGANISM: ABNORMAL
ORGANISM: ABNORMAL
PARAINFLUENZA VIRUS 1 BY PCR: NOT DETECTED
PARAINFLUENZA VIRUS 2 BY PCR: NOT DETECTED
PARAINFLUENZA VIRUS 3 BY PCR: NOT DETECTED
PARAINFLUENZA VIRUS 4 BY PCR: NOT DETECTED
PCO2 ARTERIAL: 53 MMHG (ref 35–45)
PCO2 ARTERIAL: 54 MMHG (ref 35–45)
PCO2 ARTERIAL: 56 MMHG (ref 35–45)
PCO2 ARTERIAL: 58 MMHG (ref 35–45)
PCO2 ARTERIAL: 71 MMHG (ref 35–45)
PDW BLD-RTO: 16.7 % (ref 11.5–14.5)
PDW BLD-RTO: 16.7 % (ref 11.5–14.5)
PDW BLD-RTO: 17.2 % (ref 11.5–14.5)
PDW BLD-RTO: 17.4 % (ref 11.5–14.5)
PDW BLD-RTO: 17.6 % (ref 11.5–14.5)
PDW BLD-RTO: 17.7 % (ref 11.5–14.5)
PDW BLD-RTO: 17.8 % (ref 11.5–14.5)
PDW BLD-RTO: 18.3 % (ref 11.5–14.5)
PERFORMED ON: ABNORMAL
PERFORMED ON: NORMAL
PH ARTERIAL: 7.24 (ref 7.35–7.45)
PH ARTERIAL: 7.36 (ref 7.35–7.45)
PH ARTERIAL: 7.37 (ref 7.35–7.45)
PH ARTERIAL: 7.38 (ref 7.35–7.45)
PH ARTERIAL: 7.4 (ref 7.35–7.45)
PH UA: 5.5 (ref 5–8)
PH UA: 6 (ref 5–8)
PHOSPHORUS: 3 MG/DL (ref 2.5–4.5)
PLATELET # BLD: 180 K/UL (ref 130–400)
PLATELET # BLD: 197 K/UL (ref 130–400)
PLATELET # BLD: 231 K/UL (ref 130–400)
PLATELET # BLD: 233 K/UL (ref 130–400)
PLATELET # BLD: 234 K/UL (ref 130–400)
PLATELET # BLD: 250 K/UL (ref 130–400)
PLATELET # BLD: 253 K/UL (ref 130–400)
PLATELET # BLD: 263 K/UL (ref 130–400)
PLATELET # BLD: 271 K/UL (ref 130–400)
PLATELET # BLD: 281 K/UL (ref 130–400)
PLATELET # BLD: 285 K/UL (ref 130–400)
PLATELET # BLD: 286 K/UL (ref 130–400)
PLATELET SLIDE REVIEW: ADEQUATE
PMV BLD AUTO: 10 FL (ref 9.4–12.3)
PMV BLD AUTO: 10.1 FL (ref 9.4–12.3)
PMV BLD AUTO: 10.2 FL (ref 9.4–12.3)
PMV BLD AUTO: 10.2 FL (ref 9.4–12.3)
PMV BLD AUTO: 10.4 FL (ref 9.4–12.3)
PMV BLD AUTO: 10.4 FL (ref 9.4–12.3)
PMV BLD AUTO: 10.6 FL (ref 9.4–12.3)
PMV BLD AUTO: 10.8 FL (ref 9.4–12.3)
PMV BLD AUTO: 10.9 FL (ref 9.4–12.3)
PMV BLD AUTO: 11.2 FL (ref 9.4–12.3)
PMV BLD AUTO: 9.9 FL (ref 9.4–12.3)
PMV BLD AUTO: 9.9 FL (ref 9.4–12.3)
PO2 ARTERIAL: 47 MMHG (ref 80–100)
PO2 ARTERIAL: 56 MMHG (ref 80–100)
PO2 ARTERIAL: 56 MMHG (ref 80–100)
PO2 ARTERIAL: 69 MMHG (ref 80–100)
PO2 ARTERIAL: 79 MMHG (ref 80–100)
POLYCHROMASIA: ABNORMAL
POTASSIUM REFLEX MAGNESIUM: 4 MMOL/L (ref 3.5–5)
POTASSIUM REFLEX MAGNESIUM: 4.2 MMOL/L (ref 3.5–5)
POTASSIUM REFLEX MAGNESIUM: 4.3 MMOL/L (ref 3.5–5)
POTASSIUM REFLEX MAGNESIUM: 4.3 MMOL/L (ref 3.5–5)
POTASSIUM REFLEX MAGNESIUM: 4.4 MMOL/L (ref 3.5–5)
POTASSIUM REFLEX MAGNESIUM: 4.5 MMOL/L (ref 3.5–5)
POTASSIUM REFLEX MAGNESIUM: 4.7 MMOL/L (ref 3.5–5)
POTASSIUM REFLEX MAGNESIUM: 4.8 MMOL/L (ref 3.5–5)
POTASSIUM REFLEX MAGNESIUM: 4.9 MMOL/L (ref 3.5–5)
POTASSIUM REFLEX MAGNESIUM: 5.7 MMOL/L (ref 3.5–5)
POTASSIUM REFLEX MAGNESIUM: 5.8 MMOL/L (ref 3.5–5)
POTASSIUM SERPL-SCNC: 4.3 MMOL/L (ref 3.5–5)
POTASSIUM SERPL-SCNC: 4.4 MMOL/L (ref 3.5–5)
POTASSIUM SERPL-SCNC: 4.4 MMOL/L (ref 3.5–5)
POTASSIUM SERPL-SCNC: 4.5 MMOL/L (ref 3.5–5)
POTASSIUM SERPL-SCNC: 4.5 MMOL/L (ref 3.5–5)
POTASSIUM SERPL-SCNC: 4.6 MMOL/L (ref 3.5–5)
POTASSIUM SERPL-SCNC: 4.7 MMOL/L (ref 3.5–5)
POTASSIUM SERPL-SCNC: 4.7 MMOL/L (ref 3.5–5)
POTASSIUM SERPL-SCNC: 4.8 MMOL/L (ref 3.5–5)
POTASSIUM SERPL-SCNC: 5 MMOL/L (ref 3.5–5)
POTASSIUM SERPL-SCNC: 5 MMOL/L (ref 3.5–5)
POTASSIUM, WHOLE BLOOD: 4
POTASSIUM, WHOLE BLOOD: 4.1
POTASSIUM, WHOLE BLOOD: 4.7
POTASSIUM, WHOLE BLOOD: 5.3
POTASSIUM, WHOLE BLOOD: 5.5
PRO-BNP: 3134 PG/ML (ref 0–1800)
PRO-BNP: 3685 PG/ML (ref 0–1800)
PRO-BNP: 752 PG/ML (ref 0–1800)
PRO-BNP: 956 PG/ML (ref 0–1800)
PROTEIN UA: 100 MG/DL
PROTEIN UA: 30 MG/DL
PROTHROMBIN TIME: 13.3 SEC (ref 12–14.6)
RBC # BLD: 3.92 M/UL (ref 4.2–5.4)
RBC # BLD: 3.94 M/UL (ref 4.2–5.4)
RBC # BLD: 3.96 M/UL (ref 4.2–5.4)
RBC # BLD: 4.07 M/UL (ref 4.2–5.4)
RBC # BLD: 4.09 M/UL (ref 4.2–5.4)
RBC # BLD: 4.11 M/UL (ref 4.2–5.4)
RBC # BLD: 4.21 M/UL (ref 4.2–5.4)
RBC # BLD: 4.26 M/UL (ref 4.2–5.4)
RBC # BLD: 4.34 M/UL (ref 4.2–5.4)
RBC # BLD: 4.4 M/UL (ref 4.2–5.4)
RBC # BLD: 4.42 M/UL (ref 4.2–5.4)
RBC # BLD: 4.42 M/UL (ref 4.2–5.4)
RBC UA: ABNORMAL /HPF (ref 0–2)
RBC UA: ABNORMAL /HPF (ref 0–2)
REASON FOR REJECTION: NORMAL
REJECTED TEST: NORMAL
RESPIRATORY SYNCYTIAL VIRUS BY PCR: NOT DETECTED
SARS-COV-2, NAAT: NOT DETECTED
SARS-COV-2, PCR: NOT DETECTED
SEDIMENTATION RATE, ERYTHROCYTE: 30 MM/HR (ref 0–25)
SODIUM BLD-SCNC: 135 MMOL/L (ref 136–145)
SODIUM BLD-SCNC: 136 MMOL/L (ref 136–145)
SODIUM BLD-SCNC: 138 MMOL/L (ref 136–145)
SODIUM BLD-SCNC: 139 MMOL/L (ref 136–145)
SODIUM BLD-SCNC: 139 MMOL/L (ref 136–145)
SODIUM BLD-SCNC: 140 MMOL/L (ref 136–145)
SODIUM BLD-SCNC: 141 MMOL/L (ref 136–145)
SODIUM BLD-SCNC: 142 MMOL/L (ref 136–145)
SODIUM BLD-SCNC: 142 MMOL/L (ref 136–145)
SODIUM BLD-SCNC: 143 MMOL/L (ref 136–145)
SODIUM BLD-SCNC: 145 MMOL/L (ref 136–145)
SODIUM BLD-SCNC: 145 MMOL/L (ref 136–145)
SODIUM BLD-SCNC: 146 MMOL/L (ref 136–145)
SODIUM BLD-SCNC: 147 MMOL/L (ref 136–145)
SODIUM BLD-SCNC: 149 MMOL/L (ref 136–145)
SPECIFIC GRAVITY UA: 1.02 (ref 1–1.03)
SPECIFIC GRAVITY UA: 1.02 (ref 1–1.03)
T4 FREE: 1.2 NG/DL (ref 0.93–1.7)
T4 FREE: 1.29 NG/DL (ref 0.93–1.7)
TOTAL CK: 256 U/L (ref 26–192)
TOTAL CK: 475 U/L (ref 26–192)
TOTAL IRON BINDING CAPACITY: 358 UG/DL (ref 250–400)
TOTAL PROTEIN: 6.4 G/DL (ref 6.6–8.7)
TOTAL PROTEIN: 6.6 G/DL (ref 6.6–8.7)
TOTAL PROTEIN: 6.8 G/DL (ref 6.6–8.7)
TOTAL PROTEIN: 6.9 G/DL (ref 6.6–8.7)
TOTAL PROTEIN: 6.9 G/DL (ref 6.6–8.7)
TOTAL PROTEIN: 7 G/DL (ref 6.6–8.7)
TOTAL PROTEIN: 7.2 G/DL (ref 6.6–8.7)
TRIGL SERPL-MCNC: 190 MG/DL (ref 0–149)
TROPONIN: 0.05 NG/ML (ref 0–0.03)
TROPONIN: 0.05 NG/ML (ref 0–0.03)
TROPONIN: 0.06 NG/ML (ref 0–0.03)
TROPONIN: 0.21 NG/ML (ref 0–0.03)
TROPONIN: 0.29 NG/ML (ref 0–0.03)
TROPONIN: 0.32 NG/ML (ref 0–0.03)
TROPONIN: 0.34 NG/ML (ref 0–0.03)
TROPONIN: 0.35 NG/ML (ref 0–0.03)
TROPONIN: 0.37 NG/ML (ref 0–0.03)
TROPONIN: 0.45 NG/ML (ref 0–0.03)
TSH REFLEX FT4: 2.85 UIU/ML (ref 0.35–5.5)
TSH REFLEX FT4: 4.45 UIU/ML (ref 0.35–5.5)
TSH SERPL DL<=0.05 MIU/L-ACNC: 12.08 UIU/ML (ref 0.27–4.2)
URINE CULTURE, ROUTINE: ABNORMAL
UROBILINOGEN, URINE: 0.2 E.U./DL
UROBILINOGEN, URINE: 1 E.U./DL
VANCOMYCIN TROUGH: 16.8 UG/ML (ref 10–20)
WBC # BLD: 10 K/UL (ref 4.8–10.8)
WBC # BLD: 6.3 K/UL (ref 4.8–10.8)
WBC # BLD: 6.9 K/UL (ref 4.8–10.8)
WBC # BLD: 7.8 K/UL (ref 4.8–10.8)
WBC # BLD: 8.2 K/UL (ref 4.8–10.8)
WBC # BLD: 8.5 K/UL (ref 4.8–10.8)
WBC # BLD: 8.6 K/UL (ref 4.8–10.8)
WBC # BLD: 9.3 K/UL (ref 4.8–10.8)
WBC # BLD: 9.3 K/UL (ref 4.8–10.8)
WBC # BLD: 9.5 K/UL (ref 4.8–10.8)
WBC # BLD: 9.6 K/UL (ref 4.8–10.8)
WBC # BLD: 9.9 K/UL (ref 4.8–10.8)
WBC UA: ABNORMAL /HPF (ref 0–5)
WBC UA: ABNORMAL /HPF (ref 0–5)
YEAST: PRESENT /HPF

## 2020-01-01 PROCEDURE — 6360000002 HC RX W HCPCS: Performed by: HOSPITALIST

## 2020-01-01 PROCEDURE — 1210000000 HC MED SURG R&B

## 2020-01-01 PROCEDURE — 6370000000 HC RX 637 (ALT 250 FOR IP): Performed by: HOSPITALIST

## 2020-01-01 PROCEDURE — 2140000000 HC CCU INTERMEDIATE R&B

## 2020-01-01 PROCEDURE — 87040 BLOOD CULTURE FOR BACTERIA: CPT

## 2020-01-01 PROCEDURE — 6370000000 HC RX 637 (ALT 250 FOR IP): Performed by: INTERNAL MEDICINE

## 2020-01-01 PROCEDURE — 2580000003 HC RX 258: Performed by: HOSPITALIST

## 2020-01-01 PROCEDURE — 2580000003 HC RX 258: Performed by: INTERNAL MEDICINE

## 2020-01-01 PROCEDURE — 85730 THROMBOPLASTIN TIME PARTIAL: CPT

## 2020-01-01 PROCEDURE — 2500000003 HC RX 250 WO HCPCS: Performed by: HOSPITALIST

## 2020-01-01 PROCEDURE — 6370000000 HC RX 637 (ALT 250 FOR IP): Performed by: PHYSICIAN ASSISTANT

## 2020-01-01 PROCEDURE — 82947 ASSAY GLUCOSE BLOOD QUANT: CPT

## 2020-01-01 PROCEDURE — 97129 THER IVNTJ 1ST 15 MIN: CPT

## 2020-01-01 PROCEDURE — 83880 ASSAY OF NATRIURETIC PEPTIDE: CPT

## 2020-01-01 PROCEDURE — 80048 BASIC METABOLIC PNL TOTAL CA: CPT

## 2020-01-01 PROCEDURE — 71275 CT ANGIOGRAPHY CHEST: CPT

## 2020-01-01 PROCEDURE — 94660 CPAP INITIATION&MGMT: CPT

## 2020-01-01 PROCEDURE — 83605 ASSAY OF LACTIC ACID: CPT

## 2020-01-01 PROCEDURE — 36415 COLL VENOUS BLD VENIPUNCTURE: CPT

## 2020-01-01 PROCEDURE — 80076 HEPATIC FUNCTION PANEL: CPT

## 2020-01-01 PROCEDURE — 2700000000 HC OXYGEN THERAPY PER DAY

## 2020-01-01 PROCEDURE — 84484 ASSAY OF TROPONIN QUANT: CPT

## 2020-01-01 PROCEDURE — 97162 PT EVAL MOD COMPLEX 30 MIN: CPT

## 2020-01-01 PROCEDURE — 85025 COMPLETE CBC W/AUTO DIFF WBC: CPT

## 2020-01-01 PROCEDURE — 6370000000 HC RX 637 (ALT 250 FOR IP): Performed by: STUDENT IN AN ORGANIZED HEALTH CARE EDUCATION/TRAINING PROGRAM

## 2020-01-01 PROCEDURE — 92523 SPEECH SOUND LANG COMPREHEN: CPT

## 2020-01-01 PROCEDURE — 74018 RADEX ABDOMEN 1 VIEW: CPT

## 2020-01-01 PROCEDURE — 84132 ASSAY OF SERUM POTASSIUM: CPT

## 2020-01-01 PROCEDURE — 99999 PR OFFICE/OUTPT VISIT,PROCEDURE ONLY: CPT | Performed by: EMERGENCY MEDICINE

## 2020-01-01 PROCEDURE — 99214 OFFICE O/P EST MOD 30 MIN: CPT | Performed by: PSYCHIATRY & NEUROLOGY

## 2020-01-01 PROCEDURE — 36600 WITHDRAWAL OF ARTERIAL BLOOD: CPT

## 2020-01-01 PROCEDURE — 71045 X-RAY EXAM CHEST 1 VIEW: CPT

## 2020-01-01 PROCEDURE — 82550 ASSAY OF CK (CPK): CPT

## 2020-01-01 PROCEDURE — 97110 THERAPEUTIC EXERCISES: CPT

## 2020-01-01 PROCEDURE — 84443 ASSAY THYROID STIM HORMONE: CPT

## 2020-01-01 PROCEDURE — 1036F TOBACCO NON-USER: CPT | Performed by: PSYCHIATRY & NEUROLOGY

## 2020-01-01 PROCEDURE — 51798 US URINE CAPACITY MEASURE: CPT

## 2020-01-01 PROCEDURE — 85027 COMPLETE CBC AUTOMATED: CPT

## 2020-01-01 PROCEDURE — 6360000002 HC RX W HCPCS: Performed by: INTERNAL MEDICINE

## 2020-01-01 PROCEDURE — 99222 1ST HOSP IP/OBS MODERATE 55: CPT | Performed by: INTERNAL MEDICINE

## 2020-01-01 PROCEDURE — 87186 SC STD MICRODIL/AGAR DIL: CPT

## 2020-01-01 PROCEDURE — 92526 ORAL FUNCTION THERAPY: CPT

## 2020-01-01 PROCEDURE — 97530 THERAPEUTIC ACTIVITIES: CPT

## 2020-01-01 PROCEDURE — 96374 THER/PROPH/DIAG INJ IV PUSH: CPT

## 2020-01-01 PROCEDURE — 2500000003 HC RX 250 WO HCPCS: Performed by: PHYSICIAN ASSISTANT

## 2020-01-01 PROCEDURE — 80061 LIPID PANEL: CPT

## 2020-01-01 PROCEDURE — 81001 URINALYSIS AUTO W/SCOPE: CPT

## 2020-01-01 PROCEDURE — 82728 ASSAY OF FERRITIN: CPT

## 2020-01-01 PROCEDURE — G8417 CALC BMI ABV UP PARAM F/U: HCPCS | Performed by: PSYCHIATRY & NEUROLOGY

## 2020-01-01 PROCEDURE — 87086 URINE CULTURE/COLONY COUNT: CPT

## 2020-01-01 PROCEDURE — 0202U NFCT DS 22 TRGT SARS-COV-2: CPT

## 2020-01-01 PROCEDURE — 4040F PNEUMOC VAC/ADMIN/RCVD: CPT | Performed by: PSYCHIATRY & NEUROLOGY

## 2020-01-01 PROCEDURE — 6360000004 HC RX CONTRAST MEDICATION: Performed by: HOSPITALIST

## 2020-01-01 PROCEDURE — 51702 INSERT TEMP BLADDER CATH: CPT

## 2020-01-01 PROCEDURE — 84439 ASSAY OF FREE THYROXINE: CPT

## 2020-01-01 PROCEDURE — 70450 CT HEAD/BRAIN W/O DYE: CPT

## 2020-01-01 PROCEDURE — 93005 ELECTROCARDIOGRAM TRACING: CPT | Performed by: EMERGENCY MEDICINE

## 2020-01-01 PROCEDURE — 80202 ASSAY OF VANCOMYCIN: CPT

## 2020-01-01 PROCEDURE — 2500000003 HC RX 250 WO HCPCS: Performed by: STUDENT IN AN ORGANIZED HEALTH CARE EDUCATION/TRAINING PROGRAM

## 2020-01-01 PROCEDURE — 97165 OT EVAL LOW COMPLEX 30 MIN: CPT

## 2020-01-01 PROCEDURE — G8427 DOCREV CUR MEDS BY ELIG CLIN: HCPCS | Performed by: PSYCHIATRY & NEUROLOGY

## 2020-01-01 PROCEDURE — 83735 ASSAY OF MAGNESIUM: CPT

## 2020-01-01 PROCEDURE — 86140 C-REACTIVE PROTEIN: CPT

## 2020-01-01 PROCEDURE — 1090F PRES/ABSN URINE INCON ASSESS: CPT | Performed by: PSYCHIATRY & NEUROLOGY

## 2020-01-01 PROCEDURE — 6360000002 HC RX W HCPCS

## 2020-01-01 PROCEDURE — 82803 BLOOD GASES ANY COMBINATION: CPT

## 2020-01-01 PROCEDURE — 93010 ELECTROCARDIOGRAM REPORT: CPT | Performed by: INTERNAL MEDICINE

## 2020-01-01 PROCEDURE — 2500000003 HC RX 250 WO HCPCS: Performed by: INTERNAL MEDICINE

## 2020-01-01 PROCEDURE — 80053 COMPREHEN METABOLIC PANEL: CPT

## 2020-01-01 PROCEDURE — 92507 TX SP LANG VOICE COMM INDIV: CPT

## 2020-01-01 PROCEDURE — 83540 ASSAY OF IRON: CPT

## 2020-01-01 PROCEDURE — 96375 TX/PRO/DX INJ NEW DRUG ADDON: CPT

## 2020-01-01 PROCEDURE — U0002 COVID-19 LAB TEST NON-CDC: HCPCS

## 2020-01-01 PROCEDURE — 5A09557 ASSISTANCE WITH RESPIRATORY VENTILATION, GREATER THAN 96 CONSECUTIVE HOURS, CONTINUOUS POSITIVE AIRWAY PRESSURE: ICD-10-PCS | Performed by: HOSPITALIST

## 2020-01-01 PROCEDURE — 85652 RBC SED RATE AUTOMATED: CPT

## 2020-01-01 PROCEDURE — 6360000002 HC RX W HCPCS: Performed by: EMERGENCY MEDICINE

## 2020-01-01 PROCEDURE — 99284 EMERGENCY DEPT VISIT MOD MDM: CPT

## 2020-01-01 PROCEDURE — 93005 ELECTROCARDIOGRAM TRACING: CPT | Performed by: HOSPITALIST

## 2020-01-01 PROCEDURE — 97130 THER IVNTJ EA ADDL 15 MIN: CPT

## 2020-01-01 PROCEDURE — 97166 OT EVAL MOD COMPLEX 45 MIN: CPT

## 2020-01-01 PROCEDURE — G8400 PT W/DXA NO RESULTS DOC: HCPCS | Performed by: PSYCHIATRY & NEUROLOGY

## 2020-01-01 PROCEDURE — 6360000002 HC RX W HCPCS: Performed by: PHYSICIAN ASSISTANT

## 2020-01-01 PROCEDURE — 93306 TTE W/DOPPLER COMPLETE: CPT

## 2020-01-01 PROCEDURE — 2580000003 HC RX 258: Performed by: EMERGENCY MEDICINE

## 2020-01-01 PROCEDURE — 1123F ACP DISCUSS/DSCN MKR DOCD: CPT | Performed by: PSYCHIATRY & NEUROLOGY

## 2020-01-01 PROCEDURE — 83036 HEMOGLOBIN GLYCOSYLATED A1C: CPT

## 2020-01-01 PROCEDURE — 97535 SELF CARE MNGMENT TRAINING: CPT

## 2020-01-01 PROCEDURE — 85379 FIBRIN DEGRADATION QUANT: CPT

## 2020-01-01 PROCEDURE — 2500000003 HC RX 250 WO HCPCS

## 2020-01-01 PROCEDURE — 84100 ASSAY OF PHOSPHORUS: CPT

## 2020-01-01 PROCEDURE — 99285 EMERGENCY DEPT VISIT HI MDM: CPT

## 2020-01-01 PROCEDURE — 83550 IRON BINDING TEST: CPT

## 2020-01-01 PROCEDURE — 85610 PROTHROMBIN TIME: CPT

## 2020-01-01 PROCEDURE — 92610 EVALUATE SWALLOWING FUNCTION: CPT

## 2020-01-01 PROCEDURE — 94761 N-INVAS EAR/PLS OXIMETRY MLT: CPT

## 2020-01-01 RX ORDER — CARVEDILOL 3.12 MG/1
3.12 TABLET ORAL 2 TIMES DAILY WITH MEALS
Status: DISCONTINUED | OUTPATIENT
Start: 2020-01-01 | End: 2020-01-01

## 2020-01-01 RX ORDER — LISINOPRIL 20 MG/1
40 TABLET ORAL DAILY
Status: DISCONTINUED | OUTPATIENT
Start: 2020-01-01 | End: 2020-01-01

## 2020-01-01 RX ORDER — DULOXETIN HYDROCHLORIDE 20 MG/1
20 CAPSULE, DELAYED RELEASE ORAL DAILY
Qty: 30 CAPSULE | Refills: 5 | Status: SHIPPED | OUTPATIENT
Start: 2020-01-01

## 2020-01-01 RX ORDER — ONDANSETRON 2 MG/ML
4 INJECTION INTRAMUSCULAR; INTRAVENOUS EVERY 6 HOURS PRN
Status: DISCONTINUED | OUTPATIENT
Start: 2020-01-01 | End: 2020-01-01 | Stop reason: HOSPADM

## 2020-01-01 RX ORDER — LEVOFLOXACIN 250 MG/1
250 TABLET ORAL DAILY
Status: DISCONTINUED | OUTPATIENT
Start: 2020-01-01 | End: 2020-01-01

## 2020-01-01 RX ORDER — OXYCODONE AND ACETAMINOPHEN 7.5; 325 MG/1; MG/1
1 TABLET ORAL EVERY 8 HOURS PRN
Qty: 9 TABLET | Refills: 0 | Status: SHIPPED | OUTPATIENT
Start: 2020-01-01 | End: 2020-01-01

## 2020-01-01 RX ORDER — POTASSIUM CHLORIDE 750 MG/1
10 TABLET, EXTENDED RELEASE ORAL DAILY
Status: DISCONTINUED | OUTPATIENT
Start: 2020-01-01 | End: 2020-01-01 | Stop reason: HOSPADM

## 2020-01-01 RX ORDER — LISINOPRIL 10 MG/1
10 TABLET ORAL DAILY
Status: DISCONTINUED | OUTPATIENT
Start: 2020-01-01 | End: 2020-01-01

## 2020-01-01 RX ORDER — SODIUM CHLORIDE 0.9 % (FLUSH) 0.9 %
10 SYRINGE (ML) INJECTION PRN
Status: DISCONTINUED | OUTPATIENT
Start: 2020-01-01 | End: 2020-01-01 | Stop reason: HOSPADM

## 2020-01-01 RX ORDER — LEVOTHYROXINE SODIUM 0.07 MG/1
150 TABLET ORAL DAILY
Status: DISCONTINUED | OUTPATIENT
Start: 2020-01-01 | End: 2020-01-01 | Stop reason: HOSPADM

## 2020-01-01 RX ORDER — AMLODIPINE BESYLATE 10 MG/1
10 TABLET ORAL DAILY
Status: DISCONTINUED | OUTPATIENT
Start: 2020-01-01 | End: 2020-01-01

## 2020-01-01 RX ORDER — DEXTROSE MONOHYDRATE 50 MG/ML
INJECTION, SOLUTION INTRAVENOUS CONTINUOUS
Status: DISCONTINUED | OUTPATIENT
Start: 2020-01-01 | End: 2020-01-01

## 2020-01-01 RX ORDER — FUROSEMIDE 10 MG/ML
60 INJECTION INTRAMUSCULAR; INTRAVENOUS ONCE
Status: COMPLETED | OUTPATIENT
Start: 2020-01-01 | End: 2020-01-01

## 2020-01-01 RX ORDER — DEXTROSE MONOHYDRATE 50 MG/ML
100 INJECTION, SOLUTION INTRAVENOUS PRN
Status: DISCONTINUED | OUTPATIENT
Start: 2020-01-01 | End: 2020-01-01 | Stop reason: HOSPADM

## 2020-01-01 RX ORDER — FUROSEMIDE 10 MG/ML
40 INJECTION INTRAMUSCULAR; INTRAVENOUS ONCE
Status: COMPLETED | OUTPATIENT
Start: 2020-01-01 | End: 2020-01-01

## 2020-01-01 RX ORDER — LABETALOL HYDROCHLORIDE 5 MG/ML
10 INJECTION, SOLUTION INTRAVENOUS EVERY 4 HOURS PRN
Status: DISCONTINUED | OUTPATIENT
Start: 2020-01-01 | End: 2020-01-01

## 2020-01-01 RX ORDER — GABAPENTIN 300 MG/1
300 CAPSULE ORAL 3 TIMES DAILY
Status: DISCONTINUED | OUTPATIENT
Start: 2020-01-01 | End: 2020-01-01 | Stop reason: HOSPADM

## 2020-01-01 RX ORDER — CARVEDILOL 6.25 MG/1
6.25 TABLET ORAL 2 TIMES DAILY WITH MEALS
Qty: 60 TABLET | Refills: 0 | DISCHARGE
Start: 2020-01-01

## 2020-01-01 RX ORDER — ZOLPIDEM TARTRATE 5 MG/1
5 TABLET ORAL ONCE
Status: COMPLETED | OUTPATIENT
Start: 2020-01-01 | End: 2020-01-01

## 2020-01-01 RX ORDER — LISINOPRIL 20 MG/1
20 TABLET ORAL DAILY
Status: DISCONTINUED | OUTPATIENT
Start: 2020-01-01 | End: 2020-01-01

## 2020-01-01 RX ORDER — OXYCODONE AND ACETAMINOPHEN 7.5; 325 MG/1; MG/1
1 TABLET ORAL EVERY 8 HOURS PRN
Status: DISCONTINUED | OUTPATIENT
Start: 2020-01-01 | End: 2020-01-01 | Stop reason: HOSPADM

## 2020-01-01 RX ORDER — OXYCODONE HYDROCHLORIDE AND ACETAMINOPHEN 5; 325 MG/1; MG/1
1 TABLET ORAL EVERY 6 HOURS PRN
Status: DISCONTINUED | OUTPATIENT
Start: 2020-01-01 | End: 2020-01-01

## 2020-01-01 RX ORDER — ASPIRIN 81 MG/1
81 TABLET ORAL DAILY
Status: DISCONTINUED | OUTPATIENT
Start: 2020-01-01 | End: 2020-01-01 | Stop reason: HOSPADM

## 2020-01-01 RX ORDER — DEXTROSE MONOHYDRATE 25 G/50ML
12.5 INJECTION, SOLUTION INTRAVENOUS PRN
Status: DISCONTINUED | OUTPATIENT
Start: 2020-01-01 | End: 2020-01-01 | Stop reason: HOSPADM

## 2020-01-01 RX ORDER — GUAIFENESIN 600 MG/1
600 TABLET, EXTENDED RELEASE ORAL 2 TIMES DAILY
Status: DISCONTINUED | OUTPATIENT
Start: 2020-01-01 | End: 2020-01-01 | Stop reason: HOSPADM

## 2020-01-01 RX ORDER — AMOXICILLIN AND CLAVULANATE POTASSIUM 875; 125 MG/1; MG/1
1 TABLET, FILM COATED ORAL 2 TIMES DAILY
Qty: 14 TABLET | Refills: 0 | Status: ON HOLD | DISCHARGE
Start: 2020-01-01 | End: 2020-01-01 | Stop reason: HOSPADM

## 2020-01-01 RX ORDER — HYDROCHLOROTHIAZIDE 25 MG/1
TABLET ORAL
Status: ON HOLD | COMMUNITY
Start: 2020-01-01 | End: 2020-01-01 | Stop reason: HOSPADM

## 2020-01-01 RX ORDER — LIDOCAINE 4 G/G
1 PATCH TOPICAL DAILY
Status: DISCONTINUED | OUTPATIENT
Start: 2020-01-01 | End: 2020-01-01 | Stop reason: HOSPADM

## 2020-01-01 RX ORDER — ACETAMINOPHEN 650 MG/1
650 SUPPOSITORY RECTAL EVERY 6 HOURS PRN
Status: DISCONTINUED | OUTPATIENT
Start: 2020-01-01 | End: 2020-01-01 | Stop reason: HOSPADM

## 2020-01-01 RX ORDER — LIDOCAINE 4 G/G
1 PATCH TOPICAL DAILY
DISCHARGE
Start: 2020-01-01

## 2020-01-01 RX ORDER — FUROSEMIDE 10 MG/ML
40 INJECTION INTRAMUSCULAR; INTRAVENOUS 2 TIMES DAILY
Status: DISCONTINUED | OUTPATIENT
Start: 2020-01-01 | End: 2020-01-01 | Stop reason: HOSPADM

## 2020-01-01 RX ORDER — OXYCODONE AND ACETAMINOPHEN 7.5; 325 MG/1; MG/1
1 TABLET ORAL ONCE
Status: COMPLETED | OUTPATIENT
Start: 2020-01-01 | End: 2020-01-01

## 2020-01-01 RX ORDER — HALOPERIDOL 5 MG/ML
2 INJECTION INTRAMUSCULAR EVERY 6 HOURS PRN
Status: DISCONTINUED | OUTPATIENT
Start: 2020-01-01 | End: 2020-01-01

## 2020-01-01 RX ORDER — POLYETHYLENE GLYCOL 3350 17 G/17G
17 POWDER, FOR SOLUTION ORAL DAILY PRN
Status: DISCONTINUED | OUTPATIENT
Start: 2020-01-01 | End: 2020-01-01 | Stop reason: HOSPADM

## 2020-01-01 RX ORDER — FLUTICASONE PROPIONATE 50 MCG
1 SPRAY, SUSPENSION (ML) NASAL DAILY
Qty: 1 BOTTLE | Refills: 0 | DISCHARGE
Start: 2020-01-01

## 2020-01-01 RX ORDER — LANOLIN ALCOHOL/MO/W.PET/CERES
3 CREAM (GRAM) TOPICAL NIGHTLY PRN
Status: DISCONTINUED | OUTPATIENT
Start: 2020-01-01 | End: 2020-01-01 | Stop reason: HOSPADM

## 2020-01-01 RX ORDER — ASPIRIN 81 MG/1
324 TABLET, CHEWABLE ORAL ONCE
Status: DISCONTINUED | OUTPATIENT
Start: 2020-01-01 | End: 2020-01-01 | Stop reason: HOSPADM

## 2020-01-01 RX ORDER — MECOBALAMIN 5000 MCG
10 TABLET,DISINTEGRATING ORAL NIGHTLY
DISCHARGE
Start: 2020-01-01

## 2020-01-01 RX ORDER — POLYETHYLENE GLYCOL 3350 17 G/17G
17 POWDER, FOR SOLUTION ORAL DAILY PRN
Qty: 527 G | Refills: 0 | DISCHARGE
Start: 2020-01-01 | End: 2020-01-01

## 2020-01-01 RX ORDER — SODIUM CHLORIDE 9 MG/ML
INJECTION, SOLUTION INTRAVENOUS CONTINUOUS
Status: DISCONTINUED | OUTPATIENT
Start: 2020-01-01 | End: 2020-01-01

## 2020-01-01 RX ORDER — GABAPENTIN 100 MG/1
300 CAPSULE ORAL 3 TIMES DAILY
Qty: 27 CAPSULE | Refills: 0 | Status: SHIPPED | OUTPATIENT
Start: 2020-01-01 | End: 2020-01-01

## 2020-01-01 RX ORDER — NALOXONE HYDROCHLORIDE 0.4 MG/ML
0.4 INJECTION, SOLUTION INTRAMUSCULAR; INTRAVENOUS; SUBCUTANEOUS ONCE
Status: COMPLETED | OUTPATIENT
Start: 2020-01-01 | End: 2020-01-01

## 2020-01-01 RX ORDER — NITROGLYCERIN 0.4 MG/1
0.4 TABLET SUBLINGUAL EVERY 5 MIN PRN
Status: DISCONTINUED | OUTPATIENT
Start: 2020-01-01 | End: 2020-01-01 | Stop reason: HOSPADM

## 2020-01-01 RX ORDER — POLYETHYLENE GLYCOL 3350 17 G/17G
17 POWDER, FOR SOLUTION ORAL DAILY
Status: DISCONTINUED | OUTPATIENT
Start: 2020-01-01 | End: 2020-01-01 | Stop reason: HOSPADM

## 2020-01-01 RX ORDER — ENALAPRILAT 2.5 MG/2ML
1.25 INJECTION INTRAVENOUS EVERY 6 HOURS PRN
Status: DISCONTINUED | OUTPATIENT
Start: 2020-01-01 | End: 2020-01-01

## 2020-01-01 RX ORDER — OXYCODONE AND ACETAMINOPHEN 7.5; 325 MG/1; MG/1
1 TABLET ORAL EVERY 8 HOURS PRN
Qty: 9 TABLET | Refills: 0 | Status: ON HOLD | OUTPATIENT
Start: 2020-01-01 | End: 2020-01-01 | Stop reason: HOSPADM

## 2020-01-01 RX ORDER — SODIUM CHLORIDE/ALOE VERA
GEL (GRAM) NASAL PRN
Status: DISCONTINUED | OUTPATIENT
Start: 2020-01-01 | End: 2020-01-01

## 2020-01-01 RX ORDER — ASPIRIN 81 MG/1
81 TABLET, CHEWABLE ORAL DAILY
Status: DISCONTINUED | OUTPATIENT
Start: 2020-01-01 | End: 2020-01-01 | Stop reason: HOSPADM

## 2020-01-01 RX ORDER — DOCUSATE SODIUM 100 MG/1
100 CAPSULE, LIQUID FILLED ORAL DAILY
Status: DISCONTINUED | OUTPATIENT
Start: 2020-01-01 | End: 2020-01-01 | Stop reason: HOSPADM

## 2020-01-01 RX ORDER — DOXYCYCLINE HYCLATE 100 MG/1
100 CAPSULE ORAL EVERY 12 HOURS SCHEDULED
Status: COMPLETED | OUTPATIENT
Start: 2020-01-01 | End: 2020-01-01

## 2020-01-01 RX ORDER — ONDANSETRON 2 MG/ML
4 INJECTION INTRAMUSCULAR; INTRAVENOUS EVERY 6 HOURS PRN
Status: DISCONTINUED | OUTPATIENT
Start: 2020-01-01 | End: 2020-01-01

## 2020-01-01 RX ORDER — ATORVASTATIN CALCIUM 40 MG/1
40 TABLET, FILM COATED ORAL NIGHTLY
Status: DISCONTINUED | OUTPATIENT
Start: 2020-01-01 | End: 2020-01-01 | Stop reason: HOSPADM

## 2020-01-01 RX ORDER — DILTIAZEM HYDROCHLORIDE 5 MG/ML
10 INJECTION INTRAVENOUS ONCE
Status: COMPLETED | OUTPATIENT
Start: 2020-01-01 | End: 2020-01-01

## 2020-01-01 RX ORDER — FLUTICASONE PROPIONATE 50 MCG
1 SPRAY, SUSPENSION (ML) NASAL DAILY
Status: DISCONTINUED | OUTPATIENT
Start: 2020-01-01 | End: 2020-01-01 | Stop reason: HOSPADM

## 2020-01-01 RX ORDER — QUETIAPINE FUMARATE 25 MG/1
25 TABLET, FILM COATED ORAL 2 TIMES DAILY
Status: DISCONTINUED | OUTPATIENT
Start: 2020-01-01 | End: 2020-01-01

## 2020-01-01 RX ORDER — SODIUM CHLORIDE 0.9 % (FLUSH) 0.9 %
10 SYRINGE (ML) INJECTION EVERY 12 HOURS SCHEDULED
Status: DISCONTINUED | OUTPATIENT
Start: 2020-01-01 | End: 2020-01-01 | Stop reason: HOSPADM

## 2020-01-01 RX ORDER — CARVEDILOL 6.25 MG/1
6.25 TABLET ORAL 2 TIMES DAILY WITH MEALS
Status: DISCONTINUED | OUTPATIENT
Start: 2020-01-01 | End: 2020-01-01 | Stop reason: HOSPADM

## 2020-01-01 RX ORDER — GUAIFENESIN 600 MG/1
1200 TABLET, EXTENDED RELEASE ORAL EVERY 12 HOURS SCHEDULED
Status: ON HOLD | COMMUNITY
Start: 2019-09-17 | End: 2020-01-01 | Stop reason: SDUPTHER

## 2020-01-01 RX ORDER — GABAPENTIN 100 MG/1
300 CAPSULE ORAL 3 TIMES DAILY
Qty: 90 CAPSULE | Refills: 0 | Status: ON HOLD | OUTPATIENT
Start: 2020-01-01 | End: 2020-01-01

## 2020-01-01 RX ORDER — NITROFURANTOIN 25; 75 MG/1; MG/1
100 CAPSULE ORAL EVERY 12 HOURS SCHEDULED
Status: DISCONTINUED | OUTPATIENT
Start: 2020-01-01 | End: 2020-01-01

## 2020-01-01 RX ORDER — MELOXICAM 7.5 MG/1
7.5 TABLET ORAL DAILY PRN
Status: ON HOLD | COMMUNITY
End: 2020-01-01 | Stop reason: HOSPADM

## 2020-01-01 RX ORDER — HYDROCHLOROTHIAZIDE 25 MG/1
25 TABLET ORAL DAILY
Status: DISCONTINUED | OUTPATIENT
Start: 2020-01-01 | End: 2020-01-01

## 2020-01-01 RX ORDER — MECOBALAMIN 5000 MCG
10 TABLET,DISINTEGRATING ORAL NIGHTLY
Status: DISCONTINUED | OUTPATIENT
Start: 2020-01-01 | End: 2020-01-01 | Stop reason: HOSPADM

## 2020-01-01 RX ORDER — SODIUM CHLORIDE, SODIUM LACTATE, POTASSIUM CHLORIDE, CALCIUM CHLORIDE 600; 310; 30; 20 MG/100ML; MG/100ML; MG/100ML; MG/100ML
1000 INJECTION, SOLUTION INTRAVENOUS ONCE
Status: COMPLETED | OUTPATIENT
Start: 2020-01-01 | End: 2020-01-01

## 2020-01-01 RX ORDER — FLUCONAZOLE 100 MG/1
200 TABLET ORAL DAILY
Status: DISCONTINUED | OUTPATIENT
Start: 2020-01-01 | End: 2020-01-01

## 2020-01-01 RX ORDER — ACETAMINOPHEN 325 MG/1
650 TABLET ORAL EVERY 6 HOURS PRN
Status: DISCONTINUED | OUTPATIENT
Start: 2020-01-01 | End: 2020-01-01 | Stop reason: HOSPADM

## 2020-01-01 RX ORDER — DULOXETIN HYDROCHLORIDE 20 MG/1
20 CAPSULE, DELAYED RELEASE ORAL DAILY
Status: DISCONTINUED | OUTPATIENT
Start: 2020-01-01 | End: 2020-01-01 | Stop reason: HOSPADM

## 2020-01-01 RX ORDER — GUAIFENESIN 600 MG/1
600 TABLET, EXTENDED RELEASE ORAL EVERY 12 HOURS SCHEDULED
DISCHARGE
Start: 2020-01-01

## 2020-01-01 RX ORDER — CARVEDILOL 6.25 MG/1
6.25 TABLET ORAL 2 TIMES DAILY WITH MEALS
Status: DISCONTINUED | OUTPATIENT
Start: 2020-01-01 | End: 2020-01-01

## 2020-01-01 RX ORDER — OXYCODONE 18 MG/1
18 CAPSULE, EXTENDED RELEASE ORAL 2 TIMES DAILY
Qty: 12 EACH | Refills: 0 | Status: ON HOLD | OUTPATIENT
Start: 2020-01-01 | End: 2020-01-01 | Stop reason: HOSPADM

## 2020-01-01 RX ORDER — HYDROCORTISONE 0.5 %
CREAM (GRAM) TOPICAL 3 TIMES DAILY PRN
Refills: 0 | DISCHARGE
Start: 2020-01-01

## 2020-01-01 RX ORDER — LISINOPRIL 10 MG/1
10 TABLET ORAL DAILY
Status: ON HOLD | COMMUNITY
Start: 2020-01-01 | End: 2020-01-01 | Stop reason: HOSPADM

## 2020-01-01 RX ORDER — HEPARIN SODIUM 5000 [USP'U]/ML
5000 INJECTION, SOLUTION INTRAVENOUS; SUBCUTANEOUS EVERY 8 HOURS SCHEDULED
Status: DISCONTINUED | OUTPATIENT
Start: 2020-01-01 | End: 2020-01-01 | Stop reason: HOSPADM

## 2020-01-01 RX ORDER — ZIPRASIDONE MESYLATE 20 MG/ML
10 INJECTION, POWDER, LYOPHILIZED, FOR SOLUTION INTRAMUSCULAR EVERY 6 HOURS PRN
Status: DISCONTINUED | OUTPATIENT
Start: 2020-01-01 | End: 2020-01-01

## 2020-01-01 RX ORDER — NICOTINE POLACRILEX 4 MG
15 LOZENGE BUCCAL PRN
Status: DISCONTINUED | OUTPATIENT
Start: 2020-01-01 | End: 2020-01-01 | Stop reason: HOSPADM

## 2020-01-01 RX ORDER — PSEUDOEPHEDRINE HCL 30 MG
100 TABLET ORAL DAILY
DISCHARGE
Start: 2020-01-01

## 2020-01-01 RX ORDER — LISINOPRIL 10 MG/1
10 TABLET ORAL DAILY
Status: DISCONTINUED | OUTPATIENT
Start: 2020-01-01 | End: 2020-01-01 | Stop reason: HOSPADM

## 2020-01-01 RX ORDER — ONDANSETRON 4 MG/1
4 TABLET, ORALLY DISINTEGRATING ORAL EVERY 8 HOURS PRN
Status: DISCONTINUED | OUTPATIENT
Start: 2020-01-01 | End: 2020-01-01 | Stop reason: HOSPADM

## 2020-01-01 RX ORDER — MIDAZOLAM HYDROCHLORIDE 1 MG/ML
4 INJECTION INTRAMUSCULAR; INTRAVENOUS ONCE
Status: COMPLETED | OUTPATIENT
Start: 2020-01-01 | End: 2020-01-01

## 2020-01-01 RX ORDER — LANOLIN ALCOHOL/MO/W.PET/CERES
3 CREAM (GRAM) TOPICAL NIGHTLY PRN
Refills: 3 | Status: ON HOLD | DISCHARGE
Start: 2020-01-01 | End: 2020-01-01 | Stop reason: HOSPADM

## 2020-01-01 RX ORDER — PROMETHAZINE HYDROCHLORIDE 25 MG/1
12.5 TABLET ORAL EVERY 6 HOURS PRN
Status: DISCONTINUED | OUTPATIENT
Start: 2020-01-01 | End: 2020-01-01

## 2020-01-01 RX ORDER — DEXTROSE AND SODIUM CHLORIDE 5; .45 G/100ML; G/100ML
INJECTION, SOLUTION INTRAVENOUS CONTINUOUS
Status: DISCONTINUED | OUTPATIENT
Start: 2020-01-01 | End: 2020-01-01

## 2020-01-01 RX ORDER — HYDROCORTISONE 0.5 %
CREAM (GRAM) TOPICAL 3 TIMES DAILY PRN
Status: DISCONTINUED | OUTPATIENT
Start: 2020-01-01 | End: 2020-01-01 | Stop reason: HOSPADM

## 2020-01-01 RX ORDER — KETAMINE HYDROCHLORIDE 50 MG/ML
100 INJECTION, SOLUTION, CONCENTRATE INTRAMUSCULAR; INTRAVENOUS ONCE
Status: DISCONTINUED | OUTPATIENT
Start: 2020-01-01 | End: 2020-01-01

## 2020-01-01 RX ORDER — MIDAZOLAM HYDROCHLORIDE 1 MG/ML
INJECTION INTRAMUSCULAR; INTRAVENOUS
Status: COMPLETED
Start: 2020-01-01 | End: 2020-01-01

## 2020-01-01 RX ORDER — SODIUM CHLORIDE 450 MG/100ML
INJECTION, SOLUTION INTRAVENOUS CONTINUOUS
Status: DISCONTINUED | OUTPATIENT
Start: 2020-01-01 | End: 2020-01-01

## 2020-01-01 RX ORDER — POLYETHYLENE GLYCOL 3350 17 G/17G
17 POWDER, FOR SOLUTION ORAL DAILY PRN
Status: DISCONTINUED | OUTPATIENT
Start: 2020-01-01 | End: 2020-01-01

## 2020-01-01 RX ORDER — FENTANYL CITRATE 50 UG/ML
50 INJECTION, SOLUTION INTRAMUSCULAR; INTRAVENOUS ONCE
Status: DISCONTINUED | OUTPATIENT
Start: 2020-01-01 | End: 2020-01-01

## 2020-01-01 RX ORDER — KETAMINE HYDROCHLORIDE 50 MG/ML
INJECTION, SOLUTION, CONCENTRATE INTRAMUSCULAR; INTRAVENOUS
Status: COMPLETED
Start: 2020-01-01 | End: 2020-01-01

## 2020-01-01 RX ORDER — AMLODIPINE BESYLATE 5 MG/1
5 TABLET ORAL DAILY
Status: DISCONTINUED | OUTPATIENT
Start: 2020-01-01 | End: 2020-01-01

## 2020-01-01 RX ORDER — LEVOTHYROXINE SODIUM 0.15 MG/1
150 TABLET ORAL DAILY
COMMUNITY
Start: 2020-01-01

## 2020-01-01 RX ORDER — FUROSEMIDE 20 MG/1
10 TABLET ORAL ONCE
Status: COMPLETED | OUTPATIENT
Start: 2020-01-01 | End: 2020-01-01

## 2020-01-01 RX ADMIN — CARVEDILOL 3.12 MG: 3.12 TABLET, FILM COATED ORAL at 17:15

## 2020-01-01 RX ADMIN — OXYCODONE HYDROCHLORIDE AND ACETAMINOPHEN 1 TABLET: 7.5; 325 TABLET ORAL at 11:41

## 2020-01-01 RX ADMIN — DICLOFENAC 1 G: 10 GEL TOPICAL at 07:55

## 2020-01-01 RX ADMIN — SODIUM CHLORIDE: 9 INJECTION, SOLUTION INTRAVENOUS at 23:05

## 2020-01-01 RX ADMIN — DULOXETINE 20 MG: 20 CAPSULE, DELAYED RELEASE ORAL at 10:31

## 2020-01-01 RX ADMIN — SODIUM CHLORIDE, PRESERVATIVE FREE 10 ML: 5 INJECTION INTRAVENOUS at 10:28

## 2020-01-01 RX ADMIN — OXYCODONE HYDROCHLORIDE AND ACETAMINOPHEN 1 TABLET: 7.5; 325 TABLET ORAL at 19:31

## 2020-01-01 RX ADMIN — DOXYCYCLINE HYCLATE 100 MG: 100 CAPSULE ORAL at 08:29

## 2020-01-01 RX ADMIN — GABAPENTIN 300 MG: 300 CAPSULE ORAL at 09:40

## 2020-01-01 RX ADMIN — OXYCODONE HYDROCHLORIDE AND ACETAMINOPHEN 1 TABLET: 7.5; 325 TABLET ORAL at 03:58

## 2020-01-01 RX ADMIN — GUAIFENESIN 600 MG: 600 TABLET, EXTENDED RELEASE ORAL at 20:58

## 2020-01-01 RX ADMIN — SODIUM CHLORIDE, PRESERVATIVE FREE 10 ML: 5 INJECTION INTRAVENOUS at 21:21

## 2020-01-01 RX ADMIN — GUAIFENESIN 600 MG: 600 TABLET, EXTENDED RELEASE ORAL at 10:50

## 2020-01-01 RX ADMIN — SODIUM CHLORIDE, PRESERVATIVE FREE 10 ML: 5 INJECTION INTRAVENOUS at 21:35

## 2020-01-01 RX ADMIN — DULOXETINE 20 MG: 20 CAPSULE, DELAYED RELEASE ORAL at 08:29

## 2020-01-01 RX ADMIN — GABAPENTIN 300 MG: 300 CAPSULE ORAL at 20:06

## 2020-01-01 RX ADMIN — GABAPENTIN 300 MG: 300 CAPSULE ORAL at 21:07

## 2020-01-01 RX ADMIN — SODIUM CHLORIDE, PRESERVATIVE FREE 10 ML: 5 INJECTION INTRAVENOUS at 08:31

## 2020-01-01 RX ADMIN — POLYETHYLENE GLYCOL 3350 17 G: 17 POWDER, FOR SOLUTION ORAL at 09:44

## 2020-01-01 RX ADMIN — FUROSEMIDE 40 MG: 10 INJECTION, SOLUTION INTRAMUSCULAR; INTRAVENOUS at 18:15

## 2020-01-01 RX ADMIN — ENOXAPARIN SODIUM 135 MG: 150 INJECTION SUBCUTANEOUS at 05:06

## 2020-01-01 RX ADMIN — GABAPENTIN 300 MG: 300 CAPSULE ORAL at 21:53

## 2020-01-01 RX ADMIN — GABAPENTIN 300 MG: 300 CAPSULE ORAL at 21:37

## 2020-01-01 RX ADMIN — SODIUM CHLORIDE, PRESERVATIVE FREE 10 ML: 5 INJECTION INTRAVENOUS at 20:43

## 2020-01-01 RX ADMIN — LISINOPRIL 10 MG: 10 TABLET ORAL at 09:24

## 2020-01-01 RX ADMIN — SODIUM CHLORIDE, PRESERVATIVE FREE 10 ML: 5 INJECTION INTRAVENOUS at 15:53

## 2020-01-01 RX ADMIN — CARVEDILOL 3.12 MG: 3.12 TABLET, FILM COATED ORAL at 08:19

## 2020-01-01 RX ADMIN — APIXABAN 5 MG: 5 TABLET, FILM COATED ORAL at 09:40

## 2020-01-01 RX ADMIN — CARVEDILOL 3.12 MG: 3.12 TABLET, FILM COATED ORAL at 17:16

## 2020-01-01 RX ADMIN — SODIUM CHLORIDE, PRESERVATIVE FREE 10 ML: 5 INJECTION INTRAVENOUS at 09:02

## 2020-01-01 RX ADMIN — CARVEDILOL 6.25 MG: 6.25 TABLET, FILM COATED ORAL at 07:56

## 2020-01-01 RX ADMIN — ASPIRIN 81 MG: 81 TABLET, FILM COATED ORAL at 08:34

## 2020-01-01 RX ADMIN — GABAPENTIN 300 MG: 300 CAPSULE ORAL at 09:37

## 2020-01-01 RX ADMIN — CARVEDILOL 6.25 MG: 6.25 TABLET, FILM COATED ORAL at 09:47

## 2020-01-01 RX ADMIN — ACETAMINOPHEN 650 MG: 325 TABLET ORAL at 21:20

## 2020-01-01 RX ADMIN — ACETAMINOPHEN 650 MG: 325 TABLET ORAL at 00:48

## 2020-01-01 RX ADMIN — FLUTICASONE PROPIONATE 1 SPRAY: 50 SPRAY, METERED NASAL at 08:33

## 2020-01-01 RX ADMIN — ACETAMINOPHEN 650 MG: 325 TABLET ORAL at 20:38

## 2020-01-01 RX ADMIN — FENTANYL CITRATE 50 MCG: 0.05 INJECTION, SOLUTION INTRAMUSCULAR; INTRAVENOUS at 18:23

## 2020-01-01 RX ADMIN — DICLOFENAC 1 G: 10 GEL TOPICAL at 09:45

## 2020-01-01 RX ADMIN — SALINE NASAL SPRAY 1 SPRAY: 1.5 SOLUTION NASAL at 09:59

## 2020-01-01 RX ADMIN — SODIUM CHLORIDE, PRESERVATIVE FREE 10 ML: 5 INJECTION INTRAVENOUS at 09:38

## 2020-01-01 RX ADMIN — GABAPENTIN 300 MG: 300 CAPSULE ORAL at 15:47

## 2020-01-01 RX ADMIN — DICLOFENAC 1 G: 10 GEL TOPICAL at 19:31

## 2020-01-01 RX ADMIN — ACETAMINOPHEN 650 MG: 325 TABLET ORAL at 00:23

## 2020-01-01 RX ADMIN — POLYETHYLENE GLYCOL 3350 17 G: 17 POWDER, FOR SOLUTION ORAL at 08:37

## 2020-01-01 RX ADMIN — ASPIRIN 81 MG: 81 TABLET, FILM COATED ORAL at 08:29

## 2020-01-01 RX ADMIN — DULOXETINE 20 MG: 20 CAPSULE, DELAYED RELEASE ORAL at 08:59

## 2020-01-01 RX ADMIN — SODIUM CHLORIDE, PRESERVATIVE FREE 10 ML: 5 INJECTION INTRAVENOUS at 09:06

## 2020-01-01 RX ADMIN — DULOXETINE 20 MG: 20 CAPSULE, DELAYED RELEASE ORAL at 09:37

## 2020-01-01 RX ADMIN — FUROSEMIDE 10 MG: 20 TABLET ORAL at 12:59

## 2020-01-01 RX ADMIN — ENOXAPARIN SODIUM 135 MG: 150 INJECTION SUBCUTANEOUS at 04:05

## 2020-01-01 RX ADMIN — APIXABAN 5 MG: 5 TABLET, FILM COATED ORAL at 08:35

## 2020-01-01 RX ADMIN — POLYETHYLENE GLYCOL 3350 17 G: 17 POWDER, FOR SOLUTION ORAL at 08:20

## 2020-01-01 RX ADMIN — SODIUM CHLORIDE, PRESERVATIVE FREE 10 ML: 5 INJECTION INTRAVENOUS at 07:59

## 2020-01-01 RX ADMIN — OXYCODONE HYDROCHLORIDE AND ACETAMINOPHEN 1 TABLET: 7.5; 325 TABLET ORAL at 14:05

## 2020-01-01 RX ADMIN — INSULIN LISPRO 1 UNITS: 100 INJECTION, SOLUTION INTRAVENOUS; SUBCUTANEOUS at 17:30

## 2020-01-01 RX ADMIN — OXYCODONE HYDROCHLORIDE AND ACETAMINOPHEN 1 TABLET: 7.5; 325 TABLET ORAL at 12:00

## 2020-01-01 RX ADMIN — ATORVASTATIN CALCIUM 40 MG: 40 TABLET, FILM COATED ORAL at 23:25

## 2020-01-01 RX ADMIN — APIXABAN 5 MG: 5 TABLET, FILM COATED ORAL at 20:07

## 2020-01-01 RX ADMIN — OXYCODONE HYDROCHLORIDE AND ACETAMINOPHEN 1 TABLET: 7.5; 325 TABLET ORAL at 04:11

## 2020-01-01 RX ADMIN — DULOXETINE 20 MG: 20 CAPSULE, DELAYED RELEASE ORAL at 08:31

## 2020-01-01 RX ADMIN — SODIUM CHLORIDE, SODIUM LACTATE, POTASSIUM CHLORIDE, AND CALCIUM CHLORIDE 1000 ML: 600; 310; 30; 20 INJECTION, SOLUTION INTRAVENOUS at 15:30

## 2020-01-01 RX ADMIN — OXYCODONE HYDROCHLORIDE AND ACETAMINOPHEN 1 TABLET: 7.5; 325 TABLET ORAL at 21:53

## 2020-01-01 RX ADMIN — DICLOFENAC 1 G: 10 GEL TOPICAL at 21:21

## 2020-01-01 RX ADMIN — SODIUM CHLORIDE, PRESERVATIVE FREE 10 ML: 5 INJECTION INTRAVENOUS at 21:27

## 2020-01-01 RX ADMIN — APIXABAN 5 MG: 5 TABLET, FILM COATED ORAL at 08:31

## 2020-01-01 RX ADMIN — OXYCODONE HYDROCHLORIDE AND ACETAMINOPHEN 1 TABLET: 7.5; 325 TABLET ORAL at 20:31

## 2020-01-01 RX ADMIN — SODIUM CHLORIDE, PRESERVATIVE FREE 10 ML: 5 INJECTION INTRAVENOUS at 23:09

## 2020-01-01 RX ADMIN — CARVEDILOL 6.25 MG: 6.25 TABLET, FILM COATED ORAL at 19:13

## 2020-01-01 RX ADMIN — ACETAMINOPHEN 650 MG: 325 TABLET ORAL at 10:09

## 2020-01-01 RX ADMIN — LISINOPRIL 10 MG: 10 TABLET ORAL at 11:20

## 2020-01-01 RX ADMIN — OXYCODONE HYDROCHLORIDE AND ACETAMINOPHEN 1 TABLET: 7.5; 325 TABLET ORAL at 21:23

## 2020-01-01 RX ADMIN — DULOXETINE 20 MG: 20 CAPSULE, DELAYED RELEASE ORAL at 07:15

## 2020-01-01 RX ADMIN — ACETAMINOPHEN 650 MG: 325 TABLET ORAL at 21:17

## 2020-01-01 RX ADMIN — SODIUM CHLORIDE, PRESERVATIVE FREE 10 ML: 5 INJECTION INTRAVENOUS at 21:18

## 2020-01-01 RX ADMIN — OXYCODONE HYDROCHLORIDE AND ACETAMINOPHEN 1 TABLET: 7.5; 325 TABLET ORAL at 10:50

## 2020-01-01 RX ADMIN — ACETAMINOPHEN 650 MG: 325 TABLET ORAL at 20:58

## 2020-01-01 RX ADMIN — GABAPENTIN 300 MG: 300 CAPSULE ORAL at 20:36

## 2020-01-01 RX ADMIN — GUAIFENESIN 600 MG: 600 TABLET, EXTENDED RELEASE ORAL at 08:23

## 2020-01-01 RX ADMIN — DICLOFENAC 1 G: 10 GEL TOPICAL at 09:03

## 2020-01-01 RX ADMIN — GUAIFENESIN 600 MG: 600 TABLET, EXTENDED RELEASE ORAL at 20:40

## 2020-01-01 RX ADMIN — SODIUM CHLORIDE: 4.5 INJECTION, SOLUTION INTRAVENOUS at 12:47

## 2020-01-01 RX ADMIN — DOCUSATE SODIUM 100 MG: 100 CAPSULE, LIQUID FILLED ORAL at 10:43

## 2020-01-01 RX ADMIN — SODIUM CHLORIDE, PRESERVATIVE FREE 10 ML: 5 INJECTION INTRAVENOUS at 07:58

## 2020-01-01 RX ADMIN — POLYETHYLENE GLYCOL 3350 17 G: 17 POWDER, FOR SOLUTION ORAL at 10:42

## 2020-01-01 RX ADMIN — LEVOTHYROXINE SODIUM 150 MCG: 75 TABLET ORAL at 06:18

## 2020-01-01 RX ADMIN — MENTHOL, METHYL SALICYLATE: 10; 15 CREAM TOPICAL at 21:59

## 2020-01-01 RX ADMIN — HEPARIN SODIUM 5000 UNITS: 5000 INJECTION INTRAVENOUS; SUBCUTANEOUS at 05:41

## 2020-01-01 RX ADMIN — DICLOFENAC 1 G: 10 GEL TOPICAL at 10:00

## 2020-01-01 RX ADMIN — DOCUSATE SODIUM 100 MG: 100 CAPSULE, LIQUID FILLED ORAL at 08:31

## 2020-01-01 RX ADMIN — FLUCONAZOLE 200 MG: 100 TABLET ORAL at 10:22

## 2020-01-01 RX ADMIN — SODIUM CHLORIDE, PRESERVATIVE FREE 10 ML: 5 INJECTION INTRAVENOUS at 20:38

## 2020-01-01 RX ADMIN — ASPIRIN 81 MG: 81 TABLET, FILM COATED ORAL at 09:16

## 2020-01-01 RX ADMIN — ASPIRIN 81 MG: 81 TABLET, FILM COATED ORAL at 07:46

## 2020-01-01 RX ADMIN — ACETAMINOPHEN 650 MG: 325 TABLET ORAL at 13:14

## 2020-01-01 RX ADMIN — LEVOTHYROXINE SODIUM 150 MCG: 75 TABLET ORAL at 05:55

## 2020-01-01 RX ADMIN — OXYCODONE HYDROCHLORIDE AND ACETAMINOPHEN 1 TABLET: 7.5; 325 TABLET ORAL at 05:57

## 2020-01-01 RX ADMIN — OXYCODONE HYDROCHLORIDE AND ACETAMINOPHEN 1 TABLET: 7.5; 325 TABLET ORAL at 08:35

## 2020-01-01 RX ADMIN — FLUTICASONE PROPIONATE 1 SPRAY: 50 SPRAY, METERED NASAL at 09:58

## 2020-01-01 RX ADMIN — ASPIRIN 81 MG: 81 TABLET, FILM COATED ORAL at 10:22

## 2020-01-01 RX ADMIN — MIDAZOLAM 4 MG: 1 INJECTION INTRAMUSCULAR; INTRAVENOUS at 14:12

## 2020-01-01 RX ADMIN — OXYCODONE HYDROCHLORIDE AND ACETAMINOPHEN 1 TABLET: 7.5; 325 TABLET ORAL at 20:06

## 2020-01-01 RX ADMIN — ASPIRIN 81 MG: 81 TABLET, FILM COATED ORAL at 09:40

## 2020-01-01 RX ADMIN — VANCOMYCIN HYDROCHLORIDE 1250 MG: 10 INJECTION, POWDER, LYOPHILIZED, FOR SOLUTION INTRAVENOUS at 12:51

## 2020-01-01 RX ADMIN — FLUCONAZOLE 200 MG: 100 TABLET ORAL at 07:46

## 2020-01-01 RX ADMIN — APIXABAN 5 MG: 5 TABLET, FILM COATED ORAL at 21:18

## 2020-01-01 RX ADMIN — APIXABAN 5 MG: 5 TABLET, FILM COATED ORAL at 10:43

## 2020-01-01 RX ADMIN — GABAPENTIN 300 MG: 300 CAPSULE ORAL at 08:31

## 2020-01-01 RX ADMIN — OXYCODONE HYDROCHLORIDE AND ACETAMINOPHEN 1 TABLET: 7.5; 325 TABLET ORAL at 02:32

## 2020-01-01 RX ADMIN — ASPIRIN 81 MG: 81 TABLET, FILM COATED ORAL at 11:20

## 2020-01-01 RX ADMIN — GABAPENTIN 300 MG: 300 CAPSULE ORAL at 13:30

## 2020-01-01 RX ADMIN — HALOPERIDOL LACTATE 2 MG: 5 INJECTION, SOLUTION INTRAMUSCULAR at 20:38

## 2020-01-01 RX ADMIN — APIXABAN 5 MG: 5 TABLET, FILM COATED ORAL at 20:38

## 2020-01-01 RX ADMIN — GABAPENTIN 300 MG: 300 CAPSULE ORAL at 20:46

## 2020-01-01 RX ADMIN — APIXABAN 5 MG: 5 TABLET, FILM COATED ORAL at 21:20

## 2020-01-01 RX ADMIN — CARVEDILOL 6.25 MG: 6.25 TABLET, FILM COATED ORAL at 10:50

## 2020-01-01 RX ADMIN — DICLOFENAC 1 G: 10 GEL TOPICAL at 20:47

## 2020-01-01 RX ADMIN — FLUTICASONE PROPIONATE 1 SPRAY: 50 SPRAY, METERED NASAL at 07:57

## 2020-01-01 RX ADMIN — GABAPENTIN 300 MG: 300 CAPSULE ORAL at 13:46

## 2020-01-01 RX ADMIN — Medication 10 MG: at 02:45

## 2020-01-01 RX ADMIN — FLUTICASONE PROPIONATE 1 SPRAY: 50 SPRAY, METERED NASAL at 08:35

## 2020-01-01 RX ADMIN — FUROSEMIDE 40 MG: 10 INJECTION, SOLUTION INTRAMUSCULAR; INTRAVENOUS at 08:59

## 2020-01-01 RX ADMIN — DICLOFENAC 1 G: 10 GEL TOPICAL at 10:46

## 2020-01-01 RX ADMIN — LEVOTHYROXINE SODIUM 150 MCG: 75 TABLET ORAL at 06:15

## 2020-01-01 RX ADMIN — OXYCODONE HYDROCHLORIDE AND ACETAMINOPHEN 1 TABLET: 7.5; 325 TABLET ORAL at 13:50

## 2020-01-01 RX ADMIN — LEVOTHYROXINE SODIUM 150 MCG: 75 TABLET ORAL at 06:25

## 2020-01-01 RX ADMIN — VANCOMYCIN HYDROCHLORIDE 1250 MG: 10 INJECTION, POWDER, LYOPHILIZED, FOR SOLUTION INTRAVENOUS at 00:00

## 2020-01-01 RX ADMIN — CEFTRIAXONE 1 G: 1 INJECTION, POWDER, FOR SOLUTION INTRAMUSCULAR; INTRAVENOUS at 15:37

## 2020-01-01 RX ADMIN — SODIUM CHLORIDE, PRESERVATIVE FREE 10 ML: 5 INJECTION INTRAVENOUS at 20:58

## 2020-01-01 RX ADMIN — ASPIRIN 81 MG: 81 TABLET, FILM COATED ORAL at 08:18

## 2020-01-01 RX ADMIN — APIXABAN 5 MG: 5 TABLET, FILM COATED ORAL at 10:45

## 2020-01-01 RX ADMIN — AMLODIPINE BESYLATE 10 MG: 10 TABLET ORAL at 10:23

## 2020-01-01 RX ADMIN — SODIUM CHLORIDE, PRESERVATIVE FREE 1 G: 5 INJECTION INTRAVENOUS at 09:44

## 2020-01-01 RX ADMIN — GUAIFENESIN 600 MG: 600 TABLET, EXTENDED RELEASE ORAL at 19:55

## 2020-01-01 RX ADMIN — OXYCODONE HYDROCHLORIDE AND ACETAMINOPHEN 1 TABLET: 7.5; 325 TABLET ORAL at 11:55

## 2020-01-01 RX ADMIN — SODIUM CHLORIDE, PRESERVATIVE FREE 10 ML: 5 INJECTION INTRAVENOUS at 23:05

## 2020-01-01 RX ADMIN — GUAIFENESIN 600 MG: 600 TABLET, EXTENDED RELEASE ORAL at 10:30

## 2020-01-01 RX ADMIN — GUAIFENESIN 600 MG: 600 TABLET, EXTENDED RELEASE ORAL at 21:58

## 2020-01-01 RX ADMIN — SODIUM CHLORIDE, PRESERVATIVE FREE 2 G: 5 INJECTION INTRAVENOUS at 07:45

## 2020-01-01 RX ADMIN — ATORVASTATIN CALCIUM 40 MG: 40 TABLET, FILM COATED ORAL at 20:53

## 2020-01-01 RX ADMIN — CARVEDILOL 3.12 MG: 3.12 TABLET, FILM COATED ORAL at 18:21

## 2020-01-01 RX ADMIN — ASPIRIN 81 MG: 81 TABLET, FILM COATED ORAL at 08:31

## 2020-01-01 RX ADMIN — GABAPENTIN 300 MG: 300 CAPSULE ORAL at 10:43

## 2020-01-01 RX ADMIN — OXYCODONE HYDROCHLORIDE AND ACETAMINOPHEN 1 TABLET: 7.5; 325 TABLET ORAL at 15:48

## 2020-01-01 RX ADMIN — IOPAMIDOL 90 ML: 755 INJECTION, SOLUTION INTRAVENOUS at 20:39

## 2020-01-01 RX ADMIN — GUAIFENESIN 600 MG: 600 TABLET, EXTENDED RELEASE ORAL at 21:18

## 2020-01-01 RX ADMIN — DILTIAZEM HYDROCHLORIDE 7.5 MG/HR: 5 INJECTION INTRAVENOUS at 05:44

## 2020-01-01 RX ADMIN — DICLOFENAC 1 G: 10 GEL TOPICAL at 10:51

## 2020-01-01 RX ADMIN — INSULIN LISPRO 1 UNITS: 100 INJECTION, SOLUTION INTRAVENOUS; SUBCUTANEOUS at 21:26

## 2020-01-01 RX ADMIN — GUAIFENESIN 600 MG: 600 TABLET, EXTENDED RELEASE ORAL at 20:32

## 2020-01-01 RX ADMIN — OXYCODONE HYDROCHLORIDE AND ACETAMINOPHEN 1 TABLET: 7.5; 325 TABLET ORAL at 22:02

## 2020-01-01 RX ADMIN — FLUTICASONE PROPIONATE 1 SPRAY: 50 SPRAY, METERED NASAL at 09:47

## 2020-01-01 RX ADMIN — DOCUSATE SODIUM 100 MG: 100 CAPSULE, LIQUID FILLED ORAL at 08:34

## 2020-01-01 RX ADMIN — ASPIRIN 81 MG: 81 TABLET, FILM COATED ORAL at 07:26

## 2020-01-01 RX ADMIN — INSULIN LISPRO 1 UNITS: 100 INJECTION, SOLUTION INTRAVENOUS; SUBCUTANEOUS at 21:37

## 2020-01-01 RX ADMIN — SODIUM CHLORIDE, PRESERVATIVE FREE 2 G: 5 INJECTION INTRAVENOUS at 10:29

## 2020-01-01 RX ADMIN — ENOXAPARIN SODIUM 40 MG: 40 INJECTION SUBCUTANEOUS at 21:56

## 2020-01-01 RX ADMIN — APIXABAN 5 MG: 5 TABLET, FILM COATED ORAL at 22:41

## 2020-01-01 RX ADMIN — DOCUSATE SODIUM 100 MG: 100 CAPSULE, LIQUID FILLED ORAL at 08:29

## 2020-01-01 RX ADMIN — CARVEDILOL 3.12 MG: 3.12 TABLET, FILM COATED ORAL at 08:40

## 2020-01-01 RX ADMIN — GABAPENTIN 300 MG: 300 CAPSULE ORAL at 20:39

## 2020-01-01 RX ADMIN — CARVEDILOL 3.12 MG: 3.12 TABLET, FILM COATED ORAL at 17:31

## 2020-01-01 RX ADMIN — SODIUM CHLORIDE, PRESERVATIVE FREE 1 G: 5 INJECTION INTRAVENOUS at 09:40

## 2020-01-01 RX ADMIN — APIXABAN 5 MG: 5 TABLET, FILM COATED ORAL at 07:56

## 2020-01-01 RX ADMIN — CARVEDILOL 3.12 MG: 3.12 TABLET, FILM COATED ORAL at 16:34

## 2020-01-01 RX ADMIN — DOCUSATE SODIUM 100 MG: 100 CAPSULE, LIQUID FILLED ORAL at 18:21

## 2020-01-01 RX ADMIN — ASPIRIN 81 MG: 81 TABLET, FILM COATED ORAL at 09:04

## 2020-01-01 RX ADMIN — GABAPENTIN 300 MG: 300 CAPSULE ORAL at 14:47

## 2020-01-01 RX ADMIN — DOXYCYCLINE HYCLATE 100 MG: 100 CAPSULE ORAL at 09:37

## 2020-01-01 RX ADMIN — DULOXETINE 20 MG: 20 CAPSULE, DELAYED RELEASE ORAL at 08:35

## 2020-01-01 RX ADMIN — DOCUSATE SODIUM 100 MG: 100 CAPSULE, LIQUID FILLED ORAL at 07:26

## 2020-01-01 RX ADMIN — DOCUSATE SODIUM 100 MG: 100 CAPSULE, LIQUID FILLED ORAL at 08:17

## 2020-01-01 RX ADMIN — GABAPENTIN 300 MG: 300 CAPSULE ORAL at 13:14

## 2020-01-01 RX ADMIN — ZIPRASIDONE MESYLATE 10 MG: 20 INJECTION, POWDER, LYOPHILIZED, FOR SOLUTION INTRAMUSCULAR at 13:30

## 2020-01-01 RX ADMIN — OXYCODONE HYDROCHLORIDE AND ACETAMINOPHEN 1 TABLET: 7.5; 325 TABLET ORAL at 16:34

## 2020-01-01 RX ADMIN — DOXYCYCLINE HYCLATE 100 MG: 100 CAPSULE ORAL at 21:55

## 2020-01-01 RX ADMIN — OXYCODONE HYDROCHLORIDE AND ACETAMINOPHEN 1 TABLET: 7.5; 325 TABLET ORAL at 08:18

## 2020-01-01 RX ADMIN — SODIUM CHLORIDE, PRESERVATIVE FREE 10 ML: 5 INJECTION INTRAVENOUS at 08:22

## 2020-01-01 RX ADMIN — LEVOTHYROXINE SODIUM 150 MCG: 75 TABLET ORAL at 05:57

## 2020-01-01 RX ADMIN — DOCUSATE SODIUM 100 MG: 100 CAPSULE, LIQUID FILLED ORAL at 08:23

## 2020-01-01 RX ADMIN — LEVOTHYROXINE SODIUM 150 MCG: 75 TABLET ORAL at 06:10

## 2020-01-01 RX ADMIN — LEVOTHYROXINE SODIUM 150 MCG: 75 TABLET ORAL at 06:29

## 2020-01-01 RX ADMIN — INSULIN LISPRO 1 UNITS: 100 INJECTION, SOLUTION INTRAVENOUS; SUBCUTANEOUS at 21:08

## 2020-01-01 RX ADMIN — CARVEDILOL 6.25 MG: 6.25 TABLET, FILM COATED ORAL at 17:23

## 2020-01-01 RX ADMIN — APIXABAN 5 MG: 5 TABLET, FILM COATED ORAL at 19:55

## 2020-01-01 RX ADMIN — INSULIN LISPRO 2 UNITS: 100 INJECTION, SOLUTION INTRAVENOUS; SUBCUTANEOUS at 21:36

## 2020-01-01 RX ADMIN — FUROSEMIDE 60 MG: 10 INJECTION, SOLUTION INTRAMUSCULAR; INTRAVENOUS at 19:40

## 2020-01-01 RX ADMIN — GUAIFENESIN 600 MG: 600 TABLET, EXTENDED RELEASE ORAL at 07:15

## 2020-01-01 RX ADMIN — GABAPENTIN 300 MG: 300 CAPSULE ORAL at 12:34

## 2020-01-01 RX ADMIN — APIXABAN 5 MG: 5 TABLET, FILM COATED ORAL at 16:40

## 2020-01-01 RX ADMIN — DULOXETINE 20 MG: 20 CAPSULE, DELAYED RELEASE ORAL at 08:58

## 2020-01-01 RX ADMIN — OXYCODONE HYDROCHLORIDE AND ACETAMINOPHEN 1 TABLET: 7.5; 325 TABLET ORAL at 08:30

## 2020-01-01 RX ADMIN — OXYCODONE HYDROCHLORIDE AND ACETAMINOPHEN 1 TABLET: 7.5; 325 TABLET ORAL at 01:20

## 2020-01-01 RX ADMIN — ACETAMINOPHEN 650 MG: 325 TABLET ORAL at 15:14

## 2020-01-01 RX ADMIN — POLYETHYLENE GLYCOL 3350 17 G: 17 POWDER, FOR SOLUTION ORAL at 18:20

## 2020-01-01 RX ADMIN — ENOXAPARIN SODIUM 135 MG: 150 INJECTION SUBCUTANEOUS at 05:57

## 2020-01-01 RX ADMIN — DOXYCYCLINE HYCLATE 100 MG: 100 CAPSULE ORAL at 08:31

## 2020-01-01 RX ADMIN — OXYCODONE HYDROCHLORIDE AND ACETAMINOPHEN 1 TABLET: 7.5; 325 TABLET ORAL at 15:34

## 2020-01-01 RX ADMIN — GABAPENTIN 300 MG: 300 CAPSULE ORAL at 13:47

## 2020-01-01 RX ADMIN — LEVOTHYROXINE SODIUM 150 MCG: 75 TABLET ORAL at 05:58

## 2020-01-01 RX ADMIN — APIXABAN 5 MG: 5 TABLET, FILM COATED ORAL at 21:26

## 2020-01-01 RX ADMIN — SODIUM CHLORIDE, PRESERVATIVE FREE 10 ML: 5 INJECTION INTRAVENOUS at 21:58

## 2020-01-01 RX ADMIN — GABAPENTIN 300 MG: 300 CAPSULE ORAL at 21:20

## 2020-01-01 RX ADMIN — APIXABAN 5 MG: 5 TABLET, FILM COATED ORAL at 09:16

## 2020-01-01 RX ADMIN — APIXABAN 5 MG: 5 TABLET, FILM COATED ORAL at 10:50

## 2020-01-01 RX ADMIN — ACETAMINOPHEN 650 MG: 325 TABLET ORAL at 00:00

## 2020-01-01 RX ADMIN — CARVEDILOL 3.12 MG: 3.12 TABLET, FILM COATED ORAL at 08:44

## 2020-01-01 RX ADMIN — APIXABAN 5 MG: 5 TABLET, FILM COATED ORAL at 20:46

## 2020-01-01 RX ADMIN — DICLOFENAC 1 G: 10 GEL TOPICAL at 12:09

## 2020-01-01 RX ADMIN — CARVEDILOL 6.25 MG: 6.25 TABLET, FILM COATED ORAL at 10:31

## 2020-01-01 RX ADMIN — LEVOTHYROXINE SODIUM 150 MCG: 75 TABLET ORAL at 05:28

## 2020-01-01 RX ADMIN — APIXABAN 5 MG: 5 TABLET, FILM COATED ORAL at 08:30

## 2020-01-01 RX ADMIN — ACETAMINOPHEN 650 MG: 325 TABLET ORAL at 16:40

## 2020-01-01 RX ADMIN — SODIUM CHLORIDE, PRESERVATIVE FREE 10 ML: 5 INJECTION INTRAVENOUS at 20:47

## 2020-01-01 RX ADMIN — SODIUM CHLORIDE, PRESERVATIVE FREE 10 ML: 5 INJECTION INTRAVENOUS at 20:32

## 2020-01-01 RX ADMIN — GABAPENTIN 300 MG: 300 CAPSULE ORAL at 20:40

## 2020-01-01 RX ADMIN — INSULIN LISPRO 1 UNITS: 100 INJECTION, SOLUTION INTRAVENOUS; SUBCUTANEOUS at 20:40

## 2020-01-01 RX ADMIN — GABAPENTIN 300 MG: 300 CAPSULE ORAL at 08:30

## 2020-01-01 RX ADMIN — FLUTICASONE PROPIONATE 1 SPRAY: 50 SPRAY, METERED NASAL at 07:17

## 2020-01-01 RX ADMIN — ACETAMINOPHEN 650 MG: 325 TABLET ORAL at 08:29

## 2020-01-01 RX ADMIN — GUAIFENESIN 600 MG: 600 TABLET, EXTENDED RELEASE ORAL at 08:34

## 2020-01-01 RX ADMIN — SODIUM CHLORIDE, PRESERVATIVE FREE 10 ML: 5 INJECTION INTRAVENOUS at 21:15

## 2020-01-01 RX ADMIN — DOXYCYCLINE HYCLATE 100 MG: 100 CAPSULE ORAL at 21:58

## 2020-01-01 RX ADMIN — DILTIAZEM HYDROCHLORIDE 10 MG/ML: 5 INJECTION INTRAVENOUS at 21:31

## 2020-01-01 RX ADMIN — GABAPENTIN 300 MG: 300 CAPSULE ORAL at 14:43

## 2020-01-01 RX ADMIN — CARVEDILOL 3.12 MG: 3.12 TABLET, FILM COATED ORAL at 16:41

## 2020-01-01 RX ADMIN — KETAMINE HYDROCHLORIDE 100 MG: 50 INJECTION INTRAMUSCULAR; INTRAVENOUS at 14:02

## 2020-01-01 RX ADMIN — GABAPENTIN 300 MG: 300 CAPSULE ORAL at 20:58

## 2020-01-01 RX ADMIN — POLYETHYLENE GLYCOL 3350 17 G: 17 POWDER, FOR SOLUTION ORAL at 10:49

## 2020-01-01 RX ADMIN — DULOXETINE 20 MG: 20 CAPSULE, DELAYED RELEASE ORAL at 07:26

## 2020-01-01 RX ADMIN — GUAIFENESIN 600 MG: 600 TABLET, EXTENDED RELEASE ORAL at 07:56

## 2020-01-01 RX ADMIN — ASPIRIN 81 MG: 81 TABLET, FILM COATED ORAL at 07:16

## 2020-01-01 RX ADMIN — LISINOPRIL 40 MG: 20 TABLET ORAL at 10:22

## 2020-01-01 RX ADMIN — GABAPENTIN 300 MG: 300 CAPSULE ORAL at 10:30

## 2020-01-01 RX ADMIN — FUROSEMIDE 60 MG: 10 INJECTION, SOLUTION INTRAMUSCULAR; INTRAVENOUS at 15:17

## 2020-01-01 RX ADMIN — CARVEDILOL 3.12 MG: 3.12 TABLET, FILM COATED ORAL at 07:55

## 2020-01-01 RX ADMIN — ASPIRIN 81 MG: 81 TABLET, CHEWABLE ORAL at 08:59

## 2020-01-01 RX ADMIN — GUAIFENESIN 600 MG: 600 TABLET, EXTENDED RELEASE ORAL at 22:41

## 2020-01-01 RX ADMIN — CARVEDILOL 6.25 MG: 6.25 TABLET, FILM COATED ORAL at 17:30

## 2020-01-01 RX ADMIN — POLYETHYLENE GLYCOL 3350 17 G: 17 POWDER, FOR SOLUTION ORAL at 10:28

## 2020-01-01 RX ADMIN — ENOXAPARIN SODIUM 135 MG: 150 INJECTION SUBCUTANEOUS at 16:50

## 2020-01-01 RX ADMIN — GABAPENTIN 300 MG: 300 CAPSULE ORAL at 22:02

## 2020-01-01 RX ADMIN — LISINOPRIL 10 MG: 10 TABLET ORAL at 23:25

## 2020-01-01 RX ADMIN — DOXYCYCLINE HYCLATE 100 MG: 100 CAPSULE ORAL at 09:04

## 2020-01-01 RX ADMIN — CARVEDILOL 6.25 MG: 6.25 TABLET, FILM COATED ORAL at 17:37

## 2020-01-01 RX ADMIN — FUROSEMIDE 40 MG: 10 INJECTION, SOLUTION INTRAMUSCULAR; INTRAVENOUS at 08:58

## 2020-01-01 RX ADMIN — FUROSEMIDE 40 MG: 10 INJECTION, SOLUTION INTRAMUSCULAR; INTRAVENOUS at 09:24

## 2020-01-01 RX ADMIN — GABAPENTIN 300 MG: 300 CAPSULE ORAL at 19:55

## 2020-01-01 RX ADMIN — ACETAMINOPHEN 650 MG: 325 TABLET ORAL at 06:25

## 2020-01-01 RX ADMIN — CARVEDILOL 6.25 MG: 6.25 TABLET, FILM COATED ORAL at 07:41

## 2020-01-01 RX ADMIN — DICLOFENAC 1 G: 10 GEL TOPICAL at 08:34

## 2020-01-01 RX ADMIN — GUAIFENESIN 600 MG: 600 TABLET, EXTENDED RELEASE ORAL at 08:31

## 2020-01-01 RX ADMIN — DULOXETINE 20 MG: 20 CAPSULE, DELAYED RELEASE ORAL at 09:44

## 2020-01-01 RX ADMIN — ACETAMINOPHEN 650 MG: 325 TABLET ORAL at 03:16

## 2020-01-01 RX ADMIN — OXYCODONE HYDROCHLORIDE AND ACETAMINOPHEN 1 TABLET: 7.5; 325 TABLET ORAL at 15:57

## 2020-01-01 RX ADMIN — GABAPENTIN 300 MG: 300 CAPSULE ORAL at 08:35

## 2020-01-01 RX ADMIN — ASPIRIN 81 MG: 81 TABLET, FILM COATED ORAL at 08:23

## 2020-01-01 RX ADMIN — GUAIFENESIN 600 MG: 600 TABLET, EXTENDED RELEASE ORAL at 08:29

## 2020-01-01 RX ADMIN — LEVOTHYROXINE SODIUM 150 MCG: 75 TABLET ORAL at 06:19

## 2020-01-01 RX ADMIN — ASPIRIN 81 MG: 81 TABLET, FILM COATED ORAL at 09:44

## 2020-01-01 RX ADMIN — DOCUSATE SODIUM 100 MG: 100 CAPSULE, LIQUID FILLED ORAL at 09:04

## 2020-01-01 RX ADMIN — SODIUM CHLORIDE, PRESERVATIVE FREE 1 G: 5 INJECTION INTRAVENOUS at 08:30

## 2020-01-01 RX ADMIN — SODIUM CHLORIDE: 9 INJECTION, SOLUTION INTRAVENOUS at 06:36

## 2020-01-01 RX ADMIN — GABAPENTIN 300 MG: 300 CAPSULE ORAL at 23:25

## 2020-01-01 RX ADMIN — POLYETHYLENE GLYCOL 3350 17 G: 17 POWDER, FOR SOLUTION ORAL at 08:22

## 2020-01-01 RX ADMIN — HEPARIN SODIUM 5000 UNITS: 5000 INJECTION INTRAVENOUS; SUBCUTANEOUS at 20:54

## 2020-01-01 RX ADMIN — ASPIRIN 81 MG: 81 TABLET, FILM COATED ORAL at 07:56

## 2020-01-01 RX ADMIN — OXYCODONE HYDROCHLORIDE AND ACETAMINOPHEN 1 TABLET: 7.5; 325 TABLET ORAL at 06:25

## 2020-01-01 RX ADMIN — HEPARIN SODIUM 5000 UNITS: 5000 INJECTION INTRAVENOUS; SUBCUTANEOUS at 13:31

## 2020-01-01 RX ADMIN — DICLOFENAC 1 G: 10 GEL TOPICAL at 22:41

## 2020-01-01 RX ADMIN — OXYCODONE HYDROCHLORIDE AND ACETAMINOPHEN 1 TABLET: 7.5; 325 TABLET ORAL at 12:05

## 2020-01-01 RX ADMIN — APIXABAN 5 MG: 5 TABLET, FILM COATED ORAL at 09:45

## 2020-01-01 RX ADMIN — GUAIFENESIN 600 MG: 600 TABLET, EXTENDED RELEASE ORAL at 20:41

## 2020-01-01 RX ADMIN — INSULIN LISPRO 1 UNITS: 100 INJECTION, SOLUTION INTRAVENOUS; SUBCUTANEOUS at 20:07

## 2020-01-01 RX ADMIN — GUAIFENESIN 600 MG: 600 TABLET, EXTENDED RELEASE ORAL at 21:07

## 2020-01-01 RX ADMIN — GABAPENTIN 300 MG: 300 CAPSULE ORAL at 14:46

## 2020-01-01 RX ADMIN — DOXYCYCLINE HYCLATE 100 MG: 100 CAPSULE ORAL at 20:32

## 2020-01-01 RX ADMIN — POLYETHYLENE GLYCOL 3350 17 G: 17 POWDER, FOR SOLUTION ORAL at 07:56

## 2020-01-01 RX ADMIN — ASPIRIN 81 MG: 81 TABLET, CHEWABLE ORAL at 08:58

## 2020-01-01 RX ADMIN — ASPIRIN 81 MG: 81 TABLET, FILM COATED ORAL at 09:37

## 2020-01-01 RX ADMIN — GABAPENTIN 300 MG: 300 CAPSULE ORAL at 00:54

## 2020-01-01 RX ADMIN — HEPARIN SODIUM 5000 UNITS: 5000 INJECTION INTRAVENOUS; SUBCUTANEOUS at 05:57

## 2020-01-01 RX ADMIN — ACETAMINOPHEN 650 MG: 325 TABLET ORAL at 18:20

## 2020-01-01 RX ADMIN — DICLOFENAC 1 G: 10 GEL TOPICAL at 21:26

## 2020-01-01 RX ADMIN — LEVOTHYROXINE SODIUM 150 MCG: 75 TABLET ORAL at 06:28

## 2020-01-01 RX ADMIN — GUAIFENESIN 600 MG: 600 TABLET, EXTENDED RELEASE ORAL at 21:37

## 2020-01-01 RX ADMIN — GUAIFENESIN 600 MG: 600 TABLET, EXTENDED RELEASE ORAL at 09:16

## 2020-01-01 RX ADMIN — GABAPENTIN 300 MG: 300 CAPSULE ORAL at 20:53

## 2020-01-01 RX ADMIN — MICONAZOLE NITRATE: 2 POWDER TOPICAL at 08:22

## 2020-01-01 RX ADMIN — GUAIFENESIN 600 MG: 600 TABLET, EXTENDED RELEASE ORAL at 20:46

## 2020-01-01 RX ADMIN — SODIUM CHLORIDE, PRESERVATIVE FREE 10 ML: 5 INJECTION INTRAVENOUS at 08:35

## 2020-01-01 RX ADMIN — HEPARIN SODIUM 5000 UNITS: 5000 INJECTION INTRAVENOUS; SUBCUTANEOUS at 12:52

## 2020-01-01 RX ADMIN — OXYCODONE HYDROCHLORIDE AND ACETAMINOPHEN 1 TABLET: 7.5; 325 TABLET ORAL at 03:23

## 2020-01-01 RX ADMIN — ASPIRIN 81 MG: 81 TABLET, FILM COATED ORAL at 10:44

## 2020-01-01 RX ADMIN — DICLOFENAC 1 G: 10 GEL TOPICAL at 20:10

## 2020-01-01 RX ADMIN — OXYCODONE HYDROCHLORIDE AND ACETAMINOPHEN 1 TABLET: 7.5; 325 TABLET ORAL at 04:35

## 2020-01-01 RX ADMIN — GABAPENTIN 300 MG: 300 CAPSULE ORAL at 08:59

## 2020-01-01 RX ADMIN — ENOXAPARIN SODIUM 135 MG: 150 INJECTION SUBCUTANEOUS at 16:31

## 2020-01-01 RX ADMIN — APIXABAN 5 MG: 5 TABLET, FILM COATED ORAL at 20:36

## 2020-01-01 RX ADMIN — OXYCODONE HYDROCHLORIDE AND ACETAMINOPHEN 1 TABLET: 7.5; 325 TABLET ORAL at 08:41

## 2020-01-01 RX ADMIN — CARVEDILOL 3.12 MG: 3.12 TABLET, FILM COATED ORAL at 10:43

## 2020-01-01 RX ADMIN — SALINE NASAL SPRAY 1 SPRAY: 1.5 SOLUTION NASAL at 09:32

## 2020-01-01 RX ADMIN — DOXYCYCLINE HYCLATE 100 MG: 100 CAPSULE ORAL at 20:38

## 2020-01-01 RX ADMIN — CARVEDILOL 6.25 MG: 6.25 TABLET, FILM COATED ORAL at 08:35

## 2020-01-01 RX ADMIN — OXYCODONE HYDROCHLORIDE AND ACETAMINOPHEN 1 TABLET: 7.5; 325 TABLET ORAL at 21:58

## 2020-01-01 RX ADMIN — GABAPENTIN 300 MG: 300 CAPSULE ORAL at 21:26

## 2020-01-01 RX ADMIN — INSULIN LISPRO 1 UNITS: 100 INJECTION, SOLUTION INTRAVENOUS; SUBCUTANEOUS at 17:16

## 2020-01-01 RX ADMIN — GUAIFENESIN 600 MG: 600 TABLET, EXTENDED RELEASE ORAL at 20:38

## 2020-01-01 RX ADMIN — LEVOTHYROXINE SODIUM 150 MCG: 75 TABLET ORAL at 05:49

## 2020-01-01 RX ADMIN — CARVEDILOL 6.25 MG: 6.25 TABLET, FILM COATED ORAL at 08:31

## 2020-01-01 RX ADMIN — LEVOTHYROXINE SODIUM 150 MCG: 75 TABLET ORAL at 05:42

## 2020-01-01 RX ADMIN — OXYCODONE HYDROCHLORIDE AND ACETAMINOPHEN 1 TABLET: 7.5; 325 TABLET ORAL at 19:13

## 2020-01-01 RX ADMIN — ACETAMINOPHEN 650 MG: 325 TABLET ORAL at 10:45

## 2020-01-01 RX ADMIN — FLUTICASONE PROPIONATE 1 SPRAY: 50 SPRAY, METERED NASAL at 08:22

## 2020-01-01 RX ADMIN — OXYCODONE HYDROCHLORIDE AND ACETAMINOPHEN 1 TABLET: 7.5; 325 TABLET ORAL at 03:56

## 2020-01-01 RX ADMIN — GABAPENTIN 300 MG: 300 CAPSULE ORAL at 21:17

## 2020-01-01 RX ADMIN — INSULIN LISPRO 1 UNITS: 100 INJECTION, SOLUTION INTRAVENOUS; SUBCUTANEOUS at 12:05

## 2020-01-01 RX ADMIN — APIXABAN 5 MG: 5 TABLET, FILM COATED ORAL at 07:15

## 2020-01-01 RX ADMIN — SODIUM CHLORIDE, PRESERVATIVE FREE 1 G: 5 INJECTION INTRAVENOUS at 15:49

## 2020-01-01 RX ADMIN — INSULIN LISPRO 2 UNITS: 100 INJECTION, SOLUTION INTRAVENOUS; SUBCUTANEOUS at 20:44

## 2020-01-01 RX ADMIN — ACETAMINOPHEN 650 MG: 325 TABLET ORAL at 12:47

## 2020-01-01 RX ADMIN — ACETAMINOPHEN 650 MG: 325 TABLET ORAL at 06:05

## 2020-01-01 RX ADMIN — VANCOMYCIN HYDROCHLORIDE 1250 MG: 10 INJECTION, POWDER, LYOPHILIZED, FOR SOLUTION INTRAVENOUS at 23:25

## 2020-01-01 RX ADMIN — LEVOTHYROXINE SODIUM 150 MCG: 75 TABLET ORAL at 05:14

## 2020-01-01 RX ADMIN — FLUTICASONE PROPIONATE 1 SPRAY: 50 SPRAY, METERED NASAL at 08:23

## 2020-01-01 RX ADMIN — DOXYCYCLINE HYCLATE 100 MG: 100 CAPSULE ORAL at 21:10

## 2020-01-01 RX ADMIN — FUROSEMIDE 40 MG: 10 INJECTION, SOLUTION INTRAMUSCULAR; INTRAVENOUS at 01:24

## 2020-01-01 RX ADMIN — DOCUSATE SODIUM 100 MG: 100 CAPSULE, LIQUID FILLED ORAL at 07:56

## 2020-01-01 RX ADMIN — GABAPENTIN 300 MG: 300 CAPSULE ORAL at 22:41

## 2020-01-01 RX ADMIN — ZIPRASIDONE MESYLATE 10 MG: 20 INJECTION, POWDER, LYOPHILIZED, FOR SOLUTION INTRAMUSCULAR at 08:22

## 2020-01-01 RX ADMIN — POTASSIUM CHLORIDE 10 MEQ: 750 TABLET, EXTENDED RELEASE ORAL at 08:59

## 2020-01-01 RX ADMIN — HALOPERIDOL LACTATE 2 MG: 5 INJECTION, SOLUTION INTRAMUSCULAR at 11:25

## 2020-01-01 RX ADMIN — MICONAZOLE NITRATE: 2 POWDER TOPICAL at 09:23

## 2020-01-01 RX ADMIN — OXYCODONE HYDROCHLORIDE AND ACETAMINOPHEN 1 TABLET: 7.5; 325 TABLET ORAL at 20:40

## 2020-01-01 RX ADMIN — CARVEDILOL 3.12 MG: 3.12 TABLET, FILM COATED ORAL at 15:09

## 2020-01-01 RX ADMIN — OXYCODONE HYDROCHLORIDE AND ACETAMINOPHEN 1 TABLET: 7.5; 325 TABLET ORAL at 01:17

## 2020-01-01 RX ADMIN — OXYCODONE HYDROCHLORIDE AND ACETAMINOPHEN 1 TABLET: 7.5; 325 TABLET ORAL at 08:32

## 2020-01-01 RX ADMIN — GUAIFENESIN 600 MG: 600 TABLET, EXTENDED RELEASE ORAL at 21:20

## 2020-01-01 RX ADMIN — LEVOTHYROXINE SODIUM 150 MCG: 75 TABLET ORAL at 05:41

## 2020-01-01 RX ADMIN — POLYETHYLENE GLYCOL 3350 17 G: 17 POWDER, FOR SOLUTION ORAL at 08:31

## 2020-01-01 RX ADMIN — SODIUM CHLORIDE, PRESERVATIVE FREE 10 ML: 5 INJECTION INTRAVENOUS at 09:45

## 2020-01-01 RX ADMIN — SODIUM CHLORIDE, PRESERVATIVE FREE 10 ML: 5 INJECTION INTRAVENOUS at 21:13

## 2020-01-01 RX ADMIN — OXYCODONE HYDROCHLORIDE AND ACETAMINOPHEN 1 TABLET: 7.5; 325 TABLET ORAL at 15:45

## 2020-01-01 RX ADMIN — ACETAMINOPHEN 650 MG: 325 TABLET ORAL at 14:47

## 2020-01-01 RX ADMIN — POTASSIUM CHLORIDE 10 MEQ: 750 TABLET, EXTENDED RELEASE ORAL at 09:24

## 2020-01-01 RX ADMIN — Medication 3 MG: at 23:59

## 2020-01-01 RX ADMIN — OXYCODONE HYDROCHLORIDE AND ACETAMINOPHEN 1 TABLET: 7.5; 325 TABLET ORAL at 04:57

## 2020-01-01 RX ADMIN — NITROFURANTOIN MONOHYDRATE/MACROCRYSTALLINE 100 MG: 25; 75 CAPSULE ORAL at 10:49

## 2020-01-01 RX ADMIN — APIXABAN 5 MG: 5 TABLET, FILM COATED ORAL at 07:55

## 2020-01-01 RX ADMIN — POLYETHYLENE GLYCOL 3350 17 G: 17 POWDER, FOR SOLUTION ORAL at 09:37

## 2020-01-01 RX ADMIN — NALOXONE HYDROCHLORIDE 0.4 MG: 0.4 INJECTION, SOLUTION INTRAMUSCULAR; INTRAVENOUS; SUBCUTANEOUS at 15:16

## 2020-01-01 RX ADMIN — OXYCODONE HYDROCHLORIDE AND ACETAMINOPHEN 1 TABLET: 7.5; 325 TABLET ORAL at 00:50

## 2020-01-01 RX ADMIN — GABAPENTIN 300 MG: 300 CAPSULE ORAL at 14:44

## 2020-01-01 RX ADMIN — DULOXETINE 20 MG: 20 CAPSULE, DELAYED RELEASE ORAL at 08:20

## 2020-01-01 RX ADMIN — FLUTICASONE PROPIONATE 1 SPRAY: 50 SPRAY, METERED NASAL at 09:38

## 2020-01-01 RX ADMIN — MIDAZOLAM HYDROCHLORIDE 4 MG: 1 INJECTION INTRAMUSCULAR; INTRAVENOUS at 14:12

## 2020-01-01 RX ADMIN — DICLOFENAC 1 G: 10 GEL TOPICAL at 09:26

## 2020-01-01 RX ADMIN — INSULIN LISPRO 1 UNITS: 100 INJECTION, SOLUTION INTRAVENOUS; SUBCUTANEOUS at 12:58

## 2020-01-01 RX ADMIN — DOXYCYCLINE HYCLATE 100 MG: 100 CAPSULE ORAL at 10:23

## 2020-01-01 RX ADMIN — SODIUM CHLORIDE, PRESERVATIVE FREE 10 ML: 5 INJECTION INTRAVENOUS at 10:07

## 2020-01-01 RX ADMIN — OXYCODONE HYDROCHLORIDE AND ACETAMINOPHEN 1 TABLET: 7.5; 325 TABLET ORAL at 16:24

## 2020-01-01 RX ADMIN — INSULIN LISPRO 1 UNITS: 100 INJECTION, SOLUTION INTRAVENOUS; SUBCUTANEOUS at 12:35

## 2020-01-01 RX ADMIN — DICLOFENAC 1 G: 10 GEL TOPICAL at 20:36

## 2020-01-01 RX ADMIN — HEPARIN SODIUM 5000 UNITS: 5000 INJECTION INTRAVENOUS; SUBCUTANEOUS at 05:49

## 2020-01-01 RX ADMIN — GABAPENTIN 300 MG: 300 CAPSULE ORAL at 14:01

## 2020-01-01 RX ADMIN — OXYCODONE HYDROCHLORIDE AND ACETAMINOPHEN 1 TABLET: 7.5; 325 TABLET ORAL at 17:16

## 2020-01-01 RX ADMIN — OXYCODONE HYDROCHLORIDE AND ACETAMINOPHEN 1 TABLET: 7.5; 325 TABLET ORAL at 20:41

## 2020-01-01 RX ADMIN — APIXABAN 5 MG: 5 TABLET, FILM COATED ORAL at 21:37

## 2020-01-01 RX ADMIN — OXYCODONE HYDROCHLORIDE AND ACETAMINOPHEN 1 TABLET: 7.5; 325 TABLET ORAL at 16:36

## 2020-01-01 RX ADMIN — ASPIRIN 81 MG: 81 TABLET, FILM COATED ORAL at 07:55

## 2020-01-01 RX ADMIN — GABAPENTIN 300 MG: 300 CAPSULE ORAL at 08:58

## 2020-01-01 RX ADMIN — OXYCODONE HYDROCHLORIDE AND ACETAMINOPHEN 1 TABLET: 7.5; 325 TABLET ORAL at 02:14

## 2020-01-01 RX ADMIN — QUETIAPINE FUMARATE 25 MG: 25 TABLET ORAL at 10:48

## 2020-01-01 RX ADMIN — GABAPENTIN 300 MG: 300 CAPSULE ORAL at 13:50

## 2020-01-01 RX ADMIN — FLUTICASONE PROPIONATE 1 SPRAY: 50 SPRAY, METERED NASAL at 09:03

## 2020-01-01 RX ADMIN — OXYCODONE HYDROCHLORIDE AND ACETAMINOPHEN 1 TABLET: 7.5; 325 TABLET ORAL at 00:03

## 2020-01-01 RX ADMIN — GABAPENTIN 300 MG: 300 CAPSULE ORAL at 15:14

## 2020-01-01 RX ADMIN — DULOXETINE 20 MG: 20 CAPSULE, DELAYED RELEASE ORAL at 09:24

## 2020-01-01 RX ADMIN — VANCOMYCIN HYDROCHLORIDE 1250 MG: 10 INJECTION, POWDER, LYOPHILIZED, FOR SOLUTION INTRAVENOUS at 13:46

## 2020-01-01 RX ADMIN — DULOXETINE 20 MG: 20 CAPSULE, DELAYED RELEASE ORAL at 07:56

## 2020-01-01 RX ADMIN — APIXABAN 5 MG: 5 TABLET, FILM COATED ORAL at 20:58

## 2020-01-01 RX ADMIN — GUAIFENESIN 600 MG: 600 TABLET, EXTENDED RELEASE ORAL at 09:37

## 2020-01-01 RX ADMIN — ENOXAPARIN SODIUM 135 MG: 150 INJECTION SUBCUTANEOUS at 06:28

## 2020-01-01 RX ADMIN — AMLODIPINE BESYLATE 5 MG: 5 TABLET ORAL at 07:46

## 2020-01-01 RX ADMIN — ATORVASTATIN CALCIUM 40 MG: 40 TABLET, FILM COATED ORAL at 22:02

## 2020-01-01 RX ADMIN — DULOXETINE 20 MG: 20 CAPSULE, DELAYED RELEASE ORAL at 14:05

## 2020-01-01 RX ADMIN — LEVOTHYROXINE SODIUM 150 MCG: 75 TABLET ORAL at 03:56

## 2020-01-01 RX ADMIN — OXYCODONE HYDROCHLORIDE AND ACETAMINOPHEN 1 TABLET: 7.5; 325 TABLET ORAL at 11:21

## 2020-01-01 RX ADMIN — GABAPENTIN 300 MG: 300 CAPSULE ORAL at 09:24

## 2020-01-01 RX ADMIN — GABAPENTIN 300 MG: 300 CAPSULE ORAL at 07:56

## 2020-01-01 RX ADMIN — GUAIFENESIN 600 MG: 600 TABLET, EXTENDED RELEASE ORAL at 09:44

## 2020-01-01 RX ADMIN — GUAIFENESIN 600 MG: 600 TABLET, EXTENDED RELEASE ORAL at 07:26

## 2020-01-01 RX ADMIN — APIXABAN 5 MG: 5 TABLET, FILM COATED ORAL at 20:40

## 2020-01-01 RX ADMIN — CARVEDILOL 6.25 MG: 6.25 TABLET, FILM COATED ORAL at 16:58

## 2020-01-01 RX ADMIN — APIXABAN 5 MG: 5 TABLET, FILM COATED ORAL at 10:30

## 2020-01-01 RX ADMIN — GABAPENTIN 300 MG: 300 CAPSULE ORAL at 15:09

## 2020-01-01 RX ADMIN — ASPIRIN 81 MG: 81 TABLET, FILM COATED ORAL at 10:50

## 2020-01-01 RX ADMIN — CARVEDILOL 6.25 MG: 6.25 TABLET, FILM COATED ORAL at 18:05

## 2020-01-01 RX ADMIN — SODIUM CHLORIDE, PRESERVATIVE FREE 10 ML: 5 INJECTION INTRAVENOUS at 07:16

## 2020-01-01 RX ADMIN — GABAPENTIN 300 MG: 300 CAPSULE ORAL at 09:44

## 2020-01-01 RX ADMIN — ACETAMINOPHEN 650 MG: 325 TABLET ORAL at 00:24

## 2020-01-01 RX ADMIN — GABAPENTIN 300 MG: 300 CAPSULE ORAL at 21:30

## 2020-01-01 RX ADMIN — GABAPENTIN 300 MG: 300 CAPSULE ORAL at 09:16

## 2020-01-01 RX ADMIN — Medication 10 MG: at 22:43

## 2020-01-01 RX ADMIN — ZIPRASIDONE MESYLATE 10 MG: 20 INJECTION, POWDER, LYOPHILIZED, FOR SOLUTION INTRAMUSCULAR at 06:26

## 2020-01-01 RX ADMIN — GUAIFENESIN 600 MG: 600 TABLET, EXTENDED RELEASE ORAL at 20:06

## 2020-01-01 RX ADMIN — OXYCODONE HYDROCHLORIDE AND ACETAMINOPHEN 1 TABLET: 7.5; 325 TABLET ORAL at 23:50

## 2020-01-01 RX ADMIN — INSULIN LISPRO 1 UNITS: 100 INJECTION, SOLUTION INTRAVENOUS; SUBCUTANEOUS at 12:34

## 2020-01-01 RX ADMIN — DOXYCYCLINE HYCLATE 100 MG: 100 CAPSULE ORAL at 20:41

## 2020-01-01 RX ADMIN — LISINOPRIL 10 MG: 10 TABLET ORAL at 08:59

## 2020-01-01 RX ADMIN — DOCUSATE SODIUM 100 MG: 100 CAPSULE, LIQUID FILLED ORAL at 08:32

## 2020-01-01 RX ADMIN — LISINOPRIL 10 MG: 10 TABLET ORAL at 08:58

## 2020-01-01 RX ADMIN — ASPIRIN 81 MG: 81 TABLET, FILM COATED ORAL at 10:42

## 2020-01-01 RX ADMIN — ACETAMINOPHEN 650 MG: 325 TABLET ORAL at 13:00

## 2020-01-01 RX ADMIN — GUAIFENESIN 600 MG: 600 TABLET, EXTENDED RELEASE ORAL at 09:39

## 2020-01-01 RX ADMIN — SODIUM CHLORIDE, PRESERVATIVE FREE 10 ML: 5 INJECTION INTRAVENOUS at 21:38

## 2020-01-01 RX ADMIN — GABAPENTIN 300 MG: 300 CAPSULE ORAL at 12:59

## 2020-01-01 RX ADMIN — DOCUSATE SODIUM 100 MG: 100 CAPSULE, LIQUID FILLED ORAL at 09:45

## 2020-01-01 RX ADMIN — GUAIFENESIN 600 MG: 600 TABLET, EXTENDED RELEASE ORAL at 21:30

## 2020-01-01 RX ADMIN — GUAIFENESIN 600 MG: 600 TABLET, EXTENDED RELEASE ORAL at 21:53

## 2020-01-01 RX ADMIN — CARVEDILOL 3.12 MG: 3.12 TABLET, FILM COATED ORAL at 16:32

## 2020-01-01 RX ADMIN — GUAIFENESIN 600 MG: 600 TABLET, EXTENDED RELEASE ORAL at 20:36

## 2020-01-01 RX ADMIN — VANCOMYCIN HYDROCHLORIDE 1250 MG: 10 INJECTION, POWDER, LYOPHILIZED, FOR SOLUTION INTRAVENOUS at 13:30

## 2020-01-01 RX ADMIN — OXYCODONE HYDROCHLORIDE AND ACETAMINOPHEN 1 TABLET: 7.5; 325 TABLET ORAL at 09:44

## 2020-01-01 RX ADMIN — ENOXAPARIN SODIUM 40 MG: 40 INJECTION SUBCUTANEOUS at 20:37

## 2020-01-01 RX ADMIN — APIXABAN 5 MG: 5 TABLET, FILM COATED ORAL at 21:53

## 2020-01-01 RX ADMIN — SALINE NASAL SPRAY 1 SPRAY: 1.5 SOLUTION NASAL at 13:47

## 2020-01-01 RX ADMIN — ENOXAPARIN SODIUM 135 MG: 150 INJECTION SUBCUTANEOUS at 17:58

## 2020-01-01 RX ADMIN — FUROSEMIDE 40 MG: 10 INJECTION, SOLUTION INTRAMUSCULAR; INTRAVENOUS at 17:44

## 2020-01-01 RX ADMIN — LEVOTHYROXINE SODIUM 150 MCG: 75 TABLET ORAL at 05:06

## 2020-01-01 RX ADMIN — APIXABAN 5 MG: 5 TABLET, FILM COATED ORAL at 07:26

## 2020-01-01 RX ADMIN — OXYCODONE HYDROCHLORIDE AND ACETAMINOPHEN 1 TABLET: 7.5; 325 TABLET ORAL at 05:14

## 2020-01-01 RX ADMIN — OXYCODONE HYDROCHLORIDE AND ACETAMINOPHEN 1 TABLET: 7.5; 325 TABLET ORAL at 15:27

## 2020-01-01 RX ADMIN — APIXABAN 5 MG: 5 TABLET, FILM COATED ORAL at 08:17

## 2020-01-01 RX ADMIN — INSULIN LISPRO 1 UNITS: 100 INJECTION, SOLUTION INTRAVENOUS; SUBCUTANEOUS at 12:54

## 2020-01-01 RX ADMIN — DILTIAZEM HYDROCHLORIDE 10 MG: 5 INJECTION INTRAVENOUS at 04:02

## 2020-01-01 RX ADMIN — SODIUM CHLORIDE, PRESERVATIVE FREE 10 ML: 5 INJECTION INTRAVENOUS at 09:30

## 2020-01-01 RX ADMIN — POTASSIUM CHLORIDE 10 MEQ: 750 TABLET, EXTENDED RELEASE ORAL at 08:58

## 2020-01-01 RX ADMIN — CARVEDILOL 6.25 MG: 6.25 TABLET, FILM COATED ORAL at 16:37

## 2020-01-01 RX ADMIN — INSULIN LISPRO 1 UNITS: 100 INJECTION, SOLUTION INTRAVENOUS; SUBCUTANEOUS at 17:25

## 2020-01-01 RX ADMIN — SODIUM CHLORIDE, PRESERVATIVE FREE 10 ML: 5 INJECTION INTRAVENOUS at 08:59

## 2020-01-01 RX ADMIN — APIXABAN 5 MG: 5 TABLET, FILM COATED ORAL at 21:30

## 2020-01-01 RX ADMIN — CARVEDILOL 3.12 MG: 3.12 TABLET, FILM COATED ORAL at 07:15

## 2020-01-01 RX ADMIN — GUAIFENESIN 600 MG: 600 TABLET, EXTENDED RELEASE ORAL at 21:26

## 2020-01-01 RX ADMIN — SODIUM CHLORIDE, PRESERVATIVE FREE 10 ML: 5 INJECTION INTRAVENOUS at 20:53

## 2020-01-01 RX ADMIN — ACETAMINOPHEN 650 MG: 325 TABLET ORAL at 10:38

## 2020-01-01 RX ADMIN — GUAIFENESIN 600 MG: 600 TABLET, EXTENDED RELEASE ORAL at 11:41

## 2020-01-01 RX ADMIN — OXYCODONE HYDROCHLORIDE AND ACETAMINOPHEN 1 TABLET: 7.5; 325 TABLET ORAL at 16:31

## 2020-01-01 RX ADMIN — GABAPENTIN 300 MG: 300 CAPSULE ORAL at 21:18

## 2020-01-01 RX ADMIN — GABAPENTIN 300 MG: 300 CAPSULE ORAL at 10:50

## 2020-01-01 RX ADMIN — ASPIRIN 81 MG: 81 TABLET, FILM COATED ORAL at 10:30

## 2020-01-01 RX ADMIN — DOCUSATE SODIUM 100 MG: 100 CAPSULE, LIQUID FILLED ORAL at 10:31

## 2020-01-01 RX ADMIN — LEVOTHYROXINE SODIUM 150 MCG: 75 TABLET ORAL at 06:33

## 2020-01-01 RX ADMIN — ZOLPIDEM TARTRATE 5 MG: 5 TABLET ORAL at 22:43

## 2020-01-01 RX ADMIN — FLUTICASONE PROPIONATE 1 SPRAY: 50 SPRAY, METERED NASAL at 07:58

## 2020-01-01 RX ADMIN — POLYETHYLENE GLYCOL 3350 17 G: 17 POWDER, FOR SOLUTION ORAL at 09:04

## 2020-01-01 RX ADMIN — GUAIFENESIN 600 MG: 600 TABLET, EXTENDED RELEASE ORAL at 10:45

## 2020-01-01 RX ADMIN — ZIPRASIDONE MESYLATE 10 MG: 20 INJECTION, POWDER, LYOPHILIZED, FOR SOLUTION INTRAMUSCULAR at 17:09

## 2020-01-01 RX ADMIN — OXYCODONE HYDROCHLORIDE AND ACETAMINOPHEN 1 TABLET: 7.5; 325 TABLET ORAL at 05:20

## 2020-01-01 RX ADMIN — INSULIN LISPRO 1 UNITS: 100 INJECTION, SOLUTION INTRAVENOUS; SUBCUTANEOUS at 12:57

## 2020-01-01 RX ADMIN — HEPARIN SODIUM 5000 UNITS: 5000 INJECTION INTRAVENOUS; SUBCUTANEOUS at 13:46

## 2020-01-01 RX ADMIN — OXYCODONE HYDROCHLORIDE AND ACETAMINOPHEN 1 TABLET: 7.5; 325 TABLET ORAL at 15:09

## 2020-01-01 RX ADMIN — SODIUM CHLORIDE, PRESERVATIVE FREE 10 ML: 5 INJECTION INTRAVENOUS at 08:40

## 2020-01-01 RX ADMIN — ENOXAPARIN SODIUM 40 MG: 40 INJECTION SUBCUTANEOUS at 23:06

## 2020-01-01 RX ADMIN — OXYCODONE HYDROCHLORIDE AND ACETAMINOPHEN 1 TABLET: 7.5; 325 TABLET ORAL at 00:31

## 2020-01-01 RX ADMIN — MICONAZOLE NITRATE: 2 POWDER TOPICAL at 12:13

## 2020-01-01 RX ADMIN — OXYCODONE HYDROCHLORIDE AND ACETAMINOPHEN 1 TABLET: 7.5; 325 TABLET ORAL at 08:34

## 2020-01-01 RX ADMIN — GABAPENTIN 300 MG: 300 CAPSULE ORAL at 07:15

## 2020-01-01 RX ADMIN — ACETAMINOPHEN 650 MG: 325 TABLET ORAL at 06:23

## 2020-01-01 RX ADMIN — FLUCONAZOLE 200 MG: 100 TABLET ORAL at 16:45

## 2020-01-01 RX ADMIN — SODIUM CHLORIDE, PRESERVATIVE FREE 2 G: 5 INJECTION INTRAVENOUS at 21:36

## 2020-01-01 RX ADMIN — SODIUM CHLORIDE, PRESERVATIVE FREE 1 G: 5 INJECTION INTRAVENOUS at 08:35

## 2020-01-01 RX ADMIN — GUAIFENESIN 600 MG: 600 TABLET, EXTENDED RELEASE ORAL at 08:19

## 2020-01-01 RX ADMIN — DOXYCYCLINE HYCLATE 100 MG: 100 CAPSULE ORAL at 08:23

## 2020-01-01 RX ADMIN — LISINOPRIL 20 MG: 20 TABLET ORAL at 07:46

## 2020-01-01 RX ADMIN — FLUTICASONE PROPIONATE 1 SPRAY: 50 SPRAY, METERED NASAL at 08:31

## 2020-01-01 RX ADMIN — DOCUSATE SODIUM 100 MG: 100 CAPSULE, LIQUID FILLED ORAL at 10:50

## 2020-01-01 RX ADMIN — CARVEDILOL 6.25 MG: 6.25 TABLET, FILM COATED ORAL at 08:29

## 2020-01-01 RX ADMIN — SODIUM CHLORIDE, PRESERVATIVE FREE 10 ML: 5 INJECTION INTRAVENOUS at 08:29

## 2020-01-01 RX ADMIN — SODIUM CHLORIDE, PRESERVATIVE FREE 10 ML: 5 INJECTION INTRAVENOUS at 10:47

## 2020-01-01 RX ADMIN — GUAIFENESIN 600 MG: 600 TABLET, EXTENDED RELEASE ORAL at 10:43

## 2020-01-01 RX ADMIN — DOCUSATE SODIUM 100 MG: 100 CAPSULE, LIQUID FILLED ORAL at 09:37

## 2020-01-01 RX ADMIN — OXYCODONE HYDROCHLORIDE AND ACETAMINOPHEN 1 TABLET: 7.5; 325 TABLET ORAL at 07:15

## 2020-01-01 RX ADMIN — DILTIAZEM HYDROCHLORIDE 5 MG/HR: 5 INJECTION INTRAVENOUS at 04:05

## 2020-01-01 RX ADMIN — GABAPENTIN 300 MG: 300 CAPSULE ORAL at 08:17

## 2020-01-01 RX ADMIN — POLYETHYLENE GLYCOL 3350 17 G: 17 POWDER, FOR SOLUTION ORAL at 08:36

## 2020-01-01 RX ADMIN — OXYCODONE HYDROCHLORIDE AND ACETAMINOPHEN 1 TABLET: 7.5; 325 TABLET ORAL at 17:30

## 2020-01-01 RX ADMIN — DULOXETINE 20 MG: 20 CAPSULE, DELAYED RELEASE ORAL at 10:50

## 2020-01-01 RX ADMIN — LEVOTHYROXINE SODIUM 150 MCG: 75 TABLET ORAL at 05:20

## 2020-01-01 RX ADMIN — DOCUSATE SODIUM 100 MG: 100 CAPSULE, LIQUID FILLED ORAL at 07:15

## 2020-01-01 RX ADMIN — GABAPENTIN 300 MG: 300 CAPSULE ORAL at 10:45

## 2020-01-01 RX ADMIN — GABAPENTIN 300 MG: 300 CAPSULE ORAL at 12:52

## 2020-01-01 RX ADMIN — CARVEDILOL 6.25 MG: 6.25 TABLET, FILM COATED ORAL at 17:00

## 2020-01-01 RX ADMIN — OXYCODONE HYDROCHLORIDE AND ACETAMINOPHEN 1 TABLET: 7.5; 325 TABLET ORAL at 17:15

## 2020-01-01 RX ADMIN — GABAPENTIN 300 MG: 300 CAPSULE ORAL at 07:26

## 2020-01-01 RX ADMIN — ASPIRIN 81 MG: 81 TABLET, CHEWABLE ORAL at 09:24

## 2020-01-01 RX ADMIN — INSULIN LISPRO 1 UNITS: 100 INJECTION, SOLUTION INTRAVENOUS; SUBCUTANEOUS at 12:46

## 2020-01-01 RX ADMIN — ASPIRIN 81 MG: 81 TABLET, FILM COATED ORAL at 08:32

## 2020-01-01 RX ADMIN — OXYCODONE HYDROCHLORIDE AND ACETAMINOPHEN 1 TABLET: 7.5; 325 TABLET ORAL at 07:56

## 2020-01-01 RX ADMIN — LEVOTHYROXINE SODIUM 150 MCG: 75 TABLET ORAL at 10:50

## 2020-01-01 ASSESSMENT — PAIN - FUNCTIONAL ASSESSMENT
PAIN_FUNCTIONAL_ASSESSMENT: PREVENTS OR INTERFERES SOME ACTIVE ACTIVITIES AND ADLS
PAIN_FUNCTIONAL_ASSESSMENT: PREVENTS OR INTERFERES WITH MANY ACTIVE NOT PASSIVE ACTIVITIES
PAIN_FUNCTIONAL_ASSESSMENT: PREVENTS OR INTERFERES SOME ACTIVE ACTIVITIES AND ADLS

## 2020-01-01 ASSESSMENT — PAIN DESCRIPTION - PAIN TYPE
TYPE: CHRONIC PAIN
TYPE: ACUTE PAIN
TYPE: ACUTE PAIN
TYPE: CHRONIC PAIN
TYPE: ACUTE PAIN
TYPE: CHRONIC PAIN
TYPE: ACUTE PAIN
TYPE: CHRONIC PAIN
TYPE: ACUTE PAIN
TYPE: CHRONIC PAIN
TYPE: ACUTE PAIN
TYPE: CHRONIC PAIN
TYPE: ACUTE PAIN
TYPE: CHRONIC PAIN
TYPE: CHRONIC PAIN
TYPE: ACUTE PAIN
TYPE: CHRONIC PAIN
TYPE: CHRONIC PAIN
TYPE: ACUTE PAIN
TYPE: CHRONIC PAIN
TYPE: ACUTE PAIN

## 2020-01-01 ASSESSMENT — PAIN SCALES - GENERAL
PAINLEVEL_OUTOF10: 9
PAINLEVEL_OUTOF10: 9
PAINLEVEL_OUTOF10: 4
PAINLEVEL_OUTOF10: 7
PAINLEVEL_OUTOF10: 10
PAINLEVEL_OUTOF10: 7
PAINLEVEL_OUTOF10: 8
PAINLEVEL_OUTOF10: 4
PAINLEVEL_OUTOF10: 10
PAINLEVEL_OUTOF10: 3
PAINLEVEL_OUTOF10: 10
PAINLEVEL_OUTOF10: 5
PAINLEVEL_OUTOF10: 0
PAINLEVEL_OUTOF10: 9
PAINLEVEL_OUTOF10: 10
PAINLEVEL_OUTOF10: 6
PAINLEVEL_OUTOF10: 10
PAINLEVEL_OUTOF10: 6
PAINLEVEL_OUTOF10: 10
PAINLEVEL_OUTOF10: 0
PAINLEVEL_OUTOF10: 10
PAINLEVEL_OUTOF10: 5
PAINLEVEL_OUTOF10: 7
PAINLEVEL_OUTOF10: 8
PAINLEVEL_OUTOF10: 9
PAINLEVEL_OUTOF10: 9
PAINLEVEL_OUTOF10: 6
PAINLEVEL_OUTOF10: 10
PAINLEVEL_OUTOF10: 9
PAINLEVEL_OUTOF10: 5
PAINLEVEL_OUTOF10: 0
PAINLEVEL_OUTOF10: 9
PAINLEVEL_OUTOF10: 8
PAINLEVEL_OUTOF10: 3
PAINLEVEL_OUTOF10: 10
PAINLEVEL_OUTOF10: 8
PAINLEVEL_OUTOF10: 5
PAINLEVEL_OUTOF10: 6
PAINLEVEL_OUTOF10: 5
PAINLEVEL_OUTOF10: 0
PAINLEVEL_OUTOF10: 0
PAINLEVEL_OUTOF10: 6
PAINLEVEL_OUTOF10: 8
PAINLEVEL_OUTOF10: 9
PAINLEVEL_OUTOF10: 10
PAINLEVEL_OUTOF10: 10
PAINLEVEL_OUTOF10: 8
PAINLEVEL_OUTOF10: 6
PAINLEVEL_OUTOF10: 4
PAINLEVEL_OUTOF10: 7
PAINLEVEL_OUTOF10: 8
PAINLEVEL_OUTOF10: 5
PAINLEVEL_OUTOF10: 9
PAINLEVEL_OUTOF10: 5
PAINLEVEL_OUTOF10: 10
PAINLEVEL_OUTOF10: 5
PAINLEVEL_OUTOF10: 10
PAINLEVEL_OUTOF10: 3
PAINLEVEL_OUTOF10: 10
PAINLEVEL_OUTOF10: 6
PAINLEVEL_OUTOF10: 0
PAINLEVEL_OUTOF10: 9
PAINLEVEL_OUTOF10: 7
PAINLEVEL_OUTOF10: 2
PAINLEVEL_OUTOF10: 8
PAINLEVEL_OUTOF10: 10
PAINLEVEL_OUTOF10: 3
PAINLEVEL_OUTOF10: 10
PAINLEVEL_OUTOF10: 9
PAINLEVEL_OUTOF10: 0
PAINLEVEL_OUTOF10: 10
PAINLEVEL_OUTOF10: 9
PAINLEVEL_OUTOF10: 7
PAINLEVEL_OUTOF10: 0
PAINLEVEL_OUTOF10: 10
PAINLEVEL_OUTOF10: 5
PAINLEVEL_OUTOF10: 0
PAINLEVEL_OUTOF10: 2
PAINLEVEL_OUTOF10: 5
PAINLEVEL_OUTOF10: 0
PAINLEVEL_OUTOF10: 5
PAINLEVEL_OUTOF10: 4
PAINLEVEL_OUTOF10: 5
PAINLEVEL_OUTOF10: 7
PAINLEVEL_OUTOF10: 10
PAINLEVEL_OUTOF10: 3
PAINLEVEL_OUTOF10: 6
PAINLEVEL_OUTOF10: 0
PAINLEVEL_OUTOF10: 6
PAINLEVEL_OUTOF10: 10
PAINLEVEL_OUTOF10: 0
PAINLEVEL_OUTOF10: 7
PAINLEVEL_OUTOF10: 5
PAINLEVEL_OUTOF10: 7
PAINLEVEL_OUTOF10: 9
PAINLEVEL_OUTOF10: 8
PAINLEVEL_OUTOF10: 10
PAINLEVEL_OUTOF10: 7
PAINLEVEL_OUTOF10: 3
PAINLEVEL_OUTOF10: 9
PAINLEVEL_OUTOF10: 8
PAINLEVEL_OUTOF10: 10
PAINLEVEL_OUTOF10: 9
PAINLEVEL_OUTOF10: 7
PAINLEVEL_OUTOF10: 5
PAINLEVEL_OUTOF10: 10
PAINLEVEL_OUTOF10: 0
PAINLEVEL_OUTOF10: 8
PAINLEVEL_OUTOF10: 9
PAINLEVEL_OUTOF10: 4
PAINLEVEL_OUTOF10: 0
PAINLEVEL_OUTOF10: 10
PAINLEVEL_OUTOF10: 10
PAINLEVEL_OUTOF10: 3
PAINLEVEL_OUTOF10: 0
PAINLEVEL_OUTOF10: 0
PAINLEVEL_OUTOF10: 9
PAINLEVEL_OUTOF10: 10
PAINLEVEL_OUTOF10: 10
PAINLEVEL_OUTOF10: 8
PAINLEVEL_OUTOF10: 9
PAINLEVEL_OUTOF10: 5
PAINLEVEL_OUTOF10: 0
PAINLEVEL_OUTOF10: 10
PAINLEVEL_OUTOF10: 0
PAINLEVEL_OUTOF10: 4
PAINLEVEL_OUTOF10: 3
PAINLEVEL_OUTOF10: 10
PAINLEVEL_OUTOF10: 10
PAINLEVEL_OUTOF10: 0

## 2020-01-01 ASSESSMENT — ENCOUNTER SYMPTOMS
PHOTOPHOBIA: 0
CHOKING: 0
DIARRHEA: 0
SHORTNESS OF BREATH: 0
SINUS PAIN: 0
SORE THROAT: 0
WHEEZING: 0
NAUSEA: 0
SORE THROAT: 0
ABDOMINAL DISTENTION: 0
ABDOMINAL PAIN: 0
ABDOMINAL DISTENTION: 0
COUGH: 0
CONSTIPATION: 0
COUGH: 0
CHOKING: 0
PHOTOPHOBIA: 0
VOMITING: 0
WHEEZING: 0
TROUBLE SWALLOWING: 0
STRIDOR: 0
EYE PAIN: 0
DIARRHEA: 0
DIARRHEA: 0
GASTROINTESTINAL NEGATIVE: 1
CONSTIPATION: 0
PHOTOPHOBIA: 0
STRIDOR: 0
CHOKING: 0
ABDOMINAL PAIN: 0
TROUBLE SWALLOWING: 0
TROUBLE SWALLOWING: 0
SHORTNESS OF BREATH: 0
TROUBLE SWALLOWING: 0
NAUSEA: 0
NAUSEA: 0
ABDOMINAL PAIN: 0
CONSTIPATION: 0
TROUBLE SWALLOWING: 0
WHEEZING: 0
SHORTNESS OF BREATH: 0
SHORTNESS OF BREATH: 1
COUGH: 0
RHINORRHEA: 1
ABDOMINAL PAIN: 0
VOMITING: 0
ABDOMINAL PAIN: 0
STRIDOR: 0
DIARRHEA: 0
CHOKING: 0
SORE THROAT: 0
STRIDOR: 0
COUGH: 1
NAUSEA: 0
SINUS PAIN: 0
WHEEZING: 0
COUGH: 0
SHORTNESS OF BREATH: 0
SORE THROAT: 0
CONSTIPATION: 0
DIARRHEA: 0
CHOKING: 0
STRIDOR: 0
VOMITING: 0
WHEEZING: 0
SINUS PAIN: 0
ABDOMINAL PAIN: 0
ABDOMINAL DISTENTION: 0
ABDOMINAL DISTENTION: 0
CONSTIPATION: 0
CHOKING: 0
SORE THROAT: 0
NAUSEA: 0
PHOTOPHOBIA: 0
PHOTOPHOBIA: 0
VOMITING: 0
SINUS PAIN: 0
CHOKING: 0
WHEEZING: 0
VOMITING: 0
ABDOMINAL DISTENTION: 0
NAUSEA: 0
DIARRHEA: 0
SORE THROAT: 0
SHORTNESS OF BREATH: 1
CONSTIPATION: 0
SHORTNESS OF BREATH: 0
COUGH: 0
SINUS PAIN: 0
DIARRHEA: 0
VOMITING: 0
DIARRHEA: 0
SINUS PAIN: 0
SHORTNESS OF BREATH: 0
DIARRHEA: 0
SINUS PAIN: 0
PHOTOPHOBIA: 0
ABDOMINAL PAIN: 0
ABDOMINAL PAIN: 0
SINUS PAIN: 0
CONSTIPATION: 0
STRIDOR: 0
TROUBLE SWALLOWING: 0
VOICE CHANGE: 0
SHORTNESS OF BREATH: 1
EYE REDNESS: 0
PHOTOPHOBIA: 0
VOMITING: 0
VOMITING: 0
PHOTOPHOBIA: 0
ABDOMINAL DISTENTION: 0
ABDOMINAL DISTENTION: 0
STRIDOR: 0
CONSTIPATION: 0
ABDOMINAL PAIN: 0
WHEEZING: 0
WHEEZING: 0
SORE THROAT: 0
TROUBLE SWALLOWING: 0
SORE THROAT: 0
CHOKING: 0
STRIDOR: 0
NAUSEA: 0
TROUBLE SWALLOWING: 0
COUGH: 0
ABDOMINAL DISTENTION: 0
VOMITING: 0
SHORTNESS OF BREATH: 0
NAUSEA: 0

## 2020-01-01 ASSESSMENT — PAIN DESCRIPTION - DESCRIPTORS
DESCRIPTORS: ACHING
DESCRIPTORS: ACHING
DESCRIPTORS: CONSTANT
DESCRIPTORS: ACHING
DESCRIPTORS: SHARP
DESCRIPTORS: ACHING
DESCRIPTORS: ACHING
DESCRIPTORS: ACHING;CONSTANT
DESCRIPTORS: CONSTANT
DESCRIPTORS: ACHING
DESCRIPTORS: ACHING;CRAMPING
DESCRIPTORS: CONSTANT;CRAMPING
DESCRIPTORS: ACHING
DESCRIPTORS: CONSTANT;ACHING
DESCRIPTORS: CONSTANT
DESCRIPTORS: BURNING
DESCRIPTORS: CONSTANT
DESCRIPTORS: CONSTANT

## 2020-01-01 ASSESSMENT — PAIN DESCRIPTION - LOCATION
LOCATION: GENERALIZED
LOCATION: BACK
LOCATION: GENERALIZED
LOCATION: BACK
LOCATION: BACK
LOCATION: OTHER (COMMENT)
LOCATION: BACK;NECK
LOCATION: GENERALIZED
LOCATION: GENERALIZED
LOCATION: BACK
LOCATION: GENERALIZED
LOCATION: BACK
LOCATION: SACRUM
LOCATION: SACRUM
LOCATION: BACK
LOCATION: SACRUM
LOCATION: GENERALIZED
LOCATION: BACK
LOCATION: BACK
LOCATION: BACK;NECK
LOCATION: BACK
LOCATION: GENERALIZED
LOCATION: BACK
LOCATION: SACRUM
LOCATION: BACK

## 2020-01-01 ASSESSMENT — PAIN DESCRIPTION - ONSET
ONSET: ON-GOING
ONSET: GRADUAL
ONSET: ON-GOING
ONSET: GRADUAL

## 2020-01-01 ASSESSMENT — PAIN DESCRIPTION - PROGRESSION
CLINICAL_PROGRESSION: NOT CHANGED
CLINICAL_PROGRESSION: NOT CHANGED
CLINICAL_PROGRESSION: GRADUALLY WORSENING
CLINICAL_PROGRESSION: GRADUALLY IMPROVING
CLINICAL_PROGRESSION: NOT CHANGED
CLINICAL_PROGRESSION: GRADUALLY WORSENING
CLINICAL_PROGRESSION: NOT CHANGED
CLINICAL_PROGRESSION: GRADUALLY WORSENING
CLINICAL_PROGRESSION: NOT CHANGED
CLINICAL_PROGRESSION: GRADUALLY WORSENING
CLINICAL_PROGRESSION: NOT CHANGED
CLINICAL_PROGRESSION: GRADUALLY WORSENING
CLINICAL_PROGRESSION: NOT CHANGED

## 2020-01-01 ASSESSMENT — PAIN DESCRIPTION - ORIENTATION
ORIENTATION: OTHER (COMMENT)
ORIENTATION: LOWER
ORIENTATION: OTHER (COMMENT)
ORIENTATION: LOWER
ORIENTATION: LOWER
ORIENTATION: OTHER (COMMENT)
ORIENTATION: LOWER
ORIENTATION: LOWER
ORIENTATION: OTHER (COMMENT)

## 2020-01-01 ASSESSMENT — PAIN DESCRIPTION - FREQUENCY
FREQUENCY: INTERMITTENT
FREQUENCY: INTERMITTENT
FREQUENCY: CONTINUOUS
FREQUENCY: INTERMITTENT
FREQUENCY: CONTINUOUS
FREQUENCY: CONTINUOUS
FREQUENCY: INTERMITTENT
FREQUENCY: CONTINUOUS
FREQUENCY: INTERMITTENT
FREQUENCY: INTERMITTENT
FREQUENCY: CONTINUOUS

## 2020-01-01 ASSESSMENT — PULMONARY FUNCTION TESTS
PIF_VALUE: 16
PIF_VALUE: 28

## 2020-01-01 ASSESSMENT — PAIN DESCRIPTION - DIRECTION
RADIATING_TOWARDS: NO

## 2020-01-02 LAB
BACTERIA SPEC AEROBE CULT: ABNORMAL
BACTERIA SPEC AEROBE CULT: ABNORMAL

## 2020-01-23 ENCOUNTER — LAB REQUISITION (OUTPATIENT)
Dept: LAB | Facility: HOSPITAL | Age: 80
End: 2020-01-23

## 2020-01-23 DIAGNOSIS — Z00.00 ENCOUNTER FOR GENERAL ADULT MEDICAL EXAMINATION WITHOUT ABNORMAL FINDINGS: ICD-10-CM

## 2020-01-23 LAB
BACTERIA UR QL AUTO: ABNORMAL /HPF
BILIRUB UR QL STRIP: NEGATIVE
CLARITY UR: ABNORMAL
COLOR UR: ABNORMAL
GLUCOSE UR STRIP-MCNC: NEGATIVE MG/DL
HGB UR QL STRIP.AUTO: NEGATIVE
HYALINE CASTS UR QL AUTO: ABNORMAL /LPF
KETONES UR QL STRIP: NEGATIVE
LEUKOCYTE ESTERASE UR QL STRIP.AUTO: ABNORMAL
NITRITE UR QL STRIP: POSITIVE
PH UR STRIP.AUTO: 5.5 [PH] (ref 5–8)
PROT UR QL STRIP: NEGATIVE
RBC # UR: ABNORMAL /HPF
REF LAB TEST METHOD: ABNORMAL
SP GR UR STRIP: 1.02 (ref 1–1.03)
SQUAMOUS #/AREA URNS HPF: ABNORMAL /HPF
UROBILINOGEN UR QL STRIP: ABNORMAL
WBC UR QL AUTO: ABNORMAL /HPF

## 2020-01-23 PROCEDURE — 87088 URINE BACTERIA CULTURE: CPT | Performed by: INTERNAL MEDICINE

## 2020-01-23 PROCEDURE — 87186 SC STD MICRODIL/AGAR DIL: CPT | Performed by: INTERNAL MEDICINE

## 2020-01-23 PROCEDURE — 87086 URINE CULTURE/COLONY COUNT: CPT | Performed by: INTERNAL MEDICINE

## 2020-01-23 PROCEDURE — 81001 URINALYSIS AUTO W/SCOPE: CPT | Performed by: INTERNAL MEDICINE

## 2020-01-25 LAB — BACTERIA SPEC AEROBE CULT: ABNORMAL

## 2020-06-18 NOTE — TELEPHONE ENCOUNTER
FYI:            Patient stated on the way out from the office visit ,that she has a new DX of CHF and that she is not on any additional medication for it .

## 2020-06-18 NOTE — PROGRESS NOTES
tablet       levothyroxine (SYNTHROID) 150 MCG tablet       meloxicam (MOBIC) 7.5 MG tablet Take 7.5 mg by mouth daily as needed      DULoxetine (CYMBALTA) 20 MG extended release capsule Take 1 capsule by mouth daily 30 capsule 5    aspirin 81 MG tablet Take 81 mg by mouth daily      potassium chloride (KLOR-CON M) 10 MEQ extended release tablet Take 10 mEq by mouth daily      oxyCODONE ER (XTAMPZA ER) 18 MG C12A Take by mouth 2 times daily.  oxyCODONE-acetaminophen (PERCOCET) 7.5-325 MG per tablet Take 1 tablet by mouth every 8 hours as needed for Pain.  cetirizine (ZYRTEC) 10 MG tablet Take 10 mg by mouth daily      gabapentin (NEURONTIN) 100 MG capsule Take 300 mg by mouth 3 times daily. No current facility-administered medications for this visit. Allergies:   Allergies as of 06/18/2020 - Review Complete 06/18/2020   Allergen Reaction Noted    Sulfa antibiotics  08/27/2018       Review of Systems:  Constitutional: negative for - chills and fever  Eyes:  negative for - visual disturbance and photophobia  HENMT: negative for - headaches and sinus pain  Respiratory: negative for - cough, hemoptysis, and shortness of breath  Cardiovascular: negative for - chest pain and palpitations  Gastrointestinal: negative for - blood in stools, constipation, diarrhea, nausea, and vomiting  Genito-Urinary: negative for - hematuria, urinary frequency, urinary urgency, and urinary retention  Musculoskeletal: negative for - joint pain, joint stiffness, and joint swelling  Hematological and Lymphatic: negative for - bleeding problems, abnormal bruising, and swollen lymph nodes  Endocrine:  negative for - polydipsia and polyphagia  Allergy/Immunology:  negative for - rhinorrhea, sinus congestion, hives  Integument:  negative for - negative for - rash, change in moles, new or changing lesions  Psychological: negative for - anxiety and depression  Neurological: negative for - memory loss,

## 2020-08-21 ENCOUNTER — LAB REQUISITION (OUTPATIENT)
Dept: LAB | Facility: HOSPITAL | Age: 80
End: 2020-08-21

## 2020-08-21 DIAGNOSIS — Z00.00 ROUTINE GENERAL MEDICAL EXAMINATION AT A HEALTH CARE FACILITY: ICD-10-CM

## 2020-08-21 LAB
BACTERIA UR QL AUTO: ABNORMAL /HPF
BILIRUB UR QL STRIP: NEGATIVE
CLARITY UR: CLEAR
COLOR UR: YELLOW
GLUCOSE UR STRIP-MCNC: NEGATIVE MG/DL
HGB UR QL STRIP.AUTO: ABNORMAL
HYALINE CASTS UR QL AUTO: ABNORMAL /LPF
KETONES UR QL STRIP: NEGATIVE
LEUKOCYTE ESTERASE UR QL STRIP.AUTO: ABNORMAL
NITRITE UR QL STRIP: POSITIVE
PH UR STRIP.AUTO: 6.5 [PH] (ref 5–8)
PROT UR QL STRIP: NEGATIVE
RBC # UR: ABNORMAL /HPF
REF LAB TEST METHOD: ABNORMAL
SP GR UR STRIP: 1.02 (ref 1–1.03)
SQUAMOUS #/AREA URNS HPF: ABNORMAL /HPF
UROBILINOGEN UR QL STRIP: ABNORMAL
WBC UR QL AUTO: ABNORMAL /HPF

## 2020-08-21 PROCEDURE — 87086 URINE CULTURE/COLONY COUNT: CPT

## 2020-08-21 PROCEDURE — 81001 URINALYSIS AUTO W/SCOPE: CPT

## 2020-08-21 PROCEDURE — 87088 URINE BACTERIA CULTURE: CPT

## 2020-08-21 PROCEDURE — 87186 SC STD MICRODIL/AGAR DIL: CPT

## 2020-08-23 LAB — BACTERIA SPEC AEROBE CULT: ABNORMAL

## 2020-10-19 PROBLEM — I50.43 ACUTE ON CHRONIC COMBINED SYSTOLIC (CONGESTIVE) AND DIASTOLIC (CONGESTIVE) HEART FAILURE (HCC): Status: ACTIVE | Noted: 2020-01-01

## 2020-10-19 PROBLEM — E78.00 HYPERCHOLESTEROLEMIA: Status: ACTIVE | Noted: 2018-05-31

## 2020-10-19 PROBLEM — M48.07 SPINAL STENOSIS OF LUMBOSACRAL REGION: Status: ACTIVE | Noted: 2019-03-30

## 2020-10-19 PROBLEM — I63.9 CEREBROVASCULAR ACCIDENT (HCC): Status: ACTIVE | Noted: 2018-05-31

## 2020-10-19 PROBLEM — R29.6 FREQUENT FALLS: Status: ACTIVE | Noted: 2019-01-27

## 2020-10-19 PROBLEM — R79.89 ELEVATED D-DIMER: Status: ACTIVE | Noted: 2020-01-01

## 2020-10-19 PROBLEM — N12 PYELONEPHRITIS: Status: ACTIVE | Noted: 2019-03-29

## 2020-10-19 PROBLEM — R26.9 GAIT ABNORMALITY: Status: ACTIVE | Noted: 2019-03-19

## 2020-10-19 PROBLEM — M19.90 ARTHRITIS: Status: ACTIVE | Noted: 2018-05-31

## 2020-10-19 NOTE — ED PROVIDER NOTES
[address]      Pt Name: Della Mtz  MRN: [id number]  [place] 1940  Date of evaluation: 10/19/2020  Provider: Cassandra Hare MD    CHIEF COMPLAINT       Chief Complaint   Patient presents with    Shortness of Breath    Fever         HISTORY OF PRESENT ILLNESS   (Location/Symptom, Timing/Onset,Context/Setting, Quality, Duration, Modifying Factors, Severity)  Note limiting factors. Della Mtz is a 79 y.o. female who presents to the emergency department with complaint of shortness of breath. Patient unable to specify how long she has had these symptoms. States she has a history of seasonal allergies and has had nasal congestion with postnasal drip recently with a cough that she thought was the culprit but today was feeling more short of breath after having been on the phone morning. Patient denies any history of COPD, asthma, or CHF. Medical records do show a history of CHF but no previous echo or other cardiac work-up is available. Patient has a history of chronic bilateral lower extremity edema. States she is prescribed HCTZ but has not taken it over the last 4 to 5 days because she did not think it was doing much for her. HPI    NursingNotes were reviewed. REVIEW OF SYSTEMS    (2-9 systems for level 4, 10 or more for level 5)     Review of Systems   Constitutional: Negative for fatigue and fever. HENT: Positive for postnasal drip and rhinorrhea. Negative for voice change. Eyes: Negative for pain and redness. Respiratory: Positive for cough and shortness of breath. Cardiovascular: Negative for chest pain. Gastrointestinal: Negative for abdominal pain, diarrhea and vomiting. Endocrine: Negative. Genitourinary: Negative. Musculoskeletal: Negative for arthralgias and gait problem. Skin: Negative for rash and wound. Neurological: Negative for weakness and headaches. Hematological: Negative.     Psychiatric/Behavioral: Negative. All other systems reviewed and are negative. A complete review of systems was performed and is negative except as noted above in the HPI. PAST MEDICAL HISTORY     Past Medical History:   Diagnosis Date    CHF (congestive heart failure) ([facility] [address]     Chronic cystitis     Fibromyalgia     Hypertension     Kidney stones     Osteoarthritis     Peripheral neuropathy     Polymyalgia rheumatica     Stroke          SURGICAL HISTORY       Past Surgical History:   Procedure Laterality Date    HYSTERECTOMY      KIDNEY SURGERY      TONSILLECTOMY           CURRENT MEDICATIONS       Current Discharge Medication List      CONTINUE these medications which have NOT CHANGED    Details   hydroCHLOROthiazide (HYDRODIURIL) 25 MG tablet       lisinopril (PRINIVIL;ZESTRIL) 10 MG tablet       levothyroxine (SYNTHROID) 150 MCG tablet       meloxicam (MOBIC) 7.5 MG tablet Take 7.5 mg by mouth daily as needed      DULoxetine (CYMBALTA) 20 MG extended release capsule Take 1 capsule by mouth daily  Qty: 30 capsule, Refills: 5      aspirin 81 MG tablet Take 81 mg by mouth daily      potassium chloride (KLOR-CON M) 10 MEQ extended release tablet Take 10 mEq by mouth daily      oxyCODONE ER (XTAMPZA ER) 18 MG C12A Take by mouth 2 times daily. oxyCODONE-acetaminophen (PERCOCET) 7.5-325 MG per tablet Take 1 tablet by mouth every 8 hours as needed for Pain. cetirizine (ZYRTEC) 10 MG tablet Take 10 mg by mouth daily      gabapentin (NEURONTIN) 100 MG capsule Take 300 mg by mouth 3 times daily.               ALLERGIES     Sulfa antibiotics    FAMILY HISTORY       Family History   Problem Relation Age of Onset    Diabetes Mother     No Known Problems Father     Stroke Sister           SOCIAL HISTORY       Social History     Socioeconomic History    Marital status:      Spouse name: None    Number of children: None    Years of education: None    Highest education level: None   Occupational History  None   Social Needs    Financial resource strain: None    Food insecurity     Worry: None     Inability: None    Transportation needs     Medical: None     Non-medical: None   Tobacco Use    Smoking status: Former Smoker     Last attempt to quit: 1979     Years since quittin.8    Smokeless tobacco: Never Used   Substance and Sexual Activity    Alcohol use: No    Drug use: No    Sexual activity: None   Lifestyle    Physical activity     Days per week: None     Minutes per session: None    Stress: None   Relationships    Social connections     Talks on phone: None     Gets together: None     Attends Worship service: None     Active member of club or organization: None     Attends meetings of clubs or organizations: None     Relationship status: None    Intimate partner violence     Fear of current or ex partner: None     Emotionally abused: None     Physically abused: None     Forced sexual activity: None   Other Topics Concern    None   Social History Narrative    None       SCREENINGS             PHYSICAL EXAM    (up to 7 for level 4, 8 or more for level 5)     ED Triage Vitals [10/19/20 1614]   BP Temp Temp src Pulse Resp SpO2 Height Weight   -- 97.8 °F (36.6 °C) -- 78 19 (!) 85 % -- --       Physical Exam  Vitals signs and nursing note reviewed. Constitutional:       General: She is not in acute distress. Appearance: She is well-developed. She is not diaphoretic. Interventions: Nasal cannula in place. HENT:      Head: Normocephalic and atraumatic. Eyes:      General: No scleral icterus. Neck:      Vascular: No JVD. Cardiovascular:      Rate and Rhythm: Normal rate and regular rhythm. Pulses:           Radial pulses are 2+ on the right side and 2+ on the left side. Dorsalis pedis pulses are 2+ on the right side and 2+ on the left side. Heart sounds: Normal heart sounds. No murmur. No friction rub. No gallop.     Pulmonary:      Effort: Pulmonary effort is normal. No accessory muscle usage or respiratory distress. Breath sounds: Normal breath sounds. No stridor. No decreased breath sounds, wheezing, rhonchi or rales. Chest:      Chest wall: No tenderness. Abdominal:      General: There is no distension. Palpations: Abdomen is soft. Tenderness: There is no abdominal tenderness. There is no guarding or rebound. Musculoskeletal: Normal range of motion. General: No deformity. Right lower le+ Edema present. Left lower le+ Edema present. Skin:     General: Skin is warm and dry. Coloration: Skin is not pale. Findings: No erythema. Neurological:      Mental Status: She is alert and oriented to person, place, and time. GCS: GCS eye subscore is 4. GCS verbal subscore is 5. GCS motor subscore is 6. Cranial Nerves: No cranial nerve deficit. Motor: No abnormal muscle tone. Coordination: Coordination normal.   Psychiatric:         Behavior: Behavior normal.         Judgment: Judgment normal.         DIAGNOSTIC RESULTS     EKG: All EKG's are interpreted by the Emergency Department Physician who either signs or Co-signs this chart in the absence of a cardiologist.      RADIOLOGY:   Non-plain film images such as CT, Ultrasound and MRI are read by the radiologist. Plainradiographic images are visualized and preliminarily interpreted by the emergency physician with the below findings:      Interpretation per the Radiologist below, if available at the time of this note:    CTA PULMONARY W CONTRAST   Final Result   1. No CT evidence of pulmonary embolus. The pulmonary arteries are   dilated. 2. Atheromatous disease of the thoracic aorta and coronary arteries. Cardiomegaly. 3. Linear infiltrates in the mid and lower lung zones bilaterally   likely due to atelectasis. There are dependent infiltrates in both   lower lobes likely due to atelectasis. Pneumonia less likely. 3. Small hiatal hernia.    4. Probable fatty liver. Probable gallstones. The full report of this exam was immediately signed and available to   the emergency room. The patient is currently in the emergency room. Signed by Dr Vika Jules on 10/19/2020 9:32 PM      XR CHEST PORTABLE   Final Result   1. Hypoventilation with vascular crowding. 2. Bronchial wall thickening. 3. Mild atelectasis in the right lung base. Mild elevation of the   right hemidiaphragm. 4. Cardiomegaly.    Signed by Dr Vika Jules on 10/19/2020 5:05 PM            ED BEDSIDE ULTRASOUND:   Performed by ED Physician - none    LABS:  Labs Reviewed   CBC WITH AUTO DIFFERENTIAL - Abnormal; Notable for the following components:       Result Value    RBC 4.09 (*)     .1 (*)     MCH 31.3 (*)     MCHC 29.2 (*)     RDW 17.6 (*)     Neutrophils % 69.7 (*)     Lymphocytes % 15.1 (*)     Eosinophils % 5.2 (*)     All other components within normal limits   COMPREHENSIVE METABOLIC PANEL W/ REFLEX TO MG FOR LOW K - Abnormal; Notable for the following components:    Glucose 126 (*)     ALT 45 (*)     AST 42 (*)     All other components within normal limits   TROPONIN - Abnormal; Notable for the following components:    Troponin 0.05 (*)     All other components within normal limits   BLOOD GAS, ARTERIAL - Abnormal; Notable for the following components:    pCO2, Arterial 54.0 (*)     pO2, Arterial 47.0 (*)     HCO3, Arterial 33.4 (*)     Base Excess, Arterial 7.0 (*)     O2 Sat, Arterial 83.0 (*)     All other components within normal limits    Narrative:     CALL  [facility]  Alex Gibbons RN ER, 10/19/2020 16:30, by EL   D-DIMER, QUANTITATIVE - Abnormal; Notable for the following components:    D-Dimer, Quant 2.11 (*)     All other components within normal limits   TROPONIN - Abnormal; Notable for the following components:    Troponin 0.06 (*)     All other components within normal limits   LIPID PANEL - Abnormal; Notable for the following components: Triglycerides 190 (*)     HDL 55 (*)     All other components within normal limits   HEMOGLOBIN A1C - Abnormal; Notable for the following components:    Hemoglobin A1C 7.4 (*)     All other components within normal limits   SEDIMENTATION RATE - Abnormal; Notable for the following components:    Sed Rate 30 (*)     All other components within normal limits   C-REACTIVE PROTEIN - Abnormal; Notable for the following components:    CRP 2.90 (*)     All other components within normal limits   TROPONIN - Abnormal; Notable for the following components:    Troponin 0.05 (*)     All other components within normal limits   RESPIRATORY PANEL, MOLECULAR, WITH COVID-19   CULTURE, BLOOD 1   CULTURE, BLOOD 2   BRAIN NATRIURETIC PEPTIDE   POTASSIUM, WHOLE BLOOD   MAGNESIUM   PHOSPHORUS   TSH WITH REFLEX TO FT4   PROTIME-INR   APTT   FERRITIN   IRON AND TIBC   URINE RT REFLEX TO CULTURE   BASIC METABOLIC PANEL   CBC WITH AUTO DIFFERENTIAL   MAGNESIUM   TROPONIN   TROPONIN       All other labs were within normal range or not returned as of this dictation.     Medications   potassium chloride (KLOR-CON M) extended release tablet 10 mEq (has no administration in time range)   oxyCODONE-acetaminophen (PERCOCET) 7.5-325 MG per tablet 1 tablet (has no administration in time range)   lisinopril (PRINIVIL;ZESTRIL) tablet 10 mg (10 mg Oral Given 10/19/20 2325)   oxyCODONE ER C12A 18 mg (has no administration in time range)   levothyroxine (SYNTHROID) tablet 150 mcg (has no administration in time range)   hydroCHLOROthiazide (HYDRODIURIL) tablet 25 mg ( Oral Automatically Held 10/23/20 0900)   gabapentin (NEURONTIN) capsule 300 mg (300 mg Oral Given 10/19/20 2325)   DULoxetine (CYMBALTA) extended release capsule 20 mg (has no administration in time range)   aspirin chewable tablet 81 mg (has no administration in time range)   aspirin chewable tablet 324 mg (has no administration in time range)   sodium chloride flush 0.9 % injection 10 mL (has no administration in time range)   sodium chloride flush 0.9 % injection 10 mL (has no administration in time range)   acetaminophen (TYLENOL) tablet 650 mg (has no administration in time range)     Or   acetaminophen (TYLENOL) suppository 650 mg (has no administration in time range)   perflutren lipid microspheres (DEFINITY) injection 1.65 mg (has no administration in time range)   polyethylene glycol (GLYCOLAX) packet 17 g (has no administration in time range)   melatonin tablet 3 mg (has no administration in time range)   ondansetron (ZOFRAN-ODT) disintegrating tablet 4 mg (has no administration in time range)     Or   ondansetron (ZOFRAN) injection 4 mg (has no administration in time range)   heparin (porcine) injection 5,000 Units (has no administration in time range)   furosemide (LASIX) injection 40 mg (has no administration in time range)   nitroGLYCERIN (NITROSTAT) SL tablet 0.4 mg (has no administration in time range)   atorvastatin (LIPITOR) tablet 40 mg (40 mg Oral Given 10/19/20 2325)   vancomycin (VANCOCIN) intermittent dosing (placeholder) (has no administration in time range)   vancomycin (VANCOCIN) 1,250 mg in dextrose 5 % 250 mL IVPB (1,250 mg Intravenous New Bag 10/19/20 2325)   furosemide (LASIX) injection 60 mg (60 mg Intravenous Given 10/19/20 1940)   oxyCODONE-acetaminophen (PERCOCET) 7.5-325 MG per tablet 1 tablet (1 tablet Oral Given 10/19/20 2040)   iopamidol (ISOVUE-370) 76 % injection 90 mL (90 mLs Intravenous Given 10/19/20 2039)       EMERGENCY DEPARTMENT COURSE and DIFFERENTIALDIAGNOSIS/MDM:   Vitals:    Vitals:    10/19/20 2118 10/19/20 2122 10/19/20 2138 10/19/20 2259   BP:  (!) 157/89 (!) 198/130 (!) 172/82   Pulse: 80  85 94   Resp: 22 20 20   Temp: 98.2 °F (36.8 °C)  97.1 °F (36.2 °C) 97.5 °F (36.4 °C)   TempSrc: Oral  Temporal Temporal   SpO2:  92% 90% 90%   Weight:   (!) 333 lb 3.2 oz (151.1 kg)    Height:   5' 5\" (1.651 m)        MDM      Based on the evaluation and work-up

## 2020-10-19 NOTE — PROGRESS NOTES
Contains critical data  BLOOD GAS, ARTERIAL   Status:  Final result    Ref Range & Units  10/19/20 1629   pH, Arterial  7.350 - 7.450  7.400    pCO2, Arterial  35.0 - 45.0 mmHg  54. 0High      pO2, Arterial  80.0 - 100.0 mmHg  47. 0Low Panic      HCO3, Arterial  22.0 - 26.0 mmol/L  33.4High      Base Excess, Arterial  -2.0 - 2.0 mmol/L  7.0High      Hemoglobin, Art, Extended  12.0 - 16.0 g/dL  13.1    O2 Sat, Arterial  >92 %  83. 0Low Panic      Carboxyhgb, Arterial  0.0 - 5.0 %  2.5    Comment:      0.0-1.5   (Smokers 1.5-5.0)    Methemoglobin, Arterial  <1.5 %  0.9    O2 Content, Arterial  Not Established mL/dL  15.3    O2 Therapy   Unknown    Resulting Agency   [facility]    Narrative     CALL  Palma Nelia Hudson RN ER, 10/19/2020 16:30, by EL         Specimen Collected: 10/19/20 1629  Last Resulted: 10/19/20 1629  View Other Order Details         Pt on room air, RR 22 site RR at+

## 2020-10-20 NOTE — H&P
mouth daily 6/18/20   Armen Humphrey MD   aspirin 81 MG tablet Take 81 mg by mouth daily    Historical Provider, MD   potassium chloride (KLOR-CON M) 10 MEQ extended release tablet Take 10 mEq by mouth daily    Historical Provider, MD   oxyCODONE ER (XTAMPZA ER) 18 MG C12A Take by mouth 2 times daily. Historical Provider, MD   oxyCODONE-acetaminophen (PERCOCET) 7.5-325 MG per tablet Take 1 tablet by mouth every 8 hours as needed for Pain. Historical Provider, MD   cetirizine (ZYRTEC) 10 MG tablet Take 10 mg by mouth daily    Historical Provider, MD   gabapentin (NEURONTIN) 100 MG capsule Take 300 mg by mouth 3 times daily. Historical Provider, MD     Allergies:    Sulfa antibiotics    Social History:    The patient currently lives in [address] [place] [address]:   reports that she quit smoking about 41 years ago. She has never used smokeless tobacco.  Alcohol:   reports no history of alcohol use.   Illicit Drugs: denies    Family History:      Problem Relation Age of Onset    Diabetes Mother     No Known Problems Father     Stroke Sister      Review of Systems:   Constitutional / general:  + fever / chills / sweats +myalgias  Head:  Denies headache / neck stiffness / trauma / visual change  Eyes:  Denies blurry vision / acute visual change or loss / itching / redness  ENT: Denies sore throat / hoarseness / nasal drainage / ear pain  CV:  Denies chest pain / palpitations/ orthopnea   Respiratory: +cough +/ shortness of breath / +sputum /Denies hemoptysis  GI: Denies nausea / vomiting / abdominal pain / diarrhea / constipation  :  Denies dysuria / hesitancy / urgency / hematuria   Neuro: Denies paralysis / syncope / seizure / dysphagia / headache / paresthesias  Musculoskeletal:  + muscle weakness /Denies joint stiffness / pain  Vascular+edema /Denies  claudication / varicosities  Heme / endocrine: Denies easy bruising / bleeding / excessive sweating / heat or cold intolerance  Psychiatric:  Denies depression / anxiety / insomnia / mood changes  Skin:  Denies new rashes / lesions / +skin changes    14 point review of systems is negative except as specifically addressed above. Physical Examination:  Pulse 78   Temp 97.8 °F (36.6 °C)   Resp 19   SpO2 93%   General appearance: alert, appears stated age, cooperative and no distress  Head: Normocephalic, without obvious abnormality, atraumatic  Eyes: conjunctivae/corneas clear. PERRL, EOM's intact. Ears: normal external ears and nose, throat without exudate  Neck: no adenopathy, no carotid bruit, no JVD, supple, symmetrical, trachea midline   Lungs: diminished throughout, soft scattered BLL wheezing and rales    Heart: regular rate and rhythm, S1, S2 normal, no murmur  Abdomen:soft, obese, non-tender; non-distended, normal bowel sounds no masses, no organomegaly  Extremities:1-2+ Bilateral lower extremity edema with BLE erythema with warmth and mild serous drainage noted  Skin: Chronic lymphedema BLE's with venous stasis changes   Lymphatic: No palpable lymph node enlargment  Neurologic: Alert and oriented X 3, generalized weakness and normal tone. Speech is fluent, no facial asymmetry noted.  Answers questions appropriately and follows commands  Psychiatric: Alert and oriented, thought content appropriate, normal insight, mood appropriate    Diagnostic Data:  CBC:  Recent Labs     10/19/20  1643   WBC 9.3   HGB 12.8   HCT 43.8        BMP:  Recent Labs     10/19/20  1629 10/19/20  1643   NA  --  139   K 4.1 4.8   CL  --  101   CO2  --  27   BUN  --  23   CREATININE  --  0.9   CALCIUM  --  8.9     Recent Labs     10/19/20  1643   AST 42*   ALT 45*   BILITOT 0.3   ALKPHOS 75     Coag Panel:   Recent Labs     10/19/20  1643   INR 1.02   PROTIME 13.3   APTT 30.9     Cardiac Enzymes:   Recent Labs     10/19/20  1643   TROPONINI 0.05*     ABGs:  Lab Results   Component Value Date    PHART 7.400 10/19/2020    PO2ART 47.0 10/19/2020    XUA7DFP 54.0 10/19/2020 ABG:  Recent Labs     10/19/20  1629   PHART 7.400   YNH6UCX 54.0*   PO2ART 47.0*   TTR3TPX 33.4*   BEART 7.0*   HGBAE 13.1   R6IYBEOT 83.0*   CARBOXHGBART 2.5     Xr Chest Portable  1. Hypoventilation with vascular crowding. 2. Bronchial wall thickening. 3. Mild atelectasis in the right lung base. Mild elevation of the right hemidiaphragm. 4. Cardiomegaly. Signed by Dr Owen De La Cruz on 10/19/2020 5:05 PM    Assessment/Plan:  Principal Problem:    Acute on chronic combined systolic (congestive) and diastolic (congestive) heart failure (HCC)-w/ elevated troponin, vascular crowding on CXR-continue IV Lasix, trend troponin, check Echocardiogram, continue Aspirin, Statin, Ace inhibitor. Hold HCTZ for now and give IV Lasix with close monitoring renal parameters     Active Problems:    Elevated d-dimer-CTA pulmonary pending, if concerning for pulmonary embolus start heparin gtt      Hypothyroidism-synthroid continued      Lower extremity cellulitis-BLE bright red erythema, would be abnormal for cellulitis bilaterally, however, patient notes she has had chronic edema with breaks in the skin and serous drainage. Discussed with attending, add pharmacy to dose Vancomycin for now and monitor closely. Keep elevated with compression wrap          Chronic cystitis-no urinary symptoms reported       History of stroke-noted      Hypercholesterolemia-statin continued      Essential hypertension-monitor, zestril resumed       Polymyalgia (HCC)/hx peripheral neuropathy/chronic back pain-noted, home medications resumed with exception of Mobic.  Will hold for now given need for IV diuretic therapy as well as IV contrast to rule out PE      Hyperglycemia-A1C 7.4, SSI to cover during hospitalization, consider Metformin and CC diet at discharge     Further orders per clinical course/attending     Signed:  Rula Hoang PA-C

## 2020-10-20 NOTE — PROGRESS NOTES
ACE wraps applied to pt bilateral legs per order. ACE wraps to be left on for 6 hours and to be removed for 4 hours.  Will pass on in report that wrap is to be removed at 10:30 AM.     Electronically signed by Latoya Mehta RN on 10/20/2020 at 4:41 AM

## 2020-10-20 NOTE — PROGRESS NOTES
Pharmacy Note  Vancomycin Consult    Kaleb Line is a 79 y.o. female started on Vancomycin; consult received from Dr. Juany Henry to manage therapy. Principal Problem:    Acute on chronic combined systolic (congestive) and diastolic (congestive) heart failure (HCC)  Active Problems:    Idiopathic peripheral neuropathy    Chronic bilateral low back pain with bilateral sciatica    Elevated d-dimer    Chronic cystitis    Arthritis    History of stroke    Hypercholesterolemia    Essential hypertension    Polymyalgia ([address]  Resolved Problems:    * No resolved hospital problems. *      Allergies:  Sulfa antibiotics     Temp max: 98.2    Recent Labs     10/19/20  1643   BUN 23       Recent Labs     10/19/20  1643   CREATININE 0.9       Recent Labs     10/19/20  1643   WBC 9.3       No intake or output data in the 24 hours ending 10/20/20 0105    No current cultures at this time  Culture Date Source Results                      Ht Readings from Last 1 Encounters:   10/19/20 5' 5\" (1.651 m)        Wt Readings from Last 1 Encounters:   10/19/20 (!) 333 lb 3.2 oz (151.1 kg)         Body mass index is 55.45 kg/m². Estimated Creatinine Clearance: 76 mL/min (based on SCr of 0.9 mg/dL). Assessment/Plan:  Will initiate vancomycin 1250 mg IV every 12 hours. Timing of trough level will be determined based on culture results, renal function, and clinical response. Thank you for the consult. Will continue to follow.     Electronically signed by Carrie Vail [facility] on 10/20/2020 at 1:05 AM

## 2020-10-20 NOTE — PROGRESS NOTES
Physical Therapy  Name: Gt Yi  MRN:  [id number]  Date of service:  10/20/2020       10/20/20 1559   Subjective   Subjective Pt stating her bottom is numb and she is in pain and wants to move.     General Comment   Comments No therapy assisted with bed mobility     Electronically signed by Guera Austin PTA on 10/20/2020 at 3:57 PM

## 2020-10-20 NOTE — CARE COORDINATION
Informed by Tiarra Burgess at [facility] the facility is OON with pt's insurance [employer]. SW notified [place] with [facility] the therapy evals are completed and able to be reviewed. Awaiting response. Spoke with [employer] to request contact information for family and informed no contact information listed for the pt at the [facility]. Attempted to contact pt's dtr listed in contacts on pt's chart and number is disconnected. Spoke with pt who reports no family available and only a niece that \"has helped all she can help, between working [employer] and having a family of her own. SW spoke with pt further and pt is requesting continued review of referral to [facility] because pt prefers [facility] but cannot self-pay and [facility] is in [place] where pt prefers.

## 2020-10-20 NOTE — CARE COORDINATION
Spoke with Tami Whalen from the [facility] division who is following the pt and aware of referrals for placement. Uzma Nagy has requested to be kept informed of the pt's disposition at dc.      [facility] Office [facility] division  [phone]

## 2020-10-20 NOTE — PROGRESS NOTES
Occupational Therapy   Occupational Therapy Initial Assessment  Date: 10/20/2020   Patient Name: Lucrecia Leggett  MRN: [id number]     : 1940    Date of Service: 10/20/2020    Discharge Recommendations:  Patient would benefit from continued therapy after discharge       Assessment   Assessment: Evaluation completed and tx initiated. The patient is likely to make significant gains with further skilled therapy intervention. Feel patient will require more assist than AL can provide. Treatment Diagnosis: Respiratory Failure, CHF  REQUIRES OT FOLLOW UP: Yes  Activity Tolerance  Activity Tolerance: Patient Tolerated treatment well  Safety Devices  Safety Devices in place: Not Applicable(left with nsg)           Patient Diagnosis(es): The primary encounter diagnosis was Acute respiratory failure with hypoxemia ([address] Diagnoses of Bilateral lower extremity edema, Acute on chronic combined systolic and diastolic CHF (congestive heart failure) ([address] Elevated troponin, and History of MI (myocardial infarction) were also pertinent to this visit. has a past medical history of CHF (congestive heart failure) ([address] Chronic cystitis, Fibromyalgia, Hypertension, Kidney stones, Osteoarthritis, Peripheral neuropathy, Polymyalgia rheumatica, and Stroke.   has a past surgical history that includes Hysterectomy; Tonsillectomy; and Kidney surgery.     Treatment Diagnosis: Respiratory Failure, CHF      Restrictions  Restrictions/Precautions  Restrictions/Precautions: Fall Risk    Subjective   General  Chart Reviewed: Yes  Patient assessed for rehabilitation services?: Yes  Family / Caregiver Present: No  Patient Currently in Pain: Yes  Pain Assessment  Pain Assessment: 0-10  Pain Level: 6  Pain Type: Chronic pain  Pain Location: Back  Non-Pharmaceutical Pain Intervention(s): Ambulation/Increased Activity;Repositioned  Response to Pain Intervention: Patient Satisfied  Vital Signs  Patient Currently in Pain: Yes  Social/Functional History  Social/Functional History  Lives With: Alone  Type of Home: Assisted living  ADL Assistance: Needs assistance  Transfer Assistance: Independent       Objective        Orientation  Overall Orientation Status: Within Normal Limits     Balance  Sitting Balance: Independent  Standing Balance: Moderate assistance  ADL  Feeding: Independent  Grooming: Independent;Setup  UE Bathing: Moderate assistance  LE Bathing: Moderate assistance;Maximum assistance  UE Dressing: Supervision  LE Dressing: Moderate assistance;Maximum assistance  Toileting: Moderate assistance;Maximum assistance           Transfers  Transfer Comments: Likely able to transfer with moderate assist.  Limited by incontinence of urine this visit and need to lie down for bedpan/hygiene. Insufficient mobility/endurance to ambulate to bathroom. Asked nursing to locate bariatric BSC     Cognition  Overall Cognitive Status: WFL                 LUE PROM (degrees)  LUE PROM: WFL  LUE AROM (degrees)  LUE AROM : WFL  RUE PROM (degrees)  RUE PROM: WFL  RUE AROM (degrees)  RUE AROM : WFL                    Tx initiated: Movement strategies (10 mins)    Plan   Plan  Times per week: 3-4    G-Code     OutComes Score                                                  AM-PAC Score             Goals  Short term goals  Short term goal 1: Supervision for seated ADL with AE  Short term goal 2: MIN A for pant management for dressing and toileting for standing aspects  Short term goal 3: Perform transfers with min A  Short term goal 4:  Independent with HEP       Therapy Time   Individual Concurrent Group Co-treatment   Time In           Time Out           Minutes                   Ricardo Almeida OT Electronically signed by Ricardo Almeida OT on 10/20/2020 at 12:15 PM

## 2020-10-20 NOTE — CARE COORDINATION
Pt is a resident at [facility] and [facility] spoke with Ale Hughes the Director of Resident Care at the facility. Ale Hughes reports pt is w/c bound and the facility staff were administering meds and partial assistance with dressing, bathing and toile ting of the pt, further explaining more in transfers than anything. Ale Hughes reports staff does assist with pt's bathing d/t size and capabilities. Ale Hughes denies pt having any HH services or home O2 at the facility. Ale Hughes states at this time the facility is aware unable to continue to manage the pt's care needs and had made referrals to local SNFs for transfer of the pt, naming [facility] and [facility]. [facility] requested PT/OT evals for pt d/t insurance will require a precert for SNF services upon dc. [facility] faxed referrals to both SNFs for review and determination for offer. Awaiting responses at this time.      [facility] Ph: [phone]  Fa: [id number] [place] [address]  [phone] ph  [phone] fax  [phone] referral fax

## 2020-10-20 NOTE — PROGRESS NOTES
[facility]    Patient:  Ember Yhe  YOB: 1940  Date of Service: 10/20/2020  MRN: [id number]   Acct: [de-identified]   Primary Care Physician: Shane Rodriguez MD  Advance Directive: DNR-CCA  Admit Date: 10/19/2020       Hospital Day: 1  Portions of this note have been copied forward, however, changed to reflect the most current clinical status of this patient. CHIEF COMPLAINT: Dyspnea, Fever     SUBJECTIVE:  Ms. Chetan Mak has no new complaints. Dyspnea is improving and she is tolerating laying flat. States legs feel improved as well. Denies chest pain, heart palpitations, N/V or cough. Cumulative Hospital Course: The patient is a 79 y.o. female with PMH CHF, fibromyalgia, stroke who presented to [address] [place] [address] ED complaining of dyspnea that has been worsening over the past week associated with worsening lower extremity edema with bilateral lower extremity erythema. She admits to rhinorrhea, productive cough though she swallows her sputum and questionable wheezing. States nothing makes it worse or better. She is wheelchair bound. Subjective fever. Complains of myalgias but not abnormal from her baseline. States she's supposed to be taking hydrochlorothiazide but stopped because she felt like it wasn't working. She is not on home oxygen and was hypoxic upon arrival. Troponin elevated to 0.05. . She is afebrile without leukocytosis. D-dimer is elevated to 2. 11. CTA pending. Respiratory virus panel with COVID-19 unremarkable. Review of Systems:   14 point review of systems is negative except as specifically addressed above.     Objective:   VITALS:  /67   Pulse 91   Temp 97.6 °F (36.4 °C) (Temporal)   Resp 20   Ht 5' 5\" (1.651 m)   Wt (!) 333 lb 3.2 oz (151.1 kg)   SpO2 90%   BMI 55.45 kg/m²   24HR INTAKE/OUTPUT:      Intake/Output Summary (Last 24 hours) at 10/20/2020 1534  Last data filed at 10/20/2020 1437  Gross per 24 hour   Intake 460 ml   Output 2400 ml   Net -1940 ml General appearance: alert, appears stated age, cooperative and no distress  Head: Normocephalic, without obvious abnormality, atraumatic  Eyes: conjunctivae/corneas clear. PERRL, EOM's intact. Ears: normal external ears and nose, throat without exudate  Neck: no adenopathy, no carotid bruit, no JVD, supple, symmetrical, trachea midline   Lungs: decreased air exchange with soft bibasilar rales, no wheezes or rhonchi  Heart: RRR, S1, S2 normal, no murmur  Abdomen:soft, non-tender; non-distended, normal bowel sounds no masses, no organomegaly  Extremities:1-2+ bialteral lower extremity edema,  There is bilateral lower extremity erythema with warmth though much improved, mild BLE tenderness to palpation  Skin: Chronic lymphedema BLE's with venous stasis changes  Lymphatic: No palpable lymph node enlargment  Neurologic: Alert and oriented X 3, generalized weakness and normal tone.  No focal deficits  Psychiatric: Alert and oriented, thought content appropriate, normal insight, mood appropriate    Medications:      potassium chloride  10 mEq Oral Daily    lisinopril  10 mg Oral Daily    oxyCODONE ER  18 mg Oral BID    levothyroxine  150 mcg Oral Daily    [Held by provider] hydroCHLOROthiazide  25 mg Oral Daily    gabapentin  300 mg Oral TID    DULoxetine  20 mg Oral Daily    aspirin  81 mg Oral Daily    aspirin  324 mg Oral Once    sodium chloride flush  10 mL Intravenous 2 times per day    heparin (porcine)  5,000 Units Subcutaneous 3 times per day    furosemide  40 mg Intravenous BID    atorvastatin  40 mg Oral Nightly    vancomycin (VANCOCIN) intermittent dosing (placeholder)   Other RX Placeholder    vancomycin  1,250 mg Intravenous Q12H     sodium chloride, oxyCODONE-acetaminophen, sodium chloride flush, acetaminophen **OR** acetaminophen, perflutren lipid microspheres, polyethylene glycol, melatonin, ondansetron **OR** ondansetron, nitroGLYCERIN  DIET CARDIAC; Carb Control: 3 carb choices (45 gms)/meal; Low Sodium (2 GM); Daily Fluid Restriction: 1500 ml; No Caffeine, Low Cholesterol     Lab and other Data:     Recent Labs     10/19/20  1643 10/20/20  0141   WBC 9.3 9.5   HGB 12.8 12.6    234     Recent Labs     10/19/20  1629 10/19/20  1643 10/20/20  0141   NA  --  139 141   K 4.1 4.8 4.4   CL  --  101 103   CO2  --  27 29   BUN  --  23 21   CREATININE  --  0.9 0.8   GLUCOSE  --  126* 176*     Recent Labs     10/19/20  1643   AST 42*   ALT 45*   BILITOT 0.3   ALKPHOS 75     Troponin T:   Recent Labs     10/19/20  2249 10/20/20  0141 10/20/20  0448   TROPONINI 0.05* 0.06* 0.06*     INR:   Recent Labs     10/19/20  1643   INR 1.02     A1C:   Recent Labs     10/19/20  1643   LABA1C 7.4*     ABG:  Recent Labs     10/19/20  1629   PHART 7.400   KEA1IQW 54.0*   PO2ART 47.0*   LSO4TFG 33.4*   BEART 7.0*   HGBAE 13.1   O8BPTCDY 83.0*   CARBOXHGBART 2.5     RAD:   Xr Chest Portable  1. Hypoventilation with vascular crowding. 2. Bronchial wall thickening. 3. Mild atelectasis in the right lung base. Mild elevation of the right hemidiaphragm. 4. Cardiomegaly. Signed by Dr Hyun Peters on 10/19/2020 5:05 PM    Cta Pulmonary W Contrast  1. No CT evidence of pulmonary embolus. The pulmonary arteries are dilated. 2. Atheromatous disease of the thoracic aorta and coronary arteries. Cardiomegaly. 3. Linear infiltrates in the mid and lower lung zones bilaterally likely due to atelectasis. There are dependent infiltrates in both lower lobes likely due to atelectasis. Pneumonia less likely. 3. Small hiatal hernia. 4. Probable fatty liver. Probable gallstones. The full report of this exam was immediately signed and available to the emergency room. The patient is currently in the emergency room. Signed by Dr Hyun Peters on 10/19/2020 9:32 PM    Echo:   Mitral annular calcification is present. Mild mitral regurgitation is present.    Mildly thickened aortic valve leaflets with mildly increased velocity   Mean gradinet 9 mm hg suggests very mild aortic stenosis   Tricuspid valve is structurally normal.   Mild tricuspid regurgitation with estimated RVSP of 50 mm Hg. Normal left ventricular size with preserved LV function and an estimated   ejection fraction of approximately 55-60%. Normal left ventricular wall thickness. No regional wall motion abnormalities. Normal diastolic filling pattern. Micro: Blood cultures pending  Respiratory virus panel with COVID-19 negative    Assessment/Plan   Principal Problem:    Acute on chronic combined systolic (congestive) and diastolic (congestive) heart failure (HCC)-ruled out based on echocardiogram, BNP. She does appear volume overloaded. Continue diuresis for now with close monitoring renal parameters      Active Problems:    Elevated d-dimer-CTA pulmonary negative for PE      Atelectasis-IS, encourage deep breathing        Hypothyroidism-synthroid continued       Lower extremity cellulitis-BLE bright red erythema, would be abnormal for cellulitis bilaterally, however, patient notes she has had chronic edema with breaks in the skin and serous drainage. This has improved with diuresis, Vancomycin and compression therapy, continue current medical management          Chronic cystitis-no urinary symptoms reported        History of stroke-noted       Hypercholesterolemia-statin continued       Essential hypertension-stable, continue Zestril       Polymyalgia (HCC)/hx peripheral neuropathy/chronic back pain-noted, home medications resumed with exception of Mobic.  Will hold for now given need for IV diuretic therapy as well as IV contrast to rule out PE, stable       Hyperglycemia-A1C 7.4, SSI to cover during hospitalization, consider Metformin and CC diet at discharge     Antibiotic: Vancomycin      DVT Prophylaxis: Heparin    James Holley PA-C

## 2020-10-21 NOTE — CARE COORDINATION
Called patient to reschedule missed appointment from 12/11/18. Patient does not want to reschedule. Notified by Ceci at [facility] the pt's precer for services is approved and pt can admit to the facility tomorrow. No covid results are required at this time. SW notified pt and attending Dr Shabbir Rios. Also notified Dora Young with [facility].     [facility]  [phone] ph  [phone] fax  [phone] referral fax

## 2020-10-21 NOTE — PROGRESS NOTES
Physical Therapy    Flower Doyle  [id number]       10/20/20 1210   Restrictions/Precautions   Restrictions/Precautions Fall Risk   General   Patient assessed for rehabilitation services? Yes   Diagnosis CHF   General Comment   Comments WEARING 02 BUT DOES NOT NORMALLY WEAR AT [facility]. Pt WEARING A PUREWICK BUT NOTED TO BE WET   Subjective   Subjective pt STATES SHE HAS CHRONIC BACK PAIN AND HAS TROUBLE BREATHING LYING DOWN. STATES SHE IS VERY TIRED BUT IS WILLING TO WORK WITH PT.   Pain Screening   Patient Currently in Pain Yes   Pain Assessment   Response to Pain Intervention Patient Satisfied   Social/Functional History   Lives With Alone   Type of Home Assisted living   ADL Assistance Needs assistance   Transfer Assistance Independent   AROM RLE (degrees)   RLE AROM WFL   RLE General AROM LIMITED MILDY BY BODY HABITUS   PROM LLE (degrees)   LLE General PROM LIMITED MILDLY BY BODY HABITUS   AROM LLE (degrees)   LLE AROM  WFL   Strength Other   Other ANTIGRAVITY BILAT LE'S BUT Pt NOTES R LE IS MILDY WEAKER AND \"TIGHT\" DUE TO PREVIOUS CVA   Bed Mobility   Scooting Minimal assistance   Transfers   Sit to Stand Moderate Assistance   Stand to sit Contact guard assistance   Comment pt PERFORMED A SEMI STAND HOLDING TO BEDRAIL AND RW BUT WAS UNABLE TO FULLY STAND AND TAKE STEPS. HER ABILITY TO ATTEMPT STANDING AND TF'S WAS LIMITED BY INCONTINENCE OF URINE   Balance   Comments ABLE TO SIT EOB UNSUPPORTED WITHOUT LOB   Short term goals   Time Frame for Short term goals 2 WKS   Short term goal 1 SUP<>SIT, MIN A 1   Short term goal 2 SIT<>STAND, MIN A 1   Short term goal 3 STAND/SQUAT PIVOT TF'S WITH MIN A 1   Conditions Requiring Skilled Therapeutic Intervention   Body structures, Functions, Activity limitations Decreased functional mobility ; Decreased endurance;Decreased strength;Decreased sensation   Assessment pt WOULD BENEFIT FROM SKILLED PT SERVICES TO ADDRESS HER MOBILITY DEFICITS.    Prognosis Fair   Decision Making Low

## 2020-10-21 NOTE — PROGRESS NOTES
Pharmacy Vancomycin Consult     Vancomycin Day: 3  Current Dosin mg IV every 12 hours    Temp max:  97.5    Recent Labs     10/20/20  0141 10/21/20  0149   BUN 21 20       Recent Labs     10/20/20  0141 10/21/20  014   CREATININE 0.8 0.9       Recent Labs     10/20/20  0141 10/21/20  0149   WBC 9.5 8.6         Intake/Output Summary (Last 24 hours) at 10/21/2020 1334  Last data filed at 10/21/2020 1059  Gross per 24 hour   Intake 390 ml   Output 4050 ml   Net -3660 ml       Culture Date Source Results   10/20/20 Blood No growth       Ht Readings from Last 1 Encounters:   10/21/20 5' 5\" (1.651 m)        Wt Readings from Last 1 Encounters:   10/21/20 (!) 323 lb 4.8 oz (146.6 kg)         Body mass index is 53.8 kg/m². Estimated Creatinine Clearance: 74 mL/min (based on SCr of 0.9 mg/dL). Trough: 16.8    Assessment/Plan: Level therapeutic. Continue vancomycin 1250 mg IV every 12 hours.     Electronically signed by Elaine Ryan, [address] [place] [address] on 10/21/2020 at 1:34 PM

## 2020-10-21 NOTE — PROGRESS NOTES
Removed ACE bandages. Patient tolerated well. Pulses 1+ bilaterally. Will reapply at 12:45 per MD order. Will continue to monitor.      Electronically signed by Brooklyn Steen on 10/21/2020 at 10:47 AM

## 2020-10-21 NOTE — CONSULTS
Pt. 2D Echo from 10/19 showing EF of 55-60% without any diastolic dysfunction noted. BNP was Normal at 752. Pt is not appropriate for CHF clinic / teaching.
Food/Nutrient Intake Outcomes:  Food and Nutrient Intake  Physical Signs/Symptoms Outcomes:  Biochemical Data, Skin, Weight, Fluid Status or Edema     Discharge Planning:    Continue current diet     Electronically signed by Ron Cody, MS, RD, LD on 10/21/20 at 1:30 PM CDT    Contact: [phone]

## 2020-10-21 NOTE — PROGRESS NOTES
Ace wrap applied to both lower extremities. Redness with mild weeping noted. No open areas noted. Patient tolerated this very well. Will leave wrap on for 6 hours and take off for 4 hours, per MD orders. Will continue to monitor.

## 2020-10-21 NOTE — PROGRESS NOTES
facility and social work has been assisting with placement at this time. Review of Systems:   14 point review of systems is negative except as specifically addressed above. Objective:   VITALS:  /70   Pulse 105   Temp 96.9 °F (36.1 °C) (Temporal)   Resp 18   Ht 5' 5\" (1.651 m)   Wt (!) 323 lb 4.8 oz (146.6 kg)   SpO2 90%   BMI 53.80 kg/m²   24HR INTAKE/OUTPUT:      Intake/Output Summary (Last 24 hours) at 10/21/2020 1313  Last data filed at 10/21/2020 1059  Gross per 24 hour   Intake 390 ml   Output 4050 ml   Net -3660 ml     General appearance: alert, appears stated age, cooperative and no distress, resting comfortably in bed   Head: Normocephalic, without obvious abnormality, atraumatic  Eyes: conjunctivae/corneas clear. PERRL, EOM's intact. Ears: normal external ears and nose, throat without exudate  Neck: no adenopathy, no carotid bruit, no JVD, supple, symmetrical, trachea midline   Lungs: diminished at bases no wheezes rales or rhonchi   Heart: regular rate rhythm, S1, S2 normal, no murmur  Abdomen:soft, non-tender; non-distended, normal bowel sounds no masses, no organomegaly  Extremities:1-2+ bialteral lower extremity edema,  There is bilateral lower extremity erythema without warmth and overall improvement, no BLE tenderness to palpation  Skin: Chronic lymphedema BLE's with venous stasis changes  Lymphatic: No palpable lymph node enlargment  Neurologic: Alert and oriented X 3, generalized weakness and normal tone.  No focal deficits  Psychiatric: Alert and oriented, thought content appropriate, normal insight, mood appropriate    Medications:      miconazole   Topical BID    lidocaine  1 patch Transdermal Daily    potassium chloride  10 mEq Oral Daily    lisinopril  10 mg Oral Daily    oxyCODONE ER  18 mg Oral BID    levothyroxine  150 mcg Oral Daily    [Held by provider] hydroCHLOROthiazide  25 mg Oral Daily    gabapentin  300 mg Oral TID    DULoxetine  20 mg Oral Daily    aspirin  81 mg Oral Daily    aspirin  324 mg Oral Once    sodium chloride flush  10 mL Intravenous 2 times per day    heparin (porcine)  5,000 Units Subcutaneous 3 times per day    furosemide  40 mg Intravenous BID    atorvastatin  40 mg Oral Nightly    vancomycin (VANCOCIN) intermittent dosing (placeholder)   Other RX Placeholder    vancomycin  1,250 mg Intravenous Q12H     sodium chloride, oxyCODONE-acetaminophen, sodium chloride flush, acetaminophen **OR** acetaminophen, perflutren lipid microspheres, polyethylene glycol, melatonin, ondansetron **OR** ondansetron, nitroGLYCERIN  DIET CARDIAC; Carb Control: 3 carb choices (45 gms)/meal; Low Sodium (2 GM); Daily Fluid Restriction: 1500 ml; No Caffeine, Low Cholesterol     Lab and other Data:     Recent Labs     10/19/20  1643 10/20/20  0141 10/21/20  0149   WBC 9.3 9.5 8.6   HGB 12.8 12.6 12.5    234 197     Recent Labs     10/19/20  1643 10/20/20  0141 10/21/20  0149    141 141   K 4.8 4.4 4.3    103 99   CO2 27 29 31*   BUN 23 21 20   CREATININE 0.9 0.8 0.9   GLUCOSE 126* 176* 137*     Recent Labs     10/19/20  1643   AST 42*   ALT 45*   BILITOT 0.3   ALKPHOS 75     Troponin T:   Recent Labs     10/19/20  2249 10/20/20  0141 10/20/20  0448   TROPONINI 0.05* 0.06* 0.06*     INR:   Recent Labs     10/19/20  1643   INR 1.02     A1C:   Recent Labs     10/19/20  1643   LABA1C 7.4*     ABG:  Recent Labs     10/19/20  1629   PHART 7.400   IVW6HTT 54.0*   PO2ART 47.0*   RVT6EBW 33.4*   BEART 7.0*   HGBAE 13.1   U4CMYXRB 83.0*   CARBOXHGBART 2.5     RAD:   Xr Chest Portable  1. Hypoventilation with vascular crowding. 2. Bronchial wall thickening. 3. Mild atelectasis in the right lung base. Mild elevation of the right hemidiaphragm. 4. Cardiomegaly. Signed by Dr Nabila Roy on 10/19/2020 5:05 PM    Cta Pulmonary W Contrast  1. No CT evidence of pulmonary embolus. The pulmonary arteries are dilated.  2. Atheromatous disease of the thoracic aorta and coronary arteries. Cardiomegaly. 3. Linear infiltrates in the mid and lower lung zones bilaterally likely due to atelectasis. There are dependent infiltrates in both lower lobes likely due to atelectasis. Pneumonia less likely. 3. Small hiatal hernia. 4. Probable fatty liver. Probable gallstones. The full report of this exam was immediately signed and available to the emergency room. The patient is currently in the emergency room. Signed by Dr Mary Garsia on 10/19/2020 9:32 PM    Echo:   Mitral annular calcification is present. Mild mitral regurgitation is present. Mildly thickened aortic valve leaflets with mildly increased velocity   Mean gradinet 9 mm hg suggests very mild aortic stenosis   Tricuspid valve is structurally normal.   Mild tricuspid regurgitation with estimated RVSP of 50 mm Hg. Normal left ventricular size with preserved LV function and an estimated   ejection fraction of approximately 55-60%. Normal left ventricular wall thickness. No regional wall motion abnormalities. Normal diastolic filling pattern. Micro: Blood cultures negative to date  Respiratory virus panel with COVID-19 negative    Assessment/Plan   Principal Problem:    Acute on chronic combined systolic (congestive) and diastolic (congestive) heart failure (HCC)-ruled out based on echocardiogram, BNP though patient is clinically volume overloaded. She is much improved. Output not accurate due to patient incontinence. Continue IV diuresis for now and likely switch to PO in AM with close monitoring renal parameters      Active Problems:    Elevated d-dimer-CTA pulmonary negative for PE      Atelectasis-IS, encourage deep breathing, clinically improved       Hypothyroidism-synthroid continued, stable       Lower extremity cellulitis-BLE bright red erythema upon admission, would be abnormal for cellulitis bilaterally, however, patient notes she has had chronic edema with breaks in the skin and serous drainage.

## 2020-10-21 NOTE — PROGRESS NOTES
Physical Therapy  Name: Leelee Floyd  MRN:  [id number]  Date of service:  10/21/2020     10/21/20 1153   Subjective   Subjective Pt states her back is hurting and she needs to move. General Comment   Comments CNA reports pt has had multiple purewick placements and is still getting wet with urine   Exercises   Comments B LE exercises in supine AROM   Short term goals   Time Frame for Short term goals 2 WKS   Short term goal 1 SUP<>SIT, MIN A 1   Short term goal 2 SIT<>STAND, MIN A 1   Short term goal 3 STAND/SQUAT PIVOT TF'S WITH MIN A 1   Conditions Requiring Skilled Therapeutic Intervention   Body structures, Functions, Activity limitations Decreased functional mobility ; Decreased endurance;Decreased strength;Decreased sensation   Assessment When in her home pt used wheelchair and lift chair to move around.     Activity Tolerance   Activity Tolerance Patient limited by endurance   Safety Devices   Type of devices Left in bed;Call light within reach         Electronically signed by Mary Jane Dye PTA on 10/21/2020 at 11:56 AM

## 2020-10-21 NOTE — CARE COORDINATION
[facility] with [facility] reports able to offer services for the pt and will initiate precert today. Will also require covid resutls prior to dc and admit to the facility.      [facility]  [phone]   [phone] fax  [phone] referral fax

## 2020-10-22 NOTE — DISCHARGE SUMMARY
Heather Preston  :  1940  MRN:  [id number]    Admit date:  10/19/2020  Discharge date:  10/22/2020    Discharging Physician:  Haley Ham MD    Advance Directive: DNR-CCA    Consults: None    Primary Care Physician:  Tong Wall MD    Discharge Diagnoses:    Principal Problem:    Acute on chronic combined systolic (congestive) and diastolic (congestive) heart failure ([address] ruled out, clinical volume overload   Active Problems:    Idiopathic peripheral neuropathy    Chronic bilateral low back pain with bilateral sciatica    Elevated d-dimer    Chronic cystitis    Arthritis    History of stroke    Hypercholesterolemia    Essential hypertension    Polymyalgia ([address]  Resolved Problems:    * No resolved hospital problems. *    Portions of this note have been copied forward, however, changed to reflect the most current clinical status of this patient. Hospital Course: The patient is a 79 y.o. female with PMH CHF, fibromyalgia, stroke who presented to [facility] ED complaining of dyspnea that has been worsening over the past week associated with worsening lower extremity edema with bilateral lower extremity erythema. She admitted to rhinorrhea, productive cough though she swallows her sputum and questionable wheezing. Stated nothing made it worse or better. She is wheelchair bound. Subjective fever. Complains of myalgias but not abnormal from her baseline. States she's supposed to be taking hydrochlorothiazide but stopped because she felt like it wasn't working. She is not on home oxygen and was hypoxic upon arrival. Troponin elevated to 0.05 but remained flat and patient denying chest pain. . She remained afebrile without leukocytosis. D-dimer is elevated to 2. 11. CTA negative for pulmonary embolus. Respiratory virus panel with COVID-19 unremarkable. Patient started on Vancomycin for questionable lower extremity cellulitis and was also started on intravenous diuretic therapy for clinical volume overload. She has tolerated diuresis well. Echocardiogram revealed normal EF without diastolic dysfunction. She cannot return to the assisted living facility and social work has been assisting with placement at this time. Patient has adequately diuresed at this time. Lower extremity cellulitic change greatly improved. She does have chronic skin changes to BLE's. Will complete 7 additional days of antibiotic therapy at discharge. Recommend to continue compression therapy with Ace wraps from feet to just below the knee, on for 6 hours and off for 4. At this time patient is stable for discharge to skilled nursing facility. Significant Diagnostic Studies:   Xr Chest Portable  1. Hypoventilation with vascular crowding. 2. Bronchial wall thickening. 3. Mild atelectasis in the right lung base. Mild elevation of the right hemidiaphragm. 4. Cardiomegaly. Signed by Dr Angie Fraser on 10/19/2020 5:05 PM     Cta Pulmonary W Contrast  1. No CT evidence of pulmonary embolus. The pulmonary arteries are dilated. 2. Atheromatous disease of the thoracic aorta and coronary arteries. Cardiomegaly. 3. Linear infiltrates in the mid and lower lung zones bilaterally likely due to atelectasis. There are dependent infiltrates in both lower lobes likely due to atelectasis. Pneumonia less likely. 3. Small hiatal hernia. 4. Probable fatty liver. Probable gallstones. The full report of this exam was immediately signed and available to the emergency room. The patient is currently in the emergency room.  Signed by Dr Angie Fraser on 10/19/2020 9:32 PM     Echo:   Mitral annular calcification is present.   Mild mitral regurgitation is present.  Collette Mow thickened aortic valve leaflets with mildly increased velocity   Mean gradinet 9 mm hg suggests very mild aortic stenosis   Tricuspid valve is structurally normal.   Mild tricuspid regurgitation with estimated RVSP of 50 mm Hg.   Normal left ventricular size with preserved LV function and an estimated   ejection fraction of approximately 55-60%.   Normal left ventricular wall thickness.   No regional wall motion abnormalities.   Normal diastolic filling pattern.     Micro: Blood cultures negative to date  Respiratory virus panel with COVID-19 negative  Pertinent Labs:   CBC:   Recent Labs     10/20/20  0141 10/21/20  0149 10/22/20  0151   WBC 9.5 8.6 8.2   HGB 12.6 12.5 12.5    197 253     BMP:    Recent Labs     10/20/20  0141 10/21/20  0149 10/22/20  0151    141 143   K 4.4 4.3 4.4    99 97*   CO2 29 31* 35*   BUN 21 20 24*   CREATININE 0.8 0.9 1.0*   GLUCOSE 176* 137* 155*     INR:   Recent Labs     10/19/20  1643   INR 1.02     Physical Exam:  Vital Signs: /74   Pulse 94   Temp 97.2 °F (36.2 °C) (Temporal)   Resp 18   Ht 5' 5\" (1.651 m)   Wt (!) 311 lb 3 oz (141.2 kg)   SpO2 92%   BMI 51.78 kg/m²   General appearance:. Alert and Cooperative   HEENT: Normocephalic. Chest: decreased air entry at bases otherwise clear to auscultation bilaterally without wheezes or rhonchi. Cardiac: Normal heart tones with regular rate and rhythm, S1, S2 normal. No murmurs, gallops, or rubs auscultated. Abdomen:soft, non-tender; non-distended normal bowel sounds no masses, no organomegaly. Extremities: No clubbing or cyanosis. 1+ BLE edema with chronic venous stasis, lymphedema changes. Peripheral pulses palpable. Neurologic: Generalized weakness otherwise Grossly intact.     Discharge Medications:       Linda Learn   Home Medication Instructions BFV:[id number]    Printed on:10/22/20 1218   Medication Information                      amoxicillin-clavulanate (AUGMENTIN) 875-125 MG per tablet  Take 1 tablet by mouth 2 times daily for 7 days             aspirin 81 MG tablet  Take 81 mg by mouth daily             cetirizine (ZYRTEC) 10 MG tablet  Take 10 mg by mouth daily             DULoxetine (CYMBALTA) 20 MG extended release capsule  Take 1 capsule by mouth daily

## 2020-10-25 PROBLEM — R09.2 RESPIRATORY ARREST (HCC): Status: ACTIVE | Noted: 2020-01-01

## 2020-10-25 NOTE — PROGRESS NOTES
Pt has documented DNR papers, Pt changed to Cpap 5 and 10 psv with 100% per Dr. Lauro Lesch. Pt has an SBI of 54 and RR 33.

## 2020-10-25 NOTE — CARE COORDINATION
Dr. Derek Mcdaniel notified SW that pt needs a family member to approve pt goes to hospice. Pts family apparently has nothing to do with her. SW tried to call pts daughter that is listed as emergency contact, but it's not a working number. SW then called [facility] where pt came from and staff member there stated the daughter is the only number they have as well and they haven't been able to reach her either. [facility] notified Dr. Derek Mcdaniel that pt has no family.

## 2020-10-25 NOTE — ED NOTES
Called Roby xiong, sister of Ariana Murdock she is going to try to get [place] of her      Naila [place], [address] [place] [address]  10/25/20 3238

## 2020-10-25 NOTE — ED NOTES
Called all numbers for the children listed, no answer from them     Brandyn Shilrey RN  10/25/20 3150

## 2020-10-25 NOTE — PROGRESS NOTES
Contains critical data  BLOOD GAS, ARTERIAL   Status:  Final result    Ref Range & Units  10/25/20 1428   pH, Arterial  7.350 - 7.450  7.240Low Panic      pCO2, Arterial  35.0 - 45.0 mmHg  71.0High Panic      pO2, Arterial  80.0 - 100.0 mmHg  69. 0Low      HCO3, Arterial  22.0 - 26.0 mmol/L  30.4High      Base Excess, Arterial  -2.0 - 2.0 mmol/L  1.0    Hemoglobin, Art, Extended  12.0 - 16.0 g/dL  13.7    O2 Sat, Arterial  >92 %  92.2    Carboxyhgb, Arterial  0.0 - 5.0 %  2.3    Comment:      0.0-1.5   (Smokers 1.5-5.0)    Methemoglobin, Arterial  <1.5 %  1.0    O2 Content, Arterial  Not Established mL/dL  17.8    O2 Therapy   Unknown    Resulting Agency   [facility]    Narrative     CALL  Palma Chepe Navarrete MD ER, 10/25/2020 14:29, by CROMARISOL         Specimen Collected: 10/25/20 1428  Last Resulted: 10/25/20 1429  View Other Order Details         Pt on VC 15 500 % 5 peep with a Bud airway present

## 2020-10-25 NOTE — ED NOTES
Bullock airway removed by Mariposa FLOWERS and bipap placed at 1546. Pt is on bipap at this time and is sustaining.  She now has ar (friend) at bedside     Natividad Rosales, [address] [place] [address]  10/25/20 8508

## 2020-10-25 NOTE — ED NOTES
Called surrogate number listed for patient.  Giles Brown [phone] she stated to call Liseth Kaiser RN  10/25/20 4885

## 2020-10-25 NOTE — H&P
[id number] [facility]  Hospitalist - History & Physical      PCP: Bryon Kirkpatrick MD    Date of Admission: 10/25/2020    Date of Service: 10/25/2020    Chief Complaint:  Respiratory failure    History Of Present Illness: The patient is a 79 y.o. female with PMH CVA, HTN, polymyalgia, chronic lower back pain, lower extremity lymphedema who presented to [facility] ED after she was found unresponsive in the SNF where she resides. It is unknown how long she was unresponsive. EMS placed a supraglottic airway to assist with bagging en route for respiratory arrest. Patient is a DNR. Airway removed in ED and she is now on BiPAP. A  is present in the room who knows the patient and states she suspects narcotic overdose as patient has a history of uncontrolled pain and frequently attempting to take medication early or more than prescribed. It is uncertain how her medications were being given at the nursing home and patient was under quarantine for 14 days due to recently being discharged from this facility. She was discharged on 10/22/2020. She had presented to ED with dyspnea and lower extremity edema. CTA was unremarkable for PE. COVID-19 negative. She was given a short course of IV diuretic therapy. Echocardiogram unremarkable for CHF. Was discharged on Augmentin for continued treatment of bilateral lower extremity erythema. Work up in ED includes a CXR that is negative for pulmonary vascular congestion or acute cardiopulmonary process. CT head unremarkable. Patient is able to shake her head yes and no appropriately with my questioning as well as with the  who is present at bedside. She is not about to participate in conversation otherwise.  Patient's family has been called multiple times to discuss patient's DNR status however no one has been reached upon assessment of this PA-C    Past Medical History:        Diagnosis Date    CHF (congestive heart failure) (HCC)     Chronic cystitis     Fibromyalgia daily 6/18/20   Nirmal Kurtz MD   aspirin 81 MG tablet Take 81 mg by mouth daily    Historical Provider, MD   potassium chloride (KLOR-CON M) 10 MEQ extended release tablet Take 10 mEq by mouth daily    Historical Provider, MD   cetirizine (ZYRTEC) 10 MG tablet Take 10 mg by mouth daily    Historical Provider, MD     Allergies:    Sulfa antibiotics    Social History:    The patient currently lives in a SNF  Tobacco: quit smoking about 41 years ago. She has never used smokeless tobacco per previous history. Alcohol:   no history of alcohol use. Illicit Drugs: denied history of drug use on previous admit     Family History:      Problem Relation Age of Onset    Diabetes Mother     No Known Problems Father     Stroke Sister      Review of Systems:   Unable to obtain full review of systems due to current mental status     Physical Examination:  /70   Pulse 78   Resp 18   SpO2 (!) 89%   General appearance: Minimally responsive, BiPAP in place, diminished respiratory effort, well nourished  Head: Normocephalic, without obvious abnormality, atraumatic  Eyes: PERRL, EOM's intact. Ears: normal external ears and nose, throat without exudate  Neck: no adenopathy, no carotid bruit, no JVD, supple, symmetrical, trachea midline Lungs: diffuse rhonchi with decreased air exchange throughout, poor respiratory effort  Heart: regular rate and rhythm, S1, S2 normal, no murmur  Abdomen:soft, obese, non-tender; non-distended, normal bowel sounds no masses, no organomegaly  Extremities:No lower extremity edema,  There is bilateral lower extremity chronic venous stasis, lymphedema and erythema   Skin: Pale, warm and dry. Erythema to abdominal folds consistent with candidiasis   Lymphatic: No palpable lymph node enlargment  Neurologic: Patient responsive to verbal stimuli with ability to shake her head up and down for \"yes\" or left and right for \"no\". She does not follow any other commands.  Does appear to have myoclonic type jerks  Psychiatric: Unable to fully assess at this time     Diagnostic Data:  CBC:  Recent Labs     10/25/20  1400   WBC 10.0   HGB 12.8   HCT 46.4        BMP:  Recent Labs     10/25/20  1400 10/25/20  1428     --    K 5.8* 5.3     --    CO2 27  --    BUN 45*  --    CREATININE 2.1*  --    CALCIUM 8.4*  --      Recent Labs     10/25/20  1400   AST 60*   ALT 44*   BILITOT <0.2   ALKPHOS 61     Cardiac Enzymes:   Recent Labs     10/25/20  1400   TROPONINI 0.21*     ABGs:  Lab Results   Component Value Date    PHART 7.240 10/25/2020    PO2ART 69.0 10/25/2020    PTF0AFV 71.0 10/25/2020     Urinalysis:  Lab Results   Component Value Date    NITRU POSITIVE 10/25/2020    WBCUA 31-50 10/25/2020    BACTERIA 1+ 10/25/2020    RBCUA 6-10 10/25/2020    BLOODU TRACE 10/25/2020    SPECGRAV 1.016 10/25/2020    GLUCOSEU Negative 10/25/2020     ABG:  Recent Labs     10/25/20  1428   PHART 7.240*   ICK0BRG 71.0*   PO2ART 69.0*   DNX9NWK 30.4*   BEART 1.0   HGBAE 13.7   W3RUWZLZ 92.2   CARBOXHGBART 2.3     Ct Head Wo Contrast  Changes of aging with no acute intracranial abnormality Signed by Dr Jocelynn Knapp on 10/25/2020 3:19 PM    Xr Chest Portable  1. Moderate cardiomegaly no acute pulmonary vascular congestion. Signed by Dr Jocelynn Knapp on 10/25/2020 2:38 PM    Assessment/Plan:  Principal Problem:    Respiratory arrest ([address]  Active Problems:    Idiopathic peripheral neuropathy    Chronic bilateral low back pain with bilateral sciatica    Frequent falls    History of cerebrovascular accident (CVA) in adulthood    Essential hypertension    Polymyalgia ([address]    Spinal stenosis of lumbosacral region  Resolved Problems:    * No resolved hospital problems. *     Patient is minimally responsive, however, she can shake her head up and down and left and right. She received narcan in ED without initial improvement, however, was starting to open her eyes spontaneously.  Patient is a DNR and is currently on BiPAP therapy.  has tried multiple times to reach patient's next of kin to discuss palliative care vs ongoing treatment as patient cannot participate in decision making at this time. She does not appear volume overloaded. Will trend troponin, SLP eval, monitor lactic acid. Consult palliative care. Orders placed per attending.      Signed:  James Holley PA-C

## 2020-10-25 NOTE — ED NOTES
Called in patient Hospice, RN on call didn't answer but will call back per answering center.      Phoenix Ruiz  10/25/20 3999

## 2020-10-25 NOTE — ED NOTES
Daughter:    Dafne Mendez [phone]   [phone]    Iline Seat: [phone]       [facility] states niece is handling things     Edith Doshi RN  10/25/20 5624

## 2020-10-25 NOTE — PROGRESS NOTES
10/25/2020  6:20 PM - Keely Earl Incoming Lab Results From [facility] [facility]  Value  Ref Range & Units  Status  Collected  Lab    pH, Arterial  7.360  7.350 - 7.450  Final  10/25/2020  6:19 PM  [facility] Lab    pCO2, Arterial  53. 0High    35.0 - 45.0 mmHg  Final  10/25/2020  6:19 PM  [address] [place] [address] Lab    pO2, Arterial  79. 0Low    80.0 - 100.0 mmHg  Final  10/25/2020  6:19 PM  [facility] Lab    HCO3, Arterial  29. 9High    22.0 - 26.0 mmol/L  Final  10/25/2020  6:19 PM  [facility] Excess, Arterial  3.1High    -2.0 - 2.0 mmol/L  Final  10/25/2020  6:19 PM  [address] [place] [address] Lab    Hemoglobin, Art, Extended  14.6  12.0 - 16.0 g/dL  Final  10/25/2020  6:19 PM  [address] [place] [address] Lab    O2 Sat, Arterial  94.3  >92 %  Final  10/25/2020  6:19 PM  [address] [place] [address] Lab    Carboxyhgb, Arterial  2.4  0.0 - 5.0 %  Final  10/25/2020  6:19 PM  [facility] Lab         0.0-1.5   (Smokers 1.5-5.0)    Methemoglobin, Arterial  0.9  <1.5 %  Final  10/25/2020  6:19 PM  [address] [place] [address] Lab    O2 Content, Arterial  19.4  Not Established mL/dL  Final  10/25/2020  6:19 PM  [address] [place] [address] Lab      Pt on BiPAP 14/7 15 lpm Fi02  resp rate 26  Right brachial puncture

## 2020-10-25 NOTE — ED NOTES
Pt presents being unresponsive. EMS states that she was not responsive upon arrival. Intubation was done via EMS. EMS resident sheet states DNR. No family on file.  Dr Lakisha York at [facility], [address] [place] [address]  10/25/20 3572

## 2020-10-26 PROBLEM — Z51.5 PALLIATIVE CARE PATIENT: Status: ACTIVE | Noted: 2020-01-01

## 2020-10-26 NOTE — PROGRESS NOTES
Palliative Care/Spiritual Care: Visited with pt and also spoke with pt's PA Naima Shaikh. ED note says pt was found unresponsive at the SNF. Pt also has shortness of breath and lower extremity edema. Gengilberto Shaw says pt is about half confused, can answer some questions and seems to struggle with others. Advance Directives: Pt has a DNR order in EMR. Daughter is listed but daughters number said the number had been changed or disconnected. Pain/other symptoms: Pt did not complain of pain. Social/Spiritual: Pt says she is a [place] and attended several different churches. Pt/family discussion r/t goals: Pt has one daughter listed [employer]. Pt came from [facility] and told Gualberto Shaw she ambulates with a wheelchair at [facility]. [address] note says pt's daughters number is the only number and daughter is the only family member listed. Number says changed or disconnected when called. Betsy Sharpe says she will ask Dr. Ozzy Cooper to request a cognitive evaluation for pt. Pt's goals are to be determined dependant upon cognitive evaluation. Provided spiritual care with sustaining presence, mediation, and prayer. Pt expressed gratitude for spiritual care.     Electronically signed by Alejandra Raya on 10/26/2020 at 2:34 PM

## 2020-10-26 NOTE — PROGRESS NOTES
Comprehensive Nutrition Assessment    Type and Reason for Visit:  Initial, Wound, Positive Nutrition Screen    Nutrition Recommendations/Plan: Request reweigh, follow for diet advancement and PO intake    Nutrition Assessment:  Positive nutrition screen for wounds to buttocks. Stg II to R and L buttocks documented. Pt is currently NPO and awaiting SLP eval. Requested reweigh of pt as current documented wt appears to be inaccurate upon visual examination of pt. Will continue to follow for diet advancement and implement intervention when able. Malnutrition Assessment:  Malnutrition Status:   At risk for malnutrition (Comment)    Context:  Acute Illness     Findings of the 6 clinical characteristics of malnutrition:  Energy Intake:  Unable to assess  Weight Loss:  Unable to assess     Body Fat Loss:  No significant body fat loss     Muscle Mass Loss:  No significant muscle mass loss    Fluid Accumulation:  1 - Mild Extremities   Strength:  Not Performed    Estimated Daily Nutrient Needs:  Energy (kcal):  7300-7628; Weight Used for Energy Requirements:  Ideal     Protein (g):  ; Weight Used for Protein Requirements:  Ideal(1.2-2.0)        Fluid (ml/day):  4112-4397; Weight Used for Fluid Requirements:  Rienzi      Nutrition Related Findings:  Bipap in place, well-nourished      Wounds:  Stage II(buttocks)       Current Nutrition Therapies:    Diet NPO Effective Now    Anthropometric Measures:  · Height: 5' 6\" (167.6 cm)  · Current Body Weight: (suspect inaccurate)   · Usual Body Weight: 250 lb (113.4 kg)(6/2020)     · Ideal Body Weight: 130 lbs    Nutrition Diagnosis:   · Inadequate oral intake related to impaired respiratory function as evidenced by NPO or clear liquid status due to medical condition      Nutrition Interventions:   Food and/or Nutrient Delivery:  Continue NPO  Nutrition Education/Counseling:  No recommendation at this time   Coordination of Nutrition Care:  Continued Inpatient Monitoring    Goals:  Progress to oral diet, PO intake >50%, wt stable       Nutrition Monitoring and Evaluation:   Food/Nutrient Intake Outcomes:  Food and Nutrient Intake, Diet Advancement/Tolerance  Physical Signs/Symptoms Outcomes:  Biochemical Data, Nutrition Focused Physical Findings, Skin, Weight     Discharge Planning:     Too soon to determine     Electronically signed by Iraida Gerber RD, LD on 10/26/20 at 10:16 AM CDT    Contact: [phone]

## 2020-10-26 NOTE — PLAN OF CARE
Problem: Nutrition  Goal: Optimal nutrition therapy  Outcome: Ongoing   Nutrition Problem #1: Inadequate oral intake  Intervention: Food and/or Nutrient Delivery: Continue NPO  Nutritional Goals: Progress to oral diet, PO intake >50%, wt stable

## 2020-10-26 NOTE — PROGRESS NOTES
Pt has been placed in BUE wrist restraints. Pt is throwing food across the room, has ripped out 4 IV's, is continuously removing oxygen, and trying to get OOB. Dr. Astrdi Richter notified.

## 2020-10-26 NOTE — PROGRESS NOTES
[id number] [facility]      Patient:  Ana Alcantara  YOB: 1940  Date of Service: 10/26/2020  MRN: [id number]   Acct: [de-identified]   Primary Care Physician: Ivy Hatfield MD  Advance Directive: DNR-CC  Admit Date: 10/25/2020       Hospital Day: 1  Portions of this note have been copied forward, however, updated to reflect the most current clinical status of this patent    CHIEF COMPLAINT Respiratory failure    SUBJECTIVE:     Pt is laying in bed in no acute distress, although before entering the room patient was making a lot of noise that was heard from the hallway. Pt knows the year, that she is in a hospital but states that she feels \"off\". She states that she is confused and doesn't know why she is here. She tells this PA that she lives with her daughter but per ED not she came from SNF. Pt complains of right forearm pain, nursing states that pt has pulled out 3 IV lines. She is bruised at her right forearm where she states there is pain. Cumulative Hospital Course:   10/25/2020 The patient is a 79 y.o. female with PMH CVA, HTN, polymyalgia, chronic lower back pain, lower extremity lymphedema who presented to [address] [place] [address] ED after she was found unresponsive in the SNF where she resides. It is unknown how long she was unresponsive. EMS placed a supraglottic airway to assist with bagging en route for respiratory arrest. Patient is a DNR. Airway removed in ED and she is now on BiPAP. A  is present in the room who knows the patient and states she suspects narcotic overdose as patient has a history of uncontrolled pain and frequently attempting to take medication early or more than prescribed. It is uncertain how her medications were being given at the nursing home and patient was under quarantine for 14 days due to recently being discharged from this facility. She was discharged on 10/22/2020. She had presented to ED with dyspnea and lower extremity edema.  CTA was unremarkable for PE. COVID-19 negative. She was given a short course of IV diuretic therapy. Echocardiogram unremarkable for CHF. Was discharged on Augmentin for continued treatment of bilateral lower extremity erythema. Work up in ED includes a CXR that is negative for pulmonary vascular congestion or acute cardiopulmonary process. CT head unremarkable. Patient is able to shake her head yes and no appropriately with my questioning as well as with the  who is present at bedside. She is not about to participate in conversation otherwise. Patient's family has been called multiple times to discuss patient's DNR status however no one has been reached. 10/26/2020 No family is able to be reached. SLP and cognitive eval ordered. Pt off BiPAP and now on room air with SpO2 at 96%. Review of Systems:   Review of Systems   Unable to perform ROS: Other          Objective:   VITALS:  BP (!) 105/59   Pulse 87   Temp 96.4 °F (35.8 °C) (Temporal)   Resp 18   Ht 5' 6\" (1.676 m)   Wt (!) 313 lb (142 kg)   SpO2 92%   Breastfeeding No   BMI 50.52 kg/m²   24HR INTAKE/OUTPUT:      Intake/Output Summary (Last 24 hours) at 10/26/2020 1125  Last data filed at 10/26/2020 0707  Gross per 24 hour   Intake 1112 ml   Output 750 ml   Net 362 ml       Physical Exam  Constitutional:       Appearance: She is obese. She is ill-appearing. HENT:      Head: Normocephalic and atraumatic. Right Ear: External ear normal.      Left Ear: External ear normal.      Nose: Nose normal.      Mouth/Throat:      Mouth: Mucous membranes are dry. Eyes:      General: No scleral icterus. Extraocular Movements: Extraocular movements intact. Conjunctiva/sclera: Conjunctivae normal.   Neck:      Musculoskeletal: Normal range of motion. Cardiovascular:      Rate and Rhythm: Normal rate and regular rhythm. Pulses: Normal pulses. Heart sounds: Normal heart sounds. Pulmonary:      Effort: Pulmonary effort is normal. No respiratory distress. Breath sounds: Normal breath sounds. Abdominal:      General: Bowel sounds are normal. There is no distension. Palpations: Abdomen is soft. Tenderness: There is no abdominal tenderness. Musculoskeletal:         General: No swelling, tenderness or deformity. Skin:     General: Skin is warm and dry. Comments: Chronic lymphedema   Neurological:      Cranial Nerves: No cranial nerve deficit. Sensory: No sensory deficit. Medications:      sodium chloride      dextrose        ketamine  100 mg Intravenous Once    sodium chloride flush  10 mL Intravenous 2 times per day    enoxaparin  40 mg Subcutaneous Nightly    cefTRIAXone (ROCEPHIN) IV  1 g Intravenous Q24H    insulin lispro  0-6 Units Subcutaneous TID     insulin lispro  0-3 Units Subcutaneous Nightly     sodium chloride flush, acetaminophen **OR** acetaminophen, polyethylene glycol, promethazine **OR** ondansetron, glucose, dextrose, glucagon (rDNA), dextrose  Diet NPO Effective Now     Lab and other Data:     Recent Labs     10/25/20  1400 10/26/20  0414   WBC 10.0 9.6   HGB 12.8 12.3    250     Recent Labs     10/25/20  1400  10/25/20  1819 10/26/20  0551 10/26/20  1008     --   --  147*  --    K 5.8*   < > 5.5 5.7* 4.7     --   --  105  --    CO2 27  --   --  32*  --    BUN 45*  --   --  54*  --    CREATININE 2.1*  --   --  2.1*  --    GLUCOSE 263*  --   --  123*  --     < > = values in this interval not displayed.      Recent Labs     10/25/20  1400 10/26/20  0551   AST 60* 90*   ALT 44* 67*   BILITOT <0.2 <0.2   ALKPHOS 61 62     Troponin T:   Recent Labs     10/25/20  1400 10/25/20  1817 10/26/20  0823   TROPONINI 0.21* 0.29* 0.35*     UA:  Recent Labs     10/25/20  1510   COLORU YELLOW   PHUR 6.0   WBCUA 31-50*   RBCUA 6-10*   YEAST Present*   BACTERIA 1+*   CLARITYU CLOUDY*   SPECGRAV 1.016   LEUKOCYTESUR LARGE*   UROBILINOGEN 0.2   BILIRUBINUR Negative   BLOODU TRACE*   GLUCOSEU Negative AMORPHOUS Rare*     ABG:  Recent Labs     10/26/20  1008   PHART 7.370   BSP3OGA 58.0*   PO2ART 56.0*   GQB7EQJ 33.5*   BEART 6.4*   HGBAE 13.2   F3PKLILA 90.0   CARBOXHGBART 2.1       RAD:   Ct Head Wo Contrast  Result Date: 10/25/2020  Changes of aging with no acute intracranial abnormality   Signed by Dr Halima Kwon on 10/25/2020 3:19 PM    Xr Chest Portable  Result Date: 10/25/2020  1. Moderate cardiomegaly no acute pulmonary vascular congestion. Signed by Dr Halima Kwon on 10/25/2020 2:38 PM      Micro:   Culture, Urine [[id number]]   Collected: 10/25/20 1510    Order Status: Completed  Specimen: Urine, clean catch  Updated: 10/26/20 0745     Urine Culture, Routine  Growth too young, additional incubation required       Assessment/Plan   Principal Problem:    Respiratory arrest ([address]- Pt is now off BiPAP and on room air with SpO2 at 96%. Active Problems:   Acute cystitis without hematuria- Rocephin daily, await urine culture. Elevated troponin- Cardiology consulted. Continue to monitor on tele. Essential hypertension- monitor closely       Idiopathic peripheral neuropathy    Chronic bilateral low back pain with bilateral sciatica    Frequent falls    History of cerebrovascular accident (CVA) in adulthood    Polymyalgia ([address]    Spinal stenosis of lumbosacral region   -noted. Await SLP and cognitive eval. PT/OT. Pt states she ambulates via wheelchair      Palliative care patient- noted, consulted.       Antibiotic: Rocephin    DVT Prophylaxis: Lovenox      Zeina Patel PA-C  10/26/2020, 11:25 AM

## 2020-10-26 NOTE — PLAN OF CARE
Problem: Falls - Risk of:  Goal: Will remain free from falls  Description: Will remain free from falls  10/26/2020 1642 by Milda Buerger, RN  Outcome: Ongoing  10/26/2020 0532 by Jose Fontaine LPN  Outcome: Ongoing  Goal: Absence of physical injury  Description: Absence of physical injury  10/26/2020 1642 by Milda Buerger, RN  Outcome: Ongoing  10/26/2020 0532 by Jose Fontaine LPN  Outcome: Ongoing     Problem: Skin Integrity:  Goal: Will show no infection signs and symptoms  Description: Will show no infection signs and symptoms  10/26/2020 1642 by Milda Buerger, RN  Outcome: Ongoing  10/26/2020 0532 by Jose Fontaine LPN  Outcome: Ongoing  Goal: Absence of new skin breakdown  Description: Absence of new skin breakdown  10/26/2020 1642 by Milda Buerger, RN  Outcome: Ongoing  10/26/2020 0532 by Jose Fontaine LPN  Outcome: Ongoing     Problem: Confusion - Acute:  Goal: Absence of continued neurological deterioration signs and symptoms  Description: Absence of continued neurological deterioration signs and symptoms  10/26/2020 1642 by Milda Buerger, RN  Outcome: Ongoing  10/26/2020 0532 by Jose Fontaine LPN  Outcome: Ongoing  Goal: Mental status will be restored to baseline  Description: Mental status will be restored to baseline  10/26/2020 1642 by Milda Buerger, RN  Outcome: Ongoing  10/26/2020 0532 by Jose Fontaine LPN  Outcome: Ongoing     Problem: Discharge Planning:  Goal: Ability to perform activities of daily living will improve  Description: Ability to perform activities of daily living will improve  10/26/2020 1642 by Milda Buerger, RN  Outcome: Ongoing  10/26/2020 0532 by Jose Fontaine LPN  Outcome: Ongoing  Goal: Participates in care planning  Description: Participates in care planning  10/26/2020 1642 by Milda Buerger, RN  Outcome: Ongoing  10/26/2020 0532 by Jose Fontaine LPN  Outcome: Ongoing     Problem: Injury - Risk of, Physical Injury:  Goal: Will remain free from falls  Description: Will remain free from falls  10/26/2020 1642 by Gary Sacks, RN  Outcome: Ongoing  10/26/2020 0532 by Natanael Mary LPN  Outcome: Ongoing  Goal: Absence of physical injury  Description: Absence of physical injury  10/26/2020 1642 by Gary Sacks, RN  Outcome: Ongoing  10/26/2020 0532 by Natanael Mary LPN  Outcome: Ongoing     Problem: Mood - Altered:  Goal: Mood stable  Description: Mood stable  10/26/2020 1642 by Gary Sacks, RN  Outcome: Ongoing  10/26/2020 0532 by Natanael Mary LPN  Outcome: Ongoing  Goal: Absence of abusive behavior  Description: Absence of abusive behavior  10/26/2020 1642 by Gary Sacks, RN  Outcome: Ongoing  10/26/2020 0532 by Natanael Mary LPN  Outcome: Ongoing  Goal: Verbalizations of feeling emotionally comfortable while being cared for will increase  Description: Verbalizations of feeling emotionally comfortable while being cared for will increase  10/26/2020 1642 by Gary Sacks, RN  Outcome: Ongoing  10/26/2020 0532 by Natanael Mary LPN  Outcome: Ongoing     Problem: Psychomotor Activity - Altered:  Goal: Absence of psychomotor disturbance signs and symptoms  Description: Absence of psychomotor disturbance signs and symptoms  10/26/2020 1642 by Gary Sacks, RN  Outcome: Ongoing  10/26/2020 0532 by Natanael Mary LPN  Outcome: Ongoing     Problem: Sensory Perception - Impaired:  Goal: Demonstrations of improved sensory functioning will increase  Description: Demonstrations of improved sensory functioning will increase  10/26/2020 1642 by Gary Sacks, RN  Outcome: Ongoing  10/26/2020 0532 by Natanael Mary LPN  Outcome: Ongoing  Goal: Decrease in sensory misperception frequency  Description: Decrease in sensory misperception frequency  10/26/2020 1642 by Gary Sacks, RN  Outcome: Ongoing  10/26/2020 0532 by Natanael Mary LPN  Outcome: Ongoing  Goal: Able to refrain from responding to false sensory perceptions  Description: Able to refrain from responding to false sensory perceptions  10/26/2020 1642 by Alicia Rodrigues RN  Outcome: Ongoing  10/26/2020 0532 by Jolynn Robles LPN  Outcome: Ongoing  Goal: Demonstrates accurate environmental perceptions  Description: Demonstrates accurate environmental perceptions  10/26/2020 1642 by Alicia Rodrigues RN  Outcome: Ongoing  10/26/2020 0532 by Jolynn Robles LPN  Outcome: Ongoing  Goal: Able to distinguish between reality-based and nonreality-based thinking  Description: Able to distinguish between reality-based and nonreality-based thinking  10/26/2020 1642 by Alicia Rodrigues RN  Outcome: Ongoing  10/26/2020 0532 by Jolynn Robles LPN  Outcome: Ongoing  Goal: Able to interrupt nonreality-based thinking  Description: Able to interrupt nonreality-based thinking  10/26/2020 1642 by Alicia Rodrigues RN  Outcome: Ongoing  10/26/2020 0532 by Jolynn Robles LPN  Outcome: Ongoing     Problem: Sleep Pattern Disturbance:  Goal: Appears well-rested  Description: Appears well-rested  10/26/2020 1642 by Alicia Rodrigues RN  Outcome: Ongoing  10/26/2020 0532 by Jolynn Robles LPN  Outcome: Ongoing     Problem: Gas Exchange - Impaired:  Goal: Levels of oxygenation will improve  Description: Levels of oxygenation will improve  10/26/2020 1642 by Alicia Rodrigues RN  Outcome: Ongoing  10/26/2020 0532 by Jolynn Robles LPN  Outcome: Ongoing     Problem: Nutrition  Goal: Optimal nutrition therapy  10/26/2020 1642 by Alicia Rodrigues RN  Outcome: Ongoing  10/26/2020 1017 by Gosia Dao RD, LD  Outcome: Ongoing

## 2020-10-26 NOTE — ACP (ADVANCE CARE PLANNING)
Advance Care Planning     Advance Care Planning Activator (Inpatient)  Conversation Note      Date of ACP Conversation: 10/26/2020    [employer] with: Pt, pt's AUGUSTINE Owens, and USHA Guzman    ACP Activator: Anna Blanca      Current Designated Health Care Decision Maker: Pt currently does not have a decision maker but is previously a DNR. Care Preferences    Ventilation: \"If you were in your present state of health and suddenly became very ill and were unable to breathe on your own, what would your preference be about the use of a ventilator (breathing machine) if it were available to you? \"      Would the patient desire the use of ventilator (breathing machine)?: No    \"If your health worsens and it becomes clear that your chance of recovery is unlikely, what would your preference be about the use of a ventilator (breathing machine) if it were available to you? \"     Would the patient desire the use of ventilator (breathing machine)?: No      Resuscitation  \"CPR works best to restart the heart when there is a sudden event, like a heart attack, in someone who is otherwise healthy. Unfortunately, CPR does not typically restart the heart for people who have serious health conditions or who are very sick. \"    \"In the event your heart stopped as a result of an underlying serious health condition, would you want attempts to be made to restart your heart (answer \"yes\" for attempt to resuscitate) or would you prefer a natural death (answer \"no\" for do not attempt to resuscitate)? \" No         [] Yes   [x] No   Educated Patient / Carlyn Proud regarding differences between Advance Directives and portable DNR orders. Pt has a DNR order but further conversation is needed because pt does not have a medical decision maker listed with a working telephone number. Philippe Rodriguez to ask Dr Amy Navarrete to request a cognitive evaluation to determine if pt is able to make her own decisions.      Electronically signed by Shanique Self on 10/26/2020 at 2:39 PM

## 2020-10-26 NOTE — PROGRESS NOTES
Speech Language Pathology  Facility/Department: [facility] 4 ONCOLOGY UNIT   CLINICAL BEDSIDE SWALLOW EVALUATION    NAME: Sarah Beard  : 1940  MRN: [id number]    ADMISSION DATE: 10/25/2020    Visit Diagnoses       Codes    Altered mental status, unspecified altered mental status type     R41.82    Acute respiratory failure with hypoxemia ([facility] [address]     J96.01    Chronically on opiate therapy     Z79.891    Nursing home resident     Z59.3    Acute renal failure, unspecified acute renal failure type ([facility] [address] [facility]     N17.9    Acute cystitis without hematuria     N30.00    Respiratory acidosis     E87.2    Hypoxic brain injury ([facility] [address] [facility]     G93.1               Past Medical History:  has a past medical history of Cellulitis of left lower limb, Cellulitis of right lower limb, CHF (congestive heart failure) ([facility] [address] Chronic cystitis, Essential hypertension, Fibromyalgia, Hyperglycemia, Hyperlipidemia, Hypertension, Hypothyroidism, Idiopathic peripheral autonomic neuropathy, Kidney stones, Lymphedema, Osteoarthritis, Osteoarthritis, Palliative care patient, Peripheral neuropathy, Polymyalgia rheumatica, Polymyalgia rheumatica ([facility] [address] [facility] Sciatica, Stroke, and Venous insufficiency (chronic) (peripheral). Past Surgical History:  has a past surgical history that includes Hysterectomy; Tonsillectomy; and Kidney surgery. Date of Eval: 10/26/2020  Evaluating Therapist: Gabriel Hill    Current Diet level:       Primary Complaint:   Pt reported complaints of confusion and dry mouth. She discussed her family and visitation with them frequently; requested to speak with daughter Eleazar Listen. \" Pt reported no hx of swallowing difficulty.            Pain:   Pain Assessment  Pain Assessment: 0-10  Pain Level: 0  Response to Pain Intervention: Asleep with RR greater than 10    Reason for Referral  Sarah Beard was referred for a bedside swallow evaluation to assess the efficiency of her swallow function, identify signs and symptoms of aspiration, and make recommendations regarding safe dietary consistencies, effective compensatory strategies, and safe eating environment. Impression  Dysphagia Diagnosis  Dysphagia Diagnosis: Mild oral stage dysphagia;Mild to moderate pharyngeal stage dysphagia  Dysphagia Impression : Pt presents with oropharyngeal dysphagia as characterized by lingual residuals, decreased a-p movement, delayed swallow initiation, sluggish LE, and overt s/s of aspiration with all presentations of thin liquids. Pt has a risk for aspiration. Treatment Plan  Requires SLP Intervention: Yes     Duration/Frequency of Treatment: 1-2 weeks         Recommended Diet and Intervention  Solid: Diet Solids Recommendation: Dysphagia Soft and Bite-Sized (Dysphagia III)  Liquid: Liquid Consistency Recommendation: Mildly Thick (Nectar)  Medication:Recommended Form of Meds: Meds in puree  Therapeutic Interventions: Diet tolerance monitoring, Oral care, Laryngeal exercises    Compensatory Swallowing Strategies Attempted  Compensatory Swallowing Strategies: Upright as possible for all oral intake; Check for pocketing of food on the Left; Check for pocketing of food on the Right      Treatment/Goals  Short-term Goals  Timeframe for Short-term Goals: 1 week  Goal 1: Consume nectar thick and bite sized diet texture without overt s/s of aspiration. Goal 2: Consume thin liquids and bite sized diet texture without overt s/s of aspiration. Goal 3: Consume regular diet texture without overt s/s of aspiration. Goal 4: Complete oral care daily to reduce dried secretions and improve lingual movements. Goal 5: Complete 10 reps of LE exercises over 2 reps to improve LE. Long-term Goals  Timeframe for Long-term Goals: 1-2 weeks  Goal 1: Consume safest and least restrictive diet texture without overt s/s of aspiration. Goal 2: Consume safest and least restrictive liquid consistency without overt s/s of aspiration.            Oral Motor Deficits  Oral/Motor  Oral Motor: Within functional limits    Oral Phase Dysfunction  Oral Phase  Oral Phase: Exceptions  Oral Phase Dysfunction  Pocketing Left: Soft solid  Pocketing Right: Soft solid  Decreased Anterior to Posterior Transit: Soft solid  Lingual/Palatal Residue: Soft solid  Oral Phase  Oral Phase - Comment: Pt has severe dry mouth, dried secretions, and cracked dry lips with blood. Pt had mild bilateral residuals with all diet trials; required a liquid wash to clear oral cavity. Indicators of Pharyngeal Phase Dysfunction   Pharyngeal Phase  Pharyngeal Phase: Exceptions  Indicators of Pharyngeal Phase Dysfunction  Delayed Swallow: Thin - cup; Thin - straw;Ice chips; Nectar - straw;Nectar - cup  Decreased Laryngeal Elevation: Thin - teaspoon; Thin - cup; Thin - straw;Nectar - straw;Nectar - cup  Throat Clearing - Immediate: Thin - cup; Thin - straw  Throat Clearing - Delayed: Thin - cup; Thin - straw  Pharyngeal Phase   Pharyngeal: Delayed swallow initiation within 2 seconds of liquid presentation. Immediate throat clearing consistently with thin liquid trials and presentations. Pt with risk of aspiration on thin liquids. Pt had throat clearing consistently without trials presented. Laryngeal elevation slow rise. Prognosis  Prognosis  Prognosis for safe diet advancement: good  Barriers to reach goals: cognitive deficits  Individuals consulted  Consulted and agree with results and recommendations: Patient;RN;MD    Education  Patient Education: Pt was provided verbal education on diet texture and liquid consistency. She needs consistent cueing to verbalize understanding.   Patient Education Response: Needs reinforcement                      CHATO Craig  10/26/2020 12:25 PM   Electronically signed by CHATO Craig on 10/26/2020 at 12:26 PM

## 2020-10-26 NOTE — ED NOTES
Report given and care transferred to fourth floor nurse. Advised that IO is still in place.  Pt stable at this time and placed back on bipap     Grazyna Corinne, RN  10/25/20 2196

## 2020-10-26 NOTE — PROGRESS NOTES
Malik Shi arrived to room # 419. Presented with: Respiratory Arrest.  Mental Status: Patient is alert to self. Vitals:    10/25/20 2107   BP: 117/80   Pulse: 91   Resp: 16   Temp: 97.3 °F (36.3 °C)   SpO2: 95%     Patient safety contract and falls prevention contract reviewed with patient No; pt is unable to sign due to medication condition at this time. Oriented Patient to room. Call light within reach. Yes.   Needs, issues or concerns expressed at this time: no.      Electronically signed by Alison Lazo LPN on 81/16/4747 at 11:47 PM

## 2020-10-26 NOTE — PROGRESS NOTES
4 Eyes Skin Assessment    Heather Preston is being assessed upon: Admission    I agree that Grazyna Ross, along with Loletha Phalen, RN (either 2 RN's or 1 LPN and 1 RN) have performed a thorough Head to Toe Skin Assessment on the patient. ALL assessment sites listed below have been assessed. Areas assessed by both nurses:     [x]   Head, Face, and Ears   [x]   Shoulders, Back, and Chest  [x]   Arms, Elbows, and Hands   [x]   Coccyx, Sacrum, and Ischium  [x]   Legs, Feet, and Heels    Does the Patient have Skin Breakdown? Yes, wound(s) noted upon assessment. It is the responsibility of the Primary Nurse to assure that the following documentation, preventions, orders, and consults are complete on the above noted wound(s): Wound LDA initiated. LDA Flowsheet Documentation includes the Alfreda-wound, Wound Assessment, Measurements, Dressing Treatment, Drainage, and Color. ; see LDA on assessment in flowsheet.   Conrado Prevention initiated: Yes  Wound Care Orders initiated: No    [facility] nurse consulted for Pressure Injury (Stage 3,4, Unstageable, DTI, NWPT, and Complex wounds) and New or Established Ostomies: No        Primary Nurse eSignature: Katharina Trinh LPN on 04/76/3123 at 11:46 PM      Co-Signer eSignature: {Esignature:[id number]}

## 2020-10-26 NOTE — PLAN OF CARE
Problem: Falls - Risk of:  Goal: Will remain free from falls  Description: Will remain free from falls  Outcome: Ongoing  Goal: Absence of physical injury  Description: Absence of physical injury  Outcome: Ongoing     Problem: Skin Integrity:  Goal: Will show no infection signs and symptoms  Description: Will show no infection signs and symptoms  Outcome: Ongoing  Goal: Absence of new skin breakdown  Description: Absence of new skin breakdown  Outcome: Ongoing     Problem: Confusion - Acute:  Goal: Absence of continued neurological deterioration signs and symptoms  Description: Absence of continued neurological deterioration signs and symptoms  Outcome: Ongoing  Goal: Mental status will be restored to baseline  Description: Mental status will be restored to baseline  Outcome: Ongoing     Problem: Discharge Planning:  Goal: Ability to perform activities of daily living will improve  Description: Ability to perform activities of daily living will improve  Outcome: Ongoing  Goal: Participates in care planning  Description: Participates in care planning  Outcome: Ongoing     Problem: Injury - Risk of, Physical Injury:  Goal: Will remain free from falls  Description: Will remain free from falls  Outcome: Ongoing  Goal: Absence of physical injury  Description: Absence of physical injury  Outcome: Ongoing     Problem: Mood - Altered:  Goal: Mood stable  Description: Mood stable  Outcome: Ongoing  Goal: Absence of abusive behavior  Description: Absence of abusive behavior  Outcome: Ongoing  Goal: Verbalizations of feeling emotionally comfortable while being cared for will increase  Description: Verbalizations of feeling emotionally comfortable while being cared for will increase  Outcome: Ongoing     Problem: Psychomotor Activity - Altered:  Goal: Absence of psychomotor disturbance signs and symptoms  Description: Absence of psychomotor disturbance signs and symptoms  Outcome: Ongoing     Problem: Sensory Perception - Impaired:  Goal: Demonstrations of improved sensory functioning will increase  Description: Demonstrations of improved sensory functioning will increase  Outcome: Ongoing  Goal: Decrease in sensory misperception frequency  Description: Decrease in sensory misperception frequency  Outcome: Ongoing  Goal: Able to refrain from responding to false sensory perceptions  Description: Able to refrain from responding to false sensory perceptions  Outcome: Ongoing  Goal: Demonstrates accurate environmental perceptions  Description: Demonstrates accurate environmental perceptions  Outcome: Ongoing  Goal: Able to distinguish between reality-based and nonreality-based thinking  Description: Able to distinguish between reality-based and nonreality-based thinking  Outcome: Ongoing  Goal: Able to interrupt nonreality-based thinking  Description: Able to interrupt nonreality-based thinking  Outcome: Ongoing     Problem: Sleep Pattern Disturbance:  Goal: Appears well-rested  Description: Appears well-rested  Outcome: Ongoing     Problem: Gas Exchange - Impaired:  Goal: Levels of oxygenation will improve  Description: Levels of oxygenation will improve  Outcome: Ongoing

## 2020-10-26 NOTE — PROGRESS NOTES
Blood Gas, Arterial [[id number]] (Abnormal)  Collected: 10/26/20 1008       Specimen: Blood gases  Updated: 10/26/20 1009        pH, Arterial  7.370        pCO2, Arterial  58.0  mmHg         pO2, Arterial  56.0  mmHg         HCO3, Arterial  33.5  mmol/L         Base Excess, Arterial  6.4  mmol/L         Hemoglobin, Art, Extended  13.2  g/dL         O2 Sat, Arterial  90.0  %         Carboxyhgb, Arterial  2.1  %         Methemoglobin, Arterial  1.0  %         O2 Content, Arterial  16.7  mL/dL         O2 Therapy  Unknown       Potassium, Whole Blood [[id number]]  Collected: 10/26/20 1008        Updated: 10/26/20 1009        Potassium, Whole Blood  4.7      AT+, O2 @ 15 lpm, BIPAP 12/6, RR = 20, L rad

## 2020-10-26 NOTE — PROGRESS NOTES
Consult received, palliative care RN/ to initiate services. I will follow along and assist as needed. Thank you for consulting palliative care.

## 2020-10-27 NOTE — CARE COORDINATION
USHA reached out to James Morel, pt's niece. Frank Kline states that pt has no relationship with her 3 dtrs/ Pt's brother lives in [facility] [place] is pt's brothers dtr. Frank Kline currently holds limited POA as she is working to sell the house, but states that she did not want full POA. Frank Kline provided 2 of pt's dtrs phone numbers. Arvin Giron - [phone]  [address] [place] [address]    Frank Kline stated that if she had to, she was agreeable to be the primary decision maker, but would like it to be on pt's dtrs first. USHA will reach out to [facility]. Electronically signed by Sánchez Johnson on 10/27/2020 at 9:17 AM      USHA attempted to reach Arvin Giron. No VM set up. Electronically signed by Sánchez Johnson on 10/27/2020 at 9:24 AM      USHA was able to reach Arvin Giron, pt's dtr. Arvin Giron was not aware that pt was in our care nor that she was at [facility], still under the impression that she was living at [place]. USHA explained pt's status, and reason for calling. Arvin Giron stated that she was agreeable to be the main contact for pt. Stated that staff could call her at any time. USHA will make staff aware.    Electronically signed by Sánhcez Johnson on 10/27/2020 at 11:23 AM

## 2020-10-27 NOTE — PROGRESS NOTES
[facility]      Patient:  Quirino Contreras  YOB: 1940  Date of Service: 10/27/2020  MRN: [id number]   Acct: [de-identified]   Primary Care Physician: Nalini Gutierrez MD  Advance Directive: DNR-CC  Admit Date: 10/25/2020       Hospital Day: 2  Portions of this note have been copied forward, however, updated to reflect the most current clinical status of this patent    CHIEF COMPLAINT Respiratory failure    SUBJECTIVE:     Pt is laying in bed in no acute distress. She has bilateral wrist restraints in place, NC not correctly in nose and uncomfortably appearing. Nursing notified to reposition patient in bed. NC placed back in nose. Pt is still confused and ill appearing. Cumulative Hospital Course:   10/25/2020 The patient is a 79 y.o. female with PMH CVA, HTN, polymyalgia, chronic lower back pain, lower extremity lymphedema who presented to [address] [place] [address] ED after she was found unresponsive in the SNF where she resides. It is unknown how long she was unresponsive. EMS placed a supraglottic airway to assist with bagging en route for respiratory arrest. Patient is a DNR. Airway removed in ED and she is now on BiPAP. A  is present in the room who knows the patient and states she suspects narcotic overdose as patient has a history of uncontrolled pain and frequently attempting to take medication early or more than prescribed. It is uncertain how her medications were being given at the nursing home and patient was under quarantine for 14 days due to recently being discharged from this facility. She was discharged on 10/22/2020. She had presented to ED with dyspnea and lower extremity edema. CTA was unremarkable for PE. COVID-19 negative. She was given a short course of IV diuretic therapy. Echocardiogram unremarkable for CHF. Was discharged on Augmentin for continued treatment of bilateral lower extremity erythema.  Work up in ED includes a CXR that is negative for pulmonary vascular congestion or acute cardiopulmonary process. CT head unremarkable. Patient is able to shake her head yes and no appropriately with my questioning as well as with the  who is present at bedside. She is not about to participate in conversation otherwise. Patient's family has been called multiple times to discuss patient's DNR status however no one has been reached. 10/26/2020 No family is able to be reached. SLP and cognitive eval ordered. Pt has been off and on BiPAP throughout the day. 10/27/2020 Overnight patient was placed in bilateral wrist restraints due to pulling out IVs, throwing food across the room, trying to get out of bed and removing her oxygen. Her antibiotics have been changed to oral and Seroquel added for agitation. SW has contacted pt's daughter, Jf Cristina, who is agreeable to be the point of contact for the patinet. urine culture show candida albicans, Fluconazole initiated. Review of Systems:   Review of Systems   Unable to perform ROS: Other          Objective:   VITALS:  BP (!) 191/109   Pulse 136   Temp 97.4 °F (36.3 °C) (Temporal)   Resp 20   Ht 5' 6\" (1.676 m)   Wt (!) 313 lb (142 kg)   SpO2 90%   Breastfeeding No   BMI 50.52 kg/m²   24HR INTAKE/OUTPUT:      Intake/Output Summary (Last 24 hours) at 10/27/2020 1053  Last data filed at 10/26/2020 1332  Gross per 24 hour   Intake --   Output 400 ml   Net -400 ml       Physical Exam  Constitutional:       Appearance: She is obese. She is ill-appearing. HENT:      Head: Normocephalic and atraumatic. Right Ear: External ear normal.      Left Ear: External ear normal.      Nose: Nose normal.      Mouth/Throat:      Mouth: Mucous membranes are dry. Eyes:      General: No scleral icterus. Extraocular Movements: Extraocular movements intact. Conjunctiva/sclera: Conjunctivae normal.   Neck:      Musculoskeletal: Normal range of motion. Cardiovascular:      Rate and Rhythm: Normal rate and regular rhythm.       Pulses: Normal Troponin T:   Recent Labs     10/26/20  0823 10/26/20  1838 10/27/20  0702   TROPONINI 0.35* 0.37* 0.34*     UA:  Recent Labs     10/25/20  1510   COLORU YELLOW   PHUR 6.0   WBCUA 31-50*   RBCUA 6-10*   YEAST Present*   BACTERIA 1+*   CLARITYU CLOUDY*   SPECGRAV 1.016   LEUKOCYTESUR LARGE*   UROBILINOGEN 0.2   BILIRUBINUR Negative   BLOODU TRACE*   GLUCOSEU Negative   AMORPHOUS Rare*     ABG:  Recent Labs     10/26/20  1008   PHART 7.370   LIQ8ILK 58.0*   PO2ART 56.0*   FYW1EZJ 33.5*   BEART 6.4*   HGBAE 13.2   N7IBNITI 90.0   CARBOXHGBART 2.1       RAD:   Ct Head Wo Contrast  Result Date: 10/25/2020  Changes of aging with no acute intracranial abnormality   Signed by Dr Deena Campbell on 10/25/2020 3:19 PM    Xr Chest Portable  Result Date: 10/25/2020  1. Moderate cardiomegaly no acute pulmonary vascular congestion. Signed by Dr Deena Campbell on 10/25/2020 2:38 PM      Micro:   Culture, Urine [[id number]]  (Abnormal)  Collected: 10/25/20 1510    Order Status: Completed  Specimen: Urine, clean catch  Updated: 10/27/20 1135     Urine Culture, Routine  >50,000 CFU/ml      Organism  Candida albicans       Urine Culture, Routine  --     Moderate growth   No further workup          Assessment/Plan   Principal Problem:    Respiratory arrest ([address]- Pt has been off and on BiPAP. Active Problems:   Acute cystitis without hematuria- urine culture show candida albicans, Fluconazole initiated. Agitation- Geodon IM as needed. Elevated troponin- Cardiology consulted. Continue to monitor on tele. Essential hypertension- monitor closely       Idiopathic peripheral neuropathy    Chronic bilateral low back pain with bilateral sciatica    Frequent falls    History of cerebrovascular accident (CVA) in adulthood    Polymyalgia ([address]    Spinal stenosis of lumbosacral region   -noted. Await SLP cognitive eval.  Pt states she ambulates via wheelchair      Palliative care patient- noted, consulted.  DNR      DVT Prophylaxis: Lovenox      Wing Gallardo PA-C  10/27/2020, 10:53 AM

## 2020-10-27 NOTE — PROGRESS NOTES
Pt has no IV access. Unable to give IV BP medications at this time, and patient too drowsy to take oral medication. Clinical house aware pt is in need of IV access. Will pass on to night shift.    Electronically signed by Juan Alberto Galo RN on 10/27/2020 at 6:47 PM

## 2020-10-27 NOTE — CONSULTS
[facility]   Consult Note   Rosa Walsh       Requesting MD:  Sonu Morales MD   Admit Status:  Inpatient [101]       History obtained from:   [x] Patient  [x] Other (specify): RN    Patient:  Pura Best  [id number]     Chief Complaint:   Chief Complaint   Patient presents with    Altered Mental Status     Pt presents being unresponsive found at [facility]        HPI: Ms. King Del Angel is a 79 y.o. female with a history of acute respiratory failure arrest and confusion at nursing home  She is known to have history of hypertension, chronic lymphedema, history of CVA  Patient was found unresponsive at the nursing home and she was brought here to our ER by EMS after she received quick resuscitation in the field, there is no clear evidence of cardiac arrest from the EMS and the ER notes patient was placed on BiPAP after airway was secured and was admitted to the hospital for further care, however the patient has been confused and agitated and pulling her IV lines from with the nurses as mentioned during hospital stay. Initial evaluation in the emergency room ruled out COVID-19 infection, CT scan was negative for pulmonary embolism  Also EKG was not remarkable for any acute ST segment changes    When I met the patient on the floor, she was answering the questions and she denies any active chest pain or shortness of breath. There was no family at the bedside    Review of Systems:  Review of Systems   Constitutional: Negative. Respiratory: Positive for shortness of breath. Cardiovascular: Negative. Gastrointestinal: Negative. Endocrine: Negative. Genitourinary: Negative. Musculoskeletal: Negative. Skin: Negative. Neurological: Negative. Hematological: Negative. Psychiatric/Behavioral: Positive for agitation and confusion. All other systems reviewed and are negative.       Cardiac Specific Data:  Specialty Problems        Cardiology Problems    Essential hypertension Cerebrovascular accident [facility])        Hypercholesterolemia        Acute on chronic combined systolic (congestive) and diastolic (congestive) heart failure (HCC)              Past Medical History:  Past Medical History:   Diagnosis Date    Cellulitis of left lower limb     Cellulitis of right lower limb     CHF (congestive heart failure) (HCC)     Chronic cystitis     Essential hypertension     Fibromyalgia     Hyperglycemia     Hyperlipidemia     Hypertension     Hypothyroidism     Idiopathic peripheral autonomic neuropathy     Kidney stones     Lymphedema     Osteoarthritis     Osteoarthritis     Palliative care patient 10/26/2020    Peripheral neuropathy     Polymyalgia rheumatica     Polymyalgia rheumatica (HCC)     Sciatica     Stroke     Venous insufficiency (chronic) (peripheral)         Past Surgical History:  Past Surgical History:   Procedure Laterality Date    HYSTERECTOMY      KIDNEY SURGERY      TONSILLECTOMY         Past Family History:  Family History   Problem Relation Age of Onset    Diabetes Mother     No Known Problems Father     Stroke Sister        Past Social History:  Social History     Socioeconomic History    Marital status:      Spouse name: Not on file    Number of children: Not on file    Years of education: Not on file    Highest education level: Not on file   Occupational History    Not on file   Social Needs    Financial resource strain: Not on file    Food insecurity     Worry: Not on file     Inability: Not on file    Transportation needs     Medical: Not on file     Non-medical: Not on file   Tobacco Use    Smoking status: Former Smoker     Last attempt to quit: 1979     Years since quittin.8    Smokeless tobacco: Never Used   Substance and Sexual Activity    Alcohol use: No    Drug use: No    Sexual activity: Not on file   Lifestyle    Physical activity     Days per week: Not on file     Minutes per session: Not on file    Stress: Not on file   Relationships    Social connections     Talks on phone: Not on file     Gets together: Not on file     Attends [employer] service: Not on file     Active member of club or organization: Not on file     Attends meetings of clubs or organizations: Not on file     Relationship status: Not on file    Intimate partner violence     Fear of current or ex partner: Not on file     Emotionally abused: Not on file     Physically abused: Not on file     Forced sexual activity: Not on file   Other Topics Concern    Not on file   Social History Narrative    Not on file       Allergies: Allergies   Allergen Reactions    Sulfa Antibiotics        Home Meds:  Prior to Admission medications    Medication Sig Start Date End Date Taking? Authorizing Provider   sodium chloride (OCEAN, BABY AYR) 0.65 % nasal spray 1 spray by Nasal route as needed for Congestion (Stuffy / Dry Nares) 10/22/20  Yes Francesca Marcus PA-C   lidocaine 4 % external patch Place 1 patch onto the skin daily 10/23/20  Yes Francesca Marcus PA-C   miconazole (MICOTIN) 2 % powder Apply topically 2 times daily. 10/22/20  Yes Francesca Marcus PA-C   polyethylene glycol (GLYCOLAX) 17 g packet Take 17 g by mouth daily as needed for Constipation or Other (No BM in 60 hours) 10/22/20 11/22/20 Yes Francesca Marcus PA-C   melatonin 3 MG TABS tablet Take 1 tablet by mouth nightly as needed (sleep) 10/22/20  Yes Francesca Marcus PA-C   amoxicillin-clavulanate (AUGMENTIN) 875-125 MG per tablet Take 1 tablet by mouth 2 times daily for 7 days  Patient taking differently: Take 1 tablet by mouth 2 times daily Start 10/23/20- for 7 days 10/22/20 10/29/20 Yes Francesca Marcus PA-C   gabapentin (NEURONTIN) 100 MG capsule Take 3 capsules by mouth 3 times daily for 10 days. Patient taking differently: Take 300 mg by mouth 3 times daily.  Start date was 10/22/20 10/22/20 11/1/20 Yes Jack Hughes MD   lisinopril (PRINIVIL;ZESTRIL) 10 MG tablet Take 10 mg by mouth daily  5/30/20  Yes Historical Provider, MD   levothyroxine (SYNTHROID) 150 MCG tablet Take 150 mcg by mouth Daily  5/22/20  Yes Historical Provider, MD   DULoxetine (CYMBALTA) 20 MG extended release capsule Take 1 capsule by mouth daily 6/18/20  Yes Lena Castanon MD   aspirin 81 MG tablet Take 81 mg by mouth daily Delayed release. Yes Historical Provider, MD   potassium chloride (KLOR-CON M) 10 MEQ extended release tablet Take 10 mEq by mouth daily   Yes Historical Provider, MD   cetirizine (ZYRTEC) 10 MG tablet Take 10 mg by mouth daily   Yes Historical Provider, MD   guaiFENesin (MUCINEX) 600 MG extended release tablet Take 1,200 mg by mouth every 12 hours 9/17/19   Historical Provider, MD       Current Meds:   QUEtiapine  25 mg Oral BID    nitrofurantoin (macrocrystal-monohydrate)  100 mg Oral 2 times per day    lisinopril  10 mg Oral Daily    aspirin  81 mg Oral Daily    ketamine  100 mg Intravenous Once    sodium chloride flush  10 mL Intravenous 2 times per day    enoxaparin  40 mg Subcutaneous Nightly    insulin lispro  0-6 Units Subcutaneous TID WC    insulin lispro  0-3 Units Subcutaneous Nightly       Current Infused Meds:   sodium chloride 125 mL/hr at 10/26/20 1247    dextrose         Physical Exam:  Vitals:    10/27/20 1002   BP: (!) 191/109   Pulse: 136   Resp: 20   Temp: 97.4 °F (36.3 °C)   SpO2: 90%       Intake/Output Summary (Last 24 hours) at 10/27/2020 1117  Last data filed at 10/26/2020 1332  Gross per 24 hour   Intake --   Output 400 ml   Net -400 ml     Estimated body mass index is 50.52 kg/m² as calculated from the following:    Height as of this encounter: 5' 6\" (1.676 m). Weight as of this encounter: 313 lb (142 kg). Physical Exam  Vitals signs reviewed. Constitutional:       General: She is in acute distress. Appearance: She is obese. HENT:      Head: Normocephalic.       Mouth/Throat:      Mouth: Mucous membranes are moist.   Cardiovascular: Rate and Rhythm: Normal rate and regular rhythm. Pulses: Normal pulses. Heart sounds: Normal heart sounds. No murmur. Pulmonary:      Effort: Pulmonary effort is normal.      Breath sounds: Normal breath sounds. Abdominal:      General: Bowel sounds are normal.      Palpations: Abdomen is soft. Tenderness: There is no abdominal tenderness. Musculoskeletal: Normal range of motion. Right lower leg: No edema. Left lower leg: No edema. Skin:     General: Skin is warm. Neurological:      General: No focal deficit present. Mental Status: She is alert and oriented to person, place, and time. Psychiatric:         Mood and Affect: Mood normal.         Behavior: Behavior normal.         Labs:  Recent Labs     10/25/20  1400 10/26/20  0414 10/27/20  0702   WBC 10.0 9.6 9.9   HGB 12.8 12.3 13.5    250 286       Recent Labs     10/25/20  1400  10/26/20  0551 10/26/20  1008 10/27/20  0702     --  147*  --  146*   K 5.8*   < > 5.7* 4.7 4.4     --  105  --  105   CO2 27  --  32*  --  29   BUN 45*  --  54*  --  40*   CREATININE 2.1*  --  2.1*  --  0.9   LABGLOM 23*  --  23*  --  >60   CALCIUM 8.4*  --  8.6*  --  9.2    < > = values in this interval not displayed. IMAGING:    EKG  ECHO  Rhythm: Within normal limits BP: 170/80 mmHg     Indications:Dyspnea/SOB and TIA.      Conclusions      Summary   Mitral annular calcification is present. Mild mitral regurgitation is present. Mildly thickened aortic valve leaflets with mildly increased velocity   Mean gradinet 9 mm hg suggests very mild aortic stenosis   Tricuspid valve is structurally normal.   Mild tricuspid regurgitation with estimated RVSP of 50 mm Hg. Normal left ventricular size with preserved LV function and an estimated   ejection fraction of approximately 55-60%. Normal left ventricular wall thickness. No regional wall motion abnormalities. Normal diastolic filling pattern.       Signature ----------------------------------------------------------------   Electronically signed by Dylan Harmon MD(Interpreting   physician) on 10/20/2020 11:31 AM   ----------------------------------------------------------------    Results for Ricardo López (MRN [id number]) as of 10/27/2020 20:00   Ref. Range 10/25/2020 18:17 10/26/2020 08:23 10/26/2020 18:38 10/27/2020 07:02 10/27/2020 12:17   Troponin Latest Ref Range: 0.00 - 0.03 ng/mL 0.29 (HH) 0.35 (HH) 0.37 (HH) 0.34 (HH) 0.32 (HH)         Ct Head Wo Contrast    Result Date: 10/25/2020  Changes of aging with no acute intracranial abnormality Signed by Dr Halima Kwon on 10/25/2020 3:19 PM    Xr Chest Portable    Result Date: 10/25/2020  1. Moderate cardiomegaly no acute pulmonary vascular congestion. Signed by Dr Halima Kwon on 10/25/2020 2:38 PM    Xr Chest Portable    Result Date: 10/19/2020  1. Hypoventilation with vascular crowding. 2. Bronchial wall thickening. 3. Mild atelectasis in the right lung base. Mild elevation of the right hemidiaphragm. 4. Cardiomegaly. Signed by Dr Michelle Chinchilla on 10/19/2020 5:05 PM    Cta Pulmonary W Contrast    Result Date: 10/19/2020  1. No CT evidence of pulmonary embolus. The pulmonary arteries are dilated. 2. Atheromatous disease of the thoracic aorta and coronary arteries. Cardiomegaly. 3. Linear infiltrates in the mid and lower lung zones bilaterally likely due to atelectasis. There are dependent infiltrates in both lower lobes likely due to atelectasis. Pneumonia less likely. 3. Small hiatal hernia. 4. Probable fatty liver. Probable gallstones. The full report of this exam was immediately signed and available to the emergency room. The patient is currently in the emergency room.  Signed by Dr Michelle Chinchilla on 10/19/2020 9:32 PM       Assessment and Plan:  81-year-old female who is known to have history of hypertension, history of diastolic congestive heart failure, history of chronic lymphedema was admitted because of acute respiratory failure and confusion at the nursing home, her condition has improved after initial resuscitative measures including securing airways and BiPAP therapy  However patient still agitated and sometimes pulling her IV lines per nurses assessment  Cardiology was consulted to help in the management    Patient does not seem to be in florid congestive heart failure based on fluid status, she is lying flat in bed, she denies any active chest pain  Her blood pressure was elevated at 190/100, we will resume home medication and follow blood pressure, will add also as needed IV therapy if needed  EKG reviewed, no acute ischemic changes, troponin is elevated being monitored for trending, we will follow her progress especially for neurological status  I reviewed her echo which was done in October 2020 and showed no evidence of systolic dysfunction or significant valvular pathology    Patient will need ischemic evaluation once her neuro status and respiratory status stabilizes  Thank you for the consult will follow with you      Electronically signed by Tessy Chamorro MD on 10/27/2020 at 11:17 AM    Tessy Chamorro MD, [address] [id number] Cardiologist, Endovascular Specialist   Medical Director, [facility]

## 2020-10-27 NOTE — PLAN OF CARE
RN  Outcome: Ongoing  10/27/2020 0600 by Meliton Cassidy RN  Outcome: Ongoing  10/26/2020 1642 by Parth Strange RN  Outcome: Ongoing  Goal: Participates in care planning  Description: Participates in care planning  10/27/2020 0600 by Meliton Cassidy RN  Outcome: Ongoing  10/27/2020 0600 by Meliton Cassidy RN  Outcome: Ongoing  10/26/2020 1642 by Parth Strange RN  Outcome: Ongoing     Problem: Injury - Risk of, Physical Injury:  Goal: Will remain free from falls  Description: Will remain free from falls  10/27/2020 0600 by Meliton Cassidy RN  Outcome: Ongoing  10/27/2020 0600 by Meliton Cassidy RN  Outcome: Ongoing  10/26/2020 1642 by Parth Strange RN  Outcome: Ongoing  Goal: Absence of physical injury  Description: Absence of physical injury  10/27/2020 0600 by Meliton Cassidy RN  Outcome: Ongoing  10/27/2020 0600 by Meliton Cassidy RN  Outcome: Ongoing  10/26/2020 1642 by Parth Strange RN  Outcome: Ongoing     Problem: Mood - Altered:  Goal: Mood stable  Description: Mood stable  10/27/2020 0600 by Meliton Cassidy RN  Outcome: Ongoing  10/27/2020 0600 by Meliton Cassidy RN  Outcome: Ongoing  10/26/2020 1642 by Parth Strange RN  Outcome: Ongoing  Goal: Absence of abusive behavior  Description: Absence of abusive behavior  10/27/2020 0600 by Meliton Cassidy RN  Outcome: Ongoing  10/27/2020 0600 by Meliton Cassidy RN  Outcome: Ongoing  10/26/2020 1642 by Parth Strange RN  Outcome: Ongoing  Goal: Verbalizations of feeling emotionally comfortable while being cared for will increase  Description: Verbalizations of feeling emotionally comfortable while being cared for will increase  10/27/2020 0600 by Meliton Cassidy RN  Outcome: Ongoing  10/27/2020 0600 by Meliton Cassidy RN  Outcome: Ongoing  10/26/2020 1642 by Parth Strange RN  Outcome: Ongoing     Problem: Psychomotor Activity - Altered:  Goal: Absence of psychomotor disturbance signs and symptoms  Description: Absence of psychomotor disturbance signs and symptoms  10/27/2020 0600 by Fiona Dominguez RN  Outcome: Ongoing  10/27/2020 0600 by Fiona Dominguez RN  Outcome: Ongoing  10/26/2020 1642 by Sheryll Jeans, RN  Outcome: Ongoing     Problem: Sensory Perception - Impaired:  Goal: Demonstrations of improved sensory functioning will increase  Description: Demonstrations of improved sensory functioning will increase  10/27/2020 0600 by Fiona Dominguez RN  Outcome: Ongoing  10/27/2020 0600 by Fiona Dominguez RN  Outcome: Ongoing  10/26/2020 1642 by Sheryll Jeans, RN  Outcome: Ongoing  Goal: Decrease in sensory misperception frequency  Description: Decrease in sensory misperception frequency  10/27/2020 0600 by Fiona Dominguez RN  Outcome: Ongoing  10/27/2020 0600 by Fiona Dominguez RN  Outcome: Ongoing  10/26/2020 1642 by Sheryll Jeans, RN  Outcome: Ongoing  Goal: Able to refrain from responding to false sensory perceptions  Description: Able to refrain from responding to false sensory perceptions  10/27/2020 0600 by Fiona Dominguez RN  Outcome: Ongoing  10/27/2020 0600 by Fiona Dominguez RN  Outcome: Ongoing  10/26/2020 1642 by Sheryll Jeans, RN  Outcome: Ongoing  Goal: Demonstrates accurate environmental perceptions  Description: Demonstrates accurate environmental perceptions  10/27/2020 0600 by Fiona Dominguez RN  Outcome: Ongoing  10/27/2020 0600 by Fiona Dominguez RN  Outcome: Ongoing  10/26/2020 1642 by Sheryll Jeans, RN  Outcome: Ongoing  Goal: Able to distinguish between reality-based and nonreality-based thinking  Description: Able to distinguish between reality-based and nonreality-based thinking  10/27/2020 0600 by Fiona Dominguez RN  Outcome: Ongoing  10/27/2020 0600 by Fiona Dominguez RN  Outcome: Ongoing  10/26/2020 1642 by Sheryll Jeans, RN  Outcome: Ongoing  Goal: Able to interrupt nonreality-based thinking  Description: Able to interrupt nonreality-based thinking  10/27/2020 0600 by Fiona Dominguez RN  Outcome: Ongoing  10/27/2020 0600 by Brenda Alcaraz RN  Outcome: Ongoing  10/26/2020 1642 by Dwana Favre, RN  Outcome: Ongoing     Problem: Sleep Pattern Disturbance:  Goal: Appears well-rested  Description: Appears well-rested  10/27/2020 0600 by Brenda Alcaraz RN  Outcome: Ongoing  10/27/2020 0600 by Brenda Alcaraz RN  Outcome: Ongoing  10/26/2020 1642 by Dwana Favre, RN  Outcome: Ongoing     Problem: Gas Exchange - Impaired:  Goal: Levels of oxygenation will improve  Description: Levels of oxygenation will improve  10/27/2020 0600 by Brenda Alcaraz RN  Outcome: Ongoing  10/27/2020 0600 by Brenda Alcaraz RN  Outcome: Ongoing  10/26/2020 1642 by Dwana Favre, RN  Outcome: Ongoing     Problem: Nutrition  Goal: Optimal nutrition therapy  10/27/2020 0600 by Brenda Alcaraz RN  Outcome: Ongoing  10/27/2020 0600 by Brenda Alcaraz RN  Outcome: Ongoing  10/26/2020 1642 by Dwana Favre, RN  Outcome: Ongoing     Problem: Restraint Use - Nonviolent/Non-Self-Destructive Behavior:  Goal: Absence of restraint indications  Description: Absence of restraint indications  10/27/2020 0600 by Brenda Alcaraz RN  Outcome: Ongoing  10/27/2020 0600 by Brenda Alcaraz RN  Outcome: Ongoing  Goal: Absence of restraint-related injury  Description: Absence of restraint-related injury  10/27/2020 0600 by Brenda Alcaraz RN  Outcome: Ongoing  10/27/2020 0600 by Brenda Alcaraz RN  Outcome: Ongoing

## 2020-10-27 NOTE — PLAN OF CARE
RN  Outcome: Ongoing  10/27/2020 0600 by Raissa Wharton RN  Outcome: Ongoing  10/27/2020 0600 by Raissa Wharton RN  Outcome: Ongoing  Goal: Participates in care planning  Description: Participates in care planning  10/27/2020 1212 by Michelet Dalton RN  Outcome: Ongoing  10/27/2020 0600 by Raissa Wharton RN  Outcome: Ongoing  10/27/2020 0600 by Raissa Wharton RN  Outcome: Ongoing     Problem: Injury - Risk of, Physical Injury:  Goal: Will remain free from falls  Description: Will remain free from falls  10/27/2020 1212 by Michelet Dalton RN  Outcome: Ongoing  10/27/2020 0600 by Raissa Wharton RN  Outcome: Ongoing  10/27/2020 0600 by Raissa Wharton RN  Outcome: Ongoing  Goal: Absence of physical injury  Description: Absence of physical injury  10/27/2020 1212 by Michelet Dalton RN  Outcome: Ongoing  10/27/2020 0600 by Raissa Wharton RN  Outcome: Ongoing  10/27/2020 0600 by Raissa Wharton RN  Outcome: Ongoing     Problem: Mood - Altered:  Goal: Mood stable  Description: Mood stable  10/27/2020 1212 by Michelet Dalton RN  Outcome: Ongoing  10/27/2020 0600 by Raissa Wharton RN  Outcome: Ongoing  10/27/2020 0600 by Raissa Wharton RN  Outcome: Ongoing  Goal: Absence of abusive behavior  Description: Absence of abusive behavior  10/27/2020 1212 by Michelet Dalton RN  Outcome: Ongoing  10/27/2020 0600 by Raissa Wharton RN  Outcome: Ongoing  10/27/2020 0600 by Raissa Wharton RN  Outcome: Ongoing  Goal: Verbalizations of feeling emotionally comfortable while being cared for will increase  Description: Verbalizations of feeling emotionally comfortable while being cared for will increase  10/27/2020 1212 by Michelet Dalton RN  Outcome: Ongoing  10/27/2020 0600 by Raissa Wharton RN  Outcome: Ongoing  10/27/2020 0600 by Raissa Wharton RN  Outcome: Ongoing     Problem: Psychomotor Activity - Altered:  Goal: Absence of psychomotor disturbance signs and symptoms  Description: Absence of psychomotor disturbance signs and symptoms  10/27/2020 1212 by Luis Wheeler RN  Outcome: Ongoing  10/27/2020 0600 by Ev Swan RN  Outcome: Ongoing  10/27/2020 0600 by Ev Swan RN  Outcome: Ongoing     Problem: Sensory Perception - Impaired:  Goal: Demonstrations of improved sensory functioning will increase  Description: Demonstrations of improved sensory functioning will increase  10/27/2020 1212 by Luis Wheeler RN  Outcome: Ongoing  10/27/2020 0600 by Ev Swan RN  Outcome: Ongoing  10/27/2020 0600 by Ev Swan RN  Outcome: Ongoing  Goal: Decrease in sensory misperception frequency  Description: Decrease in sensory misperception frequency  10/27/2020 1212 by Luis Wheeler RN  Outcome: Ongoing  10/27/2020 0600 by Ev Swan RN  Outcome: Ongoing  10/27/2020 0600 by Ev Swan RN  Outcome: Ongoing  Goal: Able to refrain from responding to false sensory perceptions  Description: Able to refrain from responding to false sensory perceptions  10/27/2020 1212 by Luis Wheeler RN  Outcome: Ongoing  10/27/2020 0600 by Ev Swan RN  Outcome: Ongoing  10/27/2020 0600 by Ev Swan RN  Outcome: Ongoing  Goal: Demonstrates accurate environmental perceptions  Description: Demonstrates accurate environmental perceptions  10/27/2020 1212 by Luis Wheeler RN  Outcome: Ongoing  10/27/2020 0600 by Ev Swan RN  Outcome: Ongoing  10/27/2020 0600 by Ev Swan RN  Outcome: Ongoing  Goal: Able to distinguish between reality-based and nonreality-based thinking  Description: Able to distinguish between reality-based and nonreality-based thinking  10/27/2020 1212 by Luis Wheeler RN  Outcome: Ongoing  10/27/2020 0600 by Ev Swan RN  Outcome: Ongoing  10/27/2020 0600 by Ev Swan RN  Outcome: Ongoing  Goal: Able to interrupt nonreality-based thinking  Description: Able to interrupt nonreality-based thinking  10/27/2020 1212 by Miladys Portillo

## 2020-10-27 NOTE — PROGRESS NOTES
Pt growing increasingly more agitated. Pt pulling at bipap, purewik, and gown. Full range of motion and pulses 2+. Bruising and redness noted.    Electronically signed by Dwight Armenta RN on 10/27/2020 at 6:05 PM

## 2020-10-27 NOTE — PROGRESS NOTES
Comprehension  Comprehension:  (Delays were noted and mild break down was observed during simple yes/no questions regarding immediate environment and current state of being at independent level. While delayed, patient demonstrated ability to follow simple 1 step commands independently. Delays were noted and mild-moderate break down was observed during yes/no questions of increased complexity and during complex 1 and simple 2 step commands at independent level.)    Expression  Primary Mode of Expression:  (Confrontation naming of items in room was considered to be delayed. Structured responsive speech and responses in natural conversation were considered to be moderately delayed and mildly fragmented where the patient started expressions but then discontinued.)    Overall Orientation Status:  (Patient demonstrated ability to verbalize name, birthday, age, city, state, hospital, month, and date at independent level. Patient did not verbalize address, phone number, JOANIE, or year independently. )    Attention:  (Patient demonstrated decreased select and sustained attention during assessment. No adverse behaviors were noted with provision of redirections.)    Memory:  (Patient demonstrated decreased immediate memory with sequences of unrelated numbers/words set up to 4 items with repetition provided. Patient demonstrated decreased short-term/working memory with less than 10 minute delay+distractions present during assessment.)    Problem Solving:  (It is noted that during evaluation, patient did not verbalize PCP nor pharmacy at independent level. Patient did not verbalize conditions in which she takes medications nor verbalized names of medications she currently takes independently. Patient did not verbalize medication schedule at independent level.  Patient did not verbalize appropriate, full solutions to situations that could occur during activities of daily living independently (I.e. cuts, burns, fall, fever, finances, fire, nausea, reflux).     Electronically signed by CHATO Velez on 10/27/2020 at 12:32 PM

## 2020-10-28 NOTE — PLAN OF CARE
Problem: Falls - Risk of:  Goal: Will remain free from falls  Description: Will remain free from falls  10/28/2020 1649 by Janice Padilla RN  Outcome: Ongoing  10/28/2020 0322 by Félix Hoff RN  Outcome: Ongoing  Goal: Absence of physical injury  Description: Absence of physical injury  10/28/2020 1649 by Janice Padilla RN  Outcome: Ongoing  10/28/2020 0322 by Félix Hoff RN  Outcome: Ongoing     Problem: Skin Integrity:  Goal: Will show no infection signs and symptoms  Description: Will show no infection signs and symptoms  10/28/2020 1649 by Janice Padilla RN  Outcome: Ongoing  10/28/2020 0322 by Félix Hoff RN  Outcome: Ongoing  Goal: Absence of new skin breakdown  Description: Absence of new skin breakdown  10/28/2020 1649 by Janice Padilla RN  Outcome: Ongoing  10/28/2020 0322 by Félix Hoff RN  Outcome: Ongoing     Problem: Confusion - Acute:  Goal: Absence of continued neurological deterioration signs and symptoms  Description: Absence of continued neurological deterioration signs and symptoms  10/28/2020 1649 by Janice Padilla RN  Outcome: Ongoing  10/28/2020 0322 by Félix Hoff RN  Outcome: Ongoing  Goal: Mental status will be restored to baseline  Description: Mental status will be restored to baseline  10/28/2020 1649 by Janice Padilla RN  Outcome: Ongoing  10/28/2020 0322 by Félix Hoff RN  Outcome: Ongoing     Problem: Discharge Planning:  Goal: Ability to perform activities of daily living will improve  Description: Ability to perform activities of daily living will improve  10/28/2020 1649 by Janice Padilla RN  Outcome: Ongoing  10/28/2020 0322 by Félix Hoff RN  Outcome: Ongoing  Goal: Participates in care planning  Description: Participates in care planning  10/28/2020 1649 by Janice Padilla RN  Outcome: Ongoing  10/28/2020 0322 by Félix Hoff RN  Outcome: Ongoing     Problem: Injury - Risk of, Physical Injury:  Goal: Will remain free from falls  Description: Will remain free from falls  10/28/2020 1649 by Dav Hair RN  Outcome: Ongoing  10/28/2020 0322 by Jessica Salazar RN  Outcome: Ongoing  Goal: Absence of physical injury  Description: Absence of physical injury  10/28/2020 1649 by Dav Hair RN  Outcome: Ongoing  10/28/2020 0322 by Jessica Salazar RN  Outcome: Ongoing     Problem: Mood - Altered:  Goal: Mood stable  Description: Mood stable  10/28/2020 1649 by Dav Hair RN  Outcome: Ongoing  10/28/2020 0322 by Jessica Salazar RN  Outcome: Ongoing  Goal: Absence of abusive behavior  Description: Absence of abusive behavior  10/28/2020 1649 by Dav Hair RN  Outcome: Ongoing  10/28/2020 0322 by Jessica Salazar RN  Outcome: Ongoing  Goal: Verbalizations of feeling emotionally comfortable while being cared for will increase  Description: Verbalizations of feeling emotionally comfortable while being cared for will increase  10/28/2020 1649 by Dav Hair RN  Outcome: Ongoing  10/28/2020 0322 by Jessica Salazar RN  Outcome: Ongoing     Problem: Psychomotor Activity - Altered:  Goal: Absence of psychomotor disturbance signs and symptoms  Description: Absence of psychomotor disturbance signs and symptoms  10/28/2020 1649 by Dav Hair RN  Outcome: Ongoing  10/28/2020 0322 by Jessica Salazar RN  Outcome: Ongoing     Problem: Sensory Perception - Impaired:  Goal: Demonstrations of improved sensory functioning will increase  Description: Demonstrations of improved sensory functioning will increase  10/28/2020 1649 by Dav Hair RN  Outcome: Ongoing  10/28/2020 0322 by Jessica Salazar RN  Outcome: Ongoing  Goal: Decrease in sensory misperception frequency  Description: Decrease in sensory misperception frequency  10/28/2020 1649 by Dav Hair RN  Outcome: Ongoing  10/28/2020 0322 by Jessica Salazar RN  Outcome: Ongoing  Goal: Able to refrain from responding to false sensory restraint-related injury  Description: Absence of restraint-related injury  10/28/2020 1649 by Iraida Herrera RN  Outcome: Ongoing  10/28/2020 0322 by Jennifer Braxton RN  Outcome: Ongoing

## 2020-10-28 NOTE — PLAN OF CARE
Problem: Nutrition  Goal: Optimal nutrition therapy  10/28/2020 1040 by Jesus Holbrook RD, LD  Outcome: Ongoing  10/28/2020 0322 by Brenda Alcaraz RN  Outcome: Ongoing   Nutrition Problem #1: Increased nutrient needs  Intervention: Food and/or Nutrient Delivery: Start Oral Nutrition Supplement, Modify Current Diet  Nutritional Goals: Progress to oral diet, PO intake >50%, wt stable

## 2020-10-28 NOTE — PROGRESS NOTES
[id number] [facility]      Patient:  Елена Arias  YOB: 1940  Date of Service: 10/28/2020  MRN: [id number]   Acct: [de-identified]   Primary Care Physician: Derrick Levy MD  Advance Directive: DNR-CC  Admit Date: 10/25/2020       Hospital Day: 3  Portions of this note have been copied forward, however, updated to reflect the most current clinical status of this patent    CHIEF COMPLAINT Respiratory failure    SUBJECTIVE:     Pt is laying in bed in no acute distress. She is calm but remains confused. Her BLE appear to be more warm and erythremic. Cumulative Hospital Course:   10/25/2020 The patient is a 79 y.o. female with PMH CVA, HTN, polymyalgia, chronic lower back pain, lower extremity lymphedema who presented to [address] [place] [address] ED after she was found unresponsive in the SNF where she resides. It is unknown how long she was unresponsive. EMS placed a supraglottic airway to assist with bagging en route for respiratory arrest. Patient is a DNR. Airway removed in ED and she is now on BiPAP. A  is present in the room who knows the patient and states she suspects narcotic overdose as patient has a history of uncontrolled pain and frequently attempting to take medication early or more than prescribed. It is uncertain how her medications were being given at the nursing home and patient was under quarantine for 14 days due to recently being discharged from this facility. She was discharged on 10/22/2020. She had presented to ED with dyspnea and lower extremity edema. CTA was unremarkable for PE. COVID-19 negative. She was given a short course of IV diuretic therapy. Echocardiogram unremarkable for CHF. Was discharged on Augmentin for continued treatment of bilateral lower extremity erythema. Work up in ED includes a CXR that is negative for pulmonary vascular congestion or acute cardiopulmonary process. CT head unremarkable.   Patient is able to shake her head yes and no appropriately with my questioning as well as with the  who is present at bedside. She is not about to participate in conversation otherwise. Patient's family has been called multiple times to discuss patient's DNR status however no one has been reached. 10/26/2020 No family is able to be reached. SLP and cognitive eval ordered. Pt has been off and on BiPAP throughout the day. 10/27/2020 Overnight patient was placed in bilateral wrist restraints due to pulling out IVs, throwing food across the room, trying to get out of bed and removing her oxygen. Her antibiotics have been changed to oral and Seroquel added for agitation. SW has contacted pt's daughter, Juwan Griffith, who is agreeable to be the point of contact for the patinet. urine culture show candida albicans, Fluconazole initiated. 10/28/2020 Sodium continues to rise nursing having a hard time placing and keeping IVs in. Blood pressure elevated as well, Lisinopril and Norvasc dosage increased. Review of Systems:   Review of Systems   Unable to perform ROS: Other          Objective:   VITALS:  BP (!) 211/96   Pulse 70   Temp 97.1 °F (36.2 °C)   Resp 22   Ht 5' 6\" (1.676 m)   Wt (!) 313 lb (142 kg)   SpO2 93%   Breastfeeding No   BMI 50.52 kg/m²   24HR INTAKE/OUTPUT:      Intake/Output Summary (Last 24 hours) at 10/28/2020 1036  Last data filed at 10/27/2020 1310  Gross per 24 hour   Intake 120 ml   Output --   Net 120 ml       Physical Exam  Constitutional:       Appearance: She is obese. She is ill-appearing. Comments: Pt is calm at the moment   HENT:      Head: Normocephalic and atraumatic. Right Ear: External ear normal.      Left Ear: External ear normal.      Nose: Nose normal.      Mouth/Throat:      Mouth: Mucous membranes are dry. Eyes:      General: No scleral icterus. Extraocular Movements: Extraocular movements intact. Conjunctiva/sclera: Conjunctivae normal.   Neck:      Musculoskeletal: Normal range of motion.    Cardiovascular:      Rate and Rhythm: Normal rate and regular rhythm. Pulses: Normal pulses. Heart sounds: Normal heart sounds. Pulmonary:      Effort: Pulmonary effort is normal. No respiratory distress. Breath sounds: Normal breath sounds. Abdominal:      General: Bowel sounds are normal. There is no distension. Palpations: Abdomen is soft. Tenderness: There is no abdominal tenderness. Musculoskeletal:         General: No swelling, tenderness or deformity. Right lower leg: Edema present. Left lower leg: Edema present. Skin:     General: Skin is warm and dry. Findings: Erythema present. Comments: Chronic lymphedema   Neurological:      Cranial Nerves: No cranial nerve deficit. Sensory: No sensory deficit.             Medications:      sodium chloride 125 mL/hr at 10/26/20 1247    dextrose        [START ON 10/29/2020] amLODIPine  10 mg Oral Daily    [START ON 10/29/2020] lisinopril  40 mg Oral Daily    aspirin  81 mg Oral Daily    fluconazole  200 mg Oral Daily    ketamine  100 mg Intravenous Once    sodium chloride flush  10 mL Intravenous 2 times per day    enoxaparin  40 mg Subcutaneous Nightly    insulin lispro  0-6 Units Subcutaneous TID WC    insulin lispro  0-3 Units Subcutaneous Nightly     ziprasidone, labetalol, enalaprilat, sodium chloride flush, acetaminophen **OR** acetaminophen, polyethylene glycol, promethazine **OR** [DISCONTINUED] ondansetron, glucose, dextrose, glucagon (rDNA), dextrose  DIET DYSPHAGIA SOFT AND BITE-SIZED; Mildly Thick ([place])     Lab and other Data:     Recent Labs     10/26/20  0414 10/27/20  0702 10/28/20  0537   WBC 9.6 9.9 8.5   HGB 12.3 13.5 14.0    286 180     Recent Labs     10/26/20  0551 10/26/20  1008 10/27/20  0702 10/28/20  0537   *  --  146* 149*   K 5.7* 4.7 4.4 4.7     --  105 109   CO2 32*  --  29 28   BUN 54*  --  40* 29*   CREATININE 2.1*  --  0.9 0.8   GLUCOSE 123*  --  148* 120*     Recent Labs 10/26/20  0551 10/27/20  0702 10/28/20  0537   AST 90* 74* 63*   ALT 67* 61* 48*   BILITOT <0.2 0.3 0.4   ALKPHOS 62 71 68     Troponin T:   Recent Labs     10/26/20  1838 10/27/20  0702 10/27/20  1217   TROPONINI 0.37* 0.34* 0.32*     UA:  Recent Labs     10/25/20  1510   COLORU YELLOW   PHUR 6.0   WBCUA 31-50*   RBCUA 6-10*   YEAST Present*   BACTERIA 1+*   CLARITYU CLOUDY*   SPECGRAV 1.016   LEUKOCYTESUR LARGE*   UROBILINOGEN 0.2   BILIRUBINUR Negative   BLOODU TRACE*   GLUCOSEU Negative   AMORPHOUS Rare*     ABG:  Recent Labs     10/26/20  1008   PHART 7.370   YOJ5RDC 58.0*   PO2ART 56.0*   JKI8KLX 33.5*   BEART 6.4*   HGBAE 13.2   Q8QSEVSY 90.0   CARBOXHGBART 2.1       RAD:   Ct Head Wo Contrast  Result Date: 10/25/2020  Changes of aging with no acute intracranial abnormality   Signed by Dr Shikha Estrella on 10/25/2020 3:19 PM    Xr Chest Portable  Result Date: 10/25/2020  1. Moderate cardiomegaly no acute pulmonary vascular congestion. Signed by Dr Shikha Estrella on 10/25/2020 2:38 PM      Micro:   Culture, Urine [[id number]]  (Abnormal)  Collected: 10/25/20 1510    Order Status: Completed  Specimen: Urine, clean catch  Updated: 10/27/20 1135     Urine Culture, Routine  >50,000 CFU/ml      Organism  Candida albicans       Urine Culture, Routine  --     Moderate growth   No further workup          Assessment/Plan   Principal Problem:    Respiratory arrest ([address]- Pt has been off and on BiPAP. Active Problems:   Acute cystitis without hematuria- urine culture show candida albicans, Fluconazole initiated. Agitation- Geodon IM as needed. Elevated troponin- Cardiology consulted. Continue to monitor on tele. Cardiology noting pt \"will need ischemic evaluation once neuro and respiratory status stabilizes\"      Essential hypertension- Lisinopril and Norvasc doses increased. Monitor for improvement.         Idiopathic peripheral neuropathy    Chronic bilateral low back pain with bilateral sciatica Frequent falls    History of cerebrovascular accident (CVA) in adulthood    Polymyalgia ([address]    Spinal stenosis of lumbosacral region   -noted. PT/OT. Pt states she ambulates via wheelchair      Palliative care patient- noted, consulted. DNR    DVT Prophylaxis: Lovenox    SW working on communicating with family to establish guardianship for pt. Pt needing SNF placement.      Naomi Watters PA-C  10/28/2020, 10:36 AM

## 2020-10-28 NOTE — PROGRESS NOTES
Pt skin redness to lower extremities,  Bruising  From unable to get iv's noted on arms chest area. Pt remains confused restraints released pt cleaned and restraints replaced due to pulling out things. Coccyx noted to have stage 2 with mepilex noted.

## 2020-10-28 NOTE — PLAN OF CARE
Problem: Falls - Risk of:  Goal: Will remain free from falls  Description: Will remain free from falls  Outcome: Ongoing  Goal: Absence of physical injury  Description: Absence of physical injury  Outcome: Ongoing     Problem: Skin Integrity:  Goal: Will show no infection signs and symptoms  Description: Will show no infection signs and symptoms  Outcome: Ongoing  Goal: Absence of new skin breakdown  Description: Absence of new skin breakdown  Outcome: Ongoing     Problem: Confusion - Acute:  Goal: Absence of continued neurological deterioration signs and symptoms  Description: Absence of continued neurological deterioration signs and symptoms  Outcome: Ongoing  Goal: Mental status will be restored to baseline  Description: Mental status will be restored to baseline  Outcome: Ongoing     Problem: Discharge Planning:  Goal: Ability to perform activities of daily living will improve  Description: Ability to perform activities of daily living will improve  Outcome: Ongoing  Goal: Participates in care planning  Description: Participates in care planning  Outcome: Ongoing     Problem: Injury - Risk of, Physical Injury:  Goal: Will remain free from falls  Description: Will remain free from falls  Outcome: Ongoing  Goal: Absence of physical injury  Description: Absence of physical injury  Outcome: Ongoing     Problem: Mood - Altered:  Goal: Mood stable  Description: Mood stable  Outcome: Ongoing  Goal: Absence of abusive behavior  Description: Absence of abusive behavior  Outcome: Ongoing  Goal: Verbalizations of feeling emotionally comfortable while being cared for will increase  Description: Verbalizations of feeling emotionally comfortable while being cared for will increase  Outcome: Ongoing     Problem: Psychomotor Activity - Altered:  Goal: Absence of psychomotor disturbance signs and symptoms  Description: Absence of psychomotor disturbance signs and symptoms  Outcome: Ongoing     Problem: Sensory Perception - Impaired:  Goal: Demonstrations of improved sensory functioning will increase  Description: Demonstrations of improved sensory functioning will increase  Outcome: Ongoing  Goal: Decrease in sensory misperception frequency  Description: Decrease in sensory misperception frequency  Outcome: Ongoing  Goal: Able to refrain from responding to false sensory perceptions  Description: Able to refrain from responding to false sensory perceptions  Outcome: Ongoing  Goal: Demonstrates accurate environmental perceptions  Description: Demonstrates accurate environmental perceptions  Outcome: Ongoing  Goal: Able to distinguish between reality-based and nonreality-based thinking  Description: Able to distinguish between reality-based and nonreality-based thinking  Outcome: Ongoing  Goal: Able to interrupt nonreality-based thinking  Description: Able to interrupt nonreality-based thinking  Outcome: Ongoing     Problem: Sleep Pattern Disturbance:  Goal: Appears well-rested  Description: Appears well-rested  Outcome: Ongoing     Problem: Gas Exchange - Impaired:  Goal: Levels of oxygenation will improve  Description: Levels of oxygenation will improve  Outcome: Ongoing     Problem: Nutrition  Goal: Optimal nutrition therapy  Outcome: Ongoing     Problem: Restraint Use - Nonviolent/Non-Self-Destructive Behavior:  Goal: Absence of restraint indications  Description: Absence of restraint indications  Outcome: Ongoing  Goal: Absence of restraint-related injury  Description: Absence of restraint-related injury  Outcome: Ongoing

## 2020-10-28 NOTE — PROGRESS NOTES
Still unable to gain IV access this AM, oral anti-hypertensives given.    Electronically signed by Maynor Patel RN on 10/28/2020 at 7:47 AM

## 2020-10-28 NOTE — PROGRESS NOTES
determine readiness for liquid upgrade. Recommended Diet and Intervention Continue trials of water via SLP. Allow ice chips between meals with strict oral care. Compensatory Swallowing Strategies    Effortful swallow. Productive cough with overt s/s of aspiration. Education   Risks for aspiration educated to pt. Aspiration precautions in place with PO trials.              Therapy Time               CHATO Cruz  10/28/2020 1:56 PM    Electronically signed by CHATO Cruz on 10/28/2020 at 2:03 PM

## 2020-10-28 NOTE — PROGRESS NOTES
[facility]      Patient:  Quirino Contreras  YOB: 1940  Date of Service: 10/28/2020  MRN: [id number]   Acct: [de-identified]   Primary Care Physician: Nalini Gutierrez MD  Advance Directive: DNR-CC  Admit Date: 10/25/2020       Hospital Day: 3  Portions of this note have been copied forward, however, updated to reflect the most current clinical status of this patent    CHIEF COMPLAINT Respiratory failure    SUBJECTIVE:     Pt is laying in bed in no acute distress. She is calm but remains confused. Her BLE appear to be more warm and erythremic. Cumulative Hospital Course:   10/25/2020 The patient is a 79 y.o. female with PMH CVA, HTN, polymyalgia, chronic lower back pain, lower extremity lymphedema who presented to [address] [place] [address] ED after she was found unresponsive in the SNF where she resides. It is unknown how long she was unresponsive. EMS placed a supraglottic airway to assist with bagging en route for respiratory arrest. Patient is a DNR. Airway removed in ED and she is now on BiPAP. A  is present in the room who knows the patient and states she suspects narcotic overdose as patient has a history of uncontrolled pain and frequently attempting to take medication early or more than prescribed. It is uncertain how her medications were being given at the nursing home and patient was under quarantine for 14 days due to recently being discharged from this facility. She was discharged on 10/22/2020. She had presented to ED with dyspnea and lower extremity edema. CTA was unremarkable for PE. COVID-19 negative. She was given a short course of IV diuretic therapy. Echocardiogram unremarkable for CHF. Was discharged on Augmentin for continued treatment of bilateral lower extremity erythema. Work up in ED includes a CXR that is negative for pulmonary vascular congestion or acute cardiopulmonary process. CT head unremarkable.   Patient is able to shake her head yes and no appropriately with my questioning as Rhythm: Normal rate and regular rhythm. Pulses: Normal pulses. Heart sounds: Normal heart sounds. Pulmonary:      Effort: Pulmonary effort is normal. No respiratory distress. Breath sounds: Normal breath sounds. Abdominal:      General: Bowel sounds are normal. There is no distension. Palpations: Abdomen is soft. Tenderness: There is no abdominal tenderness. Musculoskeletal:         General: No swelling, tenderness or deformity. Right lower leg: Edema present. Left lower leg: Edema present. Skin:     General: Skin is warm and dry. Findings: Erythema present. Comments: Chronic lymphedema   Neurological:      Cranial Nerves: No cranial nerve deficit. Sensory: No sensory deficit. Medications:      sodium chloride 125 mL/hr at 10/26/20 1247    dextrose        [START ON 10/29/2020] amLODIPine  10 mg Oral Daily    [START ON 10/29/2020] lisinopril  40 mg Oral Daily    doxycycline hyclate  100 mg Oral 2 times per day    aspirin  81 mg Oral Daily    fluconazole  200 mg Oral Daily    ketamine  100 mg Intravenous Once    sodium chloride flush  10 mL Intravenous 2 times per day    enoxaparin  40 mg Subcutaneous Nightly    insulin lispro  0-6 Units Subcutaneous TID WC    insulin lispro  0-3 Units Subcutaneous Nightly     ziprasidone, labetalol, enalaprilat, sodium chloride flush, acetaminophen **OR** acetaminophen, polyethylene glycol, promethazine **OR** [DISCONTINUED] ondansetron, glucose, dextrose, glucagon (rDNA), dextrose  DIET DYSPHAGIA SOFT AND BITE-SIZED;  Carb Control: 4 carb choices (60 gms)/meal; Mildly Thick (Nectar)  Dietary Nutrition Supplements: Diabetic Oral Supplement     Lab and other Data:     Recent Labs     10/26/20  0414 10/27/20  0702 10/28/20  0537   WBC 9.6 9.9 8.5   HGB 12.3 13.5 14.0    286 180     Recent Labs     10/26/20  0551 10/26/20  1008 10/27/20  0702 10/28/20  0537   *  --  146* 149*   K 5.7* 4.7 4.4 4.7     --  105 109   CO2 32*  --  29 28   BUN 54*  --  40* 29*   CREATININE 2.1*  --  0.9 0.8   GLUCOSE 123*  --  148* 120*     Recent Labs     10/26/20  0551 10/27/20  0702 10/28/20  0537   AST 90* 74* 63*   ALT 67* 61* 48*   BILITOT <0.2 0.3 0.4   ALKPHOS 62 71 68     Troponin T:   Recent Labs     10/26/20  1838 10/27/20  0702 10/27/20  1217   TROPONINI 0.37* 0.34* 0.32*     ABG:  Recent Labs     10/26/20  1008   PHART 7.370   AZX7CRV 58.0*   PO2ART 56.0*   VFL0NQW 33.5*   BEART 6.4*   HGBAE 13.2   U1STUJZL 90.0   CARBOXHGBART 2.1       RAD:   Ct Head Wo Contrast  Result Date: 10/25/2020  Changes of aging with no acute intracranial abnormality   Signed by Dr Leni Kowalski on 10/25/2020 3:19 PM    Xr Chest Portable  Result Date: 10/25/2020  1. Moderate cardiomegaly no acute pulmonary vascular congestion. Signed by Dr Leni Kowalski on 10/25/2020 2:38 PM      Micro:   Culture, Urine [[id number]]  (Abnormal)  Collected: 10/25/20 1510    Order Status: Completed  Specimen: Urine, clean catch  Updated: 10/27/20 1135     Urine Culture, Routine  >50,000 CFU/ml      Organism  Candida albicans       Urine Culture, Routine  --     Moderate growth   No further workup          Assessment/Plan   Principal Problem:    Respiratory arrest ([address]- Pt has been off and on BiPAP. Active Problems:   Acute cystitis without hematuria- urine culture show candida albicans, Fluconazole initiated. Agitation- Geodon IM as needed. Elevated troponin- Cardiology consulted. Continue to monitor on tele. Cardiology noting pt \"will need ischemic evaluation once neuro and respiratory status stabilizes\"      Essential hypertension- Lisinopril and Norvasc doses increased. Monitor for improvement. Cellulitis BLE- po Doxy, pt unable to maintain IV.           Idiopathic peripheral neuropathy    Chronic bilateral low back pain with bilateral sciatica    Frequent falls    History of cerebrovascular accident (CVA) in adulthood

## 2020-10-28 NOTE — CARE COORDINATION
USHA reached out to pt's dtr, Milli Purcell, re: appointing pt a public guardian. SW explained circumstances and that pt is not able to make decisions for herself due to mentality. USHA explained the process and stated that it would be through court if any of pt's dtr's were to file for guardianship. Milli Purcell stated that she would reach out to her sisters. SW left contact info and is following. Electronically signed by Leilani Mendez on 10/28/2020 at 11:40 AM    USHA reached out to Jude Nye (pt's niece who is attempting to sell pts house and has limited POA) re: assets of pt's home once it is sold. All of pt's personal belongings are gone. House still has mortgage and foundation is bad, niece is only attempting to get anough money out of the house to pay off mortgage. Niece voiced concern over the phone that pt can not afford Assisted Living. USHA explained that we are trying to establish guardianship for pt and are following for TESSA gonsalez to recommend SNF placement.    Electronically signed by Leilani Mendez on 10/28/2020 at 12:48 PM

## 2020-10-29 NOTE — PROGRESS NOTES
Speech Language Pathology  Facility/Department: [facility] 4 ONCOLOGY UNIT  Daily Treatment Note  NAME: Kaylen Marin  : 1940  MRN: [id number]    Date of Eval: 10/29/2020  Evaluating Therapist: Ernesto Pritchett    Patient Diagnosis(es): has Idiopathic peripheral neuropathy; Chronic bilateral low back pain with bilateral sciatica; Acute on chronic combined systolic (congestive) and diastolic (congestive) heart failure ([facility] [address] Elevated d-dimer; Chronic cystitis; Arthritis; Cerebrovascular accident [facility]); Frequent falls; Gait abnormality; History of cerebrovascular accident (CVA) in adulthood; Hypercholesterolemia; Essential hypertension; Polymyalgia ([facility] [address] Pyelonephritis; Spinal stenosis of lumbosacral region; Respiratory arrest [facility]); and Palliative care patient on their problem list.  Onset Date:     Primary Complaint:  Pt has multiple complaints including discomfort with abdalla catheter, reports of severe 'gas' pain, and feeling hot in her room. She also states she is very exhausted. However, pt continues to report complaints daily about multiple problems. Subjective: Pt alert sitting upright in bed with breakfast tray. She had ice chips at her bedside and nectar thick liquids with her meal tray. Pt had consumed a little of her breakfast tray, but reported a poor appetite. Lung sounds clear in uppers. Objective:   Pt at risk for aspiration with thin liqiuds. Pt continues to require verbal cues for breath support and breath sequence pattern with PO intake. Cervical ausculation revealed adequate airwa;y protection with liquids, but possible premature spillage with laryngeal penetration. Laryngeal palpation revealed improved LE coordination and strength. Consume nectar thick and bite sized diet texture without overt s/s of aspiration. Pt met goal.    Consume thin liquids and bite sized diet texture without overt s/s of aspiration.  Overt s/s of aspiration    Complete oral care daily to reduce dried secretions and improve lingual movements. Requires assistance. Pain:  Pain Assessment  Pain Assessment: 0-10  Pain Level: 9  Response to Pain Intervention: Asleep with RR greater than 10                         Patient/Family/Caregiver Education:  SLP educated pt on her overt s/s of aspiration with thin liquids. She was given education to continue practicing swallowing ice chips with safe swallow precautions and use of aspiration precautions. Oral care routine was educated. Impressions: Pt presents with delayed weak cough or mild throat clear on 4/5 water trials via open cup. Pt requires verbal cues for breath support with PO intake. Pt has a 2-3 second swallow delay, that increases to 3-4 seconds with poor breath sequences. Pt to continue ice chips PRN. Plan:  Continued daily Speech/Language treatment with goals per  plan of care. Recommendations: Pt not ready to upgrade to thin liquids at this time d/t overt s/s of aspiration at the bedside. Pt safely tolerates ice chips.                         Shoaib Brochure    Electronically signed by CHATO Wilson on 10/29/2020 at 9:04 AM

## 2020-10-29 NOTE — PROGRESS NOTES
who is present at bedside. She is not about to participate in conversation otherwise. Patient's family has been called multiple times to discuss patient's DNR status however no one has been reached. 10/26/2020 No family is able to be reached. SLP and cognitive eval ordered. Pt has been off and on BiPAP throughout the day. 10/27/2020 Overnight patient was placed in bilateral wrist restraints due to pulling out IVs, throwing food across the room, trying to get out of bed and removing her oxygen. Her antibiotics have been changed to oral and Seroquel added for agitation. SW has contacted pt's daughter, Manish Sarmiento, who is agreeable to be the point of contact for the patinet. urine culture show candida albicans, Fluconazole initiated. 10/28/2020 Sodium continues to rise nursing having a hard time placing and keeping IVs in. Blood pressure elevated as well, Lisinopril and Norvasc dosage increased. 10/29/20 BP remaining elevated. Per nursing pt did not improve with Geodon, changed to Haldol prn for agitation. SW continuing to attempt to obtain guardian for pt. Patient's daughters do not want POA. SW filing for appointed guardianship for patient. Review of Systems:   Review of Systems   Unable to perform ROS: Other          Objective:   VITALS:  BP (!) 178/92   Pulse 85   Temp 97.6 °F (36.4 °C)   Resp 22   Ht 5' 6\" (1.676 m)   Wt (!) 313 lb (142 kg)   SpO2 93%   Breastfeeding No   BMI 50.52 kg/m²   24HR INTAKE/OUTPUT:      Intake/Output Summary (Last 24 hours) at 10/29/2020 1440  Last data filed at 10/29/2020 1158  Gross per 24 hour   Intake 600 ml   Output --   Net 600 ml       Physical Exam  Constitutional:       Appearance: She is obese. She is ill-appearing. Comments: Pt is calm at the moment   HENT:      Head: Normocephalic and atraumatic.       Right Ear: External ear normal.      Left Ear: External ear normal.      Nose: Nose normal.      Mouth/Throat:      Mouth: Mucous membranes are (Nectar)     Lab and other Data:     Recent Labs     10/27/20  0702 10/28/20  0537 10/29/20  0357   WBC 9.9 8.5 9.3   HGB 13.5 14.0 13.2    180 285     Recent Labs     10/27/20  0702 10/28/20  0537 10/29/20  0357   * 149* 145   K 4.4 4.7 4.0    109 105   CO2 29 28 30*   BUN 40* 29* 21   CREATININE 0.9 0.8 0.9   GLUCOSE 148* 120* 115*     Recent Labs     10/27/20  0702 10/28/20  0537 10/29/20  0357   AST 74* 63* 47*   ALT 61* 48* 40*   BILITOT 0.3 0.4 0.4   ALKPHOS 71 68 72     Troponin T:   Recent Labs     10/26/20  1838 10/27/20  0702 10/27/20  1217   TROPONINI 0.37* 0.34* 0.32*       RAD:   Ct Head Wo Contrast  Result Date: 10/25/2020  Changes of aging with no acute intracranial abnormality   Signed by Dr Wilfrid Schilder on 10/25/2020 3:19 PM    Xr Chest Portable  Result Date: 10/25/2020  1. Moderate cardiomegaly no acute pulmonary vascular congestion. Signed by Dr Wilfrid Schilder on 10/25/2020 2:38 PM      Micro:   Culture, Urine [[id number]]  (Abnormal)  Collected: 10/25/20 1510    Order Status: Completed  Specimen: Urine, clean catch  Updated: 10/27/20 1135     Urine Culture, Routine  >50,000 CFU/ml      Organism  Candida albicans       Urine Culture, Routine  --     Moderate growth   No further workup          Assessment/Plan   Principal Problem:    Respiratory arrest ([facility] [address]- Pt has been off and on BiPAP. Active Problems:   Acute cystitis without hematuria- urine culture show candida albicans, Fluconazole initiated on 10/27. Agitation- Geodon not affective, prn Haldol. Elevated troponin- Cardiology consulted. Continue to monitor on tele. Cardiology noting pt \"will need ischemic evaluation once neuro and respiratory status stabilizes\"      Essential hypertension- Lisinopril and Norvasc doses increased. Monitor for improvement.        BLE Cellulitis- doxy initiated 10/28       Idiopathic peripheral neuropathy    Chronic bilateral low back pain with bilateral sciatica    Frequent falls    History of cerebrovascular accident (CVA) in adulthood    Polymyalgia ([address]    Spinal stenosis of lumbosacral region   -noted. PT/OT. Pt states she ambulates via wheelchair      Palliative care patient- noted, consulted. DNR    DVT Prophylaxis: Lovenox    SW working on communicating with family to establish guardianship for pt, none agreeable. SW initiating appointed guardianship. Pt needing SNF placement.      Gera Stroud PA-C  10/29/2020, 2:40 PM

## 2020-10-29 NOTE — PROGRESS NOTES
Restraints discontinued. Pt less combative this AM and no longer pulling at lines.    Electronically signed by Daniel Johnson RN on 10/28/2020 at 7:17 PM

## 2020-10-29 NOTE — PLAN OF CARE
Problem: Falls - Risk of:  Goal: Will remain free from falls  Description: Will remain free from falls  Outcome: Ongoing  Goal: Absence of physical injury  Description: Absence of physical injury  Outcome: Ongoing     Problem: Skin Integrity:  Goal: Will show no infection signs and symptoms  Description: Will show no infection signs and symptoms  Outcome: Ongoing  Goal: Absence of new skin breakdown  Description: Absence of new skin breakdown  Outcome: Ongoing     Problem: Confusion - Acute:  Goal: Absence of continued neurological deterioration signs and symptoms  Description: Absence of continued neurological deterioration signs and symptoms  Outcome: Ongoing  Goal: Mental status will be restored to baseline  Description: Mental status will be restored to baseline  Outcome: Ongoing     Problem: Discharge Planning:  Goal: Ability to perform activities of daily living will improve  Description: Ability to perform activities of daily living will improve  Outcome: Ongoing  Goal: Participates in care planning  Description: Participates in care planning  Outcome: Ongoing     Problem: Injury - Risk of, Physical Injury:  Goal: Will remain free from falls  Description: Will remain free from falls  Outcome: Ongoing  Goal: Absence of physical injury  Description: Absence of physical injury  Outcome: Ongoing     Problem: Mood - Altered:  Goal: Mood stable  Description: Mood stable  Outcome: Ongoing  Goal: Absence of abusive behavior  Description: Absence of abusive behavior  Outcome: Ongoing  Goal: Verbalizations of feeling emotionally comfortable while being cared for will increase  Description: Verbalizations of feeling emotionally comfortable while being cared for will increase  Outcome: Ongoing     Problem: Psychomotor Activity - Altered:  Goal: Absence of psychomotor disturbance signs and symptoms  Description: Absence of psychomotor disturbance signs and symptoms  Outcome: Ongoing     Problem: Sensory Perception - Impaired:  Goal: Demonstrations of improved sensory functioning will increase  Description: Demonstrations of improved sensory functioning will increase  Outcome: Ongoing  Goal: Decrease in sensory misperception frequency  Description: Decrease in sensory misperception frequency  Outcome: Ongoing  Goal: Able to refrain from responding to false sensory perceptions  Description: Able to refrain from responding to false sensory perceptions  Outcome: Ongoing  Goal: Demonstrates accurate environmental perceptions  Description: Demonstrates accurate environmental perceptions  Outcome: Ongoing  Goal: Able to distinguish between reality-based and nonreality-based thinking  Description: Able to distinguish between reality-based and nonreality-based thinking  Outcome: Ongoing  Goal: Able to interrupt nonreality-based thinking  Description: Able to interrupt nonreality-based thinking  Outcome: Ongoing     Problem: Sleep Pattern Disturbance:  Goal: Appears well-rested  Description: Appears well-rested  Outcome: Ongoing     Problem: Gas Exchange - Impaired:  Goal: Levels of oxygenation will improve  Description: Levels of oxygenation will improve  Outcome: Ongoing     Problem: Restraint Use - Nonviolent/Non-Self-Destructive Behavior:  Goal: Absence of restraint indications  Description: Absence of restraint indications  Outcome: Ongoing  Goal: Absence of restraint-related injury  Description: Absence of restraint-related injury  Outcome: Ongoing     Problem: Nutrition  Goal: Optimal nutrition therapy  Outcome: Ongoing

## 2020-10-29 NOTE — CARE COORDINATION
[facility]. She has spoken to siblings and none are agreeable to filing for POA. SW will begin process of appointing guardianship. USHA reached out to [facility] re: return to facility. Pt did not have a bedhold and they will not have a bed available for 14 days. SW following.      Electronically signed by Leilani Mendez on 10/29/2020 at 9:06 AM

## 2020-10-30 PROBLEM — I48.91 ATRIAL FIBRILLATION (HCC): Status: ACTIVE | Noted: 2020-01-01

## 2020-10-30 NOTE — PROGRESS NOTES
[facility]      Patient:  Pura Best  YOB: 1940  Date of Service: 10/30/2020  MRN: [id number]   Acct: [de-identified]   Primary Care Physician: Radha Solo MD  Advance Directive: DNR-CC  Admit Date: 10/25/2020       Hospital Day: 5  Portions of this note have been copied forward, however, updated to reflect the most current clinical status of this patent    CHIEF COMPLAINT Respiratory failure    SUBJECTIVE:     Pt is laying in bed in no acute distress. She is calm but remains confused. Her BLE appearing stable from yesterday. LLQ abdominal tenderness noted. Cumulative Hospital Course:   10/25/2020 The patient is a 79 y.o. female with PMH CVA, HTN, polymyalgia, chronic lower back pain, lower extremity lymphedema who presented to [facility] ED after she was found unresponsive in the SNF where she resides. It is unknown how long she was unresponsive. EMS placed a supraglottic airway to assist with bagging en route for respiratory arrest. Patient is a DNR. Airway removed in ED and she is now on BiPAP. A  is present in the room who knows the patient and states she suspects narcotic overdose as patient has a history of uncontrolled pain and frequently attempting to take medication early or more than prescribed. It is uncertain how her medications were being given at the nursing home and patient was under quarantine for 14 days due to recently being discharged from this facility. She was discharged on 10/22/2020. She had presented to ED with dyspnea and lower extremity edema. CTA was unremarkable for PE. COVID-19 negative. She was given a short course of IV diuretic therapy. Echocardiogram unremarkable for CHF. Was discharged on Augmentin for continued treatment of bilateral lower extremity erythema. Work up in ED includes a CXR that is negative for pulmonary vascular congestion or acute cardiopulmonary process. CT head unremarkable.   Patient is able to shake her head yes and no appropriately with my questioning as well as with the  who is present at bedside. She is not about to participate in conversation otherwise. Patient's family has been called multiple times to discuss patient's DNR status however no one has been reached. 10/26/2020 No family is able to be reached. SLP and cognitive eval ordered. Pt has been off and on BiPAP throughout the day. 10/27/2020 Overnight patient was placed in bilateral wrist restraints due to pulling out IVs, throwing food across the room, trying to get out of bed and removing her oxygen. Her antibiotics have been changed to oral and Seroquel added for agitation. SW has contacted pt's daughter, Rox Garcia, who is agreeable to be the point of contact for the patinet. urine culture show candida albicans, Fluconazole initiated. 10/28/2020 Sodium continues to rise nursing having a hard time placing and keeping IVs in. Blood pressure elevated as well, Lisinopril and Norvasc dosage increased. 10/29/20 BP remaining elevated. Per nursing pt did not improve with Geodon, changed to Haldol prn for agitation. SW continuing to attempt to obtain guardian for pt. Patient's daughters do not want POA. SW filing for appointed guardianship for patient. 10/30/20 Pt moved to 7th floor when found to be in Atrial Fibrillation. pt on Diltiazem gtt and 1mg/kg Lovenox. BP improving. Ddimer elevated at 3.74Pt could not tolerate laying flat for CTA pulm. LLQ tenderness, KUB ordered.       Review of Systems:   Review of Systems   Unable to perform ROS: Other          Objective:   VITALS:  BP (!) 142/63   Pulse 77   Temp 97.2 °F (36.2 °C) (Temporal)   Resp 16   Ht 5' 6\" (1.676 m)   Wt (!) 302 lb 9.6 oz (137.3 kg)   SpO2 98%   Breastfeeding No   BMI 48.84 kg/m²   24HR INTAKE/OUTPUT:      Intake/Output Summary (Last 24 hours) at 10/30/2020 1056  Last data filed at 10/30/2020 0600  Gross per 24 hour   Intake 1102.58 ml   Output 1200 ml   Net -97.42 ml       Physical Exam  Constitutional:       Appearance: She is obese. She is ill-appearing. Comments: Pt is calm at the moment   HENT:      Head: Normocephalic and atraumatic. Right Ear: External ear normal.      Left Ear: External ear normal.      Nose: Nose normal.      Mouth/Throat:      Mouth: Mucous membranes are dry. Eyes:      General: No scleral icterus. Extraocular Movements: Extraocular movements intact. Conjunctiva/sclera: Conjunctivae normal.   Neck:      Musculoskeletal: Normal range of motion. Cardiovascular:      Rate and Rhythm: Normal rate. Rhythm irregular. Pulses: Normal pulses. Heart sounds: Normal heart sounds. Pulmonary:      Effort: Pulmonary effort is normal. No respiratory distress. Breath sounds: Normal breath sounds. Abdominal:      General: Bowel sounds are normal. There is no distension. Palpations: Abdomen is soft. Tenderness: There is abdominal tenderness. Comments: LLQ   Musculoskeletal:         General: No swelling, tenderness or deformity. Right lower leg: Edema present. Left lower leg: Edema present. Skin:     General: Skin is warm and dry. Findings: Erythema present. Comments: Chronic lymphedema   Neurological:      Cranial Nerves: No cranial nerve deficit. Sensory: No sensory deficit.             Medications:      dilTIAZem (CARDIZEM) 125 mg in dextrose 5% 125 mL infusion 5 mg/hr (10/30/20 0405)    dextrose        enoxaparin  1 mg/kg Subcutaneous Q12H    doxycycline hyclate  100 mg Oral 2 times per day    fluticasone  1 spray Each Nostril Daily    aspirin  81 mg Oral Daily    ketamine  100 mg Intravenous Once    sodium chloride flush  10 mL Intravenous 2 times per day    insulin lispro  0-6 Units Subcutaneous TID WC    insulin lispro  0-3 Units Subcutaneous Nightly     saline nasal gel, labetalol, enalaprilat, sodium chloride flush, acetaminophen **OR** acetaminophen, polyethylene glycol, glucose, CTA.       Agitation- Geodon not affective, prn Haldol. Acute cystitis without hematuria- urine culture show candida albicans, Fluconazole initiated on 10/27. Essential hypertension- Lisinopril and Norvasc doses increased. Improving. BLE Cellulitis- doxy initiated 10/28       Idiopathic peripheral neuropathy    Chronic bilateral low back pain with bilateral sciatica    Frequent falls    History of cerebrovascular accident (CVA) in adulthood    Polymyalgia ([address]    Spinal stenosis of lumbosacral region   -noted. PT/OT. Pt states she ambulates via wheelchair      Palliative care patient- noted, consulted. DNR    DVT Prophylaxis: Lovenox    SW working on communicating with family to establish guardianship for pt, none agreeable. SW initiating appointed guardianship. Pt needing SNF placement.      Cassius Elliott PA-C  10/30/2020, 10:56 AM

## 2020-10-30 NOTE — PROGRESS NOTES
Pt confused, continues to pull off her bipap and then state that she is SOB. Nasal canula placed on pt at 5L and pt's telemetry monitor changed to tele box with spo2 monitoring. Pt in high fowlers position and pt's O2 sat on 5L NC is currently 98% and pt denies any SOB at this time. Pt states she is much more comfortable with NC on. Monitoring closely.  Electronically signed by Latha Garza RN on 10/30/2020 at 11:15 AM

## 2020-10-30 NOTE — PROGRESS NOTES
Pt moved from room 719 to room 710, closer to the nurses station for pt safety. Pt belongings gathered and moved to new room with her.  Electronically signed by Denis Link RN on 10/30/2020 at 4:25 PM

## 2020-10-30 NOTE — PROGRESS NOTES
10/30/2020  1:36 AM - Keely Earl Incoming Lab Results From [employer]  Value  Ref Range & Units  Status  Collected  Lab    pH, Arterial  7.380  7.350 - 7.450  Final  10/30/2020  1:33 AM  [facility]    pCO2, Arterial  56. 0High    35.0 - 45.0 mmHg  Final  10/30/2020  1:33 AM  [facility]    pO2, Arterial  56. 0Low    80.0 - 100.0 mmHg  Final  10/30/2020  1:33 AM  [facility]    HCO3, Arterial  33. 1High    22.0 - 26.0 mmol/L  Final  10/30/2020  1:33 AM  [facility] Excess, Arterial  6.1High    -2.0 - 2.0 mmol/L  Final  10/30/2020  1:33 AM  [facility]    Hemoglobin, Art, Extended  14.6  12.0 - 16.0 g/dL  Final  10/30/2020  1:33 AM  [facility]    O2 Sat, Arterial  90.5  >92 %  Final  10/30/2020  1:33 AM  [facility]    Carboxyhgb, Arterial  2.3  0.0 - 5.0 %  Final  10/30/2020  1:33 AM  [facility]         0.0-1.5   (Smokers 1.5-5.0)    Methemoglobin, Arterial  1.1  <1.5 %  Final  10/30/2020  1:33 AM  [facility]    O2 Content, Arterial  18.5  Not Established mL/dL  Final  10/30/2020  1:33 AM  [facility]              BiPap 14/7cm FIO2 4lpm, RR, + AT

## 2020-10-30 NOTE — PLAN OF CARE
Impaired:  Goal: Demonstrations of improved sensory functioning will increase  Description: Demonstrations of improved sensory functioning will increase  Outcome: Ongoing  Goal: Decrease in sensory misperception frequency  Description: Decrease in sensory misperception frequency  Outcome: Ongoing  Goal: Able to refrain from responding to false sensory perceptions  Description: Able to refrain from responding to false sensory perceptions  Outcome: Ongoing  Goal: Demonstrates accurate environmental perceptions  Description: Demonstrates accurate environmental perceptions  Outcome: Ongoing  Goal: Able to distinguish between reality-based and nonreality-based thinking  Description: Able to distinguish between reality-based and nonreality-based thinking  Outcome: Ongoing  Goal: Able to interrupt nonreality-based thinking  Description: Able to interrupt nonreality-based thinking  Outcome: Ongoing     Problem: Sleep Pattern Disturbance:  Goal: Appears well-rested  Description: Appears well-rested  Outcome: Ongoing     Problem: Gas Exchange - Impaired:  Goal: Levels of oxygenation will improve  Description: Levels of oxygenation will improve  Outcome: Ongoing     Problem: Nutrition  Goal: Optimal nutrition therapy  Outcome: Ongoing     Problem: Restraint Use - Nonviolent/Non-Self-Destructive Behavior:  Goal: Absence of restraint indications  Description: Absence of restraint indications  Outcome: Ongoing  Goal: Absence of restraint-related injury  Description: Absence of restraint-related injury  Outcome: Ongoing

## 2020-10-30 NOTE — PROGRESS NOTES
Pt placed on venti mask 31% at 6L. SpO2 between 90% and 94%.  Electronically signed by Niurka James RN on 10/30/2020 at 12:46 AM

## 2020-10-31 NOTE — PLAN OF CARE
Impaired:  Goal: Demonstrations of improved sensory functioning will increase  Description: Demonstrations of improved sensory functioning will increase  Outcome: Ongoing  Goal: Decrease in sensory misperception frequency  Description: Decrease in sensory misperception frequency  Outcome: Ongoing  Goal: Able to refrain from responding to false sensory perceptions  Description: Able to refrain from responding to false sensory perceptions  Outcome: Ongoing  Goal: Demonstrates accurate environmental perceptions  Description: Demonstrates accurate environmental perceptions  Outcome: Ongoing  Goal: Able to distinguish between reality-based and nonreality-based thinking  Description: Able to distinguish between reality-based and nonreality-based thinking  Outcome: Ongoing  Goal: Able to interrupt nonreality-based thinking  Description: Able to interrupt nonreality-based thinking  Outcome: Ongoing     Problem: Sleep Pattern Disturbance:  Goal: Appears well-rested  Description: Appears well-rested  Outcome: Ongoing     Problem: Gas Exchange - Impaired:  Goal: Levels of oxygenation will improve  Description: Levels of oxygenation will improve  Outcome: Ongoing     Problem: Nutrition  Goal: Optimal nutrition therapy  Outcome: Ongoing     Problem: Restraint Use - Nonviolent/Non-Self-Destructive Behavior:  Goal: Absence of restraint indications  Description: Absence of restraint indications  Outcome: Ongoing  Goal: Absence of restraint-related injury  Description: Absence of restraint-related injury  Outcome: Ongoing     Problem: Falls - Risk of:  Goal: Will remain free from falls  Description: Will remain free from falls  Outcome: Ongoing  Goal: Absence of physical injury  Description: Absence of physical injury  Outcome: Ongoing     Problem: Skin Integrity:  Goal: Will show no infection signs and symptoms  Description: Will show no infection signs and symptoms  Outcome: Ongoing  Goal: Absence of new skin breakdown  Description: Absence of new skin breakdown  Outcome: Ongoing     Problem: Confusion - Acute:  Goal: Absence of continued neurological deterioration signs and symptoms  Description: Absence of continued neurological deterioration signs and symptoms  Outcome: Ongoing  Goal: Mental status will be restored to baseline  Description: Mental status will be restored to baseline  Outcome: Ongoing     Problem: Discharge Planning:  Goal: Ability to perform activities of daily living will improve  Description: Ability to perform activities of daily living will improve  Outcome: Ongoing  Goal: Participates in care planning  Description: Participates in care planning  Outcome: Ongoing     Problem: Injury - Risk of, Physical Injury:  Goal: Will remain free from falls  Description: Will remain free from falls  Outcome: Ongoing  Goal: Absence of physical injury  Description: Absence of physical injury  Outcome: Ongoing     Problem: Mood - Altered:  Goal: Mood stable  Description: Mood stable  Outcome: Ongoing  Goal: Absence of abusive behavior  Description: Absence of abusive behavior  Outcome: Ongoing  Goal: Verbalizations of feeling emotionally comfortable while being cared for will increase  Description: Verbalizations of feeling emotionally comfortable while being cared for will increase  Outcome: Ongoing     Problem: Psychomotor Activity - Altered:  Goal: Absence of psychomotor disturbance signs and symptoms  Description: Absence of psychomotor disturbance signs and symptoms  Outcome: Ongoing     Problem: Sensory Perception - Impaired:  Goal: Demonstrations of improved sensory functioning will increase  Description: Demonstrations of improved sensory functioning will increase  Outcome: Ongoing  Goal: Decrease in sensory misperception frequency  Description: Decrease in sensory misperception frequency  Outcome: Ongoing  Goal: Able to refrain from responding to false sensory perceptions  Description: Able to refrain from responding to false sensory perceptions  Outcome: Ongoing  Goal: Demonstrates accurate environmental perceptions  Description: Demonstrates accurate environmental perceptions  Outcome: Ongoing  Goal: Able to distinguish between reality-based and nonreality-based thinking  Description: Able to distinguish between reality-based and nonreality-based thinking  Outcome: Ongoing  Goal: Able to interrupt nonreality-based thinking  Description: Able to interrupt nonreality-based thinking  Outcome: Ongoing     Problem: Sleep Pattern Disturbance:  Goal: Appears well-rested  Description: Appears well-rested  Outcome: Ongoing     Problem: Gas Exchange - Impaired:  Goal: Levels of oxygenation will improve  Description: Levels of oxygenation will improve  Outcome: Ongoing     Problem: Nutrition  Goal: Optimal nutrition therapy  Outcome: Ongoing     Problem: Restraint Use - Nonviolent/Non-Self-Destructive Behavior:  Goal: Absence of restraint indications  Description: Absence of restraint indications  Outcome: Ongoing  Goal: Absence of restraint-related injury  Description: Absence of restraint-related injury  Outcome: Ongoing

## 2020-10-31 NOTE — PROGRESS NOTES
Speech Language Pathology  Facility/Department: [facility] PROGRESSIVE CARE  Swallow treatment/    NAME: Ascencion Clinton  : 1940  MRN: [id number]    ADMISSION DATE: 10/25/2020  ADMITTING DIAGNOSIS: has Idiopathic peripheral neuropathy; Chronic bilateral low back pain with bilateral sciatica; Acute on chronic combined systolic (congestive) and diastolic (congestive) heart failure ([facility] [address] Elevated d-dimer; Chronic cystitis; Arthritis; Cerebrovascular accident [facility]); Frequent falls; Gait abnormality; History of cerebrovascular accident (CVA) in adulthood; Hypercholesterolemia; Essential hypertension; Polymyalgia ([facility] [address] Pyelonephritis; Spinal stenosis of lumbosacral region; Respiratory arrest ([facility] [address] Palliative care patient; and Atrial fibrillation [facility]) on their problem list.    Date of Eval: 10/31/2020  Evaluating Therapist: Maria Isabel Casanova    Pain:  Pain Assessment  Pain Assessment: 0-10  Pain Level: 10  Response to Pain Intervention: Asleep with RR greater than 10    Reason for Referral  Ascencion Clinton was referred for a bedside swallow evaluation to assess the efficiency of her swallow function, identify signs and symptoms of aspiration and make recommendations regarding safe dietary consistencies, effective compensatory strategies, and safe eating environment. Impression  Patients swallow function monitored with breakfast meal. Patient tolerating current diet well. Patient did have trials of thin liquids via cup and straw. Patient noted to have delayed cough/throat clear following both. At this time recommend continuation of soft and bite sized diet with nectar thick liquids.          Recommended Diet and Intervention  Solid: Diet Solids Recommendation: Dysphagia Soft and Bite-Sized (Dysphagia III)  Liquid: Liquid Consistency Recommendation: Mildly Thick (Nectar)  Medication:Recommended Form of Meds: Meds in puree  Therapeutic Interventions: Diet tolerance monitoring, Oral care, Laryngeal exercises      Compensatory Swallowing Strategies   Alternate solids/liquids, upright with all intake,  Eat/feed slowly, small bites/sips, remain upright for 30/45 minutes.         Sharmila Mcgrath, SLP  10/31/2020 8:54 AM

## 2020-10-31 NOTE — PROGRESS NOTES
Applied ordered lidocaine patch to pt's neck and lower back. Pt states she finally has some relief. Will continue to monitor.  Electronically signed by Davis Ying RN on 10/30/2020 at 10:53 PM

## 2020-10-31 NOTE — PLAN OF CARE
Problem: Falls - Risk of:  Goal: Will remain free from falls  Outcome: Ongoing  Goal: Absence of physical injury  Outcome: Ongoing     Problem: Skin Integrity:  Goal: Will show no infection signs and symptoms  Outcome: Ongoing  Goal: Absence of new skin breakdown  Outcome: Ongoing     Problem: Confusion - Acute:  Goal: Absence of continued neurological deterioration signs and symptoms  Outcome: Ongoing  Goal: Mental status will be restored to baseline  Outcome: Ongoing     Problem: Discharge Planning:  Goal: Ability to perform activities of daily living will improve  Outcome: Ongoing  Goal: Participates in care planning  Outcome: Ongoing     Problem: Injury - Risk of, Physical Injury:  Goal: Will remain free from falls  Outcome: Ongoing  Goal: Absence of physical injury  Outcome: Ongoing     Problem: Mood - Altered:  Goal: Mood stable  Outcome: Ongoing  Goal: Absence of abusive behavior  Outcome: Ongoing  Goal: Verbalizations of feeling emotionally comfortable while being cared for will increase  Outcome: Ongoing     Problem: Psychomotor Activity - Altered:  Goal: Absence of psychomotor disturbance signs and symptoms  Outcome: Ongoing     Problem: Sensory Perception - Impaired:  Goal: Demonstrations of improved sensory functioning will increase  Outcome: Ongoing  Goal: Decrease in sensory misperception frequency  Outcome: Ongoing  Goal: Able to refrain from responding to false sensory perceptions  Outcome: Ongoing  Goal: Demonstrates accurate environmental perceptions  Outcome: Ongoing  Goal: Able to distinguish between reality-based and nonreality-based thinking  Outcome: Ongoing  Goal: Able to interrupt nonreality-based thinking  Outcome: Ongoing     Problem: Sleep Pattern Disturbance:  Goal: Appears well-rested  Outcome: Ongoing     Problem: Gas Exchange - Impaired:  Goal: Levels of oxygenation will improve  Outcome: Ongoing     Problem: Nutrition  Goal: Optimal nutrition therapy  Outcome: Ongoing     Problem: Restraint Use - Nonviolent/Non-Self-Destructive Behavior:  Goal: Absence of restraint indications  Outcome: Ongoing  Goal: Absence of restraint-related injury  Outcome: Ongoing

## 2020-10-31 NOTE — PROGRESS NOTES
Call placed to [facility] for the ordered specialty [place] bariatric bed.  Electronically signed by Adria Gray RN on 10/31/2020 at 9:27 AM

## 2020-10-31 NOTE — PROGRESS NOTES
[facility]      Patient:  Doyle Yanez  YOB: 1940  Date of Service: 10/31/2020  MRN: [id number]   Acct: [de-identified]   Primary Care Physician: Nona Zuniga MD  Advance Directive: DNR-CC  Admit Date: 10/25/2020       Hospital Day: 6  Portions of this note have been copied forward, however, updated to reflect the most current clinical status of this patent    CHIEF COMPLAINT Respiratory failure    SUBJECTIVE:     Pt is laying in bed in no acute distress. She is calm but remains confused. She has been complaining of pain. Cumulative Hospital Course:   10/25/2020 The patient is a 79 y.o. female with PMH CVA, HTN, polymyalgia, chronic lower back pain, lower extremity lymphedema who presented to [facility] ED after she was found unresponsive in the SNF where she resides. It is unknown how long she was unresponsive. EMS placed a supraglottic airway to assist with bagging en route for respiratory arrest. Patient is a DNR. Airway removed in ED and she is now on BiPAP. A  is present in the room who knows the patient and states she suspects narcotic overdose as patient has a history of uncontrolled pain and frequently attempting to take medication early or more than prescribed. It is uncertain how her medications were being given at the nursing home and patient was under quarantine for 14 days due to recently being discharged from this facility. She was discharged on 10/22/2020. She had presented to ED with dyspnea and lower extremity edema. CTA was unremarkable for PE. COVID-19 negative. She was given a short course of IV diuretic therapy. Echocardiogram unremarkable for CHF. Was discharged on Augmentin for continued treatment of bilateral lower extremity erythema. Work up in ED includes a CXR that is negative for pulmonary vascular congestion or acute cardiopulmonary process. CT head unremarkable.   Patient is able to shake her head yes and no appropriately with my questioning as well as with the  who is present at bedside. She is not about to participate in conversation otherwise. Patient's family has been called multiple times to discuss patient's DNR status however no one has been reached. 10/26/2020 No family is able to be reached. SLP and cognitive eval ordered. Pt has been off and on BiPAP throughout the day. 10/27/2020 Overnight patient was placed in bilateral wrist restraints due to pulling out IVs, throwing food across the room, trying to get out of bed and removing her oxygen. Her antibiotics have been changed to oral and Seroquel added for agitation. SW has contacted pt's daughter, Jf Cristina, who is agreeable to be the point of contact for the patinet. urine culture show candida albicans, Fluconazole initiated. 10/28/2020 Sodium continues to rise nursing having a hard time placing and keeping IVs in. Blood pressure elevated as well, Lisinopril and Norvasc dosage increased. 10/29/20 BP remaining elevated. Per nursing pt did not improve with Geodon, changed to Haldol prn for agitation. SW continuing to attempt to obtain guardian for pt. Patient's daughters do not want POA. SW filing for appointed guardianship for patient. 10/30/20 Pt moved to 7th floor when found to be in Atrial Fibrillation. pt on Diltiazem gtt and 1mg/kg Lovenox. BP improving. Ddimer elevated at 3.74Pt could not tolerate laying flat for CTA pulm. LLQ tenderness, KUB ordered. 10/31/20 KUB shows moderate amount of stool in colon with no obstruction or ileus. Ordered daily miralax and stool softener. Pain control, monitoring closely.        Review of Systems:   Review of Systems   Unable to perform ROS: Other          Objective:   VITALS:  BP (!) 151/83   Pulse 88   Temp 97.4 °F (36.3 °C) (Temporal)   Resp 18   Ht 5' 6\" (1.676 m)   Wt (!) 302 lb 9.6 oz (137.3 kg)   SpO2 91%   Breastfeeding No   BMI 48.84 kg/m²   24HR INTAKE/OUTPUT:      Intake/Output Summary (Last 24 hours) at 10/31/2020 0957  Last data  insulin lispro  0-6 Units Subcutaneous TID     insulin lispro  0-3 Units Subcutaneous Nightly     saline nasal gel, labetalol, enalaprilat, sodium chloride flush, acetaminophen **OR** acetaminophen, glucose, dextrose, glucagon (rDNA), dextrose  DIET DYSPHAGIA SOFT AND BITE-SIZED; Carb Control: 4 carb choices (60 gms)/meal; No Added Salt (3-4 GM); Mildly Thick (Nectar)     Lab and other Data:     Recent Labs     10/29/20  0357 10/30/20  0131   WBC 9.3 7.8   HGB 13.2 13.8    271     Recent Labs     10/29/20  0357 10/30/20  0131 10/30/20  0133 10/31/20  0122    142  --  143   K 4.0 4.2 4.0 4.2    107  --  102   CO2 30* 26  --  28   BUN 21 20  --  25*   CREATININE 0.9 1.0*  --  0.9   GLUCOSE 115* 148*  --  127*     Recent Labs     10/29/20  0357 10/30/20  0131   AST 47* 35*   ALT 40* 33   BILITOT 0.4 <0.2   ALKPHOS 72 66     Troponin T:   Recent Labs     10/30/20  0131   TROPONINI 0.45*       RAD:   Ct Head Wo Contrast  Result Date: 10/25/2020  Changes of aging with no acute intracranial abnormality   Signed by Dr Amy Gore on 10/25/2020 3:19 PM    Xr Chest Portable  Result Date: 10/25/2020  1. Moderate cardiomegaly no acute pulmonary vascular congestion. Signed by Dr Amy Gore on 10/25/2020 2:38 PM    XR Chest Portable  Result Date: 10/30/20   Impression:  The poor lung expansion and discoid atelectatic changes, more in the right lower lung. The Persistent moderate cardiomegaly. No pulmonary congestion. Signed by Dr Consuelo Armendariz on 10/30/2020 7:03 AM    KUB  Result Date: 10/30/20  Impression:  A moderate amount of stool in the colon more so in the proximal colon. No finding to suggest obstruction or ileus. No radiopaque calculi.    Signed by Dr Consuelo Armendariz on 10/30/2020 2:56 PM    Micro:   Culture, Urine [[id number]]  (Abnormal)  Collected: 10/25/20 1510    Order Status: Completed  Specimen: Urine, clean catch  Updated: 10/27/20 1135     Urine Culture, Routine  >50,000 CFU/ml Organism  Candida albicans       Urine Culture, Routine  --     Moderate growth   No further workup          Assessment/Plan   Principal Problem:    Respiratory arrest ([facility] [address]- Pt has been on NC during the day at 5L and on BiPAP at night. Active Problems:   Elevated troponin- Cardiology consulted. Continue to monitor on tele. Cardiology noting pt \"will need ischemic evaluation once neuro and respiratory status stabilizes\"     Atrial fibrillation- pt being weaned off Diltiazem gtt and 1mg/kg Lovenox. Remaining in a fib per telemetry. Elevated Ddimer-  Pt unable to tolerate laying flat this AM for CTA. Agitation- Geodon not affective, prn Haldol. Pt has not needed yesterday or today     Acute cystitis without hematuria- urine culture show candida albicans, Fluconazole initiated on 10/27. Essential hypertension- Lisinopril and Norvasc doses increased. Improving. BLE Cellulitis- doxy initiated 10/28. Improving        Idiopathic peripheral neuropathy    Chronic bilateral low back pain with bilateral sciatica    Frequent falls    History of cerebrovascular accident (CVA) in adulthood    Polymyalgia ([facility] [address]    Spinal stenosis of lumbosacral region   -noted. PT/OT. Pt states she ambulates via wheelchair. Pain control as respiratory status stable. Monitor closely. Palliative care patient- noted, consulted. DNR    DVT Prophylaxis: Lovenox    SW working on communicating with family to establish guardianship for pt, none agreeable. SW initiating appointed guardianship. Pt needing SNF placement.      Franki Stock PA-C  10/31/2020, 9:57 AM

## 2020-10-31 NOTE — PROGRESS NOTES
Patient moved over to specialty bed, bathed and all linens changed. Purewick changed. Patient reports severe neck and back pain. Received order for prn percocet, see MAR, from Gera BRADSHAW. O2 saturation 95% on 4L NC. HR  afib per telemetry. Cardizem gtt weaned off.  Electronically signed by Cindi Walton RN on 10/31/2020 at 11:00 AM

## 2020-10-31 NOTE — PROGRESS NOTES
Pt is complaining of severe neck pain. Repositioning and ice applied. Ordered tylenol given at 1820. Pt is banging on bed rails and yelling, requesting pain medicine injections into her neck from her pain management doctor. Messaged hospitalist. Will continue to monitor.     Electronically signed by Naty Ware RN on 10/30/2020 at 8:19 PM

## 2020-11-01 NOTE — PROGRESS NOTES
[facility]      Patient:  Della Mtz  YOB: 1940  Date of Service: 11/1/2020  MRN: [id number]   Acct: [de-identified]   Primary Care Physician: Javy Plummer MD  Advance Directive: DNR-CC  Admit Date: 10/25/2020       Hospital Day: 7  Portions of this note have been copied forward, however, updated to reflect the most current clinical status of this patent    CHIEF COMPLAINT Respiratory failure    SUBJECTIVE:     Pt is bedside chair in no acute distress. She is reading the newspaper and is fully Alert and Oriented today. She states she is feeling weak. Cumulative Hospital Course:   10/25/2020 The patient is a 79 y.o. female with PMH CVA, HTN, polymyalgia, chronic lower back pain, lower extremity lymphedema who presented to [address] [place] [address] ED after she was found unresponsive in the SNF where she resides. It is unknown how long she was unresponsive. EMS placed a supraglottic airway to assist with bagging en route for respiratory arrest. Patient is a DNR. Airway removed in ED and she is now on BiPAP. A  is present in the room who knows the patient and states she suspects narcotic overdose as patient has a history of uncontrolled pain and frequently attempting to take medication early or more than prescribed. It is uncertain how her medications were being given at the nursing home and patient was under quarantine for 14 days due to recently being discharged from this facility. She was discharged on 10/22/2020. She had presented to ED with dyspnea and lower extremity edema. CTA was unremarkable for PE. COVID-19 negative. She was given a short course of IV diuretic therapy. Echocardiogram unremarkable for CHF. Was discharged on Augmentin for continued treatment of bilateral lower extremity erythema. Work up in ED includes a CXR that is negative for pulmonary vascular congestion or acute cardiopulmonary process. CT head unremarkable.   Patient is able to shake her head yes and no appropriately with my questioning as well as with the  who is present at bedside. She is not about to participate in conversation otherwise. Patient's family has been called multiple times to discuss patient's DNR status however no one has been reached. 10/26/2020 No family is able to be reached. SLP and cognitive eval ordered. Pt has been off and on BiPAP throughout the day. 10/27/2020 Overnight patient was placed in bilateral wrist restraints due to pulling out IVs, throwing food across the room, trying to get out of bed and removing her oxygen. Her antibiotics have been changed to oral and Seroquel added for agitation. SW has contacted pt's daughter, Daylin Adamson, who is agreeable to be the point of contact for the patinet. urine culture show candida albicans, Fluconazole initiated. 10/28/2020 Sodium continues to rise nursing having a hard time placing and keeping IVs in. Blood pressure elevated as well, Lisinopril and Norvasc dosage increased. 10/29/20 BP remaining elevated. Per nursing pt did not improve with Geodon, changed to Haldol prn for agitation. SW continuing to attempt to obtain guardian for pt. Patient's daughters do not want POA. SW filing for appointed guardianship for patient. 10/30/20 Pt moved to 7th floor when found to be in Atrial Fibrillation. pt on Diltiazem gtt and 1mg/kg Lovenox. BP improving. Ddimer elevated at 3.74Pt could not tolerate laying flat for CTA pulm. LLQ tenderness, KUB ordered. 10/31/20 KUB shows moderate amount of stool in colon with no obstruction or ileus. Ordered daily miralax and stool softener. Pain control, monitoring closely. 11/01/20 Pt is alert and oriented. She is able to tell me where she has been living at [facility], about her family and year, location, and president. She says she was not on oxygen at facility and was ambulating via wheelchair. Overnight pt not compliant with Cpap machine. She has been on 4L NC.        Review of Systems:   Review of Systems Constitutional: Positive for fatigue. Negative for chills, diaphoresis and fever. HENT: Negative for congestion, ear pain, sinus pain, sore throat and trouble swallowing. Eyes: Negative for photophobia and visual disturbance. Respiratory: Positive for shortness of breath. Negative for cough, choking, wheezing and stridor. Cardiovascular: Negative for chest pain, palpitations and leg swelling. Gastrointestinal: Negative for abdominal distention, abdominal pain, constipation, diarrhea, nausea and vomiting. Endocrine: Negative for cold intolerance and heat intolerance. Genitourinary: Negative for difficulty urinating, flank pain, frequency and urgency. Musculoskeletal: Positive for arthralgias and myalgias. Neurological: Positive for weakness. Negative for dizziness, syncope, light-headedness, numbness and headaches. Hematological: Does not bruise/bleed easily. Psychiatric/Behavioral: Negative for agitation, confusion and dysphoric mood. Objective:   VITALS:  BP (!) 147/84   Pulse 76   Temp 97 °F (36.1 °C) (Temporal)   Resp 18   Ht 5' 6\" (1.676 m)   Wt (!) 303 lb 6 oz (137.6 kg)   SpO2 (!) 86%   Breastfeeding No   BMI 48.97 kg/m²   24HR INTAKE/OUTPUT:      Intake/Output Summary (Last 24 hours) at 11/1/2020 5133  Last data filed at 11/1/2020 0701  Gross per 24 hour   Intake 223.83 ml   Output 800 ml   Net -576.17 ml       Physical Exam  Constitutional:       General: She is not in acute distress. Appearance: She is obese. She is ill-appearing. She is not toxic-appearing. HENT:      Head: Normocephalic and atraumatic. Right Ear: External ear normal.      Left Ear: External ear normal.      Nose: Nose normal.      Mouth/Throat:      Mouth: Mucous membranes are dry. Eyes:      General: No scleral icterus. Extraocular Movements: Extraocular movements intact. Conjunctiva/sclera: Conjunctivae normal.   Neck:      Musculoskeletal: Normal range of motion. Cardiovascular:      Rate and Rhythm: Normal rate. Rhythm irregular. Pulses: Normal pulses. Heart sounds: Normal heart sounds. Pulmonary:      Effort: Pulmonary effort is normal. No respiratory distress. Breath sounds: Normal breath sounds. Abdominal:      General: Bowel sounds are normal. There is no distension. Palpations: Abdomen is soft. Tenderness: There is no abdominal tenderness. Musculoskeletal:         General: No swelling, tenderness or deformity. Right lower leg: Edema present. Left lower leg: Edema present. Skin:     General: Skin is warm and dry. Findings: No erythema. Comments: Chronic lymphedema   Neurological:      General: No focal deficit present. Mental Status: She is alert and oriented to person, place, and time. Cranial Nerves: No cranial nerve deficit. Sensory: No sensory deficit. Psychiatric:         Mood and Affect: Mood normal.         Behavior: Behavior normal.         Thought Content:  Thought content normal.         Judgment: Judgment normal.            Medications:      dilTIAZem (CARDIZEM) 125 mg in dextrose 5% 125 mL infusion Stopped (11/01/20 0829)    dextrose        guaiFENesin  600 mg Oral BID    enoxaparin  1 mg/kg Subcutaneous Q12H    polyethylene glycol  17 g Oral Daily    docusate sodium  100 mg Oral Daily    lidocaine  1 patch Transdermal Daily    levothyroxine  150 mcg Oral Daily    doxycycline hyclate  100 mg Oral 2 times per day    fluticasone  1 spray Each Nostril Daily    aspirin  81 mg Oral Daily    ketamine  100 mg Intravenous Once    sodium chloride flush  10 mL Intravenous 2 times per day    insulin lispro  0-6 Units Subcutaneous TID WC    insulin lispro  0-3 Units Subcutaneous Nightly     oxyCODONE-acetaminophen, saline nasal gel, labetalol, enalaprilat, sodium chloride flush, acetaminophen **OR** acetaminophen, glucose, dextrose, glucagon (rDNA), dextrose  DIET DYSPHAGIA SOFT AND BITE-SIZED; Carb Control: 4 carb choices (60 gms)/meal; No Added Salt (3-4 GM); Mildly Thick (Nectar)     Lab and other Data:     Recent Labs     10/30/20  0131   WBC 7.8   HGB 13.8        Recent Labs     10/30/20  0131 10/30/20  0133 10/31/20  0122 11/01/20  0409     --  143 143   K 4.2 4.0 4.2 4.2     --  102 104   CO2 26  --  28 32*   BUN 20  --  25* 24*   CREATININE 1.0*  --  0.9 0.9   GLUCOSE 148*  --  127* 145*     Recent Labs     10/30/20  0131   AST 35*   ALT 33   BILITOT <0.2   ALKPHOS 66     Troponin T:   Recent Labs     10/30/20  0131   TROPONINI 0.45*       RAD:   Ct Head Wo Contrast  Result Date: 10/25/2020  Changes of aging with no acute intracranial abnormality   Signed by Dr Baez on 10/25/2020 3:19 PM    Xr Chest Portable  Result Date: 10/25/2020  1. Moderate cardiomegaly no acute pulmonary vascular congestion. Signed by Dr Baez on 10/25/2020 2:38 PM    XR Chest Portable  Result Date: 10/30/20   Impression:  The poor lung expansion and discoid atelectatic changes, more in the right lower lung. The Persistent moderate cardiomegaly. No pulmonary congestion. Signed by Dr Gaurav Traylor on 10/30/2020 7:03 AM    KUB  Result Date: 10/30/20  Impression:  A moderate amount of stool in the colon more so in the proximal colon. No finding to suggest obstruction or ileus. No radiopaque calculi. Signed by Dr Gaurav Traylor on 10/30/2020 2:56 PM    Micro:   Culture, Urine [[id number]]  (Abnormal)  Collected: 10/25/20 1510    Order Status: Completed  Specimen: Urine, clean catch  Updated: 10/27/20 1135     Urine Culture, Routine  >50,000 CFU/ml      Organism  Candida albicans       Urine Culture, Routine  --     Moderate growth   No further workup          Assessment/Plan   Principal Problem:    Respiratory arrest ([address]- Pt has been on NC during the day at 4L and on BiPAP at night. Active Problems:   Elevated troponin- Cardiology consulted. Continue to monitor on tele. Cardiology noting pt \"will need ischemic evaluation once neuro and respiratory status stabilizes\"     Atrial fibrillation- pt being weaned off Diltiazem gtt and 1mg/kg Lovenox. Elevated Ddimer-  Pt unable to tolerate laying flat this AM for CTA. Agitation- Geodon not affective, prn Haldol. Pt has not needed the last few days. Essential hypertension- Lisinopril and Norvasc doses increased. Improving. BLE Cellulitis- doxy initiated 10/28. Improving        Idiopathic peripheral neuropathy    Chronic bilateral low back pain with bilateral sciatica    Frequent falls    History of cerebrovascular accident (CVA) in adulthood    Polymyalgia ([facility] [address]    Spinal stenosis of lumbosacral region   -noted. PT/OT. Pt states she ambulates via wheelchair. Pain control as respiratory status stable. Monitor closely. Palliative care patient- noted, consulted. DNR    DVT Prophylaxis: Lovenox    SW working on communicating with family to establish guardianship for pt, none agreeable. SW initiating appointed guardianship. Pt needing SNF placement.      Clay Wooten PA-C  11/1/2020, 9:52 AM

## 2020-11-01 NOTE — PROGRESS NOTES
Patient educated on C Pap, Cpap applied will continue to monitor andassess Electronically signed by Fadi Greenfield RN on 11/1/2020 at 5:23 AM

## 2020-11-01 NOTE — PROGRESS NOTES
Attempted to apply C Pap due to it being night time and the Patient slowly starting to fall asleep, Patient protested the C Pap being applied  Due to \" it making me feel like I am suffocation or something smothering me\" these concerns were addressed and patient educated on why it is important to use the Bi Pap while sleeping. Patient voiced a concern of how long does she have to use the Bi Pap, Patient educated on why it is being used and informed that she may have to use it for the foreseeable future. C Pap applied, on patient for approx. 5-10 mins before Patient removed mask \" it feels like I am smothered and I just cannot do it! \" 4 liters N/C applied and Patient educated on the use of the C Pap once again, Patient in bed A/O resting comfortable with bedside table and call light within reach, will continue to monitor and assess Electronically signed by Enio Meraz RN on 11/1/2020 at 12:01 AM

## 2020-11-02 NOTE — PLAN OF CARE
Problem: Falls - Risk of:  Goal: Will remain free from falls  Description: Will remain free from falls  Outcome: Ongoing  Goal: Absence of physical injury  Description: Absence of physical injury  Outcome: Ongoing     Problem: Skin Integrity:  Goal: Will show no infection signs and symptoms  Description: Will show no infection signs and symptoms  Outcome: Ongoing  Goal: Absence of new skin breakdown  Description: Absence of new skin breakdown  Outcome: Ongoing     Problem: Confusion - Acute:  Goal: Absence of continued neurological deterioration signs and symptoms  Description: Absence of continued neurological deterioration signs and symptoms  Outcome: Ongoing  Goal: Mental status will be restored to baseline  Description: Mental status will be restored to baseline  Outcome: Ongoing     Problem: Discharge Planning:  Goal: Ability to perform activities of daily living will improve  Description: Ability to perform activities of daily living will improve  Outcome: Ongoing  Goal: Participates in care planning  Description: Participates in care planning  Outcome: Ongoing     Problem: Injury - Risk of, Physical Injury:  Goal: Will remain free from falls  Description: Will remain free from falls  Outcome: Ongoing  Goal: Absence of physical injury  Description: Absence of physical injury  Outcome: Ongoing     Problem: Mood - Altered:  Goal: Mood stable  Description: Mood stable  Outcome: Ongoing  Goal: Absence of abusive behavior  Description: Absence of abusive behavior  Outcome: Ongoing  Goal: Verbalizations of feeling emotionally comfortable while being cared for will increase  Description: Verbalizations of feeling emotionally comfortable while being cared for will increase  Outcome: Ongoing     Problem: Psychomotor Activity - Altered:  Goal: Absence of psychomotor disturbance signs and symptoms  Description: Absence of psychomotor disturbance signs and symptoms  Outcome: Ongoing     Problem: Sensory Perception - Impaired:  Goal: Demonstrations of improved sensory functioning will increase  Description: Demonstrations of improved sensory functioning will increase  Outcome: Ongoing  Goal: Decrease in sensory misperception frequency  Description: Decrease in sensory misperception frequency  Outcome: Ongoing  Goal: Able to refrain from responding to false sensory perceptions  Description: Able to refrain from responding to false sensory perceptions  Outcome: Ongoing  Goal: Demonstrates accurate environmental perceptions  Description: Demonstrates accurate environmental perceptions  Outcome: Ongoing  Goal: Able to distinguish between reality-based and nonreality-based thinking  Description: Able to distinguish between reality-based and nonreality-based thinking  Outcome: Ongoing  Goal: Able to interrupt nonreality-based thinking  Description: Able to interrupt nonreality-based thinking  Outcome: Ongoing     Problem: Sleep Pattern Disturbance:  Goal: Appears well-rested  Description: Appears well-rested  Outcome: Ongoing     Problem: Gas Exchange - Impaired:  Goal: Levels of oxygenation will improve  Description: Levels of oxygenation will improve  Outcome: Ongoing     Problem: Nutrition  Goal: Optimal nutrition therapy  Outcome: Ongoing     Problem: Restraint Use - Nonviolent/Non-Self-Destructive Behavior:  Goal: Absence of restraint indications  Description: Absence of restraint indications  Outcome: Ongoing  Goal: Absence of restraint-related injury  Description: Absence of restraint-related injury  Outcome: Ongoing     Problem: Pain:  Goal: Pain level will decrease  Description: Pain level will decrease  Outcome: Ongoing  Goal: Control of acute pain  Description: Control of acute pain  Outcome: Ongoing  Goal: Control of chronic pain  Description: Control of chronic pain  Outcome: Ongoing

## 2020-11-02 NOTE — CARE COORDINATION
Financial documentation was completed by the pt's temporary payee Delia Olivier who was assigned as temporary for the sale of the pt's home for payment of services. Delia Olivier  [place] [address], [address]  [place]. [facility], [address] [facility]  Phone: [phone]  Fax: [phone]  Email: [email or web address]      USHA faxed all paperwork to the state guardianship office for review and approval.   [place] [employer]  P: [phone]  F: [phone]

## 2020-11-02 NOTE — CARE COORDINATION
Informed by Lidia Falcon with [employer] the pt does not have a qualifying diagnosis for Trilogy equipment and will have to continue with the bipap. If the pt requires a bipap for dc, she must complete a sleep study upon dc to qualify. [facility] Ph: [phone]  Fa: [phone]      USHA informed attending Dr Erin Johnson via perfect serve.

## 2020-11-02 NOTE — PLAN OF CARE
Problem: Nutrition  Goal: Optimal nutrition therapy  11/2/2020 1126 by Davey Young RD, LD  Outcome: Ongoing  11/1/2020 2331 by Diane Perez RN  Outcome: Ongoing   Nutrition Problem #1: Increased nutrient needs  Intervention: Food and/or Nutrient Delivery: Modify Current Diet  Nutritional Goals: Progress to oral diet, PO intake >50%, wt stable

## 2020-11-02 NOTE — CARE COORDINATION
Discussed possible Trilogy for pt and attending Dr Ronnie Veliz was agreeable. USHA faxed documentation to [facility] for review and awaiting the attending's order to complete.      [facility] Ph: [phone]  Fa: [phone]

## 2020-11-02 NOTE — PROGRESS NOTES
[facility] Hospitalists    Patient:  Ange Noble  YOB: 1940  Date of Service: 11/2/2020  MRN: [id number]   Acct: [de-identified]   Primary Care Physician: Juan Jose Will MD  Advance Directive: [facility]  Admit Date: 10/25/2020       Hospital Day: 8  Portions of this note have been copied forward, however, changed to reflect the most current clinical status of this patient. CHIEF COMPLAINT Respiratory failure    SUBJECTIVE:  Patient states she's uncomfortable in bed and needs to be repositioned. Denies new or worsening dyspnea, chest pain, N/V or abdominal pain    Cumulative Hospital Course:  Spike Steele is a 79year old female with PMH CVA, HTN, polymyalgia, chronic lower back pain, chronic BLE lymphedema who presented to [facility] ED after being found unresponsive at the [facility] where she resides. She was noted to have acute respiratory failure and supraglottic airway was placed by EMS and she was bagged en route to this facility. Upon arrival to ED she was transitioned to BiPAP. Respiratory failure thought to be 2/2 opiate overdose, however, patient states she has no recollection of events at SNF prior to being found unresponsive. She was admitted to Hospitalist service. Social work and palliative care were consulted. Cardiology has followed for elevated troponin though patient denying chest pain. She has been agitated at times although this has improved overall. Social work assisting with legal guardian as family refuses at this time. Supportive care continued. She was found to have atrial fibrillation this hospitalization and has at this time been weaned off Cardizem gtt. Anticoagulation continued. Review of Systems:   14 point review of systems is negative except as specifically addressed above.     Objective:   VITALS:  /72   Pulse 83   Temp 97.2 °F (36.2 °C) (Temporal)   Resp 20   Ht 5' 6\" (1.676 m)   Wt (!) 317 lb 9 oz (144 kg)   SpO2 94%   Breastfeeding No   BMI 51.26 kg/m²   24HR INTAKE/OUTPUT:      Intake/Output Summary (Last 24 hours) at 11/2/2020 1300  Last data filed at 11/2/2020 0932  Gross per 24 hour   Intake 240 ml   Output 500 ml   Net -260 ml     General appearance: 78 yo female, well nourished, no acute distress   Head: Normocephalic, without obvious abnormality, atraumatic  Eyes: conjunctivae/corneas clear. PERRL, EOM's intact. Ears: normal external ears and nose, throat without exudate  Neck: no adenopathy, no carotid bruit, no JVD, supple, symmetrical, trachea midline   Lungs: decreased throughout, no wheezes, rhonchi or rales   Heart: irregularly irregualr, S1, S2 normal, no murmur  Abdomen:soft, obese, non-tender; non-distended, normal bowel sounds no masses, no organomegaly  Extremities:BLE edema with chronic lymphedema/venous stasis changes  Skin: Skin color, texture, turgor normal. No rashes or lesions  Lymphatic: No palpable lymph node enlargment  Neurologic: Alert and oriented X 3, generalized weakness and normal tone.  No focal deficits, patient confused at times   Psychiatric: Appropriate affect    Medications:      dilTIAZem (CARDIZEM) 125 mg in dextrose 5% 125 mL infusion Stopped (11/01/20 0829)    dextrose        guaiFENesin  600 mg Oral BID    enoxaparin  1 mg/kg Subcutaneous Q12H    polyethylene glycol  17 g Oral Daily    docusate sodium  100 mg Oral Daily    lidocaine  1 patch Transdermal Daily    levothyroxine  150 mcg Oral Daily    doxycycline hyclate  100 mg Oral 2 times per day    fluticasone  1 spray Each Nostril Daily    aspirin  81 mg Oral Daily    ketamine  100 mg Intravenous Once    sodium chloride flush  10 mL Intravenous 2 times per day    insulin lispro  0-6 Units Subcutaneous TID     insulin lispro  0-3 Units Subcutaneous Nightly     oxyCODONE-acetaminophen, saline nasal gel, labetalol, enalaprilat, sodium chloride flush, acetaminophen **OR** acetaminophen, glucose, dextrose, glucagon (rDNA), dextrose  DIET DYSPHAGIA SOFT AND BITE-SIZED; Carb Control: 4 carb choices (60 gms)/meal; No Added Salt (3-4 GM); Mildly Thick (Nectar)     Lab and other Data:       Recent Labs     10/31/20  0122 11/01/20  0409 11/02/20  0220    143 145   K 4.2 4.2 4.3    104 106   CO2 28 32* 32*   BUN 25* 24* 26*   CREATININE 0.9 0.9 0.9   GLUCOSE 127* 145* 109     RAD:   Ct Head Wo Contrast  Result Date: 10/25/2020  Changes of aging with no acute intracranial abnormality   Signed by Dr Jonas Lauren on 10/25/2020 3:19 PM     Xr Chest Portable  Result Date: 10/25/2020  1. Moderate cardiomegaly no acute pulmonary vascular congestion. Signed by Dr Jonas Lauren on 10/25/2020 2:38 PM     XR Chest Portable  Result Date: 10/30/20   Impression:  The poor lung expansion and discoid atelectatic changes, more in the right lower lung. The Persistent moderate cardiomegaly. No pulmonary congestion. Signed by Dr Josefina Patel on 10/30/2020 7:03 AM     KUB  Result Date: 10/30/20  Impression:  A moderate amount of stool in the colon more so in the proximal colon. No finding to suggest obstruction or ileus. No radiopaque calculi. Signed by Dr Josefina Patel on 10/30/2020 2:56 PM     Micro:   Culture, Urine [[id number]]  (Abnormal)  Collected: 10/25/20 1510   Order Status: Completed  Specimen: Urine, clean catch  Updated: 10/27/20 1135     Urine Culture, Routine  >50,000 CFU/ml      Organism  Candida albicans       Urine Culture, Routine      Moderate growth   No further workup     Assessment/Plan   Principal Problem:    Respiratory arrest (HCC)-2/2 suspected opiate overdose, Utilizing BiPAP  At times although feel she would benefit from Trilogy device. USHA consulted to assist with this    Active Problems:    Elevated troponin-no chest pain, defer further work up to Cardiology        Generalized weakness-PT/OT, will need placement.  SW following and has requested state guardianship at this time      Atrial fibrillation-continued on full dose Lovenox, weaned off Cardizem gtt, consider addition of PO BB vs CCB       Idiopathic peripheral neuropathy/Chronic bilateral low back pain with bilateral sciatica-noted, pain control       History of cerebrovascular accident (CVA) in adulthood-noted      Essential hypertension-controlled      Palliative care patient-noted      Hypothyroidism-synthroid continued      BLE cellulitis -likely 2/2 chronic lymphedema/venous stasis.      Further orders per clinical course/attending    Antibiotic: Doxycycline    DVT Prophylaxis: Lovenox    Dax Chiu PA-C

## 2020-11-02 NOTE — PROGRESS NOTES
Comprehensive Nutrition Assessment    Type and Reason for Visit:  Reassess    Nutrition Recommendations/Plan: Add double protein portions with meals. Nutrition Assessment:  Pt improving from a nutritional standpoint with PO intake >50% many meals. Adding double protein portions to promote would healing at this time. Will continue to monitor nutrition progression. Malnutrition Assessment:  Malnutrition Status:  No malnutrition    Context:  Acute Illness       Estimated Daily Nutrient Needs:  Energy (kcal):  3205-3084; Weight Used for Energy Requirements:  Ideal     Protein (g):  ; Weight Used for Protein Requirements:  Ideal(1.2-2.0)        Fluid (ml/day):  7371-3188; Method Used for Fluid Requirements:  [place]      Nutrition Related Findings:  Bipap in place, well-nourished      Wounds:  Stage II(buttocks)       Current Nutrition Therapies:    DIET DYSPHAGIA SOFT AND BITE-SIZED; Carb Control: 4 carb choices (60 gms)/meal; No Added Salt (3-4 GM);  Mildly Thick ([place])    Anthropometric Measures:  · Height: 5' 6\" (167.6 cm)  · Current Body Weight: 317 lb 9 oz (144 kg)   · Usual Body Weight: 250 lb (113.4 kg)(6/2020)     · Ideal Body Weight: 130 lbs; % Ideal Body Weight     · BMI: 51.3  · BMI Categories: Obese Class 3 (BMI 40.0 or greater)       Nutrition Diagnosis:   · Increased nutrient needs related to increase demand for energy/nutrients as evidenced by wounds      Nutrition Interventions:   Food and/or Nutrient Delivery:  Modify Current Diet  Nutrition Education/Counseling:  No recommendation at this time   Coordination of Nutrition Care:  Continue to monitor while inpatient    Goals:  Progress to oral diet, PO intake >50%, wt stable       Nutrition Monitoring and Evaluation:   Behavioral-Environmental Outcomes:  None Identified   Food/Nutrient Intake Outcomes:  Food and Nutrient Intake, Supplement Intake  Physical Signs/Symptoms Outcomes:  Biochemical Data, Nutrition Focused Physical Findings, Skin, Weight     Discharge Planning:    Continue current diet     Electronically signed by Mara Barnett RD, LD on 11/2/20 at 11:25 AM CST    Contact: [phone]

## 2020-11-03 NOTE — PLAN OF CARE
Problem: Falls - Risk of:  Goal: Will remain free from falls  Description: Will remain free from falls  Outcome: Met This Shift  Goal: Absence of physical injury  Description: Absence of physical injury  Outcome: Met This Shift     Problem: Skin Integrity:  Goal: Will show no infection signs and symptoms  Description: Will show no infection signs and symptoms  Outcome: Ongoing  Goal: Absence of new skin breakdown  Description: Absence of new skin breakdown  Outcome: Met This Shift     Problem: Confusion - Acute:  Goal: Absence of continued neurological deterioration signs and symptoms  Description: Absence of continued neurological deterioration signs and symptoms  Outcome: Met This Shift  Goal: Mental status will be restored to baseline  Description: Mental status will be restored to baseline  Outcome: Met This Shift     Problem: Discharge Planning:  Goal: Ability to perform activities of daily living will improve  Description: Ability to perform activities of daily living will improve  Outcome: Ongoing  Goal: Participates in care planning  Description: Participates in care planning  Outcome: Met This Shift     Problem: Injury - Risk of, Physical Injury:  Goal: Will remain free from falls  Description: Will remain free from falls  Outcome: Met This Shift  Goal: Absence of physical injury  Description: Absence of physical injury  Outcome: Met This Shift     Problem: Mood - Altered:  Goal: Mood stable  Description: Mood stable  Outcome: Ongoing  Goal: Absence of abusive behavior  Description: Absence of abusive behavior  Outcome: Met This Shift  Goal: Verbalizations of feeling emotionally comfortable while being cared for will increase  Description: Verbalizations of feeling emotionally comfortable while being cared for will increase  Outcome: Met This Shift     Problem: Psychomotor Activity - Altered:  Goal: Absence of psychomotor disturbance signs and symptoms  Description: Absence of psychomotor disturbance pain  Outcome: Ongoing  Goal: Control of chronic pain  Description: Control of chronic pain  Outcome: Ongoing

## 2020-11-03 NOTE — PROGRESS NOTES
Notified by tele monitor tech patient had 8 beat run of Vtach. Patient is currently resting and expressed no needs at this time. Will continue to monitor.  Electronically signed by Reese Rojas RN on 11/2/2020 at 11:11 PM

## 2020-11-03 NOTE — PROGRESS NOTES
[facility] Hospitalists    Patient:  Rhys Patino  YOB: 1940  Date of Service: 11/3/2020  MRN: [id number]   Acct: [de-identified]   Primary Care Physician: Jyothi Hickman MD  Advance Directive: [facility]  Admit Date: 10/25/2020       Hospital Day: 9  Portions of this note have been copied forward, however, changed to reflect the most current clinical status of this patient. CHIEF COMPLAINT Respiratory failure    SUBJECTIVE:  Patient has no new complaints. States she's feeling better overall today. Congestion improving. Denies chest pain, heart palpitations. Cumulative Hospital Course:  Michael Jaimes is a 79year old female with PMH CVA, HTN, polymyalgia, chronic lower back pain, chronic BLE lymphedema who presented to [address] [place] [address] ED after being found unresponsive at the SNF where she resides. She was noted to have acute respiratory failure and supraglottic airway was placed by EMS and she was bagged en route to this facility. Upon arrival to ED she was transitioned to BiPAP. Respiratory failure thought to be 2/2 opiate overdose, however, patient states she has no recollection of events at SNF prior to being found unresponsive and has remained confused with likely anoxic brain injury. She is not capable of making decisions for herself and social work is assisting with legal guardian. She was admitted to Hospitalist service. Social work and palliative care were consulted. Cardiology has followed for elevated troponin suspected to be 2/2 atrial fibrillation and chronic diastolic heart failure. Patient denying chest pain. She was weaned off Cardizem gtt. Anticoagulation continued and transitioned from Lovenox to Eliquis. Coreg added. Review of Systems:   14 point review of systems is negative except as specifically addressed above.     Objective:   VITALS:  BP (!) 150/72   Pulse 54   Temp 96.9 °F (36.1 °C) (Temporal)   Resp 20   Ht 5' 6\" (1.676 m)   Wt (!) 311 lb (141.1 kg)   SpO2 90%   Breastfeeding No BMI 50.20 kg/m²   24HR INTAKE/OUTPUT:      Intake/Output Summary (Last 24 hours) at 11/3/2020 1247  Last data filed at 11/3/2020 1005  Gross per 24 hour   Intake 720 ml   Output 750 ml   Net -30 ml     General appearance: 80 yo female, well nourished, no acute distress, resting comfortably in right lateral recumbent position   Head: Normocephalic, without obvious abnormality, atraumatic  Eyes: conjunctivae/corneas clear. PERRL, EOM's intact. Ears: normal external ears and nose, throat without exudate  Neck: no adenopathy, no carotid bruit, no JVD, supple, symmetrical, trachea midline   Lungs: faint scattered wheezing , no rhonchi or rales   Heart: irregularly irregular, S1, S2 normal, no murmur  Abdomen:soft, obese, non-tender; non-distended, normal bowel sounds no masses, no organomegaly  Extremities:BLE edema with chronic lymphedema/venous stasis changes  Skin: Skin color, texture, turgor normal. No rashes or lesions  Lymphatic: No palpable lymph node enlargment  Neurologic: Alert and oriented X 3, generalized weakness and normal tone.  No focal deficits, patient remains confused at times   Psychiatric: Appropriate affect    Medications:      dilTIAZem (CARDIZEM) 125 mg in dextrose 5% 125 mL infusion Stopped (11/01/20 0829)    dextrose        gabapentin  300 mg Oral TID    apixaban  5 mg Oral BID    carvedilol  6.25 mg Oral BID WC    DULoxetine  20 mg Oral Daily    guaiFENesin  600 mg Oral BID    polyethylene glycol  17 g Oral Daily    docusate sodium  100 mg Oral Daily    lidocaine  1 patch Transdermal Daily    levothyroxine  150 mcg Oral Daily    fluticasone  1 spray Each Nostril Daily    aspirin  81 mg Oral Daily    ketamine  100 mg Intravenous Once    sodium chloride flush  10 mL Intravenous 2 times per day    insulin lispro  0-6 Units Subcutaneous TID     insulin lispro  0-3 Units Subcutaneous Nightly     miconazole, oxyCODONE-acetaminophen, saline nasal gel, labetalol, enalaprilat, sodium chloride flush, acetaminophen **OR** acetaminophen, glucose, dextrose, glucagon (rDNA), dextrose  DIET DYSPHAGIA SOFT AND BITE-SIZED; Carb Control: 4 carb choices (60 gms)/meal; No Added Salt (3-4 GM); Mildly Thick (Nectar)     Lab and other Data:       Recent Labs     11/01/20  0409 11/02/20  0220 11/03/20  0227    145 143   K 4.2 4.3 4.3    106 106   CO2 32* 32* 25   BUN 24* 26* 26*   CREATININE 0.9 0.9 0.8   GLUCOSE 145* 109 110*     RAD:   Ct Head Wo Contrast  Result Date: 10/25/2020  Changes of aging with no acute intracranial abnormality   Signed by Dr Samantha Valentine on 10/25/2020 3:19 PM     Xr Chest Portable  Result Date: 10/25/2020  1. Moderate cardiomegaly no acute pulmonary vascular congestion. Signed by Dr Samantha Valentine on 10/25/2020 2:38 PM     XR Chest Portable  Result Date: 10/30/20   Impression:  The poor lung expansion and discoid atelectatic changes, more in the right lower lung. The Persistent moderate cardiomegaly. No pulmonary congestion. Signed by Dr Wili Fraser on 10/30/2020 7:03 AM     KUB  Result Date: 10/30/20  Impression:  A moderate amount of stool in the colon more so in the proximal colon. No finding to suggest obstruction or ileus. No radiopaque calculi. Signed by Dr Wili Fraser on 10/30/2020 2:56 PM     Micro:   Culture, Urine [[id number]]  (Abnormal)  Collected: 10/25/20 1510   Order Status: Completed  Specimen: Urine, clean catch  Updated: 10/27/20 1135     Urine Culture, Routine  >50,000 CFU/ml      Organism  Candida albicans       Urine Culture, Routine      Moderate growth   No further workup     Assessment/Plan   Principal Problem:    Respiratory arrest (HCC)-2/2 suspected opiate overdose, Utilizing BiPAP  At times although feel she would benefit from Trilogy device. SW consulted to assist with this    Active Problems:    Anoxic brain injury-patient unable to make decisions for herself. SW trying to find legal guardian and placement.       Elevated troponin-2/2 atrial fibrillation/diastolic CHF per Cardiology and without need for further work up      Generalized weakness-PT/OT, will need placement. SW following and has requested state guardianship at this time      Atrial fibrillation-transitioned to Eliquis, weaned off Cardizem gtt, Coreg added       Idiopathic peripheral neuropathy/Chronic bilateral low back pain with bilateral sciatica-noted, pain seems to be adequately controlled at this time       History of cerebrovascular accident (CVA) in adulthood-noted      Essential hypertension-controlled      Palliative care patient-noted      Hypothyroidism-synthroid continued      BLE cellulitis -likely 2/2 chronic lymphedema/venous stasis.      Patient medically stable for discharge once guardianship/placement arrangements confirmed     Antibiotic: Doxycycline    DVT Prophylaxis: Lovenox    Juan Leyva PA-C

## 2020-11-04 NOTE — CARE COORDINATION
Spoke with Haylee Hutson in the [employer] office who reports will email SW the appointment papers to be filed with [employer] in request of guardianship. SW will deliver the papers to the [facility] to attempt appointment.

## 2020-11-04 NOTE — PROGRESS NOTES
Speech Language Pathology  Facility/Department: [facility] 7 PROGRESSIVE CARE  SWALLOW THERAPY     NAME: Ember Yeh  : 1940  MRN: [id number]    ADMISSION DATE: 10/25/2020  ADMITTING DIAGNOSIS: has Idiopathic peripheral neuropathy; Chronic bilateral low back pain with bilateral sciatica; Acute on chronic combined systolic (congestive) and diastolic (congestive) heart failure ([facility] Elevated d-dimer; Chronic cystitis; Arthritis; Cerebrovascular accident [facility]); Frequent falls; Gait abnormality; History of cerebrovascular accident (CVA) in adulthood; Hypercholesterolemia; Essential hypertension; Polymyalgia ([facility] Pyelonephritis; Spinal stenosis of lumbosacral region; Respiratory arrest ([facility] Palliative care patient; and Atrial fibrillation [facility]) on their problem list.    Date of Treat: 2020  Evaluating Therapist: Jnenifer Jha    Current Diet level:  Bite sized consistency and nectar thick liquids    Reason for Referral  Ember Yeh was referred for a bedside swallow evaluation to assess the efficiency of her swallow function, identify signs and symptoms of aspiration and make recommendations regarding safe dietary consistencies, effective compensatory strategies, and safe eating environment. Impression  Monitored patient's swallowing function. Patient exhibits decreased oral prep of more solid consistencies as well as sluggish, mild-moderately decreased laryngeal elevation for swallow airway protection. Even so, no outward S/S penetration/aspiration was noted with any bite sized consistency presentation or nectar thick liquid presentation administered during treatment session this date. At this time, would recommend continuation bite sized consistency and nectar thick liquids. Recommend meds in pudding/applesauce. If patient receives mouth care prior to intake, okay for ice chips IN BETWEEN MEALS for comfort.      Treatment Plan  Requires SLP Intervention: Yes     Recommended Diet and Intervention  Diet Solids Recommendation: Bite sized   Liquid Consistency Recommendation: Nectar thick   Recommended Form of Meds: Meds in puree as able     Compensatory Swallowing Strategies  Compensatory Swallowing Strategies: Upright as possible for all oral intake;Small bites/sips;Eat/Feed slowly; Alternate solids and liquids; Remain upright for 30-45 minutes after meals     General  Chart Reviewed: Yes  Behavior/Cognition: Alert;Confused  Communication Observation: (SLP ranked functional intelligibility of speech for unfamiliar listeners at 100% in utterances without background noise present.)  Follows Directions: Simple   Patient Positioning: Upright in bed  Consistencies Administered: Bite sized; Nectar - straw     Monitored patient's swallowing function with the following observations noted:     Oral Phase  Mastication :Bite sized(Patient exhibited decreased rotary jaw movement during oral prep of bite sized consistency presentations administered by SLP.)  Oral transit: Bite sized (Min-moderate oral cavity residue was noted post swallows; residue cleared from the mouth with additional dry swallows.)  Oral Phase - Comment: Oral transit of bite sized consistency primarily measured 1-2 seconds in length. Oral transit of nectar thick liquid presentations, administered via straw by SLP, primarily measured 1-2 seconds in length. Pharyngeal Phase  Laryngeal Elevation: (Patient exhibited sluggish, mild-moderately decreased laryngeal elevation for swallow airway protection.)  Pharyngeal Phase - Comment: No outward S/S penetration/aspiration was noted with any bite sized consistency presentation or nectar thick liquid presentation administered during treatment session this date. At this time, would recommend continuation bite sized consistency and nectar thick liquids. Recommend meds in pudding/applesauce.  If patient receives mouth care prior to intake, okay for ice chips IN BETWEEN MEALS for comfort.     Electronically signed by CHATO Elliott on 11/4/2020 at 12:23 PM

## 2020-11-04 NOTE — PLAN OF CARE
Problem: Falls - Risk of:  Goal: Will remain free from falls  Description: Will remain free from falls  11/3/2020 2316 by Sharon Ortiz RN  Outcome: Ongoing  11/3/2020 2316 by Sharon Ortiz RN  Outcome: Ongoing  11/3/2020 1609 by Jane Whitmore RN  Outcome: Met This Shift  Goal: Absence of physical injury  Description: Absence of physical injury  11/3/2020 2316 by Sharon Saldana RN  Outcome: Ongoing  11/3/2020 1609 by Jane Whitmore RN  Outcome: Met This Shift     Problem: Skin Integrity:  Goal: Will show no infection signs and symptoms  Description: Will show no infection signs and symptoms  11/3/2020 2316 by Sharon Ortiz RN  Outcome: Ongoing  11/3/2020 1609 by Jane Whitmore RN  Outcome: Ongoing  Goal: Absence of new skin breakdown  Description: Absence of new skin breakdown  11/3/2020 2316 by Sharon Ortiz RN  Outcome: Ongoing  11/3/2020 1609 by Jane Whitmore RN  Outcome: Met This Shift     Problem: Confusion - Acute:  Goal: Absence of continued neurological deterioration signs and symptoms  Description: Absence of continued neurological deterioration signs and symptoms  11/3/2020 2316 by Sharon Ortiz RN  Outcome: Ongoing  11/3/2020 1609 by Jane Whitmore RN  Outcome: Met This Shift  Goal: Mental status will be restored to baseline  Description: Mental status will be restored to baseline  11/3/2020 2316 by Sharon Ortiz RN  Outcome: Ongoing  11/3/2020 1609 by Jane Whitmore RN  Outcome: Met This Shift     Problem: Discharge Planning:  Goal: Ability to perform activities of daily living will improve  Description: Ability to perform activities of daily living will improve  11/3/2020 2316 by Bishop Mcgrath RN  Outcome: Ongoing  11/3/2020 1609 by Jane Whitmore RN  Outcome: Ongoing  Goal: Participates in care planning  Description: Participates in care planning  11/3/2020 2316 by Bishop Mcgrath RN  Outcome: Ongoing  11/3/2020 1609 by Jane Whitmore RN  Outcome: Met This Shift     Problem: Injury - Risk of, Physical Injury:  Goal: Will remain free from falls  Description: Will remain free from falls  11/3/2020 2316 by Sharon Ortiz RN  Outcome: Ongoing  11/3/2020 2316 by Sharon Ortiz RN  Outcome: Ongoing  11/3/2020 1609 by Ozzie Arceo RN  Outcome: Met This Shift  Goal: Absence of physical injury  Description: Absence of physical injury  11/3/2020 2316 by Sharon Ortiz RN  Outcome: Ongoing  11/3/2020 1609 by Ozzie Arceo RN  Outcome: Met This Shift     Problem: Mood - Altered:  Goal: Mood stable  Description: Mood stable  11/3/2020 2316 by Sharon Ortiz RN  Outcome: Ongoing  11/3/2020 1609 by Ozzie Arceo RN  Outcome: Ongoing  Goal: Absence of abusive behavior  Description: Absence of abusive behavior  11/3/2020 2316 by Jann Ruggiero RN  Outcome: Ongoing  11/3/2020 1609 by Ozzie Arceo RN  Outcome: Met This Shift  Goal: Verbalizations of feeling emotionally comfortable while being cared for will increase  Description: Verbalizations of feeling emotionally comfortable while being cared for will increase  11/3/2020 2316 by Jann Ruggiero RN  Outcome: Ongoing  11/3/2020 1609 by Ozzie Arceo RN  Outcome: Met This Shift     Problem: Psychomotor Activity - Altered:  Goal: Absence of psychomotor disturbance signs and symptoms  Description: Absence of psychomotor disturbance signs and symptoms  11/3/2020 2316 by Sharon Ortiz RN  Outcome: Ongoing  11/3/2020 1609 by Ozzie Arceo RN  Outcome: Met This Shift     Problem: Sensory Perception - Impaired:  Goal: Demonstrations of improved sensory functioning will increase  Description: Demonstrations of improved sensory functioning will increase  11/3/2020 2316 by Sharon Ortiz RN  Outcome: Ongoing  11/3/2020 1609 by Ozzie Arceo RN  Outcome: Met This Shift  Goal: Decrease in sensory misperception frequency  Description: Decrease in sensory misperception frequency  11/3/2020 2316 by Sharon Ortiz RN  Outcome: Ongoing  11/3/2020 1609 by Ozzie Arceo RN  Outcome: Met This Shift  Goal: Able to refrain from responding to false sensory perceptions  Description: Able to refrain from responding to false sensory perceptions  11/3/2020 2316 by Shant Kohler RN  Outcome: Ongoing  11/3/2020 1609 by Bernardo Mccullough RN  Outcome: Met This Shift  Goal: Demonstrates accurate environmental perceptions  Description: Demonstrates accurate environmental perceptions  11/3/2020 2316 by Shant Kohler RN  Outcome: Ongoing  11/3/2020 1609 by Bernardo Mccullough RN  Outcome: Met This Shift  Goal: Able to distinguish between reality-based and nonreality-based thinking  Description: Able to distinguish between reality-based and nonreality-based thinking  11/3/2020 2316 by Shant Kohler RN  Outcome: Ongoing  11/3/2020 1609 by Bernardo Mccullough RN  Outcome: Met This Shift  Goal: Able to interrupt nonreality-based thinking  Description: Able to interrupt nonreality-based thinking  11/3/2020 2316 by Shant Kohler RN  Outcome: Ongoing  11/3/2020 1609 by Bernardo Mccullough RN  Outcome: Met This Shift     Problem: Sleep Pattern Disturbance:  Goal: Appears well-rested  Description: Appears well-rested  11/3/2020 2316 by Sharon Ortiz RN  Outcome: Ongoing  11/3/2020 1609 by Bernardo Mccullough RN  Outcome: Met This Shift     Problem: Gas Exchange - Impaired:  Goal: Levels of oxygenation will improve  Description: Levels of oxygenation will improve  11/3/2020 2316 by Sharon Chin RN  Outcome: Ongoing  11/3/2020 1609 by Bernardo Mccullough RN  Outcome: Ongoing     Problem: Nutrition  Goal: Optimal nutrition therapy  11/3/2020 2316 by Sharon Chin RN  Outcome: Ongoing  11/3/2020 1609 by Bernardo Mccullough RN  Outcome: Met This Shift     Problem: Restraint Use - Nonviolent/Non-Self-Destructive Behavior:  Goal: Absence of restraint indications  Description: Absence of restraint indications  11/3/2020 2316 by Sharon Ortiz RN  Outcome: Ongoing  11/3/2020 1609 by Bernardo Mccullough RN  Outcome: Met This Shift  Goal: Absence

## 2020-11-04 NOTE — PROGRESS NOTES
[facility] Hospitalists    Patient:  Laisha Mckeon  YOB: 1940  Date of Service: 11/4/2020  MRN: [id number]   Acct: [de-identified]   Primary Care Physician: George Peralta MD  Advance Directive: [facility]  Admit Date: 10/25/2020       Hospital Day: 10  Portions of this note have been copied forward, however, changed to reflect the most current clinical status of this patient. CHIEF COMPLAINT Respiratory failure    SUBJECTIVE:  Patient states she has generalized myalgias. No fever. Cough, dyspnea with continued improvement. Denies chest pain or heart palpitations. Cumulative Hospital Course:  Amy Keller is a 79year old female with PMH CVA, HTN, polymyalgia, chronic lower back pain, chronic BLE lymphedema who presented to [facility] ED after being found unresponsive at the [facility] where she resides. She was noted to have acute respiratory failure and supraglottic airway was placed by EMS and she was bagged en route to this facility. Upon arrival to ED she was transitioned to BiPAP. Respiratory failure thought to be 2/2 opiate overdose, however, patient states she has no recollection of events at SNF prior to being found unresponsive and has remained confused with likely anoxic brain injury. She is not capable of making decisions for herself and social work is assisting with legal guardian. She was admitted to Hospitalist service. Social work and palliative care were consulted. Cardiology has followed for elevated troponin suspected to be 2/2 atrial fibrillation and chronic diastolic heart failure. Patient denying chest pain. She was weaned off Cardizem gtt. Anticoagulation continued and transitioned from Lovenox to Eliquis. Coreg added. Review of Systems:   14 point review of systems is negative except as specifically addressed above.     Objective:   VITALS:  BP (!) 107/48   Pulse 82   Temp 98.2 °F (36.8 °C) (Temporal)   Resp 20   Ht 5' 6\" (1.676 m)   Wt (!) 323 lb 4.8 oz (146.6 kg)   SpO2 94% Breastfeeding No   BMI 52.18 kg/m²   24HR INTAKE/OUTPUT:      Intake/Output Summary (Last 24 hours) at 11/4/2020 1418  Last data filed at 11/4/2020 1405  Gross per 24 hour   Intake --   Output 250 ml   Net -250 ml     General appearance: 78 yo female, well nourished, no acute distress, resting comfortably in right lateral recumbent position    Head: Normocephalic, without obvious abnormality, atraumatic  Eyes: conjunctivae/corneas clear. PERRL, EOM's intact. Ears: normal external ears and nose, throat without exudate  Neck: no adenopathy, no carotid bruit, no JVD, supple, symmetrical, trachea midline   Lungs: coarse throughout otherwise no wheezes, rales or rhonchi   Heart: irregularly irregular, S1, S2 normal, no murmur  Abdomen:soft, obese, non-tender; non-distended, normal bowel sounds no masses, no organomegaly  Extremities:BLE edema with chronic lymphedema/venous stasis changes, unchanged  Skin: Skin color, texture, turgor normal. No rashes or lesions  Lymphatic: No palpable lymph node enlargment  Neurologic: Alert and oriented X 2, generalized weakness and normal tone.  No focal deficits, patient remains confused at times   Psychiatric: Anxious    Medications:      dextrose        gabapentin  300 mg Oral TID    apixaban  5 mg Oral BID    carvedilol  3.125 mg Oral BID WC    DULoxetine  20 mg Oral Daily    guaiFENesin  600 mg Oral BID    polyethylene glycol  17 g Oral Daily    docusate sodium  100 mg Oral Daily    lidocaine  1 patch Transdermal Daily    levothyroxine  150 mcg Oral Daily    fluticasone  1 spray Each Nostril Daily    aspirin  81 mg Oral Daily    sodium chloride flush  10 mL Intravenous 2 times per day    insulin lispro  0-6 Units Subcutaneous TID     insulin lispro  0-3 Units Subcutaneous Nightly     miconazole, oxyCODONE-acetaminophen, saline nasal gel, labetalol, enalaprilat, sodium chloride flush, acetaminophen **OR** acetaminophen, glucose, dextrose, glucagon (rDNA), dextrose  DIET DYSPHAGIA SOFT AND BITE-SIZED; Carb Control: 4 carb choices (60 gms)/meal; No Added Salt (3-4 GM); Mildly Thick (Nectar)     Lab and other Data:       Recent Labs     11/02/20 0220 11/03/20 0227 11/04/20  0327    143 142   K 4.3 4.3 4.9    106 106   CO2 32* 25 25   BUN 26* 26* 32*   CREATININE 0.9 0.8 1.1*   GLUCOSE 109 110* 113*     RAD:   Ct Head Wo Contrast  Result Date: 10/25/2020  Changes of aging with no acute intracranial abnormality   Signed by Dr Rodriguez Lester on 10/25/2020 3:19 PM     Xr Chest Portable  Result Date: 10/25/2020  1. Moderate cardiomegaly no acute pulmonary vascular congestion. Signed by Dr Rodriguez Lester on 10/25/2020 2:38 PM     XR Chest Portable  Result Date: 10/30/20   Impression:  The poor lung expansion and discoid atelectatic changes, more in the right lower lung. The Persistent moderate cardiomegaly. No pulmonary congestion. Signed by Dr Yoel Sexton on 10/30/2020 7:03 AM     KUB  Result Date: 10/30/20  Impression:  A moderate amount of stool in the colon more so in the proximal colon. No finding to suggest obstruction or ileus. No radiopaque calculi. Signed by Dr Yoel Sexton on 10/30/2020 2:56 PM     Micro:   Culture, Urine [[id number]]  (Abnormal)  Collected: 10/25/20 1510   Order Status: Completed  Specimen: Urine, clean catch  Updated: 10/27/20 1135     Urine Culture, Routine  >50,000 CFU/ml      Organism  Candida albicans       Urine Culture, Routine      Moderate growth   No further workup     Assessment/Plan   Principal Problem:    Respiratory arrest (HCC)-2/2 suspected opiate overdose, Utilizing BiPAP at times, does not qualify for Trilogy, resolved, stable on [place] oxygen. Active Problems:    Anoxic brain injury-mild improvement in cognition since admit. D/w SLP, will repeat cognitive eval in AM. SW trying to find legal guardian and placement.       Elevated troponin-2/2 atrial fibrillation/diastolic CHF per Cardiology and without need for further work up, stable      Generalized weakness-PT/OT, will need placement. SW following and has requested state guardianship at this time      Atrial fibrillation-transitioned to Eliquis, weaned off Cardizem gtt, Coreg added, stable      Idiopathic peripheral neuropathy/Chronic bilateral low back pain with bilateral sciatica-noted, pain seems to be adequately controlled at this time       History of cerebrovascular accident (CVA) in adulthood-noted      Essential hypertension-controlled      Palliative care patient-noted      Hypothyroidism-synthroid continued      BLE cellulitis -likely 2/2 chronic lymphedema/venous stasis. Repeat cognitive eval in AM for comparison. SW following assisting with guardianship.  Will DC once arrangements complete     Antibiotic: Doxycycline    DVT Prophylaxis: Lovenox Georgie Boas, PA-C

## 2020-11-05 NOTE — PROGRESS NOTES
Comprehensive Nutrition Assessment    Type and Reason for Visit:  Reassess    Nutrition Recommendations/Plan: continue current POC    Nutrition Assessment:  Pt continues to improve from a nutritional standpoint with po intake now 50% or greater. Small weight increase. Doing well with double protein portions    Malnutrition Assessment:  Malnutrition Status:  No malnutrition    Context:  Acute Illness     Findings of the 6 clinical characteristics of malnutrition:  Energy Intake:  No significant decrease in energy intake  Weight Loss:  No significant weight loss     Body Fat Loss:  No significant body fat loss     Muscle Mass Loss:  No significant muscle mass loss    Fluid Accumulation:  1 - Mild Extremities   Strength:  Not Performed    Estimated Daily Nutrient Needs:  Energy (kcal):  5910-0316; Weight Used for Energy Requirements:  Ideal     Protein (g):  ; Weight Used for Protein Requirements:  Ideal(1.2-2.0)        Fluid (ml/day):  5888-3522; Method Used for Fluid Requirements:  [place]      Nutrition Related Findings:  Bipap in place, well-nourished      Wounds:  Stage II       Current Nutrition Therapies:    DIET DYSPHAGIA SOFT AND BITE-SIZED; Carb Control: 4 carb choices (60 gms)/meal; No Added Salt (3-4 GM);  Mildly Thick ([place])    Anthropometric Measures:  · Height: 5' 6\" (167.6 cm)  · Current Body Weight: 319 lb 4 oz (144.8 kg)   · Admission Body Weight:      · Usual Body Weight: 250 lb (113.4 kg)(6/2020)     · Ideal Body Weight: 130 lbs; % Ideal Body Weight 245.6 %   · BMI: 51.6  · Adjusted Body Weight:  ; No Adjustment   · BMI Categories: Obese Class 3 (BMI 40.0 or greater)       Nutrition Diagnosis:   · Increased nutrient needs related to increase demand for energy/nutrients as evidenced by wounds      Nutrition Interventions:   Food and/or Nutrient Delivery:  Continue Current Diet  Nutrition Education/Counseling:  No recommendation at this time   Coordination of Nutrition Care:  Continue to monitor while inpatient    Goals:  Progress to oral diet, PO intake >50%, wt stable       Nutrition Monitoring and Evaluation:   Behavioral-Environmental Outcomes:  None Identified   Food/Nutrient Intake Outcomes:  Food and Nutrient Intake  Physical Signs/Symptoms Outcomes:  Biochemical Data, Nutrition Focused Physical Findings, Skin, Weight     Discharge Planning:    Continue current diet     Electronically signed by Bryant Malhotra MS, RD, LD on 11/5/20 at 1:54 PM CST    Contact: [phone]

## 2020-11-05 NOTE — PROGRESS NOTES
51.53 kg/m²   24HR INTAKE/OUTPUT:      Intake/Output Summary (Last 24 hours) at 11/5/2020 1509  Last data filed at 11/5/2020 1440  Gross per 24 hour   Intake 720 ml   Output --   Net 720 ml     General appearance: 78 yo female, well nourished, no acute distress, sitting comfortably on side of bed   Head: Normocephalic, without obvious abnormality, atraumatic  Eyes: conjunctivae/corneas clear. PERRL, EOM's intact. Ears: normal external ears and nose, throat without exudate  Neck: no adenopathy, no carotid bruit, no JVD, supple, symmetrical, trachea midline   Lungs: coarse throughout with improved air entry, no wheezes, rales or rhonchi   Heart: irregularly irregular, S1, S2 normal, no murmur  Abdomen:soft, obese, non-tender; non-distended, normal bowel sounds no masses, no organomegaly  Extremities:BLE edema with chronic lymphedema/venous stasis changes, unchanged   Skin: Skin color, texture, turgor normal. No rashes or lesions  Lymphatic: No palpable lymph node enlargment  Neurologic: Alert and oriented X 3, generalized weakness and normal tone.  No focal deficits, patient remains confused at times   Psychiatric: Calm, appropriate affect     Medications:      dextrose        gabapentin  300 mg Oral TID    apixaban  5 mg Oral BID    carvedilol  3.125 mg Oral BID WC    DULoxetine  20 mg Oral Daily    guaiFENesin  600 mg Oral BID    polyethylene glycol  17 g Oral Daily    docusate sodium  100 mg Oral Daily    lidocaine  1 patch Transdermal Daily    levothyroxine  150 mcg Oral Daily    fluticasone  1 spray Each Nostril Daily    aspirin  81 mg Oral Daily    sodium chloride flush  10 mL Intravenous 2 times per day    insulin lispro  0-6 Units Subcutaneous TID     insulin lispro  0-3 Units Subcutaneous Nightly     miconazole, oxyCODONE-acetaminophen, saline nasal gel, labetalol, enalaprilat, sodium chloride flush, acetaminophen **OR** acetaminophen, glucose, dextrose, glucagon (rDNA), dextrose  DIET DYSPHAGIA SOFT AND BITE-SIZED; Carb Control: 4 carb choices (60 gms)/meal; No Added Salt (3-4 GM); Mildly Thick (Nectar)     Lab and other Data:       Recent Labs     11/03/20  0227 11/04/20  0327 11/05/20  0141    142 140   K 4.3 4.9 4.5    106 103   CO2 25 25 26   BUN 26* 32* 32*   CREATININE 0.8 1.1* 0.9   GLUCOSE 110* 113* 118*     RAD:   Ct Head Wo Contrast  Result Date: 10/25/2020  Changes of aging with no acute intracranial abnormality   Signed by Dr Jocelynn Knapp on 10/25/2020 3:19 PM     Xr Chest Portable  Result Date: 10/25/2020  1. Moderate cardiomegaly no acute pulmonary vascular congestion. Signed by Dr Jocelynn Knapp on 10/25/2020 2:38 PM     XR Chest Portable  Result Date: 10/30/20   Impression:  The poor lung expansion and discoid atelectatic changes, more in the right lower lung. The Persistent moderate cardiomegaly. No pulmonary congestion. Signed by Dr Cassidy Hays on 10/30/2020 7:03 AM     KUB  Result Date: 10/30/20  Impression:  A moderate amount of stool in the colon more so in the proximal colon. No finding to suggest obstruction or ileus. No radiopaque calculi. Signed by Dr Cassidy Hays on 10/30/2020 2:56 PM     Micro:   Culture, Urine [[id number]]  (Abnormal)  Collected: 10/25/20 1510   Order Status: Completed  Specimen: Urine, clean catch  Updated: 10/27/20 1135     Urine Culture, Routine  >50,000 CFU/ml      Organism  Candida albicans       Urine Culture, Routine      Moderate growth   No further workup     Assessment/Plan   Principal Problem:    Respiratory arrest (HCC)-2/2 suspected opiate overdose, Utilizing BiPAP at times, does not qualify for Trilogy, resolved, stable on [place] oxygen    Active Problems:    Anoxic brain injury-mild improvement in cognition since admit. D/w SLP, mildly impaired cognitively. SW trying to find legal guardian and placement.       Elevated troponin-2/2 atrial fibrillation/diastolic CHF per Cardiology and without need for further work up, stable      Generalized weakness-PT/OT, will need placement. SW following and has requested state guardianship at this time      Atrial fibrillation-transitioned to Eliquis, weaned off Cardizem gtt, Coreg added, stable      Idiopathic peripheral neuropathy/Chronic bilateral low back pain with bilateral sciatica-noted, pain seems to be adequately controlled at this time       History of cerebrovascular accident (CVA) in adulthood-noted      Essential hypertension-well controlled      Palliative care patient-noted      Hypothyroidism-synthroid continued      BLE cellulitis -likely 2/2 chronic lymphedema/venous stasis. Guardianship paperwork completed and will be taken to [employer] this afternoon. SW assisting with placement. HR is mildly elevated at times with increase movement/repositioning but overall well controlled on current regimen.     Antibiotic: Doxycycline    DVT Prophylaxis: Lovenox Vita Goodpasture, PA-C

## 2020-11-05 NOTE — CARE COORDINATION
Zahira with [facility] is reviewing the pt for possible bed availability at any of their facilities

## 2020-11-05 NOTE — PLAN OF CARE
Impaired:  Goal: Demonstrations of improved sensory functioning will increase  Description: Demonstrations of improved sensory functioning will increase  Outcome: Ongoing  Goal: Decrease in sensory misperception frequency  Description: Decrease in sensory misperception frequency  Outcome: Ongoing  Goal: Able to refrain from responding to false sensory perceptions  Description: Able to refrain from responding to false sensory perceptions  Outcome: Ongoing  Goal: Demonstrates accurate environmental perceptions  Description: Demonstrates accurate environmental perceptions  Outcome: Ongoing  Goal: Able to distinguish between reality-based and nonreality-based thinking  Description: Able to distinguish between reality-based and nonreality-based thinking  Outcome: Ongoing  Goal: Able to interrupt nonreality-based thinking  Description: Able to interrupt nonreality-based thinking  Outcome: Ongoing     Problem: Sleep Pattern Disturbance:  Goal: Appears well-rested  Description: Appears well-rested  Outcome: Ongoing     Problem: Gas Exchange - Impaired:  Goal: Levels of oxygenation will improve  Description: Levels of oxygenation will improve  Outcome: Ongoing     Problem: Nutrition  Goal: Optimal nutrition therapy  Outcome: Ongoing     Problem: Restraint Use - Nonviolent/Non-Self-Destructive Behavior:  Goal: Absence of restraint indications  Description: Absence of restraint indications  Outcome: Ongoing  Goal: Absence of restraint-related injury  Description: Absence of restraint-related injury  Outcome: Ongoing     Problem: Pain:  Goal: Pain level will decrease  Description: Pain level will decrease  Outcome: Ongoing  Goal: Control of acute pain  Description: Control of acute pain  Outcome: Ongoing  Goal: Control of chronic pain  Description: Control of chronic pain  Outcome: Ongoing

## 2020-11-05 NOTE — PROGRESS NOTES
Visited with pt to provide spiritual care. Pt was attempting to brush out her hair and asked this  to assist. After working on it some, there was little progress. Pt took the brush back to work on it again. Provided spiritual care with sustaining presence, nurtured hope, and prayer. Pt expressed gratitude for spiritual care.     Electronically signed by Susan Oliveir on 11/5/2020 at 12:04 PM

## 2020-11-05 NOTE — CARE COORDINATION
Received the application/petition completed by [facility] and delivering to the [employer] this afternoon in attempts for  to review and approve.

## 2020-11-06 NOTE — PROGRESS NOTES
No   BMI 51.66 kg/m²   24HR INTAKE/OUTPUT:      Intake/Output Summary (Last 24 hours) at 11/6/2020 1510  Last data filed at 11/6/2020 0433  Gross per 24 hour   Intake --   Output 850 ml   Net -850 ml     General appearance: 80 yo female, well nourished, no acute distress, resting comfortably in right lateral recumbent position in bed    Head: Normocephalic, without obvious abnormality, atraumatic  Eyes: conjunctivae/corneas clear. PERRL, EOM's intact. Ears: normal external ears and nose, throat without exudate  Neck: no adenopathy, no carotid bruit, no JVD, supple, symmetrical, trachea midline   Lungs: diminished throughout otherwise no rales, wheezes or rhonchi   Heart: irregularly irregular, S1, S2 normal, no murmur  Abdomen:soft, obese, non-tender; non-distended, normal bowel sounds no masses, no organomegaly  Extremities:BLE edema with chronic lymphedema/venous stasis changes, unchanged   Skin: Skin color, texture, turgor normal. No rashes or lesions  Lymphatic: No palpable lymph node enlargment  Neurologic: Alert and oriented X 3, generalized weakness and normal tone.  No focal deficits, patient remains confused at times   Psychiatric: Calm, appropriate affect     Medications:      dextrose        gabapentin  300 mg Oral TID    apixaban  5 mg Oral BID    carvedilol  3.125 mg Oral BID WC    DULoxetine  20 mg Oral Daily    guaiFENesin  600 mg Oral BID    polyethylene glycol  17 g Oral Daily    docusate sodium  100 mg Oral Daily    lidocaine  1 patch Transdermal Daily    levothyroxine  150 mcg Oral Daily    fluticasone  1 spray Each Nostril Daily    aspirin  81 mg Oral Daily    sodium chloride flush  10 mL Intravenous 2 times per day    insulin lispro  0-6 Units Subcutaneous TID     insulin lispro  0-3 Units Subcutaneous Nightly     miconazole, oxyCODONE-acetaminophen, saline nasal gel, sodium chloride flush, acetaminophen **OR** acetaminophen, glucose, dextrose, glucagon (rDNA), dextrose  DIET DYSPHAGIA SOFT AND BITE-SIZED; Carb Control: 4 carb choices (60 gms)/meal; No Added Salt (3-4 GM); Mildly Thick (Nectar)     Lab and other Data:       Recent Labs     11/04/20  0327 11/05/20  0141    140   K 4.9 4.5    103   CO2 25 26   BUN 32* 32*   CREATININE 1.1* 0.9   GLUCOSE 113* 118*     RAD:   Ct Head Wo Contrast  Result Date: 10/25/2020  Changes of aging with no acute intracranial abnormality   Signed by Dr Nataliya Ceballos on 10/25/2020 3:19 PM     Xr Chest Portable  Result Date: 10/25/2020  1. Moderate cardiomegaly no acute pulmonary vascular congestion. Signed by Dr Nataliya Ceballos on 10/25/2020 2:38 PM     XR Chest Portable  Result Date: 10/30/20   Impression:  The poor lung expansion and discoid atelectatic changes, more in the right lower lung. The Persistent moderate cardiomegaly. No pulmonary congestion. Signed by Dr Marco Antonio Lynch on 10/30/2020 7:03 AM     KUB  Result Date: 10/30/20  Impression:  A moderate amount of stool in the colon more so in the proximal colon. No finding to suggest obstruction or ileus. No radiopaque calculi. Signed by Dr Marco Antonio Lynch on 10/30/2020 2:56 PM     Micro:   Culture, Urine [[id number]]  (Abnormal)  Collected: 10/25/20 1510   Order Status: Completed  Specimen: Urine, clean catch  Updated: 10/27/20 1135     Urine Culture, Routine  >50,000 CFU/ml      Organism  Candida albicans       Urine Culture, Routine      Moderate growth   No further workup     Assessment/Plan   Principal Problem:    Respiratory arrest (HCC)-2/2 suspected opiate overdose, Utilizing BiPAP at times, does not qualify for Trilogy, resolved, remains stable on [place] oxygen     Active Problems:    Anoxic brain injury-mild improvement in cognition since admit. D/w SLP, mildly impaired cognitively. SW trying to find legal guardian and placement.       Elevated troponin-2/2 atrial fibrillation/diastolic CHF per Cardiology and without need for further work up, stable      Generalized weakness-PT/OT, will need placement. SW following and has requested state guardianship at this time      Atrial fibrillation-transitioned to Eliquis, weaned off Cardizem gtt, Coreg added, stable      Idiopathic peripheral neuropathy/Chronic bilateral low back pain with bilateral sciatica-noted, pain seems to be adequately controlled at this time       History of cerebrovascular accident (CVA) in adulthood-noted      Essential hypertension-well controlled      Palliative care patient-noted      Hypothyroidism-synthroid continued      BLE cellulitis -likely 2/2 chronic lymphedema/venous stasis. Guardianship paperwork not yet signed. Social work assisting with this as well as placement. Patient otherwise medically stable for discharge to SNF.     Antibiotic: Doxycycline    DVT Prophylaxis: Lovebrenda Holley PA-C

## 2020-11-07 NOTE — PROGRESS NOTES
Speech Language Pathology  Facility/Department: [facility] 5 SURG SERVICES  Initial Speech/Language/Cognitive Assessment    NAME: Flower Doyle  : 1940   MRN: [id number]  ADMISSION DATE: 10/25/2020   Date of Eval: 2020   Evaluating Therapist: Katja Cavazos MS CCC-SLP      ADMITTING DIAGNOSIS:   has Idiopathic peripheral neuropathy; Chronic bilateral low back pain with bilateral sciatica; Acute on chronic combined systolic (congestive) and diastolic (congestive) heart failure ([facility] [address] Elevated d-dimer; Chronic cystitis; Arthritis; Cerebrovascular accident [facility]); Frequent falls; Gait abnormality; History of cerebrovascular accident (CVA) in adulthood; Hypercholesterolemia; Essential hypertension; Polymyalgia ([facility] [address] Pyelonephritis; Spinal stenosis of lumbosacral region; Respiratory arrest ([facility] [address] Palliative care patient; and Atrial fibrillation [facility]) on their problem list.    Cumulative Hospital Course:  Per physician: Rich Garcia is a 79year old female with PMH CVA, HTN, polymyalgia, chronic lower back pain, chronic BLE lymphedema who presented to [facility] ED after being found unresponsive at the [facility] where she resides. She was noted to have acute respiratory failure and supraglottic airway was placed by EMS and she was bagged en route to this facility. Upon arrival to ED she was transitioned to BiPAP. Respiratory failure thought to be 2/2 opiate overdose, however, patient states she has no recollection of events at SNF prior to being found unresponsive and has remained confused with likely anoxic brain injury. She is not capable of making decisions for herself and social work is assisting with legal guardian. She was admitted to Hospitalist service. Social work and palliative care were consulted. Cardiology has followed for elevated troponin suspected to be 2/2 atrial fibrillation and chronic diastolic heart failure. Patient denying chest pain. She was weaned off Cardizem gtt.  Anticoagulation continued and transitioned from Lovenox to Eliquis. Coreg added. Pain:  Pain Assessment  Pain Assessment: FLACC  Pain Level: 9  Patient's Stated Pain Goal: No pain  Pain Type: Chronic pain  Pain Location: Generalized  Pain Orientation: Other (Comment)(all over)  Pain Descriptors: Constant  Pain Frequency: Continuous  Pain Onset: On-going  Clinical Progression: Not changed  Functional Pain Assessment: Prevents or interferes some active activities and ADLs  Non-Pharmaceutical Pain Intervention(s): Distraction, Emotional support, Repositioned  Response to Pain Intervention: Asleep with RR greater than 10  Pain Assessment/FLACC  Pain Rating: FLACC (rest) - Face: no particular expression or smile  Pain Rating: FLACC (rest) - Legs: normal position or relaxed  Pain Rating: FLACC (rest) - Activity: lying quietly, normal position, moves easily  Pain Rating: FLACC (rest) - Cry: no cry (awake or asleep)  Pain Rating: FLACC (rest) - Consolability: content, relaxed  Score: FLACC (rest): 0    Assessment:  The patient was oriented x4 this date. She did answer open ended question and yes/no questions reliably. She did score an 18 out of 30 possible points on the SLUMS which is in the scoring range for dementia. She is able to answer simple questions, however, do question if she could reliably answer complex questions regarding her medical care. Recommendations:  Plan:   Goals: The patient will complete higher level cognitive tasks with minimal cues. The patient will complete short term recall tasks with minimal cues. The patient will complete working memory tasks with minimal cues.       Patient/family involved in developing goals and treatment plan: yes    Subjective:  General  Chart Reviewed: Yes  Patient assessed for rehabilitation services?: Yes  Family / Caregiver Present: No     Vision  Vision: Impaired  Vision Exceptions: Wears glasses at all times  Hearing  Hearing: Within functional limits Objective:  Oral/Motor  Oral Motor: Within functional limits    Auditory Comprehension  Yes/No Questions: [facility])  Basic Questions: [facility])  One Step Basic Commands: (WFL)  Conversation: [facility])    Reading Comprehension  Reading Status: Within functional limits    Expression  Primary Mode of Expression: Verbal    Verbal Expression  Initiation: (WFL)  Repetition: (WFL)  Automatic Speech: (WFL)  Confrontation: [facility])  Divergent: [facility])  Conversation: [facility])    Written Expression  Dominant Hand: Right    Motor Speech  Motor Speech: Within Functional Limits    Pragmatics/Social Functioning  Pragmatics: Within functional limits    Cognition:   Orientation  Overall Orientation Status: Within Normal Limits  Attention  Attention: Within Functional Limits  Memory  Memory: Exceptions to [facility]  Short-term Memory: Moderate  Working Memory: Moderate  Numeric Reasoning  Numeric Reasoning: Exceptions to [facility]   Calculations: Moderate  Time: Moderate       No diet orders were located in her records from [facility]. The patient did deny any pureed foods, chopped minced foods or thickened liquids at [facility]. Additional Assessments:  Portions of the RIPA and WAB were completed. The SLUMS were also completed. The [facility] Mental Status Examination or SLUMS consists of 11 items, and measures aspects of cognition that include orientation, short-term memory, calculations, the naming of animals, the clock drawing test, and recognition of geometric figures. Scores range from 0 to 30. Scores of 27 to 30 are considered normal in a person with a high school education. Scores between 21 and 26 suggest a mild neurocognitive disorder. Scores between 0 and 20 indicate dementia. She scored an 18 out of 30 which is in the scoring range for dementia.     SLUMS  Orientation 1/3   Word Recall 1/5   Mental Math 1/3   Divergent Naming 3/3   Reverse Recall 0/2   Clock Drawing 2/4   Following Directions 2/2   Story Recall 6/8   Total Score 18/30       She exhibited difficulty in the areas of word recall, mental math, reverse recall, and clock drawing. During the clock drawing task, she did perseverate on several numbers, however, she did state she was having difficulty seeing written numbers. She was able to initially recall only one out of five words presented, but when given minimal cues, she did recall all five words.     Electronically Signed By:  Yolis Noyola  11/7/2020,12:31 PM.          11/7/2020 12:21 PM

## 2020-11-07 NOTE — PROGRESS NOTES
[facility] Hospitalists    Patient:  Malik Shi  YOB: 1940  Date of Service: 11/7/2020  MRN: [id number]   Acct: [de-identified]   Primary Care Physician: Belem Meyer MD  Advance Directive: [facility]  Admit Date: 10/25/2020       Hospital Day: 15  Portions of this note have been copied forward, however, changed to reflect the most current clinical status of this patient. CHIEF COMPLAINT Respiratory failure    SUBJECTIVE:  Patient states she feels tired again today. Has had difficulty getting comfortably in bed. She answers all questions appropriately and is oriented to self, place and year. Cumulative Hospital Course:  Francisco Barnett is a 79year old female with PMH CVA, HTN, polymyalgia, chronic lower back pain, chronic BLE lymphedema who presented to [address] [place] [address] ED after being found unresponsive at the SNF where she resides. She was noted to have acute respiratory failure and supraglottic airway was placed by EMS and she was bagged en route to this facility. Upon arrival to ED she was transitioned to BiPAP. Respiratory failure thought to be 2/2 opiate overdose, however, patient states she has no recollection of events at SNF prior to being found unresponsive and has remained confused with likely anoxic brain injury. She is not capable of making decisions for herself and social work is assisting with legal guardian. She was admitted to Hospitalist service. Social work and palliative care were consulted. Cardiology has followed for elevated troponin suspected to be 2/2 atrial fibrillation and chronic diastolic heart failure. Patient denying chest pain. She was weaned off Cardizem gtt. Anticoagulation continued and transitioned from Lovenox to Eliquis. Coreg added. Review of Systems:   14 point review of systems is negative except as specifically addressed above.     Objective:   VITALS:  /63   Pulse 79   Temp 96.8 °F (36 °C) (Temporal)   Resp 20   Ht 5' 6\" (1.676 m)   Wt (!) 320 lb 1 oz dextrose, glucagon (rDNA), dextrose  DIET DYSPHAGIA SOFT AND BITE-SIZED; Carb Control: 4 carb choices (60 gms)/meal; No Added Salt (3-4 GM); Mildly Thick (Nectar)     Lab and other Data:       Recent Labs     11/05/20  0141      K 4.5      CO2 26   BUN 32*   CREATININE 0.9   GLUCOSE 118*     RAD:   Ct Head Wo Contrast  Result Date: 10/25/2020  Changes of aging with no acute intracranial abnormality   Signed by Dr Lestine Cushing on 10/25/2020 3:19 PM     Xr Chest Portable  Result Date: 10/25/2020  1. Moderate cardiomegaly no acute pulmonary vascular congestion. Signed by Dr Lestine Cushing on 10/25/2020 2:38 PM     XR Chest Portable  Result Date: 10/30/20   Impression:  The poor lung expansion and discoid atelectatic changes, more in the right lower lung. The Persistent moderate cardiomegaly. No pulmonary congestion. Signed by Dr Shankar Nazario on 10/30/2020 7:03 AM     KUB  Result Date: 10/30/20  Impression:  A moderate amount of stool in the colon more so in the proximal colon. No finding to suggest obstruction or ileus. No radiopaque calculi. Signed by Dr Shankar Nazario on 10/30/2020 2:56 PM     Micro:   Culture, Urine [[id number]]  (Abnormal)  Collected: 10/25/20 1510   Order Status: Completed  Specimen: Urine, clean catch  Updated: 10/27/20 1135     Urine Culture, Routine  >50,000 CFU/ml      Organism  Candida albicans       Urine Culture, Routine      Moderate growth   No further workup     Assessment/Plan   Principal Problem:    Respiratory arrest (HCC)-2/2 suspected opiate overdose, Utilizing BiPAP at times, does not qualify for Trilogy, resolved, remains stable on [place] oxygen, resolved    Active Problems:    Anoxic brain injury-slow improvement in cognition throughout her hospital course.  Repeat cognitive eval requested yuri       Elevated troponin-2/2 atrial fibrillation/diastolic CHF per Cardiology and without need for further work up, stable      Generalized weakness-PT/OT, will need placement. SW following and has requested state guardianship at this time, unchanged      Atrial fibrillation-transitioned to Eliquis, weaned off Cardizem gtt, Coreg added, stable      Idiopathic peripheral neuropathy/Chronic bilateral low back pain with bilateral sciatica-noted, pain seems to be adequately controlled at this time       History of cerebrovascular accident (CVA) in adulthood-noted      Essential hypertension-well controlled      Palliative care patient-noted      Hypothyroidism-synthroid continued      BLE cellulitis -likely 2/2 chronic lymphedema/venous stasis. If repeat cognitive eval demonstrates patient can make her own decisions will not need to continue with guardianship. She will however continue to need placement as she cannot care for herself at home and cannot discharge back to other facility.     Antibiotic: Doxycycline    DVT Prophylaxis: Lovenox    Nirmal Denney PA-C

## 2020-11-08 NOTE — PROGRESS NOTES
[facility] Hospitalists    Patient:  Kaylen Marin  YOB: 1940  Date of Service: 11/8/2020  MRN: [id number]   Acct: [de-identified]   Primary Care Physician: Elizabeth Van MD  Advance Directive: [facility]  Admit Date: 10/25/2020       Hospital Day: 15  Portions of this note have been copied forward, however, changed to reflect the most current clinical status of this patient. CHIEF COMPLAINT Respiratory failure    SUBJECTIVE:  Patient has no new complaints. Denies new or worsening pain or dyspnea. RN states urine is very dark in coloration and is concerned for UTI. Cumulative Hospital Course:  Milagro Haywood is a 79year old female with PMH CVA, HTN, polymyalgia, chronic lower back pain, chronic BLE lymphedema who presented to [facility] ED after being found unresponsive at the [facility] where she resides. She was noted to have acute respiratory failure and supraglottic airway was placed by EMS and she was bagged en route to this facility. Upon arrival to ED she was transitioned to BiPAP. Respiratory failure thought to be 2/2 opiate overdose, however, patient states she has no recollection of events at SNF prior to being found unresponsive and has remained confused with likely anoxic brain injury. She is not capable of making decisions for herself and social work is assisting with legal guardian. She was admitted to Hospitalist service. Social work and palliative care were consulted. Cardiology has followed for elevated troponin suspected to be 2/2 atrial fibrillation and chronic diastolic heart failure. Patient denying chest pain. She was weaned off Cardizem gtt. Anticoagulation continued and transitioned from Lovenox to Eliquis. Coreg added. Review of Systems:   14 point review of systems is negative except as specifically addressed above.     Objective:   VITALS:  /77   Pulse 68   Temp 97.6 °F (36.4 °C) (Temporal)   Resp 20   Ht 5' 6\" (1.676 m)   Wt (!) 320 lb 1 oz (145.2 kg)   SpO2 92% Breastfeeding No   BMI 51.66 kg/m²   24HR INTAKE/OUTPUT:      Intake/Output Summary (Last 24 hours) at 11/8/2020 1241  Last data filed at 11/8/2020 1020  Gross per 24 hour   Intake 250 ml   Output 2000 ml   Net -1750 ml     General appearance: 78 yo female, well nourished, laying flat in supine position, appears comfortable, no acute distress  Head: Normocephalic, without obvious abnormality, atraumatic  Eyes: conjunctivae/corneas clear. PERRL, EOM's intact. Ears: normal external ears and nose, throat without exudate  Neck: no adenopathy, no carotid bruit, no JVD, supple, symmetrical, trachea midline   Lungs: decreased at bases, no rales, wheezes or rhonchi. Clear throughout  Heart: irregularly irregular, S1, S2 normal, no murmur  Abdomen:soft, obese, non-tender; non-distended, normal bowel sounds no masses, no organomegaly  Extremities:BLE edema with chronic lymphedema/venous stasis changes, unchanged   Skin: Skin color, texture, turgor normal. No rashes or lesions  Lymphatic: No palpable lymph node enlargment  Neurologic: Alert and oriented X 3, generalized weakness and normal tone.  No focal deficits, patient does not appear confused today, answers all questions appropriately   Psychiatric: Calm, appropriate affect, confused at times     Medications:      dextrose        gabapentin  300 mg Oral TID    apixaban  5 mg Oral BID    carvedilol  3.125 mg Oral BID WC    DULoxetine  20 mg Oral Daily    guaiFENesin  600 mg Oral BID    polyethylene glycol  17 g Oral Daily    docusate sodium  100 mg Oral Daily    lidocaine  1 patch Transdermal Daily    levothyroxine  150 mcg Oral Daily    fluticasone  1 spray Each Nostril Daily    aspirin  81 mg Oral Daily    sodium chloride flush  10 mL Intravenous 2 times per day    insulin lispro  0-6 Units Subcutaneous TID     insulin lispro  0-3 Units Subcutaneous Nightly     miconazole, oxyCODONE-acetaminophen, saline nasal gel, sodium chloride flush, acetaminophen **OR** acetaminophen, glucose, dextrose, glucagon (rDNA), dextrose  DIET DYSPHAGIA SOFT AND BITE-SIZED; Carb Control: 4 carb choices (60 gms)/meal; No Added Salt (3-4 GM); Mildly Thick (Nectar)     Lab and other Data:     RAD:   Ct Head Wo Contrast  Result Date: 10/25/2020  Changes of aging with no acute intracranial abnormality   Signed by Dr Isabella Bell on 10/25/2020 3:19 PM     Xr Chest Portable  Result Date: 10/25/2020  1. Moderate cardiomegaly no acute pulmonary vascular congestion. Signed by Dr Isabella Bell on 10/25/2020 2:38 PM     XR Chest Portable  Result Date: 10/30/20   Impression:  The poor lung expansion and discoid atelectatic changes, more in the right lower lung. The Persistent moderate cardiomegaly. No pulmonary congestion. Signed by Dr Dwain Nevarez on 10/30/2020 7:03 AM     KUB  Result Date: 10/30/20  Impression:  A moderate amount of stool in the colon more so in the proximal colon. No finding to suggest obstruction or ileus. No radiopaque calculi. Signed by Dr Dwain Nevarez on 10/30/2020 2:56 PM     Micro:   Culture, Urine [[id number]]  (Abnormal)  Collected: 10/25/20 1510   Order Status: Completed  Specimen: Urine, clean catch  Updated: 10/27/20 1135     Urine Culture, Routine  >50,000 CFU/ml      Organism  Candida albicans       Urine Culture, Routine      Moderate growth   No further workup     Assessment/Plan   Principal Problem:    Respiratory arrest (HCC)-2/2 suspected opiate overdose, Utilizing BiPAP at times, does not qualify for Trilogy, resolved, remains stable on 4L [place] oxygen, resolved    Active Problems:    Anoxic brain injury-slow improvement in cognition throughout her hospital course. SLUMS score 18/30 consistent with dementia       Elevated troponin-2/2 atrial fibrillation/diastolic CHF per Cardiology and without need for further work up, stable      Generalized weakness-PT/OT, will need placement.  SW following and has requested state guardianship at this time, unchanged      Atrial fibrillation-transitioned to Eliquis, weaned off Cardizem gtt, Coreg added, stable      Idiopathic peripheral neuropathy/Chronic bilateral low back pain with bilateral sciatica-noted, pain seems to be adequately controlled at this time       History of cerebrovascular accident (CVA) in adulthood-noted      Essential hypertension-well controlled      Palliative care patient-noted      Hypothyroidism-synthroid continued      BLE cellulitis -likely 2/2 chronic lymphedema/venous stasis. Await guardianship/placement. Social work assisting with this. RN concerned for very dark appearance of urine. Will do In/Out cath and check a UA/CBC.     Antibiotic: Doxycycline    DVT Prophylaxis: Lovenox    Sandra Aj PA-C

## 2020-11-09 NOTE — PLAN OF CARE
Problem: Falls - Risk of:  Goal: Will remain free from falls  Description: Will remain free from falls  Outcome: Ongoing  Goal: Absence of physical injury  Description: Absence of physical injury  Outcome: Ongoing     Problem: Skin Integrity:  Goal: Will show no infection signs and symptoms  Description: Will show no infection signs and symptoms  Outcome: Ongoing  Goal: Absence of new skin breakdown  Description: Absence of new skin breakdown  Outcome: Ongoing     Problem: Confusion - Acute:  Goal: Absence of continued neurological deterioration signs and symptoms  Description: Absence of continued neurological deterioration signs and symptoms  Outcome: Ongoing  Goal: Mental status will be restored to baseline  Description: Mental status will be restored to baseline  Outcome: Ongoing     Problem: Discharge Planning:  Goal: Ability to perform activities of daily living will improve  Description: Ability to perform activities of daily living will improve  Outcome: Ongoing  Goal: Participates in care planning  Description: Participates in care planning  Outcome: Ongoing     Problem: Injury - Risk of, Physical Injury:  Goal: Will remain free from falls  Description: Will remain free from falls  Outcome: Ongoing  Goal: Absence of physical injury  Description: Absence of physical injury  Outcome: Ongoing     Problem: Mood - Altered:  Goal: Mood stable  Description: Mood stable  Outcome: Ongoing  Goal: Absence of abusive behavior  Description: Absence of abusive behavior  Outcome: Ongoing  Goal: Verbalizations of feeling emotionally comfortable while being cared for will increase  Description: Verbalizations of feeling emotionally comfortable while being cared for will increase  Outcome: Ongoing     Problem: Psychomotor Activity - Altered:  Goal: Absence of psychomotor disturbance signs and symptoms  Description: Absence of psychomotor disturbance signs and symptoms  Outcome: Ongoing     Problem: Sensory Perception - Impaired:  Goal: Demonstrations of improved sensory functioning will increase  Description: Demonstrations of improved sensory functioning will increase  Outcome: Ongoing  Goal: Decrease in sensory misperception frequency  Description: Decrease in sensory misperception frequency  Outcome: Ongoing  Goal: Able to refrain from responding to false sensory perceptions  Description: Able to refrain from responding to false sensory perceptions  Outcome: Ongoing  Goal: Demonstrates accurate environmental perceptions  Description: Demonstrates accurate environmental perceptions  Outcome: Ongoing  Goal: Able to distinguish between reality-based and nonreality-based thinking  Description: Able to distinguish between reality-based and nonreality-based thinking  Outcome: Ongoing  Goal: Able to interrupt nonreality-based thinking  Description: Able to interrupt nonreality-based thinking  Outcome: Ongoing     Problem: Sleep Pattern Disturbance:  Goal: Appears well-rested  Description: Appears well-rested  Outcome: Ongoing     Problem: Gas Exchange - Impaired:  Goal: Levels of oxygenation will improve  Description: Levels of oxygenation will improve  Outcome: Ongoing     Problem: Nutrition  Goal: Optimal nutrition therapy  Outcome: Ongoing     Problem: Pain:  Goal: Pain level will decrease  Description: Pain level will decrease  Outcome: Ongoing  Goal: Control of acute pain  Description: Control of acute pain  Outcome: Ongoing  Goal: Control of chronic pain  Description: Control of chronic pain  Outcome: Ongoing     Problem: Restraint Use - Nonviolent/Non-Self-Destructive Behavior:  Goal: Absence of restraint indications  Description: Absence of restraint indications  Outcome: Completed  Goal: Absence of restraint-related injury  Description: Absence of restraint-related injury  Outcome: Completed

## 2020-11-09 NOTE — PROGRESS NOTES
Speech Language Pathology  Facility/Department: [facility] 5 SURG SERVICES   Swallow treatment/cognitive treatment    NAME: Hailey Avalos  : 1940  MRN: [id number]    ADMISSION DATE: 10/25/2020  ADMITTING DIAGNOSIS: has Idiopathic peripheral neuropathy; Chronic bilateral low back pain with bilateral sciatica; Acute on chronic combined systolic (congestive) and diastolic (congestive) heart failure ([facility] [address] Elevated d-dimer; Chronic cystitis; Arthritis; Cerebrovascular accident [facility]); Frequent falls; Gait abnormality; History of cerebrovascular accident (CVA) in adulthood; Hypercholesterolemia; Essential hypertension; Polymyalgia ([facility] [address] Pyelonephritis; Spinal stenosis of lumbosacral region; Respiratory arrest ([facility] [address] Palliative care patient; and Atrial fibrillation [facility]) on their problem list.      Date of Eval: 2020  Evaluating Therapist: Arnoldo Reyna    Current Diet level:  Soft and bite sized with nectar thick liquids      Reason for Referral  Hailey Avalos was referred for a bedside swallow evaluation to assess the efficiency of her swallow function, identify signs and symptoms of aspiration and make recommendations regarding safe dietary consistencies, effective compensatory strategies, and safe eating environment. Impression: Patients swallow function monitored with breakfast meal. Patient exhibits decreased oral prep and mastication with more solid consistencies as well as sluggish, mild/moderately decreased laryngeal elevation for swallow airway protection. Patient did present with delayed throat clears following a few trials. Patient stating her foods were to dry. At this time, would recommend continuation of bite sized consistency and nectar thick liquids. Recommend meds in pudding/applesauce. If patient receives mouth care prior to intake, okay for ice chips IN BETWEEN MEALS for comfort.      Recommended Diet and Intervention  Diet Solids Recommendation: Bite sized   Liquid Consistency Recommendation: Nectar thick   Recommended Form of Meds: Meds in puree as able    Compensatory Swallowing Strategies    Compensatory Swallowing Strategies: Upright as possible for all oral intake;Small bites/sips;Eat/Feed slowly; Alternate solids and liquids; Remain upright for 30-45 minutes after meals      Comprehension: Patient continues to have delayed responses during therapy tasks. Patient able to follow simple 1 and 2 step commands with 100% independently. Did note more delay with 2 step commands. Expressive language: Patient presenting with increased thought organization for conversational discourse. Memory: Patient was oriented to person, place and time today. Patient did utilize board in her room independently to assist with orientation to [facility]. Patient given 3 words to recall during the session. After 2/3 minute delay/distraction patient recalling 1 of the 3 words independently. With mod verbal cues patient able to recall 2/3 words.      Electronically signed by CHATO Crespo on 11/9/2020 at 9:23 AM

## 2020-11-09 NOTE — PROGRESS NOTES
12 hour chart check review completed. Electronically signed by Johnson Burkett LPN on 02/6/9224 at 5:46 AM

## 2020-11-09 NOTE — CARE COORDINATION
Spoke with Denise Giron at the [employer] who reports the staff has returned to their office at the [facility] and she will confirm all paperwork and attempt a 's signature/approval today.

## 2020-11-09 NOTE — PROGRESS NOTES
[facility] Hospitalists      Patient:  Laisha Mckeon  YOB: 1940  Date of Service: 11/9/2020  MRN: [id number]   Acct: [de-identified]   Primary Care Physician: George Peralta MD  Advance Directive: [facility]  Admit Date: 10/25/2020       Hospital Day: 15  Portions of this note have been copied forward, however, changed to reflect the most current clinical status of this patient. CHIEF COMPLAINT Respiratory failure    SUBJECTIVE:     Pt laying in bed in no acute distress, resting comfortably. Nurse notes that patient continues to complain of pain. Cumulative Hospital Course:   Amy Keller is a 79year old female with PMH CVA, HTN, polymyalgia, chronic lower back pain, chronic BLE lymphedema who presented to [facility] ED after being found unresponsive at the [facility] where she resides. She was noted to have acute respiratory failure and supraglottic airway was placed by EMS and she was bagged en route to this facility. Upon arrival to ED she was transitioned to BiPAP. Respiratory failure thought to be 2/2 opiate overdose, however, patient states she has no recollection of events at SNF prior to being found unresponsive and has remained confused with likely anoxic brain injury. She is not capable of making decisions for herself and social work is assisting with legal guardian. She was admitted to Hospitalist service. Social work and palliative care were consulted. Cardiology has followed for elevated troponin suspected to be 2/2 atrial fibrillation and chronic diastolic heart failure. Patient denying chest pain. She was weaned off Cardizem gtt. Anticoagulation continued and transitioned from Lovenox to Eliquis. Coreg added. 11/09/2020 Pt is started on Cefepime for Klebsiella UTI. Review of Systems:   Review of Systems   Constitutional: Negative for chills, diaphoresis, fatigue and fever. HENT: Negative for congestion, ear pain, sinus pain, sore throat and trouble swallowing.     Eyes: Negative for photophobia and visual disturbance. Respiratory: Negative for cough, choking, shortness of breath, wheezing and stridor. Cardiovascular: Negative for chest pain, palpitations and leg swelling. Gastrointestinal: Negative for abdominal distention, abdominal pain, constipation, diarrhea, nausea and vomiting. Endocrine: Negative for cold intolerance and heat intolerance. Genitourinary: Negative for difficulty urinating, flank pain, frequency and urgency. Musculoskeletal: Positive for arthralgias. Negative for myalgias. Neurological: Negative for dizziness, syncope, weakness, light-headedness, numbness and headaches. Hematological: Does not bruise/bleed easily. Psychiatric/Behavioral: Negative for agitation, confusion and dysphoric mood. Objective:   VITALS:  BP (!) 160/80   Pulse 91   Temp 96.5 °F (35.8 °C) (Temporal)   Resp 18   Ht 5' 6\" (1.676 m)   Wt (!) 323 lb 5 oz (146.7 kg)   SpO2 90%   Breastfeeding No   BMI 52.18 kg/m²   24HR INTAKE/OUTPUT:      Intake/Output Summary (Last 24 hours) at 11/9/2020 1103  Last data filed at 11/9/2020 0630  Gross per 24 hour   Intake 340 ml   Output 1800 ml   Net -1460 ml       Physical Exam  Constitutional:       General: She is not in acute distress. Appearance: She is obese. She is ill-appearing. She is not toxic-appearing or diaphoretic. HENT:      Head: Normocephalic and atraumatic. Right Ear: External ear normal.      Left Ear: External ear normal.      Nose: Nose normal. No congestion or rhinorrhea. Mouth/Throat:      Mouth: Mucous membranes are moist.      Pharynx: Oropharynx is clear. Eyes:      General: No scleral icterus. Extraocular Movements: Extraocular movements intact. Conjunctiva/sclera: Conjunctivae normal.   Neck:      Musculoskeletal: Normal range of motion and neck supple. Cardiovascular:      Rate and Rhythm: Normal rate and regular rhythm. Pulses: Normal pulses.       Heart sounds: Normal heart sounds. No murmur. No friction rub. No gallop. Pulmonary:      Effort: Pulmonary effort is normal. No respiratory distress. Breath sounds: Normal breath sounds. No stridor. No wheezing, rhonchi or rales. Abdominal:      General: Abdomen is flat. Bowel sounds are normal. There is no distension. Palpations: Abdomen is soft. Tenderness: There is no abdominal tenderness. Musculoskeletal: Normal range of motion. General: No swelling. Right lower leg: Edema present. Left lower leg: Edema present. Skin:     General: Skin is warm and dry. Coloration: Skin is not jaundiced. Findings: No erythema, lesion or rash. Comments: Chronic BLE venous statis and lymphedema    Neurological:      General: No focal deficit present. Mental Status: She is alert. Mental status is at baseline. Cranial Nerves: No cranial nerve deficit. Sensory: No sensory deficit. Motor: No weakness. Psychiatric:         Mood and Affect: Mood normal.         Behavior: Behavior normal.            Medications:      dextrose        cefepime  2 g Intravenous Q12H    gabapentin  300 mg Oral TID    apixaban  5 mg Oral BID    carvedilol  3.125 mg Oral BID     DULoxetine  20 mg Oral Daily    guaiFENesin  600 mg Oral BID    polyethylene glycol  17 g Oral Daily    docusate sodium  100 mg Oral Daily    lidocaine  1 patch Transdermal Daily    levothyroxine  150 mcg Oral Daily    fluticasone  1 spray Each Nostril Daily    aspirin  81 mg Oral Daily    sodium chloride flush  10 mL Intravenous 2 times per day    insulin lispro  0-6 Units Subcutaneous TID     insulin lispro  0-3 Units Subcutaneous Nightly     sodium chloride, miconazole, oxyCODONE-acetaminophen, sodium chloride flush, acetaminophen **OR** acetaminophen, glucose, dextrose, glucagon (rDNA), dextrose  DIET DYSPHAGIA SOFT AND BITE-SIZED; Carb Control: 4 carb choices (60 gms)/meal; No Added Salt (3-4 GM);  Mildly Thick ([place])     Lab and other Data:     Recent Labs     11/08/20  1400   WBC 6.3   HGB 13.0        UA:  Recent Labs     11/08/20  1515   COLORU DARK YELLOW*   PHUR 5.5   WBCUA 21-30*   RBCUA TNTC*   BACTERIA Trace*   CLARITYU TURBID*   SPECGRAV 1.024   LEUKOCYTESUR SMALL*   UROBILINOGEN 1.0   BILIRUBINUR Negative   BLOODU LARGE*   GLUCOSEU Negative       RAD:   Ct Head Wo Contrast  Result Date: 10/25/2020  Changes of aging with no acute intracranial abnormality   Signed by Dr Ekaterina Meraz on 10/25/2020 3:19 PM    Xr Chest Portable  Result Date: 10/25/2020  1. Moderate cardiomegaly no acute pulmonary vascular congestion. Signed by Dr Ekaterina Meraz on 10/25/2020 2:38 PM    XR Chest Portable  Result Date: 10/30/20   Impression:  The poor lung expansion and discoid atelectatic changes, more in the right lower lung. The Persistent moderate cardiomegaly. No pulmonary congestion. Signed by Dr Olympia Lesches on 10/30/2020 7:03 AM      KUB  Result Date: 10/30/20  Impression:  A moderate amount of stool in the colon more so in the proximal colon. No finding to suggest obstruction or ileus. No radiopaque calculi. Signed by Dr Olympia Lesches on 10/30/2020 2:56 PM        Micro:   Culture, Urine [[id number]]  (Abnormal)  Collected: 11/08/20 1515    Order Status: Completed  Specimen: Urine, clean catch  Updated: 11/09/20 0936     Urine Culture, Routine  >100,000 CFU/ml       Organism  Klebsiella pneumoniae      Urine Culture, Routine  --     Moderate growth   Sensitivity to follow          Assessment/Plan   Principal Problem:    Respiratory arrest (HCC)-2/2 suspected opiate overdose, Utilizing BiPAP at times, does not qualify for Trilogy, resolved, remains stable on 4L [place] oxygen, resolved     Active Problems:    Anoxic brain injury- slow improvement in cognition throughout her hospital course.  SLUMS score 18/30 consistent with dementia        Elevated troponin-2/2 atrial fibrillation/diastolic CHF per Cardiology and without need for further work up, stable       Generalized weakness-PT/OT, will need placement. SW following and has requested state guardianship at this time, unchanged       Atrial fibrillation-transitioned to Eliquis, weaned off Cardizem gtt, Coreg added, stable       Idiopathic peripheral neuropathy/Chronic bilateral low back pain with bilateral sciatica-noted, pain seems to be adequately controlled at this time       History of cerebrovascular accident (CVA) in adulthood-noted       Essential hypertension-well controlled       Palliative care patient-noted       Hypothyroidism-synthroid continued       BLE cellulitis -likely 2/2 chronic lymphedema/venous stasis.        Klebsiella pneumoniae UTI- Cefepime initiated 11/09/2020    Await guardianship/placement. Social work assisting with this.      Antibiotic: Doxycycline completed duration of 7 days.  Cefepime day #1     DVT Prophylaxis: Lovenox    Cele Duval PA-C  11/9/2020, 11:03 AM

## 2020-11-10 NOTE — PROGRESS NOTES
[facility] Hospitalists      Patient:  Rich Seo  YOB: 1940  Date of Service: 11/10/2020  MRN: [id number]   Acct: [de-identified]   Primary Care Physician: Yasir Field MD  Advance Directive: [facility]  Admit Date: 10/25/2020       Hospital Day: 12  Portions of this note have been copied forward, however, changed to reflect the most current clinical status of this patient. CHIEF COMPLAINT Respiratory failure    SUBJECTIVE:     Pt laying in bed in no acute distress, resting comfortably. Pt states she needs to get out of here, she has things to do. She offers no new complaints or concerns at this time. Cumulative Hospital Course:   Melissa Marquis is a 79year old female with PMH CVA, HTN, polymyalgia, chronic lower back pain, chronic BLE lymphedema who presented to [facility] ED after being found unresponsive at the [facility] where she resides. She was noted to have acute respiratory failure and supraglottic airway was placed by EMS and she was bagged en route to this facility. Upon arrival to ED she was transitioned to BiPAP. Respiratory failure thought to be 2/2 opiate overdose, however, patient states she has no recollection of events at SNF prior to being found unresponsive and has remained confused with likely anoxic brain injury. She is not capable of making decisions for herself and social work is assisting with legal guardian. She was admitted to Hospitalist service. Social work and palliative care were consulted. Cardiology has followed for elevated troponin suspected to be 2/2 atrial fibrillation and chronic diastolic heart failure. Patient denying chest pain. She was weaned off Cardizem gtt. Anticoagulation continued and transitioned from Lovenox to Eliquis. Coreg added. 11/09/2020 Pt is started on Cefepime for Klebsiella UTI. Cefepime changed to Rocephin on 11/10 based on urine culture sensitivities.          Review of Systems:   Review of Systems   Constitutional: Negative for chills, diaphoresis, supple. Cardiovascular:      Rate and Rhythm: Normal rate and regular rhythm. Pulses: Normal pulses. Heart sounds: Normal heart sounds. No murmur. No friction rub. No gallop. Pulmonary:      Effort: Pulmonary effort is normal. No respiratory distress. Breath sounds: Normal breath sounds. No stridor. No wheezing, rhonchi or rales. Abdominal:      General: Abdomen is flat. Bowel sounds are normal. There is no distension. Palpations: Abdomen is soft. Tenderness: There is no abdominal tenderness. Musculoskeletal: Normal range of motion. General: No swelling. Right lower leg: Edema present. Left lower leg: Edema present. Skin:     General: Skin is warm and dry. Coloration: Skin is not jaundiced. Findings: No erythema, lesion or rash. Comments: Chronic BLE venous statis and lymphedema    Neurological:      General: No focal deficit present. Mental Status: She is alert. Mental status is at baseline. Cranial Nerves: No cranial nerve deficit. Sensory: No sensory deficit. Motor: No weakness.    Psychiatric:         Mood and Affect: Mood normal.         Behavior: Behavior normal.            Medications:      dextrose        cefTRIAXone (ROCEPHIN) IV  1 g Intravenous Q24H    gabapentin  300 mg Oral TID    apixaban  5 mg Oral BID    carvedilol  3.125 mg Oral BID WC    DULoxetine  20 mg Oral Daily    guaiFENesin  600 mg Oral BID    polyethylene glycol  17 g Oral Daily    docusate sodium  100 mg Oral Daily    lidocaine  1 patch Transdermal Daily    levothyroxine  150 mcg Oral Daily    fluticasone  1 spray Each Nostril Daily    aspirin  81 mg Oral Daily    sodium chloride flush  10 mL Intravenous 2 times per day    insulin lispro  0-6 Units Subcutaneous TID     insulin lispro  0-3 Units Subcutaneous Nightly     sodium chloride, methyl salicylate-menthol, miconazole, oxyCODONE-acetaminophen, sodium chloride flush, acetaminophen **OR** acetaminophen, glucose, dextrose, glucagon (rDNA), dextrose  DIET DYSPHAGIA SOFT AND BITE-SIZED; Carb Control: 4 carb choices (60 gms)/meal; No Added Salt (3-4 GM); Mildly Thick (Nectar)     Lab and other Data:     Recent Labs     11/08/20  1400   WBC 6.3   HGB 13.0        UA:  Recent Labs     11/08/20  1515   COLORU DARK YELLOW*   PHUR 5.5   WBCUA 21-30*   RBCUA TNTC*   BACTERIA Trace*   CLARITYU TURBID*   SPECGRAV 1.024   LEUKOCYTESUR SMALL*   UROBILINOGEN 1.0   BILIRUBINUR Negative   BLOODU LARGE*   GLUCOSEU Negative       RAD:   Ct Head Wo Contrast  Result Date: 10/25/2020  Changes of aging with no acute intracranial abnormality   Signed by Dr Betty Farooq on 10/25/2020 3:19 PM    Xr Chest Portable  Result Date: 10/25/2020  1. Moderate cardiomegaly no acute pulmonary vascular congestion. Signed by Dr Betty Farooq on 10/25/2020 2:38 PM    XR Chest Portable  Result Date: 10/30/20   Impression:  The poor lung expansion and discoid atelectatic changes, more in the right lower lung. The Persistent moderate cardiomegaly. No pulmonary congestion. Signed by Dr Carlton Lew on 10/30/2020 7:03 AM      KUB  Result Date: 10/30/20  Impression:  A moderate amount of stool in the colon more so in the proximal colon. No finding to suggest obstruction or ileus. No radiopaque calculi.    Signed by Dr Carlton Lew on 10/30/2020 2:56 PM        Micro:   Culture, Urine [[id number]]  (Abnormal)  Collected: 11/08/20 1515    Order Status: Completed  Specimen: Urine, clean catch  Updated: 11/09/20 0936     Urine Culture, Routine  >100,000 CFU/ml       Organism  Klebsiella pneumoniae      Urine Culture, Routine  --     Moderate growth   Sensitivity to follow    Susceptibility   Klebsiella pneumoniae (1)   Antibiotic  Interpretation  MEREDITH  Status     ampicillin  Resistant  >=32  mcg/mL      aztreonam  Sensitive  <=1  mcg/mL      ceFAZolin  Sensitive  <=4  mcg/mL      cefepime  Sensitive  <=1  mcg/mL      cefTRIAXone Sensitive  <=1  mcg/mL      ertapenem  Sensitive  <=0.5  mcg/mL      gentamicin  Sensitive  <=1  mcg/mL      levofloxacin  Sensitive  <=0.12  mcg/mL      meropenem  Sensitive  <=0.25  mcg/mL      nitrofurantoin  Sensitive  <=16  mcg/mL      piperacillin-tazobactam  Sensitive  8  mcg/mL      trimethoprim-sulfamethoxazole  Sensitive  <=20  mcg/mL              Assessment/Plan   Principal Problem:    Respiratory arrest (HCC)-2/2 suspected opiate overdose, Utilizing BiPAP at times, does not qualify for Trilogy, resolved, remains stable on 4L [place] oxygen, resolved     Active Problems:    Anoxic brain injury- slow improvement in cognition throughout her hospital course. SLUMS score 18/30 consistent with dementia        Elevated troponin-2/2 atrial fibrillation/diastolic CHF per Cardiology and without need for further work up, stable       Generalized weakness-PT/OT, will need placement. SW following and has requested state guardianship at this time, unchanged       Atrial fibrillation-transitioned to Eliquis, weaned off Cardizem gtt, Coreg added, stable       Idiopathic peripheral neuropathy/Chronic bilateral low back pain with bilateral sciatica-noted, pain seems to be adequately controlled at this time       History of cerebrovascular accident (CVA) in adulthood-noted       Essential hypertension-well controlled       Palliative care patient-noted       Hypothyroidism-synthroid continued       BLE cellulitis -likely 2/2 chronic lymphedema/venous stasis.        Klebsiella pneumoniae UTI- Cefepime initiated 11/09/2020 but changed to Rocephin on 11/20 based on urine culture sensitivities. Await guardianship/placement. Social work assisting with this.      Antibiotic: Doxycycline completed duration of 7 days.  Cefepime changed to rocephin 11/10/20 based on urine culture sensitivities.      DVT Prophylaxis: Lovenox    Sade Cates PA-C  11/10/2020, 1:35 PM

## 2020-11-10 NOTE — PROGRESS NOTES
Speech Language Pathology  Facility/Department: [facility] SURG SERVICES   Swallow treatment/speech and language treatment    NAME: Rich Seo  : 1940  MRN: [id number]    ADMISSION DATE: 10/25/2020  ADMITTING DIAGNOSIS: has Idiopathic peripheral neuropathy; Chronic bilateral low back pain with bilateral sciatica; Acute on chronic combined systolic (congestive) and diastolic (congestive) heart failure ([facility] [address] Elevated d-dimer; Chronic cystitis; Arthritis; Cerebrovascular accident [facility]); Frequent falls; Gait abnormality; History of cerebrovascular accident (CVA) in adulthood; Hypercholesterolemia; Essential hypertension; Polymyalgia ([facility] [address] Pyelonephritis; Spinal stenosis of lumbosacral region; Respiratory arrest ([facility] [address] Palliative care patient; and Atrial fibrillation [facility]) on their problem list.    Date of Eval: 11/10/2020  Evaluating Therapist: Raman Flores    Current Diet level:  Soft and bite sized with nectar thick liquids    Reason for Referral  Rich Seo was referred for a bedside swallow evaluation to assess the efficiency of her swallow function, identify signs and symptoms of aspiration and make recommendations regarding safe dietary consistencies, effective compensatory strategies, and safe eating environment. Impression: Swallow function reassessed today with trials of thin liquids via cup. Patient presenting with immediate cough following consecutive sips. Fast oral transit noted with each sip. Patient continues to tolerate nectar thick liquids with no overt s/s of aspiration observed.         At this time, would recommend continuation of bite sized consistency and nectar thick liquids. Recommend meds in pudding/applesauce. If patient receives mouth care prior to intake, okay for ice chips IN BETWEEN MEALS for comfort. Continue to follow for swallow monitoring/reassess and cognition.      Recommended Diet and Intervention  Diet Solids Recommendation: Bite sized   Liquid Consistency

## 2020-11-11 NOTE — PLAN OF CARE
Problem: Falls - Risk of:  Goal: Will remain free from falls  Description: Will remain free from falls  Outcome: Ongoing  Goal: Absence of physical injury  Description: Absence of physical injury  Outcome: Ongoing     Problem: Skin Integrity:  Goal: Will show no infection signs and symptoms  Description: Will show no infection signs and symptoms  Outcome: Ongoing  Goal: Absence of new skin breakdown  Description: Absence of new skin breakdown  Outcome: Ongoing     Problem: Confusion - Acute:  Goal: Absence of continued neurological deterioration signs and symptoms  Description: Absence of continued neurological deterioration signs and symptoms  Outcome: Ongoing  Goal: Mental status will be restored to baseline  Description: Mental status will be restored to baseline  Outcome: Ongoing     Problem: Discharge Planning:  Goal: Ability to perform activities of daily living will improve  Description: Ability to perform activities of daily living will improve  Outcome: Ongoing  Goal: Participates in care planning  Description: Participates in care planning  Outcome: Ongoing     Problem: Injury - Risk of, Physical Injury:  Goal: Will remain free from falls  Description: Will remain free from falls  Outcome: Ongoing  Goal: Absence of physical injury  Description: Absence of physical injury  Outcome: Ongoing     Problem: Mood - Altered:  Goal: Mood stable  Description: Mood stable  Outcome: Ongoing  Goal: Absence of abusive behavior  Description: Absence of abusive behavior  Outcome: Ongoing  Goal: Verbalizations of feeling emotionally comfortable while being cared for will increase  Description: Verbalizations of feeling emotionally comfortable while being cared for will increase  Outcome: Ongoing     Problem: Psychomotor Activity - Altered:  Goal: Absence of psychomotor disturbance signs and symptoms  Description: Absence of psychomotor disturbance signs and symptoms  Outcome: Ongoing     Problem: Sensory Perception - Impaired:  Goal: Demonstrations of improved sensory functioning will increase  Description: Demonstrations of improved sensory functioning will increase  Outcome: Ongoing  Goal: Decrease in sensory misperception frequency  Description: Decrease in sensory misperception frequency  Outcome: Ongoing  Goal: Able to refrain from responding to false sensory perceptions  Description: Able to refrain from responding to false sensory perceptions  Outcome: Ongoing  Goal: Demonstrates accurate environmental perceptions  Description: Demonstrates accurate environmental perceptions  Outcome: Ongoing  Goal: Able to distinguish between reality-based and nonreality-based thinking  Description: Able to distinguish between reality-based and nonreality-based thinking  Outcome: Ongoing  Goal: Able to interrupt nonreality-based thinking  Description: Able to interrupt nonreality-based thinking  Outcome: Ongoing     Problem: Sleep Pattern Disturbance:  Goal: Appears well-rested  Description: Appears well-rested  Outcome: Ongoing     Problem: Gas Exchange - Impaired:  Goal: Levels of oxygenation will improve  Description: Levels of oxygenation will improve  Outcome: Ongoing     Problem: Nutrition  Goal: Optimal nutrition therapy  Outcome: Ongoing     Problem: Pain:  Goal: Pain level will decrease  Description: Pain level will decrease  Outcome: Ongoing  Goal: Control of acute pain  Description: Control of acute pain  Outcome: Ongoing  Goal: Control of chronic pain  Description: Control of chronic pain  Outcome: Ongoing

## 2020-11-11 NOTE — CARE COORDINATION
Monae with the Co 's Office confirms the petition for state guardianship request has been turned in and being processed to the [employer] in [place]. Unfortunately, [facility] cannot check on the status of approval/'s signature d/t the courthouse being closed today for [employer] Day. [facility] will follow up tomorrow.

## 2020-11-11 NOTE — PROGRESS NOTES
Nutrition Assessment     Type and Reason for Visit: Reassess    Nutrition Assessment:  PO intake is good. Continue current POC. Estimated Daily Nutrient Needs:  Energy (kcal): 1114-5732; Weight Used for Energy Requirements:  Ideal     Protein (g): ; Weight Used for Protein Requirements:  Ideal(1.2-2.0)        Fluid (ml/day): 9787-6772; Weight Used for Fluid Requirements:  Ideal      Current Nutrition Therapies:    DIET DYSPHAGIA SOFT AND BITE-SIZED; Carb Control: 4 carb choices (60 gms)/meal; No Added Salt (3-4 GM);  Mildly Thick ([place])    Anthropometric Measures:  · Height: 5' 6\" (167.6 cm)  · Current Body Wt: 323 lb (146.5 kg)   · BMI: 52.2    Nutrition Diagnosis:   · Increased nutrient needs related to increase demand for energy/nutrients as evidenced by wounds    Nutrition Interventions:   Food and/or Nutrient Delivery:  Continue Current Diet  Coordination of Nutrition Care:  Continue to monitor while inpatient    Goals:  New Goal: Pt will continue with good PO intake to meet nutritional needs       Nutrition Monitoring and Evaluation:   Food/Nutrient Intake Outcomes:  Food and Nutrient Intake  Physical Signs/Symptoms Outcomes:  Biochemical Data, Nutrition Focused Physical Findings, Skin, Weight     Electronically signed by Samson Wang MS, RD, LD on 11/11/20 at 10:28 AM CST    Contact: [phone]

## 2020-11-11 NOTE — PLAN OF CARE
Nutrition Problem #1: Increased nutrient needs  Intervention: Food and/or Nutrient Delivery: Continue Current Diet  Nutritional Goals: New Goal: Pt will continue with good PO intake to meet nutritional needs

## 2020-11-11 NOTE — PROGRESS NOTES
[facility] Hospitalists      Patient:  Jarrod Duff  YOB: 1940  Date of Service: 11/11/2020  MRN: [id number]   Acct: [de-identified]   Primary Care Physician: Samuel Disla MD  Advance Directive: [facility]  Admit Date: 10/25/2020       Hospital Day: 16  Portions of this note have been copied forward, however, changed to reflect the most current clinical status of this patient. CHIEF COMPLAINT Respiratory failure    SUBJECTIVE:     Pt laying in bed in no acute distress, resting comfortably. Pt wanting to get up out of bed. She offers no new complaints or concerns at this time. Cumulative Hospital Course:   Roberto Kidd is a 79year old female with PMH CVA, HTN, polymyalgia, chronic lower back pain, chronic BLE lymphedema who presented to [address] [place] [address] ED after being found unresponsive at the SNF where she resides. She was noted to have acute respiratory failure and supraglottic airway was placed by EMS and she was bagged en route to this facility. Upon arrival to ED she was transitioned to BiPAP. Respiratory failure thought to be 2/2 opiate overdose, however, patient states she has no recollection of events at SNF prior to being found unresponsive and has remained confused with likely anoxic brain injury. She is not capable of making decisions for herself and social work is assisting with legal guardian. She was admitted to Hospitalist service. Social work and palliative care were consulted. Cardiology has followed for elevated troponin suspected to be 2/2 atrial fibrillation and chronic diastolic heart failure. Patient denying chest pain. She was weaned off Cardizem gtt. Anticoagulation continued and transitioned from Lovenox to Eliquis. Coreg added. 11/09/2020 Pt is started on Cefepime for Klebsiella UTI. Cefepime changed to Rocephin on 11/10 based on urine culture sensitivities. Review of Systems:   Review of Systems   Constitutional: Negative for chills, diaphoresis, fatigue and fever.    HENT: Negative for congestion, ear pain, sinus pain, sore throat and trouble swallowing. Eyes: Negative for photophobia and visual disturbance. Respiratory: Negative for cough, choking, shortness of breath, wheezing and stridor. Cardiovascular: Negative for chest pain, palpitations and leg swelling. Gastrointestinal: Negative for abdominal distention, abdominal pain, constipation, diarrhea, nausea and vomiting. Endocrine: Negative for cold intolerance and heat intolerance. Genitourinary: Negative for difficulty urinating, flank pain, frequency and urgency. Musculoskeletal: Positive for arthralgias. Negative for myalgias. Neurological: Negative for dizziness, syncope, weakness, light-headedness, numbness and headaches. Hematological: Does not bruise/bleed easily. Psychiatric/Behavioral: Negative for agitation, confusion and dysphoric mood. Objective:   VITALS:  /62   Pulse 64   Temp 97.6 °F (36.4 °C) (Temporal)   Resp 18   Ht 5' 6\" (1.676 m)   Wt (!) 323 lb 5 oz (146.7 kg)   SpO2 90%   Breastfeeding No   BMI 52.18 kg/m²   24HR INTAKE/OUTPUT:      Intake/Output Summary (Last 24 hours) at 11/11/2020 1406  Last data filed at 11/11/2020 1052  Gross per 24 hour   Intake 330 ml   Output 1725 ml   Net -1395 ml       Physical Exam  Constitutional:       General: She is not in acute distress. Appearance: She is obese. She is not ill-appearing, toxic-appearing or diaphoretic. HENT:      Head: Normocephalic and atraumatic. Right Ear: External ear normal.      Left Ear: External ear normal.      Nose: Nose normal. No congestion or rhinorrhea. Mouth/Throat:      Mouth: Mucous membranes are moist.      Pharynx: Oropharynx is clear. Eyes:      General: No scleral icterus. Extraocular Movements: Extraocular movements intact. Conjunctiva/sclera: Conjunctivae normal.   Neck:      Musculoskeletal: Normal range of motion and neck supple.    Cardiovascular:      Rate and Rhythm: Normal rate and regular rhythm. Pulses: Normal pulses. Heart sounds: Normal heart sounds. No murmur. No friction rub. No gallop. Pulmonary:      Effort: Pulmonary effort is normal. No respiratory distress. Breath sounds: Normal breath sounds. No stridor. No wheezing, rhonchi or rales. Abdominal:      General: Abdomen is flat. Bowel sounds are normal. There is no distension. Palpations: Abdomen is soft. Tenderness: There is no abdominal tenderness. Musculoskeletal: Normal range of motion. General: No swelling. Right lower leg: Edema present. Left lower leg: Edema present. Skin:     General: Skin is warm and dry. Coloration: Skin is not jaundiced. Findings: No erythema, lesion or rash. Comments: Chronic BLE venous statis and lymphedema    Neurological:      General: No focal deficit present. Mental Status: She is alert. Mental status is at baseline. Cranial Nerves: No cranial nerve deficit. Sensory: No sensory deficit. Motor: No weakness.    Psychiatric:         Mood and Affect: Mood normal.         Behavior: Behavior normal.            Medications:      dextrose        diclofenac sodium  1 g Topical BID    carvedilol  6.25 mg Oral BID WC    cefTRIAXone (ROCEPHIN) IV  1 g Intravenous Q24H    gabapentin  300 mg Oral TID    apixaban  5 mg Oral BID    DULoxetine  20 mg Oral Daily    guaiFENesin  600 mg Oral BID    polyethylene glycol  17 g Oral Daily    docusate sodium  100 mg Oral Daily    lidocaine  1 patch Transdermal Daily    levothyroxine  150 mcg Oral Daily    fluticasone  1 spray Each Nostril Daily    aspirin  81 mg Oral Daily    sodium chloride flush  10 mL Intravenous 2 times per day    insulin lispro  0-6 Units Subcutaneous TID     insulin lispro  0-3 Units Subcutaneous Nightly     sodium chloride, methyl salicylate-menthol, miconazole, oxyCODONE-acetaminophen, sodium chloride flush, acetaminophen **OR** acetaminophen, glucose, dextrose, glucagon (rDNA), dextrose  DIET DYSPHAGIA SOFT AND BITE-SIZED; Carb Control: 4 carb choices (60 gms)/meal; No Added Salt (3-4 GM); Mildly Thick ([place])     Lab and other Data:     No results for input(s): WBC, HGB, PLT in the last 72 hours. UA:  Recent Labs     11/08/20  1515   COLORU DARK YELLOW*   PHUR 5.5   WBCUA 21-30*   RBCUA TNTC*   BACTERIA Trace*   CLARITYU TURBID*   SPECGRAV 1.024   LEUKOCYTESUR SMALL*   UROBILINOGEN 1.0   BILIRUBINUR Negative   BLOODU LARGE*   GLUCOSEU Negative       RAD:   Ct Head Wo Contrast  Result Date: 10/25/2020  Changes of aging with no acute intracranial abnormality   Signed by Dr Laura Barber on 10/25/2020 3:19 PM    Xr Chest Portable  Result Date: 10/25/2020  1. Moderate cardiomegaly no acute pulmonary vascular congestion. Signed by Dr Laura Barber on 10/25/2020 2:38 PM    XR Chest Portable  Result Date: 10/30/20   Impression:  The poor lung expansion and discoid atelectatic changes, more in the right lower lung. The Persistent moderate cardiomegaly. No pulmonary congestion. Signed by Dr Tamy Massey on 10/30/2020 7:03 AM      KUB  Result Date: 10/30/20  Impression:  A moderate amount of stool in the colon more so in the proximal colon. No finding to suggest obstruction or ileus. No radiopaque calculi.    Signed by Dr Tamy Massey on 10/30/2020 2:56 PM        Micro:   Culture, Urine [[id number]]  (Abnormal)  Collected: 11/08/20 1515    Order Status: Completed  Specimen: Urine, clean catch  Updated: 11/09/20 0936     Urine Culture, Routine  >100,000 CFU/ml       Organism  Klebsiella pneumoniae      Urine Culture, Routine  --     Moderate growth   Sensitivity to follow    Susceptibility   Klebsiella pneumoniae (1)   Antibiotic  Interpretation  MEREDITH  Status     ampicillin  Resistant  >=32  mcg/mL      aztreonam  Sensitive  <=1  mcg/mL      ceFAZolin  Sensitive  <=4  mcg/mL      cefepime  Sensitive  <=1  mcg/mL      cefTRIAXone Sensitive  <=1  mcg/mL      ertapenem  Sensitive  <=0.5  mcg/mL      gentamicin  Sensitive  <=1  mcg/mL      levofloxacin  Sensitive  <=0.12  mcg/mL      meropenem  Sensitive  <=0.25  mcg/mL      nitrofurantoin  Sensitive  <=16  mcg/mL      piperacillin-tazobactam  Sensitive  8  mcg/mL      trimethoprim-sulfamethoxazole  Sensitive  <=20  mcg/mL              Assessment/Plan   Principal Problem:    Respiratory arrest (HCC)-2/2 suspected opiate overdose, Utilizing BiPAP at times, does not qualify for Trilogy, resolved, remains stable on 4L [place] oxygen, resolved     Active Problems:    Anoxic brain injury- slow improvement in cognition throughout her hospital course. SLUMS score 18/30 consistent with dementia        Elevated troponin-2/2 atrial fibrillation/diastolic CHF per Cardiology and without need for further work up, stable       Generalized weakness- PT/OT, will need placement. SW following and has requested state guardianship at this time, unchanged       Atrial fibrillation-transitioned to Eliquis, weaned off Cardizem gtt, Coreg added, stable       Idiopathic peripheral neuropathy/Chronic bilateral low back pain with bilateral sciatica-noted, pain seems to be adequately controlled at this time       History of cerebrovascular accident (CVA) in adulthood-noted       Essential hypertension-well controlled       Palliative care patient-noted       Hypothyroidism-synthroid continued       BLE cellulitis -likely 2/2 chronic lymphedema/venous stasis.        Klebsiella pneumoniae UTI- Cefepime initiated 11/09/2020 but changed to Rocephin on 11/20 based on urine culture sensitivities. Await guardianship/placement. Social work assisting with this.      Antibiotic: Doxycycline completed duration of 7 days.  Cefepime changed to rocephin 11/10/20 based on urine culture sensitivities.      DVT Prophylaxis: Kisha Calderon PA-C  11/11/2020, 2:06 PM         Attestation:    Patient seen independently and case discussed with Tutu Frank PA-C on 11/11/2020. I agree with assessment and plan as detailed in progress note for today with the following notes. Patient resting comfortably no acute distress. Patient denies any chest pain, shortness of breath or dysuria. On exam, diminished breath sounds bilaterally and regular heart rhythm. Laboratory diagnostics reviewed. Urine culture speciated Klebsiella sensitive to Rocephin    Patient with long hospital course, originally admitted following respiratory failure with suspected opiate overdose. Hospital course complicated by AMS with confusion and agitation at times. Attempting placement at rehab facility as well as obtaining guardianship through the state as family unwilling and social work assisting. Continue treatment of acute issues including current antibiotic course for UTI, needs to complete minimum of 5 days. Continue rate control with Coreg, uptitrate as blood pressure and heart can tolerate for A. fib and Eliquis for anticoagulation. BiPAP as needed. Continue to work on placement and obtaining patient guardianship through state with case management/social work assistance.

## 2020-11-12 NOTE — PROGRESS NOTES
Physical Therapy    Facility/Department: [facility] SURG SERVICES  Initial Assessment    NAME: Sarah Beard  : 1940  MRN: [id number]    Date of Service: 2020    Discharge Recommendations:  Continue to assess pending progress, Patient would benefit from continued therapy after discharge        Assessment   Body structures, Functions, Activity limitations: Decreased functional mobility ; Decreased ADL status; Decreased ROM; Decreased strength;Decreased safe awareness;Decreased endurance;Decreased balance;Decreased cognition; Increased pain  Assessment: REQUIRES ASSIST FOR ALL MOBILITY. BED MOB DIFFICULT DUE TO AIR BED. ASSIST X 2 TO STAND AND PIVOT TO CHAIR, Pt FATIGUES QUICKLY. WILL PROGRESS AS TOLERATED. PT Education: Plan of Care;PT Role  REQUIRES PT FOLLOW UP: Yes  Activity Tolerance  Activity Tolerance: Patient limited by fatigue;Patient limited by endurance       Patient Diagnosis(es): The primary encounter diagnosis was Respiratory arrest ([address] Diagnoses of Altered mental status, unspecified altered mental status type, Acute respiratory failure with hypoxemia ([address] Chronically on opiate therapy, Nursing home resident, Acute renal failure, unspecified acute renal failure type ([address] Acute cystitis without hematuria, Respiratory acidosis, and Hypoxic brain injury [facility]) were also pertinent to this visit. has a past medical history of Cellulitis of left lower limb, Cellulitis of right lower limb, CHF (congestive heart failure) ([address] Chronic cystitis, Essential hypertension, Fibromyalgia, Hyperglycemia, Hyperlipidemia, Hypertension, Hypothyroidism, Idiopathic peripheral autonomic neuropathy, Kidney stones, Lymphedema, Osteoarthritis, Osteoarthritis, Palliative care patient, Peripheral neuropathy, Polymyalgia rheumatica, Polymyalgia rheumatica ([address] Sciatica, Stroke, and Venous insufficiency (chronic) (peripheral). has a past surgical history that includes Hysterectomy;  Tonsillectomy; and Kidney surgery. Restrictions  Restrictions/Precautions  Restrictions/Precautions: Fall Risk  Position Activity Restriction  Other position/activity restrictions: BARIATRIC BED  Vision/Hearing        Subjective  General  Patient assessed for rehabilitation services?: Yes  Diagnosis: RESPIRATORY ARREST  Subjective  Subjective: Pt WANTS TO TRY GETTING UP TO CHAIR  Pain Screening  Patient Currently in Pain: No          Orientation  Orientation  Overall Orientation Status: Within Functional Limits  Social/Functional History  Social/Functional History  Type of Home: Facility  ADL Assistance: Needs assistance  Transfer Assistance: Needs assistance  Additional Comments: Pt. reports using a w/c for mobility. She states she would stand and pivot with assist to a w/c  Cognition        Objective          PROM RLE (degrees)  RLE PROM: WFL  PROM LLE (degrees)  LLE PROM: WFL  Strength RLE  Comment: GROSSLY -3/5  Strength LLE  Comment: GROSSLY -3/5        Bed mobility  Supine to Sit: Minimal assistance; Moderate assistance  Transfers  Sit to Stand: Moderate Assistance;Maximum Assistance;2 Person Assistance  Stand to sit: Moderate Assistance;Maximum Assistance;2 Person Assistance  Bed to Chair: Moderate assistance;Maximum assistance;2 Person Assistance  Stand Pivot Transfers: Moderate Assistance;Maximum Assistance;2 Person Assistance([facility], UNSTEADY OVERALL)  Comment: STOOD AGAIN FACING BEDRAIL WITH ASSIST X 2 FOR CLEAN-UP.         Balance  Sitting - Dynamic: Fair  Standing - Dynamic: Poor        Plan   Plan  Times per week: AT LEAST 4-6  Current Treatment Recommendations: Strengthening, ROM, Balance Training, Functional Mobility Training, Patient/Caregiver Education & Training, Safety Education & Training, Transfer Training  Safety Devices  Type of devices: Call light within reach    G-Code       OutComes Score                                                  AM-PAC Score             Goals  Short term goals  Time Frame for Short term goals: 14 DAYS  Short term goal 1: BED MOB MIN ASSIST  Short term goal 2: SIT TO STAND MIN-MOD  Short term goal 3: BED TO CHAIR MIN-MOD       Therapy Time   Individual Concurrent Group Co-treatment   Time In           Time Out           Minutes                   Sandra Phillip, PT

## 2020-11-12 NOTE — CARE COORDINATION
Attempted to contact Divya with [employer] [phone] for approval of state guardianship and left a VM requesting callback for update. Electronically signed by Isabella Rodas on 11/12/2020 at 9:59 AM  Spoke with Divya at the [employer] who confirms has the paperwork and will get either a  approval or hearing set today and she will notify me when determined.

## 2020-11-12 NOTE — PROGRESS NOTES
[facility] Hospitalists      Patient:  Kaleb Trujillo  YOB: 1940  Date of Service: 11/12/2020  MRN: [id number]   Acct: [de-identified]   Primary Care Physician: Le Hassan MD  Advance Directive: [facility]  Admit Date: 10/25/2020       Hospital Day: 25  Portions of this note have been copied forward, however, changed to reflect the most current clinical status of this patient. CHIEF COMPLAINT Respiratory failure    SUBJECTIVE:     Pt laying in bed in no acute distress, resting comfortably. She offers no new complaints or concerns at this time. Cumulative Hospital Course:   Kay Rizvi is a 79year old female with PMH CVA, HTN, polymyalgia, chronic lower back pain, chronic BLE lymphedema who presented to [address] [place] [address] ED after being found unresponsive at the SNF where she resides. She was noted to have acute respiratory failure and supraglottic airway was placed by EMS and she was bagged en route to this facility. Upon arrival to ED she was transitioned to BiPAP. Respiratory failure thought to be 2/2 opiate overdose, however, patient states she has no recollection of events at SNF prior to being found unresponsive and has remained confused with likely anoxic brain injury. She is not capable of making decisions for herself and social work is assisting with legal guardian. She was admitted to Hospitalist service. Social work and palliative care were consulted. Cardiology has followed for elevated troponin suspected to be 2/2 atrial fibrillation and chronic diastolic heart failure. Patient denying chest pain. She was weaned off Cardizem gtt. Anticoagulation continued and transitioned from Lovenox to Eliquis. Coreg added. 11/09/2020 Pt is started on Cefepime for Klebsiella UTI. Cefepime changed to Rocephin on 11/10 based on urine culture sensitivities.   Guardianship hearing at [facility] court on Monday November 16th at 9:30AM.      Review of Systems:   Review of Systems   Constitutional: Negative for sodium chloride flush, acetaminophen **OR** acetaminophen, glucose, dextrose, glucagon (rDNA), dextrose  DIET DYSPHAGIA SOFT AND BITE-SIZED; Carb Control: 4 carb choices (60 gms)/meal; No Added Salt (3-4 GM); Mildly Thick (Nectar)     Lab and other Data:     RAD:   Ct Head Wo Contrast  Result Date: 10/25/2020  Changes of aging with no acute intracranial abnormality   Signed by Dr Nataliya Ceballos on 10/25/2020 3:19 PM    Xr Chest Portable  Result Date: 10/25/2020  1. Moderate cardiomegaly no acute pulmonary vascular congestion. Signed by Dr Nataliya Ceballos on 10/25/2020 2:38 PM    XR Chest Portable  Result Date: 10/30/20   Impression:  The poor lung expansion and discoid atelectatic changes, more in the right lower lung. The Persistent moderate cardiomegaly. No pulmonary congestion. Signed by Dr Marco Antonio Lynch on 10/30/2020 7:03 AM      KUB  Result Date: 10/30/20  Impression:  A moderate amount of stool in the colon more so in the proximal colon. No finding to suggest obstruction or ileus. No radiopaque calculi.    Signed by Dr Marco Antonio Lynch on 10/30/2020 2:56 PM        Micro:   Culture, Urine [[id number]]  (Abnormal)  Collected: 11/08/20 1515    Order Status: Completed  Specimen: Urine, clean catch  Updated: 11/09/20 0936     Urine Culture, Routine  >100,000 CFU/ml       Organism  Klebsiella pneumoniae      Urine Culture, Routine  --     Moderate growth   Sensitivity to follow    Susceptibility   Klebsiella pneumoniae (1)   Antibiotic  Interpretation  MEREDITH  Status     ampicillin  Resistant  >=32  mcg/mL      aztreonam  Sensitive  <=1  mcg/mL      ceFAZolin  Sensitive  <=4  mcg/mL      cefepime  Sensitive  <=1  mcg/mL      cefTRIAXone  Sensitive  <=1  mcg/mL      ertapenem  Sensitive  <=0.5  mcg/mL      gentamicin  Sensitive  <=1  mcg/mL      levofloxacin  Sensitive  <=0.12  mcg/mL      meropenem  Sensitive  <=0.25  mcg/mL      nitrofurantoin  Sensitive  <=16  mcg/mL      piperacillin-tazobactam  Sensitive  8  mcg/mL

## 2020-11-12 NOTE — PROGRESS NOTES
Speech Language Pathology  Facility/Department: [facility] 5 SURG SERVICES  Daily Treatment Note  NAME: Carmel Perea  : 1940  MRN: [id number]    Date of Eval: 2020  Evaluating Therapist: Andi Dia    Patient Diagnosis(es): has Idiopathic peripheral neuropathy; Chronic bilateral low back pain with bilateral sciatica; Acute on chronic combined systolic (congestive) and diastolic (congestive) heart failure ([facility] [address] [facility] Elevated d-dimer; Chronic cystitis; Arthritis; Cerebrovascular accident [facility]); Frequent falls; Gait abnormality; History of cerebrovascular accident (CVA) in adulthood; Hypercholesterolemia; Essential hypertension; Polymyalgia ([facility] [address] Pyelonephritis; Spinal stenosis of lumbosacral region; Respiratory arrest ([facility] [address] Palliative care patient; and Atrial fibrillation ([facility] [address] on their problem list.  Onset Date:     Primary Complaint: Requesting thin liquids. States the food is not appetizing. Uncomfortable in her bed, wanting to sit in the recliner. Also complaints of mucous drainage.     Pain:  Pain Assessment  Pain Assessment: 0-10  Pain Level: 9  Patient's Stated Pain Goal: No pain  Pain Type: Acute pain  Pain Location: Back  Pain Orientation: Other (Comment)(all over)  Pain Radiating Towards: no  Pain Descriptors: Aching, Cramping  Pain Frequency: Intermittent  Pain Onset: Gradual  Clinical Progression: Gradually worsening  Functional Pain Assessment: Prevents or interferes some active activities and ADLs  Non-Pharmaceutical Pain Intervention(s): Distraction, Emotional support, Rest  Response to Pain Intervention: Asleep with RR greater than 10  Multiple Pain Sites: No  Pain Assessment/FLACC  Pain Rating: FLACC (rest) - Face: no particular expression or smile  Pain Rating: FLACC (rest) - Legs: normal position or relaxed  Pain Rating: FLACC (rest) - Activity: lying quietly, normal position, moves easily  Pain Rating: FLACC (rest) - Cry: no cry (awake or asleep)  Pain Rating: FLACC (rest) - Consolability: content, relaxed  Score: FLACC (rest): 0    Oral Motor / Motor Speech: Appears WNL     Auditory Comprehens Responded to simple y/n questions. Turn-taking in conversation and following more than three turns in a conversation. Appears with %. Verbal Expression: Naming events in relation to her hospitalization, with Increasing difficulty with first week of hospital stay. Pt named approximately 5 events w/i 60 seconds. Cognitive Linguistic: Self-monitoring, planning with use of self-administering medications (nasal spray) as well as oral care routine. Pt verbalized completing these tasks daily asking for assistance for set-up. Pt demonstrated both routines, requiring no cues, stand-by assistance. Patient/Family/Caregiver Education: Pt educated on her swallowing function and continuation of nectar thick liquids. Education on cognition. Impressions: SLP recommends pt for upgrade to thin liquids. However, one more tx with monitoring thin liquids to determine readiness. SLP predicts pt's cough response appears with mucous drainage. Pt had 25 trials of thin liquids via straw. 3x throat clear and 1x immediate cough. Pt can verbalize throat clears are to increase mucous clearance. Pt with minimal risk for aspiration. Plan:  Continued daily Speech/Language treatment with goals per  plan of care. And swallow treatment. Try for liquid upgrade before pt is d/c.                                Alba Ritchie  Electronically signed by CHATO Davidson on 11/12/20 at 11:15 AM CST

## 2020-11-12 NOTE — PROGRESS NOTES
Occupational Therapy   Occupational Therapy Initial Assessment  Date: 2020   Patient Name: Giles Lowe  MRN: [id number]     : 1940    Date of Service: 2020    Discharge Recommendations:          Assessment   Performance deficits / Impairments: Decreased endurance;Decreased functional mobility ; Decreased ADL status; Decreased ROM; Decreased balance;Decreased strength;Decreased safe awareness;Decreased cognition  Assessment: Will progress as tolerated  Treatment Diagnosis: Respiratory Arrest  Prognosis: Good  REQUIRES OT FOLLOW UP: Yes  Activity Tolerance  Activity Tolerance: Patient Tolerated treatment well           Patient Diagnosis(es): The primary encounter diagnosis was Respiratory arrest ([address] Diagnoses of Altered mental status, unspecified altered mental status type, Acute respiratory failure with hypoxemia ([address] Chronically on opiate therapy, Nursing home resident, Acute renal failure, unspecified acute renal failure type ([address] Acute cystitis without hematuria, Respiratory acidosis, and Hypoxic brain injury [facility]) were also pertinent to this visit. has a past medical history of Cellulitis of left lower limb, Cellulitis of right lower limb, CHF (congestive heart failure) ([address] Chronic cystitis, Essential hypertension, Fibromyalgia, Hyperglycemia, Hyperlipidemia, Hypertension, Hypothyroidism, Idiopathic peripheral autonomic neuropathy, Kidney stones, Lymphedema, Osteoarthritis, Osteoarthritis, Palliative care patient, Peripheral neuropathy, Polymyalgia rheumatica, Polymyalgia rheumatica ([address] Sciatica, Stroke, and Venous insufficiency (chronic) (peripheral). has a past surgical history that includes Hysterectomy; Tonsillectomy; and Kidney surgery.     Treatment Diagnosis: Respiratory Arrest      Restrictions  Restrictions/Precautions  Restrictions/Precautions: Fall Risk    Subjective   General  Chart Reviewed: Yes  Patient assessed for rehabilitation services?: Yes  Additional Pertinent Hx: Pt. found unresponsive at the SNF where she resides. Family / Caregiver Present: No  Diagnosis: Respiratory Arrest  Patient Currently in Pain: No  Vital Signs  Patient Currently in Pain: No  Social/Functional History  Social/Functional History  Type of Home: Facility  ADL Assistance: Needs assistance  Transfer Assistance: Needs assistance  Additional Comments: Pt. reports using a w/c for mobility. She states she would stand and pivot with assist to a w/c       Objective   Vision: Impaired  Vision Exceptions: Wears glasses at all times  Hearing: Within functional limits    Orientation  Overall Orientation Status: Impaired  Orientation Level: Disoriented to time;Oriented to person;Oriented to place        ADL  Feeding: Setup  Grooming: Supervision  UE Bathing: Minimal assistance  LE Bathing: Maximum assistance  UE Dressing: Moderate assistance  Toileting: Dependent/Total        Bed mobility  Supine to Sit: Minimal assistance; Moderate assistance(With use of overhead trapeeze bar)  Transfers  Stand Step Transfers: Moderate assistance;2 Person assistance;Maximum assistance  Sit to stand: Moderate assistance;2 Person assistance     Cognition  Overall Cognitive Status: Exceptions  Attention Span: Difficulty dividing attention; Difficulty attending to directions  Memory: Decreased short term memory  Safety Judgement: Decreased awareness of need for assistance;Decreased awareness of need for safety  Insights: Decreased awareness of deficits  Initiation: Requires cues for some  Sequencing: Requires cues for some                 LUE AROM (degrees)  LUE AROM : WFL  RUE AROM (degrees)  RUE AROM : WFL  LUE Strength  Gross LUE Strength: Exceptions to [facility]  L Shoulder Flex: 4-/5  L Shoulder ABduction: 4-/5  L Elbow Flex: 4-/5  L Elbow Ext: 4-/5  RUE Strength  Gross RUE Strength: Exceptions to [address] [place] [address]  Times per week: 3-5x/week  Times per day: Daily    G-Code     OutComes Score AM-PAC Score             Goals  Short term goals  Time Frame for Short term goals: 1 week  Short term goal 1: Mod A of 1 with sit-stand consistently  Short term goal 2: Tolerate standing 2 minutes with CGA for balance.   Short term goal 3: MOd A of 1 with BSC tfers  Long term goals  Long term goal 1: Return to PLOF       Therapy Time   Individual Concurrent Group Co-treatment   Time In           Time Out           Minutes                   Nathan Christianson, OTR/L

## 2020-11-12 NOTE — PROGRESS NOTES
PHYSICAL THERAPY        DATE: 11/12/2020    Received order for physical therapy evaluation. The evaluation was attempted but was unable to be completed due to:     [x] The patient currently has an order for bed rest.     [] The patient currently is a COVID-19 rule out. Please reorder therapy once cleared. [] The patient declined.     [] The patient was unavailable. [] Nursing declined.     [] Waiting on clarification orders from Ortho / Neuro     [] The patient is currently restrained. Due to facility policy on restraints physical therapy is deferred when a patient is restrained.         Electronically signed by Isak Fraser PT on 11/12/2020 at 6:32 AM

## 2020-11-12 NOTE — PROGRESS NOTES
Speech Language Pathology  Facility/Department: [facility] SURG SERVICES  Daily Treatment Note  NAME: Giles Lowe  : 1940  MRN: [id number]    Date of Eval: 2020  Evaluating Therapist: Maria Isabel Covarrubias    Patient Diagnosis(es): has Idiopathic peripheral neuropathy; Chronic bilateral low back pain with bilateral sciatica; Acute on chronic combined systolic (congestive) and diastolic (congestive) heart failure ([address] Elevated d-dimer; Chronic cystitis; Arthritis; Cerebrovascular accident [facility]); Frequent falls; Gait abnormality; History of cerebrovascular accident (CVA) in adulthood; Hypercholesterolemia; Essential hypertension; Polymyalgia ([address] Pyelonephritis; Spinal stenosis of lumbosacral region; Respiratory arrest ([address] Palliative care patient; and Atrial fibrillation ([address] on their problem list.  Onset Date:            Impressions: Pt completed 3 oz water test. She was not in the optimal upright position. Pt had a previous MBSS completed 19 that demonstrated penetration episodes with thin liquids. Pt demonstrates habitual throat clearing at rest due to mucous clearing and sinus draining. Pt has an immediate cough response on exhale occasionally with thin consistency. However, SLP does want to upgrade pt to thin liquids. Monitor for increased chest congestion and downgrade to nectar thick if any concerns. Plan:  Continued daily Speech/Language treatment with goals per plan of care. Upgrade to thin liquids. Present medications whole with sips of water or bites of applesauce. Attempt getting pt upright in bed or sitting in recliner.                           Maria Isabel Covarrubias  Electronically signed by CHATO Nicholas on 2020 at 12:37 PM

## 2020-11-13 NOTE — PROGRESS NOTES
Physical Therapy  Name: Елена Arias  MRN:  [id number]  Date of service:  11/13/2020 11/13/20 0852   Subjective   Subjective Pt states she is able to transfer from bed to recliner via pivot transfer. Bed Mobility   Supine to Sit Moderate assistance;Minimal assistance   Transfers   Sit to Stand Maximum Assistance;2 Person Assistance   Stand to sit Maximum Assistance;2 Person Assistance   Squat Pivot Transfers Maximum Assistance;2 Person Assistance   Comment Pt transferred to recliner via a stand pivot transfer, very slow and unsafe. Pt stating that is the way she ususally transfers   Short term goals   Time Frame for Short term goals 14 DAYS   Short term goal 1 BED MOB MIN ASSIST   Short term goal 2 SIT TO STAND MIN-MOD   Short term goal 3 BED TO CHAIR MIN-MOD   Conditions Requiring Skilled Therapeutic Intervention   Body structures, Functions, Activity limitations Decreased functional mobility ; Decreased ADL status; Decreased ROM; Decreased strength;Decreased safe awareness;Decreased endurance;Decreased balance;Decreased cognition; Increased pain   Assessment Pt was able to get into recliner   Activity Tolerance   Activity Tolerance Patient limited by fatigue;Patient limited by endurance   Safety Devices   Type of devices Left in bed;Call light within reach       Electronically signed by Rosetta Jiang PTA on 11/13/2020 at 8:57 AM

## 2020-11-13 NOTE — PROGRESS NOTES
[facility] Hospitalists      Patient:  Miles Cody  YOB: 1940  Date of Service: 11/13/2020  MRN: [id number]   Acct: [de-identified]   Primary Care Physician: Bandar Bray MD  Advance Directive: [facility]  Admit Date: 10/25/2020       Hospital Day: 23  Portions of this note have been copied forward, however, changed to reflect the most current clinical status of this patient. CHIEF COMPLAINT Respiratory failure    SUBJECTIVE:     Pt sitting up in bedside chair in no acute distress. She states it is nice to sit up out of bed but she is starting to have low back pain. She offers no new complaints or concerns at this time. Cumulative Hospital Course:   Mercy Chester is a 79year old female with PMH CVA, HTN, polymyalgia, chronic lower back pain, chronic BLE lymphedema who presented to [address] [place] [address] ED after being found unresponsive at the SNF where she resides. She was noted to have acute respiratory failure and supraglottic airway was placed by EMS and she was bagged en route to this facility. Upon arrival to ED she was transitioned to BiPAP. Respiratory failure thought to be 2/2 opiate overdose, however, patient states she has no recollection of events at SNF prior to being found unresponsive and has remained confused with likely anoxic brain injury. She is not capable of making decisions for herself and social work is assisting with legal guardian. She was admitted to Hospitalist service. Social work and palliative care were consulted. Cardiology has followed for elevated troponin suspected to be 2/2 atrial fibrillation and chronic diastolic heart failure. Patient denying chest pain. She was weaned off Cardizem gtt. Anticoagulation continued and transitioned from Lovenox to Eliquis. Coreg added. 11/09/2020 Pt is started on Cefepime for Klebsiella UTI. Cefepime changed to Rocephin on 11/10 based on urine culture sensitivities.   Guardianship hearing at [facility] court on Monday November 16th at 9: 30AM.      Review of Systems:   Review of Systems   Constitutional: Negative for chills, diaphoresis, fatigue and fever. HENT: Negative for congestion, ear pain, sinus pain, sore throat and trouble swallowing. Eyes: Negative for photophobia and visual disturbance. Respiratory: Negative for cough, choking, shortness of breath, wheezing and stridor. Cardiovascular: Negative for chest pain, palpitations and leg swelling. Gastrointestinal: Negative for abdominal distention, abdominal pain, constipation, diarrhea, nausea and vomiting. Endocrine: Negative for cold intolerance and heat intolerance. Genitourinary: Negative for difficulty urinating, flank pain, frequency and urgency. Musculoskeletal: Positive for arthralgias. Negative for myalgias. Neurological: Negative for dizziness, syncope, weakness, light-headedness, numbness and headaches. Hematological: Does not bruise/bleed easily. Psychiatric/Behavioral: Negative for agitation, confusion and dysphoric mood. Objective:   VITALS:  /65   Pulse 94   Temp 97.5 °F (36.4 °C) (Temporal)   Resp 18   Ht 5' 6\" (1.676 m)   Wt (!) 329 lb (149.2 kg)   SpO2 90%   Breastfeeding No   BMI 53.10 kg/m²   24HR INTAKE/OUTPUT:      Intake/Output Summary (Last 24 hours) at 11/13/2020 1029  Last data filed at 11/13/2020 0845  Gross per 24 hour   Intake 120 ml   Output 1200 ml   Net -1080 ml       Physical Exam  Constitutional:       General: She is not in acute distress. Appearance: She is obese. She is not ill-appearing, toxic-appearing or diaphoretic. HENT:      Head: Normocephalic and atraumatic. Right Ear: External ear normal.      Left Ear: External ear normal.      Nose: Nose normal. No congestion or rhinorrhea. Mouth/Throat:      Mouth: Mucous membranes are moist.      Pharynx: Oropharynx is clear. Eyes:      General: No scleral icterus. Extraocular Movements: Extraocular movements intact.       Conjunctiva/sclera: Conjunctivae normal.   Neck:      Musculoskeletal: Normal range of motion and neck supple. Cardiovascular:      Rate and Rhythm: Normal rate and regular rhythm. Pulses: Normal pulses. Heart sounds: Normal heart sounds. No murmur. No friction rub. No gallop. Pulmonary:      Effort: Pulmonary effort is normal. No respiratory distress. Breath sounds: Normal breath sounds. No stridor. No wheezing, rhonchi or rales. Abdominal:      General: Abdomen is flat. Bowel sounds are normal. There is no distension. Palpations: Abdomen is soft. Tenderness: There is no abdominal tenderness. Musculoskeletal: Normal range of motion. General: No swelling. Right lower leg: Edema present. Left lower leg: Edema present. Skin:     General: Skin is warm and dry. Coloration: Skin is not jaundiced. Findings: No erythema, lesion or rash. Comments: Chronic BLE venous statis and lymphedema    Neurological:      General: No focal deficit present. Mental Status: She is alert. Mental status is at baseline. Cranial Nerves: No cranial nerve deficit. Sensory: No sensory deficit. Motor: No weakness.    Psychiatric:         Mood and Affect: Mood normal.         Behavior: Behavior normal.            Medications:      dextrose        diclofenac sodium  1 g Topical BID    carvedilol  6.25 mg Oral BID     cefTRIAXone (ROCEPHIN) IV  1 g Intravenous Q24H    gabapentin  300 mg Oral TID    apixaban  5 mg Oral BID    DULoxetine  20 mg Oral Daily    guaiFENesin  600 mg Oral BID    polyethylene glycol  17 g Oral Daily    docusate sodium  100 mg Oral Daily    lidocaine  1 patch Transdermal Daily    levothyroxine  150 mcg Oral Daily    fluticasone  1 spray Each Nostril Daily    aspirin  81 mg Oral Daily    sodium chloride flush  10 mL Intravenous 2 times per day    insulin lispro  0-6 Units Subcutaneous TID     insulin lispro  0-3 Units Subcutaneous Nightly sodium chloride, methyl salicylate-menthol, miconazole, oxyCODONE-acetaminophen, sodium chloride flush, acetaminophen **OR** acetaminophen, glucose, dextrose, glucagon (rDNA), dextrose  DIET DYSPHAGIA SOFT AND BITE-SIZED; Carb Control: 4 carb choices (60 gms)/meal; No Added Salt (3-4 GM); Mildly Thick (Nectar)     Lab and other Data:     RAD:   Ct Head Wo Contrast  Result Date: 10/25/2020  Changes of aging with no acute intracranial abnormality   Signed by Dr Lou Gay on 10/25/2020 3:19 PM    Xr Chest Portable  Result Date: 10/25/2020  1. Moderate cardiomegaly no acute pulmonary vascular congestion. Signed by Dr Lou Gay on 10/25/2020 2:38 PM    XR Chest Portable  Result Date: 10/30/20   Impression:  The poor lung expansion and discoid atelectatic changes, more in the right lower lung. The Persistent moderate cardiomegaly. No pulmonary congestion. Signed by Dr Christie Schaefer on 10/30/2020 7:03 AM      KUB  Result Date: 10/30/20  Impression:  A moderate amount of stool in the colon more so in the proximal colon. No finding to suggest obstruction or ileus. No radiopaque calculi.    Signed by Dr Christie Schaefer on 10/30/2020 2:56 PM        Micro:   Culture, Urine [[id number]]  (Abnormal)  Collected: 11/08/20 1515    Order Status: Completed  Specimen: Urine, clean catch  Updated: 11/09/20 0936     Urine Culture, Routine  >100,000 CFU/ml       Organism  Klebsiella pneumoniae      Urine Culture, Routine  --     Moderate growth   Sensitivity to follow    Susceptibility   Klebsiella pneumoniae (1)   Antibiotic  Interpretation  MEREDITH  Status     ampicillin  Resistant  >=32  mcg/mL      aztreonam  Sensitive  <=1  mcg/mL      ceFAZolin  Sensitive  <=4  mcg/mL      cefepime  Sensitive  <=1  mcg/mL      cefTRIAXone  Sensitive  <=1  mcg/mL      ertapenem  Sensitive  <=0.5  mcg/mL      gentamicin  Sensitive  <=1  mcg/mL      levofloxacin  Sensitive  <=0.12  mcg/mL      meropenem  Sensitive  <=0.25  mcg/mL nitrofurantoin  Sensitive  <=16  mcg/mL      piperacillin-tazobactam  Sensitive  8  mcg/mL      trimethoprim-sulfamethoxazole  Sensitive  <=20  mcg/mL              Assessment/Plan   Principal Problem:    Respiratory arrest (HCC)-2/2 suspected opiate overdose, Utilizing BiPAP at times, does not qualify for Trilogy, resolved, remains stable on 4L [place] oxygen, resolved     Active Problems:    Anoxic brain injury- slow improvement in cognition throughout her hospital course. SLUMS score 18/30 consistent with dementia        Elevated troponin-2/2 atrial fibrillation/diastolic CHF per Cardiology and without need for further work up, stable       Generalized weakness- PT/OT, will need placement. SW following and has requested state guardianship at this time, unchanged. Guardianship hearing at [facility] court on Monday November 16th at 9:30AM.         Atrial fibrillation-transitioned to Eliquis, weaned off Cardizem gtt, Coreg added, stable       Idiopathic peripheral neuropathy/Chronic bilateral low back pain with bilateral sciatica-noted, pain seems to be adequately controlled at this time       History of cerebrovascular accident (CVA) in adulthood-noted       Essential hypertension-well controlled       Palliative care patient-noted       Hypothyroidism-synthroid continued       BLE cellulitis -likely 2/2 chronic lymphedema/venous stasis.        Klebsiella pneumoniae UTI- Cefepime initiated 11/09/2020 but changed to Rocephin on 11/20 based on urine culture sensitivities. Day #5 abx. Await guardianship/placement. Social work assisting with this.      Antibiotic: Doxycycline completed duration of 7 days.  Cefepime changed to rocephin 11/10/20 based on urine culture sensitivities.      DVT Prophylaxis: Kisha Fleming PA-C  11/13/2020, 10:29 AM

## 2020-11-13 NOTE — PROGRESS NOTES
Speech Language Pathology  Facility/Department: [facility] SURG SERVICES  SWALLOW THERAPY     NAME: Chen Cruz  : 1940  MRN: [id number]    ADMISSION DATE: 10/25/2020  ADMITTING DIAGNOSIS: has Idiopathic peripheral neuropathy; Chronic bilateral low back pain with bilateral sciatica; Acute on chronic combined systolic (congestive) and diastolic (congestive) heart failure ([facility] [address] Elevated d-dimer; Chronic cystitis; Arthritis; Cerebrovascular accident [facility]); Frequent falls; Gait abnormality; History of cerebrovascular accident (CVA) in adulthood; Hypercholesterolemia; Essential hypertension; Polymyalgia ([facility] [address] Pyelonephritis; Spinal stenosis of lumbosacral region; Respiratory arrest ([facility] [address] Palliative care patient; and Atrial fibrillation [facility]) on their problem list.    Date of Treat: 2020  Evaluating Therapist: Shana Records    Current Diet level:  Bite sized consistency with thin liquids    Reason for Referral  Chen Cruz was referred for a bedside swallow evaluation to assess the efficiency of her swallow function, identify signs and symptoms of aspiration and make recommendations regarding safe dietary consistencies, effective compensatory strategies, and safe eating environment. Impression  Monitored patient's swallowing function. Patient exhibits inconsistently fast oral transit and suspected swallow delay with thin liquids and sluggish, mild-moderately decreased laryngeal elevation for swallow airway protection. Just mild delayed coughs were noted with bite sized consistency presentations administered during treatment session this date. Patient exhibited S/S penetration/aspiration with thin liquid presentations with consistent delayed coughs observed post swallows.     At this time, would recommend diet downgrade back to nectar thick liquids. Continue bite sized consistency. Recommend meds in pudding/applesauce or with nectar thick liquids.  If patient receives mouth care prior to intake, okay for ice chips and small sips thin H2O IN BETWEEN MEALS for comfort.     Treatment Plan  Requires SLP Intervention: Yes     Recommended Diet and Intervention  Diet Solids Recommendation: Bite sized   Liquid Consistency Recommendation: Nectar thick   Recommended Form of Meds: Meds in puree as able     Compensatory Swallowing Strategies  Compensatory Swallowing Strategies: Upright as possible for all oral intake;Small bites/sips;Eat/Feed slowly; Alternate solids and liquids; Remain upright for 30-45 minutes after meals     Treatment/Goals  Short-term Goals 1-2x/day for 3 days   Goal 1: Patient will tolerate bite sized consistency and nectar thick liquids with min S/S penetration/aspiration during PO intake. Goal 2: Patient staff will follow swallow safety recommendations to decrease risk of penetration/aspiration during  PO intake. Goal 3: Trial oral motor, lingual, and pharyngeal exercises with provision of mod cues/prompts. Goal 4: Re-assessment of swallow function for potential diet upgrade. General  Chart Reviewed: Yes  Behavior/Cognition: Alert; Cooperative  Communication Observation: (SLP ranked functional intelligibility of speech for unfamiliar listeners at 100% in utterances without background noise present.)  Follows Directions: Simple   Patient Positioning: Upright in bed  Consistencies Administered: Bite sized; Thin - cup      Monitored patient's swallowing function with the following observations noted:     Oral Phase  Suspected Premature Bolus Loss: Thin - cup (Patient exhibited inconsistent fast oral transit of thin liquid presentations administered independently via cup.)  Oral Phase - Comment: Patient exhibited adequate oral prep of bite sized consistency presentations administered independently.  Oral transit of bite sized consistency primarily measured 1-2 seconds in length and min oral cavity residue was noted post swallows; residue cleared from the mouth with additional dry swallows.      Pharyngeal Phase  Suspected Swallow Delay: Thin - cup (Suspect secondary to oral transit times.)  Laryngeal Elevation: (Patient exhibited sluggish, mild-moderately decreased laryngeal elevation for swallow airway protection.)  Delayed Cough: Bite sized; Thin - cup   Pharyngeal Phase - Comment: Just mild delayed coughs were noted with bite sized consistency presentations administered during treatment session this date. Patient exhibited S/S penetration/aspiration with thin liquid presentations with consistent delayed coughs observed post swallows.     At this time, would recommend diet downgrade back to nectar thick liquids. Continue bite sized consistency. Recommend meds in pudding/applesauce or with nectar thick liquids. If patient receives mouth care prior to intake, okay for ice chips and small sips thin H2O IN BETWEEN MEALS for comfort. Will continue to follow.     Electronically signed by CHATO Liu on 11/13/2020 at 1:03 PM

## 2020-11-14 NOTE — PROGRESS NOTES
Physical Therapy  Name: Jarrod Duff  MRN:  [id number]  Date of service:  11/14/2020 11/14/20 1253   Position Activity Restriction   Other position/activity restrictions BARIATRIC BED   Subjective   Subjective Pt states her \"bad knees\" make mobility difficult. General Comment   Comments has a purewick in place and noted to be incontinent of  small amount of loose stool   Bed Mobility   Rolling Minimal assistance   Supine to Sit Minimal assistance  (hob elevated slightly and uses bedrail)   Transfers   Sit to Stand Moderate Assistance;2 Person Assistance   Stand to sit Minimal Assistance;2 Person Assistance   Squat Pivot Transfers Moderate Assistance;2 Person Assistance   Comment worked on sit to stand using bed rail and prolonging standing to increase endurance   Balance   Sitting - Dynamic Fair;+   Short term goals   Time Frame for Short term goals 14 DAYS   Short term goal 1 BED MOB MIN ASSIST   Short term goal 2 SIT TO STAND MIN-MOD   Short term goal 3 BED TO CHAIR MIN-MOD   Conditions Requiring Skilled Therapeutic Intervention   Body structures, Functions, Activity limitations Decreased functional mobility ; Decreased ADL status; Decreased ROM; Decreased strength;Decreased safe awareness;Decreased endurance;Decreased balance;Decreased cognition; Increased pain   Assessment Pt improved with bed mobility today. sit to stand and pivot tf remain difficult due to weakness, \"bad knees\" and morbid obesity. Discharge Recommendations Continue to assess pending progress; Patient would benefit from continued therapy after discharge   Activity Tolerance   Activity Tolerance Patient Tolerated treatment well;Patient limited by endurance   Plan   Times per week AT LEAST 4-6   Current Treatment Recommendations Strengthening;ROM;Balance Training;Functional Mobility Training;Patient/Caregiver Education & Training; Safety Education & Training;Transfer Training   Safety Devices   Type of devices Call light within reach;Gait

## 2020-11-14 NOTE — PROGRESS NOTES
Physical Therapy  Name: Reza Gong  MRN:  [id number]  Date of service:  11/14/2020 11/14/20 1418   Position Activity Restriction   Other position/activity restrictions BARIATRIC BED   Subjective   Subjective Pt reports increase in chronic [facility] and states she needs to use the [facility] for a bowel movement. Bed Mobility   Rolling Minimal assistance   Sit to Supine Maximal assistance  (assist x 2)   Transfers   Sit to Stand Minimal Assistance; Moderate Assistance;2 Person Assistance  (varies with height and amount of Pt effort. )   Stand to sit Minimal Assistance;2 Person Assistance   Bed to Chair Moderate assistance;2 Person Assistance;Maximum assistance   Squat Pivot Transfers Moderate Assistance;Maximum Assistance;2 Person Assistance   Comment worked on standing for clean up after bowel movement. then had pT rest before pivot btb from [facility]. Pt has difficulty with weight shift and moving feet and sat before completely turned and back to bed. Short term goals   Time Frame for Short term goals 14 DAYS   Short term goal 1 BED MOB MIN ASSIST   Short term goal 2 SIT TO STAND MIN-MOD   Short term goal 3 BED TO CHAIR MIN-MOD   Conditions Requiring Skilled Therapeutic Intervention   Body structures, Functions, Activity limitations Decreased functional mobility ; Decreased ADL status; Decreased ROM; Decreased strength;Decreased safe awareness;Decreased endurance;Decreased balance;Decreased cognition; Increased pain   Assessment due to morbid obesity and difficulty moving feet during TF, reshma schroeder should be tried to see if she can fit in it successfully in order to improve staff and pt safety with TF's. Discharge Recommendations Continue to assess pending progress; Patient would benefit from continued therapy after discharge  (anticipate SNF placement)   Activity Tolerance   Activity Tolerance Patient Tolerated treatment well;Patient limited by endurance   Plan   Times per week AT LEAST 4-6   Current Treatment

## 2020-11-14 NOTE — PROGRESS NOTES
[facility]      Patient:  Darshana Luque  YOB: 1940  Date of Service: 11/14/2020  MRN: [id number]   Acct: [de-identified]   Primary Care Physician: Bhavana Laureano MD  Advance Directive: [facility]  Admit Date: 10/25/2020       Hospital Day: 20  Portions of this note have been copied forward, however, changed to reflect the most current clinical status of this patient. CHIEF COMPLAINT Respiratory failure    SUBJECTIVE:     Pt laying in bed in no acute distress. She offers no new complaints or concerns at this time. Cumulative Hospital Course:   Sierra Burkitt is a 79year old female with PMH CVA, HTN, polymyalgia, chronic lower back pain, chronic BLE lymphedema who presented to [facility] ED after being found unresponsive at the [facility] where she resides. She was noted to have acute respiratory failure and supraglottic airway was placed by EMS and she was bagged en route to this facility. Upon arrival to ED she was transitioned to BiPAP. Respiratory failure thought to be 2/2 opiate overdose, however, patient states she has no recollection of events at SNF prior to being found unresponsive and has remained confused with likely anoxic brain injury. She is not capable of making decisions for herself and social work is assisting with legal guardian. She was admitted to Hospitalist service. Social work and palliative care were consulted. Cardiology has followed for elevated troponin suspected to be 2/2 atrial fibrillation and chronic diastolic heart failure. Patient denying chest pain. She was weaned off Cardizem gtt. Anticoagulation continued and transitioned from Lovenox to Eliquis. Coreg added. 11/09/2020 Pt is started on Cefepime for Klebsiella UTI. Cefepime changed to Rocephin on 11/10 based on urine culture sensitivities. Guardianship hearing at [facility] court on Monday November 16th at 9:30AM. Pt has completed 5 day course of IV abx for UTI. Working with PT.       Review of Systems:   Review Musculoskeletal: Normal range of motion and neck supple. Cardiovascular:      Rate and Rhythm: Normal rate and regular rhythm. Pulses: Normal pulses. Heart sounds: Normal heart sounds. No murmur. No friction rub. No gallop. Pulmonary:      Effort: Pulmonary effort is normal. No respiratory distress. Breath sounds: Normal breath sounds. No stridor. No wheezing, rhonchi or rales. Abdominal:      General: Abdomen is flat. Bowel sounds are normal. There is no distension. Palpations: Abdomen is soft. Tenderness: There is no abdominal tenderness. Musculoskeletal: Normal range of motion. General: No swelling. Right lower leg: Edema present. Left lower leg: Edema present. Skin:     General: Skin is warm and dry. Coloration: Skin is not jaundiced. Findings: No erythema, lesion or rash. Comments: Chronic BLE venous statis and lymphedema    Neurological:      General: No focal deficit present. Mental Status: She is alert. Mental status is at baseline. Cranial Nerves: No cranial nerve deficit. Sensory: No sensory deficit. Motor: No weakness.    Psychiatric:         Mood and Affect: Mood normal.         Behavior: Behavior normal.            Medications:      dextrose        diclofenac sodium  1 g Topical BID    carvedilol  6.25 mg Oral BID     gabapentin  300 mg Oral TID    apixaban  5 mg Oral BID    DULoxetine  20 mg Oral Daily    guaiFENesin  600 mg Oral BID    polyethylene glycol  17 g Oral Daily    docusate sodium  100 mg Oral Daily    lidocaine  1 patch Transdermal Daily    levothyroxine  150 mcg Oral Daily    fluticasone  1 spray Each Nostril Daily    aspirin  81 mg Oral Daily    sodium chloride flush  10 mL Intravenous 2 times per day    insulin lispro  0-6 Units Subcutaneous TID     insulin lispro  0-3 Units Subcutaneous Nightly     sodium chloride, methyl salicylate-menthol, miconazole, oxyCODONE-acetaminophen, sodium chloride flush, acetaminophen **OR** acetaminophen, glucose, dextrose, glucagon (rDNA), dextrose  DIET DYSPHAGIA SOFT AND BITE-SIZED; Carb Control: 4 carb choices (60 gms)/meal; No Added Salt (3-4 GM); Mildly Thick (Nectar)     Lab and other Data:     RAD:   Ct Head Wo Contrast  Result Date: 10/25/2020  Changes of aging with no acute intracranial abnormality   Signed by Dr Tuyet Campbell on 10/25/2020 3:19 PM    Xr Chest Portable  Result Date: 10/25/2020  1. Moderate cardiomegaly no acute pulmonary vascular congestion. Signed by Dr Tuyet Campbell on 10/25/2020 2:38 PM    XR Chest Portable  Result Date: 10/30/20   Impression:  The poor lung expansion and discoid atelectatic changes, more in the right lower lung. The Persistent moderate cardiomegaly. No pulmonary congestion. Signed by Dr Salina Schilder on 10/30/2020 7:03 AM      KUB  Result Date: 10/30/20  Impression:  A moderate amount of stool in the colon more so in the proximal colon. No finding to suggest obstruction or ileus. No radiopaque calculi. Signed by Dr Salina Schilder on 10/30/2020 2:56 PM        Assessment/Plan   Principal Problem:    Respiratory arrest (HCC)-2/2 suspected opiate overdose, Utilizing BiPAP at times, does not qualify for Trilogy, resolved. Pt is down to 2-3 L NC.     Active Problems:    Anoxic brain injury- slow improvement in cognition throughout her hospital course. SLUMS score 18/30 consistent with dementia        Elevated troponin-2/2 atrial fibrillation/diastolic CHF per Cardiology and without need for further work up, stable       Generalized weakness- PT/OT, will need placement. SW following and has requested state guardianship at this time, unchanged.  Guardianship hearing at [facility] court on Monday November 16th at 9:30AM.         Atrial fibrillation-transitioned to Eliquis, weaned off Cardizem gtt, Coreg added, stable       Idiopathic peripheral neuropathy/Chronic bilateral low back pain with bilateral sciatica-noted, pain seems to be adequately controlled at this time       History of cerebrovascular accident (CVA) in adulthood-noted       Essential hypertension-well controlled       Palliative care patient-noted       Hypothyroidism-synthroid continued       BLE cellulitis -likely 2/2 chronic lymphedema/venous stasis. ABX course completed.       Klebsiella pneumoniae UTI- completed 5 day course of abx. Await guardianship/placement.  Social work assisting with this.      Antibiotic: completed appropriate abx course for cellulitis and UTI.      DVT Prophylaxis: Lovenox    Chidi Carson PA-C  11/14/2020, 11:06 AM

## 2020-11-15 NOTE — PLAN OF CARE
Problem: Falls - Risk of:  Goal: Will remain free from falls  Description: Will remain free from falls  11/15/2020 0323 by Jose Fontaine LPN  Outcome: Ongoing  11/15/2020 0127 by Jose Fontaine LPN  Outcome: Ongoing  Goal: Absence of physical injury  Description: Absence of physical injury  11/15/2020 0323 by Jose Fontaine LPN  Outcome: Ongoing  11/15/2020 0127 by Jose Fontaine LPN  Outcome: Ongoing     Problem: Skin Integrity:  Goal: Will show no infection signs and symptoms  Description: Will show no infection signs and symptoms  11/15/2020 0323 by Jose Fontaine LPN  Outcome: Ongoing  11/15/2020 0127 by Jose Fontaine LPN  Outcome: Ongoing  Goal: Absence of new skin breakdown  Description: Absence of new skin breakdown  11/15/2020 0323 by Jose Fontaine LPN  Outcome: Ongoing  11/15/2020 0127 by Jose Fontaine LPN  Outcome: Ongoing     Problem: Confusion - Acute:  Goal: Absence of continued neurological deterioration signs and symptoms  Description: Absence of continued neurological deterioration signs and symptoms  11/15/2020 0323 by Jose Fontaine LPN  Outcome: Ongoing  11/15/2020 0127 by Jose Fontaine LPN  Outcome: Ongoing  Goal: Mental status will be restored to baseline  Description: Mental status will be restored to baseline  11/15/2020 0323 by Jose Fontaine LPN  Outcome: Ongoing  11/15/2020 0127 by Jose Fontaine LPN  Outcome: Ongoing     Problem: Discharge Planning:  Goal: Ability to perform activities of daily living will improve  Description: Ability to perform activities of daily living will improve  11/15/2020 0323 by Jose Fontaine LPN  Outcome: Ongoing  11/15/2020 0127 by Jose Fontaine LPN  Outcome: Ongoing  Goal: Participates in care planning  Description: Participates in care planning  11/15/2020 0323 by Jose Fontaine LPN  Outcome: Ongoing  11/15/2020 0127 by Jose Fontaine LPN  Outcome: Ongoing     Problem: Injury - Risk of, Physical Injury:  Goal: Will remain free from falls  Description: Will remain free from falls  11/15/2020 0323 by Jose Fontaine LPN  Outcome: Ongoing  11/15/2020 0127 by Jose Fontaine LPN  Outcome: Ongoing  Goal: Absence of physical injury  Description: Absence of physical injury  11/15/2020 0323 by Jose Fontaine LPN  Outcome: Ongoing  11/15/2020 0127 by Jose Fontaine LPN  Outcome: Ongoing     Problem: Mood - Altered:  Goal: Mood stable  Description: Mood stable  11/15/2020 0323 by Jose Fontaine LPN  Outcome: Ongoing  11/15/2020 0127 by Jose Fontaine LPN  Outcome: Ongoing  Goal: Absence of abusive behavior  Description: Absence of abusive behavior  11/15/2020 0323 by Jose Fontaine LPN  Outcome: Ongoing  11/15/2020 0127 by Jose Fontaine LPN  Outcome: Ongoing  Goal: Verbalizations of feeling emotionally comfortable while being cared for will increase  Description: Verbalizations of feeling emotionally comfortable while being cared for will increase  11/15/2020 0323 by Jose Fontaine LPN  Outcome: Ongoing  11/15/2020 0127 by Jose Fontaine LPN  Outcome: Ongoing     Problem: Psychomotor Activity - Altered:  Goal: Absence of psychomotor disturbance signs and symptoms  Description: Absence of psychomotor disturbance signs and symptoms  11/15/2020 0323 by Jose Fontaine LPN  Outcome: Ongoing  11/15/2020 0127 by Jose Fontaine LPN  Outcome: Ongoing     Problem: Sensory Perception - Impaired:  Goal: Demonstrations of improved sensory functioning will increase  Description: Demonstrations of improved sensory functioning will increase  11/15/2020 0323 by Jose Fontaine LPN  Outcome: Ongoing  11/15/2020 0127 by Jose Fontaine LPN  Outcome: Ongoing  Goal: Decrease in sensory misperception frequency  Description: Decrease in sensory misperception frequency  11/15/2020 0323 by Jose Fontaine LPN  Outcome: Ongoing  11/15/2020 0127 by Jose Fontaine LPN  Outcome: Ongoing  Goal: Able to refrain from responding to false sensory perceptions  Description: Able to refrain from responding to false sensory perceptions  11/15/2020 0323 by Russ Story LPN  Outcome: Ongoing  11/15/2020 0127 by Russ Story LPN  Outcome: Ongoing  Goal: Demonstrates accurate environmental perceptions  Description: Demonstrates accurate environmental perceptions  11/15/2020 0323 by Russ Story LPN  Outcome: Ongoing  11/15/2020 0127 by Russ Story LPN  Outcome: Ongoing  Goal: Able to distinguish between reality-based and nonreality-based thinking  Description: Able to distinguish between reality-based and nonreality-based thinking  11/15/2020 0323 by Russ Story LPN  Outcome: Ongoing  11/15/2020 0127 by Russ Story LPN  Outcome: Ongoing  Goal: Able to interrupt nonreality-based thinking  Description: Able to interrupt nonreality-based thinking  11/15/2020 0323 by Russ Story LPN  Outcome: Ongoing  11/15/2020 0127 by uRss Story LPN  Outcome: Ongoing     Problem: Sleep Pattern Disturbance:  Goal: Appears well-rested  Description: Appears well-rested  11/15/2020 0323 by Russ Story LPN  Outcome: Ongoing  11/15/2020 0127 by Russ Story LPN  Outcome: Ongoing     Problem: Gas Exchange - Impaired:  Goal: Levels of oxygenation will improve  Description: Levels of oxygenation will improve  11/15/2020 0323 by Russ Story LPN  Outcome: Ongoing  11/15/2020 0127 by Russ Story LPN  Outcome: Ongoing     Problem: Pain:  Goal: Pain level will decrease  Description: Pain level will decrease  11/15/2020 0323 by Russ Story LPN  Outcome: Ongoing  11/15/2020 0127 by Russ Story LPN  Outcome: Ongoing  Goal: Control of acute pain  Description: Control of acute pain  11/15/2020 0323 by Russ Story LPN  Outcome: Ongoing  11/15/2020 0127 by Russ Story LPN  Outcome: Ongoing  Goal: Control of chronic pain  Description: Control of chronic pain  11/15/2020 0323 by Philippe Parisi LPN  Outcome: Ongoing  11/15/2020 0127 by Philippe Parisi LPN  Outcome: Ongoing

## 2020-11-15 NOTE — PROGRESS NOTES
12 hour chart check review completed. Electronically signed by Selina Pimentel LPN on 27/82/7354 at 12:10 AM

## 2020-11-15 NOTE — PROGRESS NOTES
[id number] [facility]      Patient:  Madison Borrego  YOB: 1940  Date of Service: 11/15/2020  MRN: [id number]   Acct: [de-identified]   Primary Care Physician: Rosa Escobar MD  Advance Directive: [facility]  Admit Date: 10/25/2020       Hospital Day: 24  Portions of this note have been copied forward, however, changed to reflect the most current clinical status of this patient. CHIEF COMPLAINT Respiratory failure    SUBJECTIVE:     Pt laying in bed in no acute distress, resting. She offers no new complaints or concerns at this time. Cumulative Hospital Course:   Kay Knight is a 79year old female with PMH CVA, HTN, polymyalgia, chronic lower back pain, chronic BLE lymphedema who presented to [facility] ED after being found unresponsive at the [facility] where she resides. She was noted to have acute respiratory failure and supraglottic airway was placed by EMS and she was bagged en route to this facility. Upon arrival to ED she was transitioned to BiPAP. Respiratory failure thought to be 2/2 opiate overdose, however, patient states she has no recollection of events at SNF prior to being found unresponsive and has remained confused with likely anoxic brain injury. She is not capable of making decisions for herself and social work is assisting with legal guardian. She was admitted to Hospitalist service. Social work and palliative care were consulted. Cardiology has followed for elevated troponin suspected to be 2/2 atrial fibrillation and chronic diastolic heart failure. Patient denying chest pain. She was weaned off Cardizem gtt. Anticoagulation continued and transitioned from Lovenox to Eliquis. Coreg added. 11/09/2020 Pt is started on Cefepime for Klebsiella UTI. Cefepime changed to Rocephin on 11/10 based on urine culture sensitivities. Guardianship hearing at [facility] court on Monday November 16th at 9:30AM. Pt has completed 5 day course of IV abx for UTI. Working with PT.       Review of Systems: Review of Systems   Constitutional: Negative for chills, diaphoresis, fatigue and fever. HENT: Negative for congestion, ear pain, sinus pain, sore throat and trouble swallowing. Eyes: Negative for photophobia and visual disturbance. Respiratory: Negative for cough, choking, shortness of breath, wheezing and stridor. Cardiovascular: Negative for chest pain, palpitations and leg swelling. Gastrointestinal: Negative for abdominal distention, abdominal pain, constipation, diarrhea, nausea and vomiting. Endocrine: Negative for cold intolerance and heat intolerance. Genitourinary: Negative for difficulty urinating, flank pain, frequency and urgency. Musculoskeletal: Positive for arthralgias. Negative for myalgias. Neurological: Negative for dizziness, syncope, weakness, light-headedness, numbness and headaches. Hematological: Does not bruise/bleed easily. Psychiatric/Behavioral: Negative for agitation, confusion and dysphoric mood. Objective:   VITALS:  BP (!) 146/86   Pulse 66   Temp 97 °F (36.1 °C) (Temporal)   Resp 18   Ht 5' 6\" (1.676 m)   Wt (!) 327 lb 5 oz (148.5 kg)   SpO2 90%   Breastfeeding No   BMI 52.83 kg/m²   24HR INTAKE/OUTPUT:      Intake/Output Summary (Last 24 hours) at 11/15/2020 1001  Last data filed at 11/15/2020 0951  Gross per 24 hour   Intake 550 ml   Output 1300 ml   Net -750 ml       Physical Exam  Constitutional:       General: She is not in acute distress. Appearance: She is obese. She is not ill-appearing, toxic-appearing or diaphoretic. HENT:      Head: Normocephalic and atraumatic. Right Ear: External ear normal.      Left Ear: External ear normal.      Nose: Nose normal. No congestion or rhinorrhea. Mouth/Throat:      Mouth: Mucous membranes are moist.      Pharynx: Oropharynx is clear. Eyes:      General: No scleral icterus. Extraocular Movements: Extraocular movements intact.       Conjunctiva/sclera: Conjunctivae normal. oxyCODONE-acetaminophen, sodium chloride flush, acetaminophen **OR** acetaminophen, glucose, dextrose, glucagon (rDNA), dextrose  DIET DYSPHAGIA SOFT AND BITE-SIZED; Carb Control: 4 carb choices (60 gms)/meal; No Added Salt (3-4 GM); Mildly Thick (Nectar)     Lab and other Data:     RAD:   Ct Head Wo Contrast  Result Date: 10/25/2020  Changes of aging with no acute intracranial abnormality   Signed by Dr Halima Kwon on 10/25/2020 3:19 PM    Xr Chest Portable  Result Date: 10/25/2020  1. Moderate cardiomegaly no acute pulmonary vascular congestion. Signed by Dr Halima Kwon on 10/25/2020 2:38 PM    XR Chest Portable  Result Date: 10/30/20   Impression:  The poor lung expansion and discoid atelectatic changes, more in the right lower lung. The Persistent moderate cardiomegaly. No pulmonary congestion. Signed by Dr You Urban on 10/30/2020 7:03 AM      KUB  Result Date: 10/30/20  Impression:  A moderate amount of stool in the colon more so in the proximal colon. No finding to suggest obstruction or ileus. No radiopaque calculi. Signed by Dr You Urban on 10/30/2020 2:56 PM        Assessment/Plan   Principal Problem:    Respiratory arrest (HCC)-2/2 suspected opiate overdose, Utilizing BiPAP at times, does not qualify for Trilogy, resolved. Pt is down to 2 L NC.     Active Problems:    Anoxic brain injury- slow improvement in cognition throughout her hospital course. SLUMS score 18/30 consistent with dementia        Elevated troponin-2/2 atrial fibrillation/diastolic CHF per Cardiology and without need for further work up, stable       Generalized weakness- PT/OT, will need placement. SW following and has requested state guardianship at this time, unchanged.  Guardianship hearing at [facility] court on Monday November 16th at 9:30AM.         Atrial fibrillation-transitioned to Eliquis, weaned off Cardizem gtt, Coreg added, stable       Idiopathic peripheral neuropathy/Chronic bilateral low back pain with bilateral sciatica-noted, pain seems to be adequately controlled at this time       History of cerebrovascular accident (CVA) in adulthood-noted       Essential hypertension- stable       Palliative care patient-noted       Hypothyroidism-synthroid continued       BLE cellulitis -likely 2/2 chronic lymphedema/venous stasis. ABX course completed.       Klebsiella pneumoniae UTI- completed 5 day course of abx. Await guardianship/placement.  Social work assisting with this.      Antibiotic: completed appropriate abx course for cellulitis and UTI.      DVT Prophylaxis: Andrea Frank PA-C  11/15/2020, 10:01 AM

## 2020-11-16 NOTE — PROGRESS NOTES
11/16/20 8882   General Comment   Comments No TX per Romina Kebede, RN.   Patient has been restless all day just went to sleep did not want patient disturbed   Physical Therapy    Electronically signed by Jessica Alvares PTA on 11/16/2020 at 2:47 PM

## 2020-11-16 NOTE — PROGRESS NOTES
[facility] Hospitalists    Patient:  Quirino Contreras  YOB: 1940  Date of Service: 11/16/2020  MRN: [id number]   Acct: [de-identified]   Primary Care Physician: Nalini Gutierrez MD  Advance Directive: [facility]  Admit Date: 10/25/2020       Hospital Day: 25  Portions of this note have been copied forward, however, changed to reflect the most current clinical status of this patient. CHIEF COMPLAINT Respiratory failure    SUBJECTIVE:  Patient states she did not sleep well overnight, otherwise, has no new complaints. Cumulative Hospital Course:  Jewell Cuevas is a 79year old female with PMH CVA, HTN, polymyalgia, chronic lower back pain, chronic BLE lymphedema who presented to [facility] ED after being found unresponsive at the [facility] where she resides. She was noted to have acute respiratory failure and supraglottic airway was placed by EMS and she was bagged en route to this facility. Upon arrival to ED she was transitioned to BiPAP. Respiratory failure thought to be 2/2 opiate overdose, however, patient states she has no recollection of events at SNF prior to being found unresponsive and has remained confused with likely anoxic brain injury. She is not capable of making decisions for herself and social work is assisting with legal guardian. She was admitted to Hospitalist service. Social work and palliative care were consulted. Cardiology has followed for elevated troponin suspected to be 2/2 atrial fibrillation and chronic diastolic heart failure. Patient denying chest pain. She was weaned off Cardizem gtt. Anticoagulation continued and transitioned from Lovenox to Eliquis. Coreg added. 11/09/2020 Pt is started on Cefepime for Klebsiella UTI. Cefepime changed to Rocephin on 11/10 based on urine culture sensitivities. Guardianship hearing at [facility] court on Monday November 16th at 9:30AM. Pt has completed 5 day course of IV abx for UTI. Working with PT.     Review of Systems:   14 point review of systems is negative except as specifically addressed above. Objective:   VITALS:  /63   Pulse 85   Temp 97.7 °F (36.5 °C) (Temporal)   Resp 16   Ht 5' 6\" (1.676 m)   Wt (!) 327 lb 5 oz (148.5 kg)   SpO2 90%   Breastfeeding No   BMI 52.83 kg/m²   24HR INTAKE/OUTPUT:      Intake/Output Summary (Last 24 hours) at 11/16/2020 1450  Last data filed at 11/16/2020 1017  Gross per 24 hour   Intake 720 ml   Output 625 ml   Net 95 ml     General appearance: 80 yo female, well nourished, no acute distress, resting comfortably in bed   Head: Normocephalic, without obvious abnormality, atraumatic  Eyes: conjunctivae/corneas clear. PERRL, EOM's intact. Ears: normal external ears and nose, throat without exudate  Neck: no adenopathy, no carotid bruit, no JVD, supple, symmetrical, trachea midline   Lungs: clear throughout, no wheezes, rales or rhonchi  Heart: irregularly irregular, S1, S2 normal, no murmur  Abdomen:soft, obese, non-tender; non-distended, normal bowel sounds no masses, no organomegaly  Extremities:trace BLE edema with chronic lymphedema/venous stasis changes, unchanged   Skin: Skin color, texture, turgor normal. No rashes or lesions  Lymphatic: No palpable lymph node enlargment  Neurologic: Alert and oriented X 3, generalized weakness and normal tone.  No focal deficits, patient does not appear confused today, answers all questions appropriately   Psychiatric: Calm, appropriate affect, confused at times     Medications:      dextrose        diclofenac sodium  1 g Topical BID    carvedilol  6.25 mg Oral BID WC    gabapentin  300 mg Oral TID    apixaban  5 mg Oral BID    DULoxetine  20 mg Oral Daily    guaiFENesin  600 mg Oral BID    polyethylene glycol  17 g Oral Daily    docusate sodium  100 mg Oral Daily    lidocaine  1 patch Transdermal Daily    levothyroxine  150 mcg Oral Daily    fluticasone  1 spray Each Nostril Daily    aspirin  81 mg Oral Daily    sodium chloride flush  10 mL Intravenous 2 times per day    insulin lispro  0-6 Units Subcutaneous TID WC    insulin lispro  0-3 Units Subcutaneous Nightly     sodium chloride, methyl salicylate-menthol, miconazole, oxyCODONE-acetaminophen, sodium chloride flush, acetaminophen **OR** acetaminophen, glucose, dextrose, glucagon (rDNA), dextrose  DIET DYSPHAGIA SOFT AND BITE-SIZED; Carb Control: 4 carb choices (60 gms)/meal; No Added Salt (3-4 GM); Mildly Thick (Nectar)     Lab and other Data:     RAD:   Ct Head Wo Contrast  Result Date: 10/25/2020  Changes of aging with no acute intracranial abnormality   Signed by Dr Nataliya Ceballos on 10/25/2020 3:19 PM     Xr Chest Portable  Result Date: 10/25/2020  1. Moderate cardiomegaly no acute pulmonary vascular congestion. Signed by Dr Nataliya Ceballos on 10/25/2020 2:38 PM     XR Chest Portable  Result Date: 10/30/20   Impression:  The poor lung expansion and discoid atelectatic changes, more in the right lower lung. The Persistent moderate cardiomegaly. No pulmonary congestion. Signed by Dr Marco Antonio Lynch on 10/30/2020 7:03 AM     KUB  Result Date: 10/30/20  Impression:  A moderate amount of stool in the colon more so in the proximal colon. No finding to suggest obstruction or ileus. No radiopaque calculi. Signed by Dr Marco Antonio Lynch on 10/30/2020 2:56 PM     Micro:   Culture, Urine [[id number]]  (Abnormal)  Collected: 10/25/20 1510   Order Status: Completed  Specimen: Urine, clean catch  Updated: 10/27/20 1135     Urine Culture, Routine  >50,000 CFU/ml      Organism  Candida albicans       Urine Culture, Routine      Moderate growth   No further workup     Assessment/Plan   Principal Problem:    Respiratory arrest (HCC)-2/2 suspected opiate overdose, Utilizing BiPAP at times, does not qualify for Trilogy, resolved, remains stable on 4L [place] oxygen, resolved    Active Problems:    Anoxic brain injury-slow improvement in cognition throughout her hospital course.  SLUMS score 18/30 consistent with dementia, guardianship pending      Elevated troponin-2/2 atrial fibrillation/diastolic CHF per Cardiology and without need for further work up, stable      Generalized weakness-PT/OT, will need placement. SW following and has requested state guardianship at this time, chronic and unchanged       Atrial fibrillation-transitioned to Eliquis, weaned off Cardizem gtt, Coreg added, stable      Idiopathic peripheral neuropathy/Chronic bilateral low back pain with bilateral sciatica-noted, pain seems to be adequately controlled at this time       History of cerebrovascular accident (CVA) in adulthood-noted      Essential hypertension-remains well controlled       Palliative care patient-noted      Hypothyroidism-synthroid continued      BLE cellulitis -likely 2/2 chronic lymphedema/venous stasis. Klebsiella pneumoniae UTI-completed abx course     Await guardianship/placement. Patient stable off antibiotics.       DVT Prophylaxis: Lovenosydnee Orantes PA-C

## 2020-11-17 NOTE — PROGRESS NOTES
[facility] Hospitalists    Patient:  Heather Preston  YOB: 1940  Date of Service: 11/17/2020  MRN: [id number]   Acct: [de-identified]   Primary Care Physician: Tong Wall MD  Advance Directive: [facility]  Admit Date: 10/25/2020       Hospital Day: 21  Portions of this note have been copied forward, however, changed to reflect the most current clinical status of this patient. CHIEF COMPLAINT: Respiratory failure    SUBJECTIVE:  Patient has no new complaints. Denies new or worsening dyspnea. Denies chest pain,N/V. Remains weak and requires assistance to maneuver in bed. Cumulative Hospital Course:  Troy Lee is a 79year old female with PMH CVA, HTN, polymyalgia, chronic lower back pain, chronic BLE lymphedema who presented to [facility] ED after being found unresponsive at the [facility] where she resides. She was noted to have acute respiratory failure and supraglottic airway was placed by EMS and she was bagged en route to this facility. Upon arrival to ED she was transitioned to BiPAP. Respiratory failure thought to be 2/2 opiate overdose, however, patient states she has no recollection of events at SNF prior to being found unresponsive and has remained confused with likely anoxic brain injury. She is not capable of making decisions for herself and social work is assisting with legal guardian. She was admitted to Hospitalist service. Social work and palliative care were consulted. Cardiology has followed for elevated troponin suspected to be 2/2 atrial fibrillation and chronic diastolic heart failure. Patient denying chest pain. She was weaned off Cardizem gtt. Anticoagulation continued and transitioned from Lovenox to Eliquis. Coreg added. 11/09/2020 Pt is started on Cefepime for Klebsiella UTI. Cefepime changed to Rocephin on 11/10 based on urine culture sensitivities. Guardianship hearing at [facility] court on Monday November 16th at 9:30AM. Pt has completed 5 day course of IV abx for UTI. Working with PT. Review of Systems:   14 point review of systems is negative except as specifically addressed above. Objective:   VITALS:  /86   Pulse 81   Temp 96.5 °F (35.8 °C) (Temporal)   Resp 14   Ht 5' 6\" (1.676 m)   Wt (!) 327 lb 5 oz (148.5 kg)   SpO2 90%   Breastfeeding No   BMI 52.83 kg/m²   24HR INTAKE/OUTPUT:      Intake/Output Summary (Last 24 hours) at 11/17/2020 1632  Last data filed at 11/17/2020 0449  Gross per 24 hour   Intake 600 ml   Output 400 ml   Net 200 ml     General appearance: 80 yo female, well nourished, no acute distress, resting comfortably    Head: Normocephalic, without obvious abnormality, atraumatic  Eyes: conjunctivae/corneas clear. PERRL, EOM's intact. Ears: normal external ears and nose, throat without exudate  Neck: no adenopathy, no carotid bruit, no JVD, supple, symmetrical, trachea midline   Lungs: soft bibasilar rales, no wheezes or rhonchi  Heart: irregularly irregular, S1, S2 normal, no murmur  Abdomen:soft, obese, non-tender; non-distended, normal bowel sounds no masses, no organomegaly  Extremities:trace BLE edema with chronic lymphedema/venous stasis changes, unchanged   Skin: Skin color, texture, turgor normal. No rashes or lesions  Lymphatic: No palpable lymph node enlargment  Neurologic: Alert and oriented X 3, generalized weakness and normal tone.  No focal deficits, patient does not appear confused today, answers all questions appropriately   Psychiatric: Calm, appropriate affect, confused at times     Medications:      dextrose        diclofenac sodium  1 g Topical BID    carvedilol  6.25 mg Oral BID WC    gabapentin  300 mg Oral TID    apixaban  5 mg Oral BID    DULoxetine  20 mg Oral Daily    guaiFENesin  600 mg Oral BID    polyethylene glycol  17 g Oral Daily    docusate sodium  100 mg Oral Daily    lidocaine  1 patch Transdermal Daily    levothyroxine  150 mcg Oral Daily    fluticasone  1 spray Each Nostril Daily    aspirin 81 mg Oral Daily    sodium chloride flush  10 mL Intravenous 2 times per day    insulin lispro  0-6 Units Subcutaneous TID WC    insulin lispro  0-3 Units Subcutaneous Nightly     sodium chloride, methyl salicylate-menthol, miconazole, oxyCODONE-acetaminophen, sodium chloride flush, acetaminophen **OR** acetaminophen, glucose, dextrose, glucagon (rDNA), dextrose  DIET DYSPHAGIA SOFT AND BITE-SIZED; Carb Control: 4 carb choices (60 gms)/meal; No Added Salt (3-4 GM); Mildly Thick (Nectar)     Lab and other Data:     RAD:   Ct Head Wo Contrast  Result Date: 10/25/2020  Changes of aging with no acute intracranial abnormality   Signed by Dr Nataliya Ceballos on 10/25/2020 3:19 PM     Xr Chest Portable  Result Date: 10/25/2020  1. Moderate cardiomegaly no acute pulmonary vascular congestion. Signed by Dr Nataliya Ceballos on 10/25/2020 2:38 PM     XR Chest Portable  Result Date: 10/30/20   Impression:  The poor lung expansion and discoid atelectatic changes, more in the right lower lung. The Persistent moderate cardiomegaly. No pulmonary congestion. Signed by Dr Marco Antonio Lynch on 10/30/2020 7:03 AM     KUB  Result Date: 10/30/20  Impression:  A moderate amount of stool in the colon more so in the proximal colon. No finding to suggest obstruction or ileus. No radiopaque calculi.    Signed by Dr Marco Antonio Lynch on 10/30/2020 2:56 PM     Micro:   Culture, Urine [[id number]]  (Abnormal)  Collected: 10/25/20 1510   Order Status: Completed  Specimen: Urine, clean catch  Updated: 10/27/20 1135     Urine Culture, Routine  >50,000 CFU/ml      Organism  Candida albicans       Urine Culture, Routine      Moderate growth   No further workup     Assessment/Plan   Principal Problem:    Respiratory arrest (HCC)-2/2 suspected opiate overdose, Utilizing BiPAP at times, does not qualify for Trilogy, resolved, remains stable on 4L [place] oxygen, resolved    Active Problems:    Anoxic brain injury-slow improvement in cognition throughout her hospital course. SLUMS score 18/30 consistent with dementia, guardianship obtained, now pending placement/insurance approval      Elevated troponin-2/2 atrial fibrillation/diastolic CHF per Cardiology and without need for further work up, stable      Generalized weakness-PT/OT, will need placement. SW following and has requested state guardianship at this time, chronic and unchanged       Atrial fibrillation-transitioned to Eliquis, weaned off Cardizem gtt, Coreg added, stable      Idiopathic peripheral neuropathy/Chronic bilateral low back pain with bilateral sciatica-noted, pain well controlled       History of cerebrovascular accident (CVA) in adulthood-noted      Essential hypertension-stable      Palliative care patient-noted      Hypothyroidism-synthroid continued      BLE cellulitis -likely 2/2 chronic lymphedema/venous stasis. Klebsiella pneumoniae UTI-completed abx course     Give small dose Lasix x 1 for increasing weight.  Plan on discharge once arrangements complete     DVT Prophylaxis: Kisha Nicolas, ELLEN

## 2020-11-17 NOTE — PROGRESS NOTES
Physical Therapy  Name: Park Carlos  MRN:  [id number]  Date of service:  11/17/2020 11/17/20 1010   Subjective   Subjective Nurse Dominique Hernandez says to let pt sleep this morning.         Electronically signed by Arlette Chilel PTA on 11/17/2020 at 10:11 AM

## 2020-11-17 NOTE — PROGRESS NOTES
Physical Therapy  Name: Chen Cruz  MRN:  [id number]  Date of service:  11/17/2020 11/17/20 1603   Subjective   Subjective Pt states she wants to sit bedside   Bed Mobility   Supine to Sit Minimal assistance; Moderate assistance   Sit to Supine Minimal assistance   Comment BSS 20 min   Exercises   Comments B LE ex in sitting   Short term goals   Time Frame for Short term goals 14 DAYS   Short term goal 1 BED MOB MIN ASSIST   Short term goal 2 SIT TO STAND MIN-MOD   Short term goal 3 BED TO CHAIR MIN-MOD   Conditions Requiring Skilled Therapeutic Intervention   Body structures, Functions, Activity limitations Decreased functional mobility ; Decreased ADL status; Decreased ROM; Decreased strength;Decreased safe awareness;Decreased endurance;Decreased balance;Decreased cognition; Increased pain   Assessment Did not attempt transfer to chair, pt was able to sit IND bedside    Activity Tolerance   Activity Tolerance Patient Tolerated treatment well;Patient limited by endurance   Safety Devices   Type of devices Call light within reach; Left in bed         Electronically signed by Allison Newby PTA on 11/17/2020 at 4:05 PM

## 2020-11-17 NOTE — CARE COORDINATION
Pending insurance pre-cert, Pt has been accepted at [facility]. Admissions will notify  once pre-cert has cleared.    [facility]   C  [phone] F  Electronically signed by Rita Spears on 11/17/2020 at 3:10 PM

## 2020-11-17 NOTE — CARE COORDINATION
Pt has been awarded a State Guardian to assist with medical/financial needs    Ricci Williamson [phone]    [email or web address]      will start sending referrals for rehab placement to the surrounding area     Electronically signed by Miller Nascimento on 11/17/2020 at 8:28 AM

## 2020-11-18 NOTE — PROGRESS NOTES
[facility] Hospitalists    Patient:  Quirino Contreras  YOB: 1940  Date of Service: 11/18/2020  MRN: [id number]   Acct: [de-identified]   Primary Care Physician: Nalini Gutierrez MD  Advance Directive: [facility]  Admit Date: 10/25/2020       Hospital Day: 25  Portions of this note have been copied forward, however, changed to reflect the most current clinical status of this patient. CHIEF COMPLAINT: Respiratory failure    SUBJECTIVE:  Patient has no new complaints. She states she's having difficulty getting comfortable in the hospital bed     Cumulative Hospital Course:  Jewell Cuevas is a 79year old female with PMH CVA, HTN, polymyalgia, chronic lower back pain, chronic BLE lymphedema who presented to [address] [place] [address] ED after being found unresponsive at the SNF where she resides. She was noted to have acute respiratory failure and supraglottic airway was placed by EMS and she was bagged en route to this facility. Upon arrival to ED she was transitioned to BiPAP. Respiratory failure thought to be 2/2 opiate overdose, however, patient states she has no recollection of events at SNF prior to being found unresponsive and has remained confused with likely anoxic brain injury. She is not capable of making decisions for herself and social work is assisting with legal guardian. She was admitted to Hospitalist service. Social work and palliative care were consulted. Cardiology has followed for elevated troponin suspected to be 2/2 atrial fibrillation and chronic diastolic heart failure. Patient denying chest pain. She was weaned off Cardizem gtt. Anticoagulation continued and transitioned from Lovenox to Eliquis. Coreg added. 11/09/2020 Pt is started on Cefepime for Klebsiella UTI. Cefepime changed to Rocephin on 11/10 based on urine culture sensitivities. Guardianship hearing at [facility] court on Monday November 16th at 9:30AM. Pt has completed 5 day course of IV abx for UTI. Working with PT.     Review of Systems: 14 point review of systems is negative except as specifically addressed above. Objective:   VITALS:  /79   Pulse 101   Temp 98.1 °F (36.7 °C) (Temporal)   Resp 20   Ht 5' 6\" (1.676 m)   Wt (!) 327 lb 5 oz (148.5 kg)   SpO2 90%   Breastfeeding No   BMI 52.83 kg/m²   24HR INTAKE/OUTPUT:      Intake/Output Summary (Last 24 hours) at 11/18/2020 1713  Last data filed at 11/18/2020 1456  Gross per 24 hour   Intake 480 ml   Output 600 ml   Net -120 ml     General appearance: 80 yo female, no acute distress, resting comfortably in bed in supine position     Head: Normocephalic, without obvious abnormality, atraumatic  Eyes: conjunctivae/corneas clear. PERRL, EOM's intact. Ears: normal external ears and nose, throat without exudate  Neck: no adenopathy, no carotid bruit, no JVD, supple, symmetrical, trachea midline   Lungs: clear throughout, no wheezes, rales or rhonchi  Heart: irregularly irregular, S1, S2 normal, no murmur  Abdomen:soft, obese, non-tender; non-distended, normal bowel sounds no masses, no organomegaly  Extremities:trace BLE edema with chronic lymphedema/venous stasis changes, unchanged   Skin: Skin color, texture, turgor normal. No rashes or lesions  Lymphatic: No palpable lymph node enlargment  Neurologic: Alert and oriented X 3, generalized weakness and normal tone.  No focal deficits, patient does not appear confused today, answers all questions appropriately   Psychiatric: Calm, appropriate affect, confused at times     Medications:      dextrose        diclofenac sodium  1 g Topical BID    carvedilol  6.25 mg Oral BID WC    gabapentin  300 mg Oral TID    apixaban  5 mg Oral BID    DULoxetine  20 mg Oral Daily    guaiFENesin  600 mg Oral BID    polyethylene glycol  17 g Oral Daily    docusate sodium  100 mg Oral Daily    lidocaine  1 patch Transdermal Daily    levothyroxine  150 mcg Oral Daily    fluticasone  1 spray Each Nostril Daily    aspirin  81 mg Oral Daily    sodium chloride flush  10 mL Intravenous 2 times per day    insulin lispro  0-6 Units Subcutaneous TID WC    insulin lispro  0-3 Units Subcutaneous Nightly     sodium chloride, methyl salicylate-menthol, miconazole, oxyCODONE-acetaminophen, sodium chloride flush, acetaminophen **OR** acetaminophen, glucose, dextrose, glucagon (rDNA), dextrose  DIET DYSPHAGIA SOFT AND BITE-SIZED; Carb Control: 4 carb choices (60 gms)/meal; No Added Salt (3-4 GM); Mildly Thick (Nectar)     Lab and other Data:     RAD:   Ct Head Wo Contrast  Result Date: 10/25/2020  Changes of aging with no acute intracranial abnormality   Signed by Dr Aj Robledo on 10/25/2020 3:19 PM     Xr Chest Portable  Result Date: 10/25/2020  1. Moderate cardiomegaly no acute pulmonary vascular congestion. Signed by Dr Aj Robledo on 10/25/2020 2:38 PM     XR Chest Portable  Result Date: 10/30/20   Impression:  The poor lung expansion and discoid atelectatic changes, more in the right lower lung. The Persistent moderate cardiomegaly. No pulmonary congestion. Signed by Dr Shi Anaya on 10/30/2020 7:03 AM     KUB  Result Date: 10/30/20  Impression:  A moderate amount of stool in the colon more so in the proximal colon. No finding to suggest obstruction or ileus. No radiopaque calculi. Signed by Dr Shi Anaya on 10/30/2020 2:56 PM     Micro:   Culture, Urine [[id number]]  (Abnormal)  Collected: 10/25/20 1510   Order Status: Completed  Specimen: Urine, clean catch  Updated: 10/27/20 1135     Urine Culture, Routine  >50,000 CFU/ml      Organism  Candida albicans       Urine Culture, Routine      Moderate growth   No further workup     Assessment/Plan   Principal Problem:    Respiratory arrest (HCC)-2/2 suspected opiate overdose, Utilizing BiPAP at times, does not qualify for Trilogy, resolved, remains stable on 4L [place] oxygen, resolved    Active Problems:    Anoxic brain injury-slow improvement in cognition throughout her hospital course.  SLUMS score 18/30 consistent with dementia, guardianship obtained, now pending placement/insurance approval      Elevated troponin-2/2 atrial fibrillation/diastolic CHF per Cardiology and without need for further work up, stable      Generalized weakness-PT/OT, will need placement. SW following and has requested state guardianship at this time, chronic and unchanged       Atrial fibrillation-transitioned to Eliquis, weaned off Cardizem gtt, Coreg added, stable      Idiopathic peripheral neuropathy/Chronic bilateral low back pain with bilateral sciatica-noted, pain well controlled       History of cerebrovascular accident (CVA) in adulthood-noted      Essential hypertension-stable      Palliative care patient-noted      Hypothyroidism-synthroid continued      BLE cellulitis -likely 2/2 chronic lymphedema/venous stasis. Klebsiella pneumoniae UTI-completed abx course     Patient remains stable on NC oxygen. UTI adequately treated. Working with PT/OT. Mental status at baseline. Guardianship obtained.  Plan on DC once pre-cert complete    DVT Prophylaxis: Lovenox    Sandra Aj PA-C

## 2020-11-18 NOTE — PROGRESS NOTES
Physical Therapy  Name: Wes Tavares  MRN:  [id number]  Date of service:  11/18/2020 11/18/20 1010   Subjective   Subjective Pt states she is willing to sit on bedside   Bed Mobility   Supine to Sit Contact guard assistance   Sit to Supine Minimal assistance   Scooting Maximal assistance;2 Person assistance   Comment BSS 25 min   Short term goals   Time Frame for Short term goals 14 DAYS   Short term goal 1 BED MOB MIN ASSIST   Short term goal 2 SIT TO STAND MIN-MOD   Short term goal 3 BED TO CHAIR MIN-MOD   Conditions Requiring Skilled Therapeutic Intervention   Body structures, Functions, Activity limitations Decreased functional mobility ; Decreased ADL status; Decreased ROM; Decreased strength;Decreased safe awareness;Decreased endurance;Decreased balance;Decreased cognition; Increased pain   Assessment Pt was able to perform  BSS, pt back to supine stating her back was hurting from sitting up. Activity Tolerance   Activity Tolerance Patient limited by pain; Patient limited by endurance   Safety Devices   Type of devices Left in bed; Chair alarm in place;Call light within reach         Electronically signed by Jessy Andujar PTA on 11/18/2020 at 10:13 AM

## 2020-11-19 NOTE — PROGRESS NOTES
Physical Therapy  Brennan Moreno  [id number]     11/19/20 1007   Subjective   Subjective Attempt, patient declined at this time. Will cont to follow.    Electronically signed by Ari Aguirre PTA on 11/19/2020 at 10:08 AM

## 2020-11-19 NOTE — CARE COORDINATION
Pre-cert has cleared for Pt to DC to [facility] on Thursday November 19th.    [facility]  [phone]  Electronically signed by Blossom Gaytan on 11/19/2020 at 7:23 AM

## 2020-11-19 NOTE — DISCHARGE SUMMARY
Alec Garcia  :  1940  MRN:  [id number]    Admit date:  10/25/2020  Discharge date:  2020    Discharging Physician: Jess Ho MD    Advance Directive: [facility]    Consults: Dr. Galvan-Cardiology    Primary Care Physician:  Aurelia Martinez MD    Discharge Diagnoses:    Principal Problem:    Respiratory arrest [facility])  Active Problems:    Idiopathic peripheral neuropathy    Chronic bilateral low back pain with bilateral sciatica    Frequent falls    History of cerebrovascular accident (CVA) in adulthood    Essential hypertension    Polymyalgia ([facility] [address]    Spinal stenosis of lumbosacral region    Palliative care patient    Atrial fibrillation ([facility] [address]    Klebsiella pneumoniae UTI-completed abx course    BLE cellulitis-resolved, chronic venous stasis    Generalized weakness    Anoxic brain injury/Dementia    Portions of this note have been copied forward, however, changed to reflect the most current clinical status of this patient. Hospital Course:    Azar Starks is a 79year old female with PMH CVA, HTN, polymyalgia, chronic lower back pain, chronic BLE lymphedema who presented to [facility] ED after being found unresponsive at the SNF where she resides. She was noted to have acute respiratory failure and supraglottic airway was placed by EMS and she was bagged en route to this facility. Upon arrival to ED she was transitioned to BiPAP. Respiratory failure thought to be 2/2 opiate overdose, however, patient states she has no recollection of events at SNF prior to being found unresponsive and has remained confused with likely anoxic brain injury. She is not capable of making decisions for herself and social work is assisting with legal guardian. She was admitted to Hospitalist service. Social work and palliative care were consulted. Cardiology has followed for elevated troponin suspected to be 2/2 atrial fibrillation and chronic diastolic heart failure. Patient denying chest pain.   She was weaned off Cardizem gtt. Anticoagulation continued and transitioned from Lovenox to Eliquis. Coreg added. 11/09/2020 Pt is started on Cefepime for Klebsiella UTI. Cefepime changed to Rocephin on 11/10 based on urine culture sensitivities.  Guardianship hearing at [facility] [address] on Monday November 16th at 9:30AM. Patient has completed a 5 day course of antibiotic therapy for UTI. Her mental status is now at baseline. Her vitals and labs have remained stable. At this time patient is stable for discharge to [facility]. Significant Diagnostic Studies:   RAD:   Ct Head Wo Contrast  Result Date: 10/25/2020  Changes of aging with no acute intracranial abnormality   Signed by Dr Chris Young on 10/25/2020 3:19 PM     Xr Chest Portable  Result Date: 10/25/2020  1. Moderate cardiomegaly no acute pulmonary vascular congestion. Signed by Dr Chris Young on 10/25/2020 2:38 PM     XR Chest Portable  Result Date: 10/30/20   Impression:  The poor lung expansion and discoid atelectatic changes, more in the right lower lung. The Persistent moderate cardiomegaly. No pulmonary congestion. Signed by Dr Tavon Carter on 10/30/2020 7:03 AM     KUB  Result Date: 10/30/20  Impression:  A moderate amount of stool in the colon more so in the proximal colon. No finding to suggest obstruction or ileus. No radiopaque calculi.    Signed by Dr Tavon Carter on 10/30/2020 2:56 PM     Micro:   Culture, Urine [[id number]]  (Abnormal)     Collected: 10/25/20 1510   Order Status: Completed      Specimen: Urine, clean catch             Updated: 10/27/20 1135              Urine Culture, Routine            >50,000 CFU/ml               Organism        Candida albicans                Urine Culture, Routine                         Moderate growth   No further workup   Pertinent Labs: BMP:    Recent Labs     11/17/20  0355 11/19/20  0338    135*   K 4.8 5.0    99   CO2 30* 26   BUN 26* 31*   CREATININE 0.8 0.7   GLUCOSE 143* 154*     Physical Exam:  Vital Signs: /62   Pulse 77   Temp 97.1 °F (36.2 °C) (Temporal)   Resp 18   Ht 5' 6\" (1.676 m)   Wt (!) 334 lb 6.4 oz (151.7 kg)   SpO2 90%   Breastfeeding No   BMI 53.97 kg/m²   General appearance:79 yo female, no acute distress    HEENT: Normocephalic. Chest: mildly diminished at bases otherwise clear to auscultation bilaterally without wheezes or rhonchi. Cardiac: irregularly irregular, S1, S2 normal. No murmurs, gallops, or rubs auscultated. Abdomen:soft, non-tender; non-distended normal bowel sounds no masses, no organomegaly. Extremities: No clubbing or cyanosis. No peripheral edema. Peripheral pulses palpable. Neurologic: Generalized weakness, no focal deficits     Discharge Medications:       [facility]   Home Medication Instructions [facility]:[id number]    Printed on:11/19/20 1000   Medication Information                      apixaban (ELIQUIS) 5 MG TABS tablet  Take 1 tablet by mouth 2 times daily             aspirin 81 MG tablet  Take 81 mg by mouth daily Delayed release. carvedilol (COREG) 6.25 MG tablet  Take 1 tablet by mouth 2 times daily (with meals)             diclofenac sodium (VOLTAREN) 1 % GEL  Apply 1 g topically 2 times daily             docusate sodium (COLACE, DULCOLAX) 100 MG CAPS  Take 100 mg by mouth daily             DULoxetine (CYMBALTA) 20 MG extended release capsule  Take 1 capsule by mouth daily             fluticasone (FLONASE) 50 MCG/ACT nasal spray  1 spray by Each Nostril route daily             gabapentin (NEURONTIN) 100 MG capsule  Take 3 capsules by mouth 3 times daily for 10 days.              guaiFENesin (MUCINEX) 600 MG extended release tablet  Take 1 tablet by mouth every 12 hours             levothyroxine (SYNTHROID) 150 MCG tablet  Take 150 mcg by mouth Daily              lidocaine 4 % external patch  Place 1 patch onto the skin daily             melatonin 5 MG TBDP disintegrating tablet  Take 2 tablets by mouth nightly

## 2020-11-19 NOTE — PROGRESS NOTES
nectar thick liquids. If patient receives mouth care prior to intake, okay for ice chips and small sips thin H2O IN BETWEEN MEALS for comfort.     Treatment Plan  Requires SLP Intervention: No-patient to D/C this date     Recommended Diet and Intervention  Diet Solids Recommendation: Bite sized   Liquid Consistency Recommendation: Nectar thick   Recommended Form of Meds: Meds in puree as able     Compensatory Swallowing Strategies  Compensatory Swallowing Strategies: Upright as possible for all oral intake;Small bites/sips;Eat/Feed slowly; Alternate solids and liquids; Remain upright for 30-45 minutes after meals     General  Chart Reviewed: Yes  Behavior/Cognition: Alert; Cooperative  Communication Observation: (SLP ranked functional intelligibility of speech for unfamiliar listeners at 100% in utterances without background noise present.)  Follows Directions: Simple   Patient Positioning: Upright in bed  Consistencies Administered: Bite sized; Nectar- cup; Thin - cup     Re-assessed patient's swallowing function with the following observations noted:     Oral Phase  Suspected Premature Bolus Loss: Thin - cup (Patient exhibited fast oral transit of thin H2O trials presented via cup by SLP)  Oral Phase - Comment: Patient exhibited adequate oral prep of bite sized consistency presentations administered by SLP. Oral transit of bite sized consistency primarily measured 1-2 seconds in length and min oral cavity residue was noted post swallows; residue cleared from the mouth with additional dry swallows. Oral transit of nectar thick liquid presentations, administered via cup by SLP, primarily measured 1-2 seconds in length.      Pharyngeal Phase  Suspected Swallow Delay: Thin - cup (Suspect secondary to oral transit times.)  Laryngeal Elevation: (Patient exhibited sluggish, mild-moderately decreased laryngeal elevation for swallow airway protection.)  Delayed Cough:  Thin - cup   Pharyngeal Phase - Comment: No outward S/S penetration/aspiration was noted with any bite sized consistency presentation or nectar thick liquid presentation administered during treatment session this date. Patient did exhibit S/S penetration/aspiration with thin H2O trials with consistent delayed coughs observed post swallows.     At this time, upon D/C from acute hospital setting, would recommend continuation bite sized consistency and nectar thick liquids. Recommend meds in pudding/applesauce or with nectar thick liquids. If patient receives mouth care prior to intake, okay for ice chips and small sips thin H2O IN BETWEEN MEALS for comfort. No further acute speech therapy is felt to be warranted.     Electronically signed by CHATO De Souza on 11/19/2020 at 10:01 AM

## 2020-12-03 NOTE — TELEPHONE ENCOUNTER
The pt guardian called and said that the pt is not at [facility] and they would need to be contacted to coordinate the appointment. The phone number is [phone].

## (undated) DEVICE — DUAL LUMEN URETERAL CATHETER

## (undated) DEVICE — PK TURNOVER CYSTO RM

## (undated) DEVICE — GW SENSR DUALFLEX NITNL STR .038IN 3X150CM

## (undated) DEVICE — GLV SURG BIOGEL M LTX PF 7 1/2

## (undated) DEVICE — TBG DRN URINARY 9/32IN

## (undated) DEVICE — ENDOSCOPIC SEAL URO 1 SIZE FITS ALL: Brand: ENDOSCOPIC SEAL

## (undated) DEVICE — GLV SURG TRIUMPH MICRO PF LTX 7.5 STRL

## (undated) DEVICE — CLTH CLENS READYCLEANSE PERI CARE PK/5

## (undated) DEVICE — CATH URETRL OPN/END 5F70CM

## (undated) DEVICE — WATER 50W MAX / AIR 8W MAX: Brand: FLEXIVA TRACTIP

## (undated) DEVICE — PK CYSTO 30

## (undated) DEVICE — URETERAL ACCESS SHEATH SET: Brand: NAVIGATOR HD

## (undated) DEVICE — CATHETER,FOLEY,SILI-ELAST,LTX,16FR,10ML: Brand: MEDLINE

## (undated) DEVICE — GLW STD STR 3CM .035X150CM